# Patient Record
Sex: FEMALE | Race: WHITE | NOT HISPANIC OR LATINO | URBAN - METROPOLITAN AREA
[De-identification: names, ages, dates, MRNs, and addresses within clinical notes are randomized per-mention and may not be internally consistent; named-entity substitution may affect disease eponyms.]

---

## 2017-07-26 ENCOUNTER — OUTPATIENT (OUTPATIENT)
Dept: OUTPATIENT SERVICES | Facility: HOSPITAL | Age: 57
LOS: 1 days | Discharge: HOME | End: 2017-07-26

## 2017-07-26 DIAGNOSIS — R55 SYNCOPE AND COLLAPSE: ICD-10-CM

## 2017-07-26 DIAGNOSIS — R42 DIZZINESS AND GIDDINESS: ICD-10-CM

## 2017-07-26 DIAGNOSIS — Z12.31 ENCOUNTER FOR SCREENING MAMMOGRAM FOR MALIGNANT NEOPLASM OF BREAST: ICD-10-CM

## 2017-07-29 ENCOUNTER — EMERGENCY (EMERGENCY)
Facility: HOSPITAL | Age: 57
LOS: 0 days | Discharge: HOME | End: 2017-07-29

## 2017-07-29 DIAGNOSIS — Z79.899 OTHER LONG TERM (CURRENT) DRUG THERAPY: ICD-10-CM

## 2017-07-29 DIAGNOSIS — M62.830 MUSCLE SPASM OF BACK: ICD-10-CM

## 2017-07-29 DIAGNOSIS — M54.6 PAIN IN THORACIC SPINE: ICD-10-CM

## 2017-07-29 DIAGNOSIS — R55 SYNCOPE AND COLLAPSE: ICD-10-CM

## 2017-07-29 DIAGNOSIS — I10 ESSENTIAL (PRIMARY) HYPERTENSION: ICD-10-CM

## 2017-07-29 DIAGNOSIS — Z79.82 LONG TERM (CURRENT) USE OF ASPIRIN: ICD-10-CM

## 2017-07-29 DIAGNOSIS — Z98.890 OTHER SPECIFIED POSTPROCEDURAL STATES: ICD-10-CM

## 2017-07-29 DIAGNOSIS — Z88.2 ALLERGY STATUS TO SULFONAMIDES: ICD-10-CM

## 2017-07-29 DIAGNOSIS — R42 DIZZINESS AND GIDDINESS: ICD-10-CM

## 2017-07-29 DIAGNOSIS — E78.00 PURE HYPERCHOLESTEROLEMIA, UNSPECIFIED: ICD-10-CM

## 2017-07-31 DIAGNOSIS — N95.9 UNSPECIFIED MENOPAUSAL AND PERIMENOPAUSAL DISORDER: ICD-10-CM

## 2017-07-31 DIAGNOSIS — Z13.820 ENCOUNTER FOR SCREENING FOR OSTEOPOROSIS: ICD-10-CM

## 2017-08-04 ENCOUNTER — OUTPATIENT (OUTPATIENT)
Dept: OUTPATIENT SERVICES | Facility: HOSPITAL | Age: 57
LOS: 1 days | Discharge: HOME | End: 2017-08-04

## 2017-08-04 DIAGNOSIS — R55 SYNCOPE AND COLLAPSE: ICD-10-CM

## 2017-08-04 DIAGNOSIS — R42 DIZZINESS AND GIDDINESS: ICD-10-CM

## 2017-08-04 DIAGNOSIS — R92.8 OTHER ABNORMAL AND INCONCLUSIVE FINDINGS ON DIAGNOSTIC IMAGING OF BREAST: ICD-10-CM

## 2017-08-08 DIAGNOSIS — Z01.419 ENCOUNTER FOR GYNECOLOGICAL EXAMINATION (GENERAL) (ROUTINE) WITHOUT ABNORMAL FINDINGS: ICD-10-CM

## 2017-09-21 ENCOUNTER — EMERGENCY (EMERGENCY)
Facility: HOSPITAL | Age: 57
LOS: 0 days | Discharge: HOME | End: 2017-09-21

## 2017-09-21 DIAGNOSIS — R11.0 NAUSEA: ICD-10-CM

## 2017-09-21 DIAGNOSIS — E78.00 PURE HYPERCHOLESTEROLEMIA, UNSPECIFIED: ICD-10-CM

## 2017-09-21 DIAGNOSIS — R10.32 LEFT LOWER QUADRANT PAIN: ICD-10-CM

## 2017-09-21 DIAGNOSIS — R42 DIZZINESS AND GIDDINESS: ICD-10-CM

## 2017-09-21 DIAGNOSIS — Z79.899 OTHER LONG TERM (CURRENT) DRUG THERAPY: ICD-10-CM

## 2017-09-21 DIAGNOSIS — R10.30 LOWER ABDOMINAL PAIN, UNSPECIFIED: ICD-10-CM

## 2017-09-21 DIAGNOSIS — R55 SYNCOPE AND COLLAPSE: ICD-10-CM

## 2017-09-21 DIAGNOSIS — Z88.2 ALLERGY STATUS TO SULFONAMIDES: ICD-10-CM

## 2017-09-21 DIAGNOSIS — Z79.82 LONG TERM (CURRENT) USE OF ASPIRIN: ICD-10-CM

## 2017-09-21 DIAGNOSIS — Z98.890 OTHER SPECIFIED POSTPROCEDURAL STATES: ICD-10-CM

## 2017-09-22 ENCOUNTER — EMERGENCY (EMERGENCY)
Facility: HOSPITAL | Age: 57
LOS: 0 days | Discharge: HOME | End: 2017-09-22
Admitting: INTERNAL MEDICINE

## 2017-09-22 DIAGNOSIS — Z79.899 OTHER LONG TERM (CURRENT) DRUG THERAPY: ICD-10-CM

## 2017-09-22 DIAGNOSIS — R35.0 FREQUENCY OF MICTURITION: ICD-10-CM

## 2017-09-22 DIAGNOSIS — Z79.82 LONG TERM (CURRENT) USE OF ASPIRIN: ICD-10-CM

## 2017-09-22 DIAGNOSIS — R55 SYNCOPE AND COLLAPSE: ICD-10-CM

## 2017-09-22 DIAGNOSIS — K65.9 PERITONITIS, UNSPECIFIED: ICD-10-CM

## 2017-09-22 DIAGNOSIS — R11.0 NAUSEA: ICD-10-CM

## 2017-09-22 DIAGNOSIS — Z88.2 ALLERGY STATUS TO SULFONAMIDES: ICD-10-CM

## 2017-09-22 DIAGNOSIS — R10.32 LEFT LOWER QUADRANT PAIN: ICD-10-CM

## 2017-09-22 DIAGNOSIS — R42 DIZZINESS AND GIDDINESS: ICD-10-CM

## 2017-09-22 DIAGNOSIS — E78.00 PURE HYPERCHOLESTEROLEMIA, UNSPECIFIED: ICD-10-CM

## 2017-10-24 ENCOUNTER — APPOINTMENT (OUTPATIENT)
Dept: SURGERY | Facility: CLINIC | Age: 57
End: 2017-10-24
Payer: COMMERCIAL

## 2017-10-24 VITALS
SYSTOLIC BLOOD PRESSURE: 124 MMHG | WEIGHT: 179 LBS | DIASTOLIC BLOOD PRESSURE: 72 MMHG | BODY MASS INDEX: 30.56 KG/M2 | HEIGHT: 64 IN

## 2017-10-24 DIAGNOSIS — Z80.1 FAMILY HISTORY OF MALIGNANT NEOPLASM OF TRACHEA, BRONCHUS AND LUNG: ICD-10-CM

## 2017-10-24 DIAGNOSIS — Z82.49 FAMILY HISTORY OF ISCHEMIC HEART DISEASE AND OTHER DISEASES OF THE CIRCULATORY SYSTEM: ICD-10-CM

## 2017-10-24 DIAGNOSIS — Z80.0 FAMILY HISTORY OF MALIGNANT NEOPLASM OF DIGESTIVE ORGANS: ICD-10-CM

## 2017-10-24 DIAGNOSIS — Z84.1 FAMILY HISTORY OF DISORDERS OF KIDNEY AND URETER: ICD-10-CM

## 2017-10-24 DIAGNOSIS — Z87.891 PERSONAL HISTORY OF NICOTINE DEPENDENCE: ICD-10-CM

## 2017-10-24 DIAGNOSIS — K86.1 OTHER CHRONIC PANCREATITIS: ICD-10-CM

## 2017-10-24 DIAGNOSIS — K58.9 IRRITABLE BOWEL SYNDROME W/OUT DIARRHEA: ICD-10-CM

## 2017-10-24 DIAGNOSIS — K57.90 DIVERTICULOSIS OF INTESTINE, PART UNSPECIFIED, W/OUT PERFORATION OR ABSCESS W/OUT BLEEDING: ICD-10-CM

## 2017-10-24 DIAGNOSIS — R42 DIZZINESS AND GIDDINESS: ICD-10-CM

## 2017-10-24 DIAGNOSIS — K59.00 CONSTIPATION, UNSPECIFIED: ICD-10-CM

## 2017-10-24 DIAGNOSIS — M54.5 LOW BACK PAIN: ICD-10-CM

## 2017-10-24 DIAGNOSIS — Z87.442 PERSONAL HISTORY OF URINARY CALCULI: ICD-10-CM

## 2017-10-24 PROCEDURE — 99203 OFFICE O/P NEW LOW 30 MIN: CPT

## 2017-10-31 PROBLEM — Z87.891 FORMER SMOKER: Status: ACTIVE | Noted: 2017-10-24

## 2017-10-31 PROBLEM — K86.1 CHRONIC PANCREATITIS: Status: ACTIVE | Noted: 2017-10-24

## 2017-10-31 PROBLEM — Z82.49 FAMILY HISTORY OF CARDIAC DISORDER: Status: ACTIVE | Noted: 2017-10-24

## 2017-10-31 PROBLEM — K58.9 IRRITABLE BOWEL SYNDROME: Status: ACTIVE | Noted: 2017-10-24

## 2017-10-31 PROBLEM — Z87.442 HISTORY OF KIDNEY STONES: Status: ACTIVE | Noted: 2017-10-24

## 2017-10-31 PROBLEM — Z84.1 FAMILY HISTORY OF RENAL FAILURE: Status: ACTIVE | Noted: 2017-10-24

## 2017-10-31 PROBLEM — M54.5 LOWER BACK PAIN: Status: ACTIVE | Noted: 2017-10-24

## 2017-10-31 PROBLEM — R42 LIGHTHEADEDNESS: Status: ACTIVE | Noted: 2017-10-24

## 2017-10-31 PROBLEM — Z80.0 FAMILY HISTORY OF PANCREATIC CANCER: Status: ACTIVE | Noted: 2017-10-24

## 2017-10-31 PROBLEM — Z80.1 FAMILY HISTORY OF LUNG CANCER: Status: ACTIVE | Noted: 2017-10-24

## 2017-10-31 PROBLEM — Z80.0 FAMILY HISTORY OF MALIGNANT NEOPLASM OF STOMACH: Status: ACTIVE | Noted: 2017-10-24

## 2017-10-31 PROBLEM — K59.00 CONSTIPATION: Status: ACTIVE | Noted: 2017-10-24

## 2017-11-21 ENCOUNTER — APPOINTMENT (OUTPATIENT)
Dept: SURGERY | Facility: CLINIC | Age: 57
End: 2017-11-21

## 2018-02-22 ENCOUNTER — EMERGENCY (EMERGENCY)
Facility: HOSPITAL | Age: 58
LOS: 0 days | Discharge: HOME | End: 2018-02-22
Attending: EMERGENCY MEDICINE

## 2018-02-22 VITALS
TEMPERATURE: 97 F | SYSTOLIC BLOOD PRESSURE: 109 MMHG | HEART RATE: 73 BPM | RESPIRATION RATE: 18 BRPM | OXYGEN SATURATION: 99 % | DIASTOLIC BLOOD PRESSURE: 68 MMHG

## 2018-02-22 VITALS
WEIGHT: 179.9 LBS | HEART RATE: 65 BPM | OXYGEN SATURATION: 100 % | SYSTOLIC BLOOD PRESSURE: 145 MMHG | RESPIRATION RATE: 18 BRPM | DIASTOLIC BLOOD PRESSURE: 83 MMHG | TEMPERATURE: 97 F | HEIGHT: 64 IN

## 2018-02-22 DIAGNOSIS — G89.18 OTHER ACUTE POSTPROCEDURAL PAIN: ICD-10-CM

## 2018-02-22 DIAGNOSIS — Z88.2 ALLERGY STATUS TO SULFONAMIDES: ICD-10-CM

## 2018-02-22 DIAGNOSIS — M79.675 PAIN IN LEFT TOE(S): ICD-10-CM

## 2018-02-22 DIAGNOSIS — M79.676 PAIN IN UNSPECIFIED TOE(S): ICD-10-CM

## 2018-02-22 DIAGNOSIS — Z98.890 OTHER SPECIFIED POSTPROCEDURAL STATES: Chronic | ICD-10-CM

## 2018-02-22 DIAGNOSIS — Z98.890 OTHER SPECIFIED POSTPROCEDURAL STATES: ICD-10-CM

## 2018-02-22 LAB
HCT VFR BLD CALC: 39.2 % — SIGNIFICANT CHANGE UP (ref 37–47)
HGB BLD-MCNC: 12.8 G/DL — LOW (ref 14–18)
MCHC RBC-ENTMCNC: 29.3 PG — SIGNIFICANT CHANGE UP (ref 27–31)
MCHC RBC-ENTMCNC: 32.7 G/DL — SIGNIFICANT CHANGE UP (ref 32–37)
MCV RBC AUTO: 89.7 FL — SIGNIFICANT CHANGE UP (ref 81–91)
NRBC # BLD: 0 /100 WBCS — SIGNIFICANT CHANGE UP (ref 0–0)
PLATELET # BLD AUTO: 240 K/UL — SIGNIFICANT CHANGE UP (ref 130–400)
RBC # BLD: 4.37 M/UL — SIGNIFICANT CHANGE UP (ref 4.2–5.4)
RBC # FLD: 12.6 % — SIGNIFICANT CHANGE UP (ref 11.5–14.5)
WBC # BLD: 6.34 K/UL — SIGNIFICANT CHANGE UP (ref 4.8–10.8)
WBC # FLD AUTO: 6.34 K/UL — SIGNIFICANT CHANGE UP (ref 4.8–10.8)

## 2018-02-22 RX ORDER — AMPICILLIN SODIUM AND SULBACTAM SODIUM 250; 125 MG/ML; MG/ML
3 INJECTION, POWDER, FOR SUSPENSION INTRAMUSCULAR; INTRAVENOUS EVERY 6 HOURS
Qty: 0 | Refills: 0 | Status: DISCONTINUED | OUTPATIENT
Start: 2018-02-22 | End: 2018-02-22

## 2018-02-22 RX ORDER — OXYCODONE AND ACETAMINOPHEN 5; 325 MG/1; MG/1
2 TABLET ORAL ONCE
Qty: 0 | Refills: 0 | Status: DISCONTINUED | OUTPATIENT
Start: 2018-02-22 | End: 2018-02-22

## 2018-02-22 RX ADMIN — OXYCODONE AND ACETAMINOPHEN 2 TABLET(S): 5; 325 TABLET ORAL at 06:49

## 2018-02-22 RX ADMIN — AMPICILLIN SODIUM AND SULBACTAM SODIUM 200 GRAM(S): 250; 125 INJECTION, POWDER, FOR SUSPENSION INTRAMUSCULAR; INTRAVENOUS at 06:49

## 2018-02-22 NOTE — ED PROVIDER NOTE - ATTENDING CONTRIBUTION TO CARE
agree with above. 57 yF not immunocompromised p/w pain to surgical area where she had to e surgery 2 weeks ago -   erythema to toe and mildly over dorsum of foot. no systemic symptoms FROM toes, soft compartments no paresthesias no calf pain .  NVI. plan iv abx -  cbc  follow up oupt surgeon return to ED instructions outpt abx

## 2018-02-22 NOTE — ED PROVIDER NOTE - NS ED ROS FT
Pt denied fvr/chills, HA, visual changes, dizziness, light headedness, presyncope, LOC, CP, FISHER, PND, SOB, cough, hemoptysis, abd pain, n/v/d, changes in bowel or bladder habits, LE edema, numbness, weakness, changes in gait.  The pt also denied recent travel outside of the country, recent illnesses, sick contacts, recent airplane trips or periods of immobility/bedbound.

## 2018-02-22 NOTE — ED PROVIDER NOTE - PHYSICAL EXAMINATION
AOx4, Non toxic appearing, NAD. Skin  warm and dry, no acute rash. PERRLA/EOM, conjunctiva and sclera clear. MM moist, no nasal discharge.  Pharynx and TM's unremarkable. Neck supple nt, no meningeal signs. Heart RRR s1s2 nl, no rub/murmur. Lungs- BS equal, CTAB. Abdomen soft ntnd no r/g. Extremities- moves all, +equal distal pulses, sensation wnl, normal ROM. No LE edema, calves nttp b/l.  Left 4th toe post op with surrounding erythema no fluctuance, nv intact distally, soft compartments.

## 2018-02-22 NOTE — ED ADULT TRIAGE NOTE - CHIEF COMPLAINT QUOTE
pt c/o of L foot pain for past 2 days. post foot sx 2 weeks. pt states she has redness and swelling  denies drainage from incision site.

## 2018-02-22 NOTE — ED PROVIDER NOTE - OBJECTIVE STATEMENT
58 yo F hx of toe surgery 2 weeks ago p/w increasing toe pain since yesterday not responsive to tramadol ehre for evaluation. pain was improving for the first 10 days then began getting suddenly worse. denies trauma.

## 2018-03-28 ENCOUNTER — OUTPATIENT (OUTPATIENT)
Dept: OUTPATIENT SERVICES | Facility: HOSPITAL | Age: 58
LOS: 1 days | Discharge: HOME | End: 2018-03-28

## 2018-03-28 DIAGNOSIS — Z98.890 OTHER SPECIFIED POSTPROCEDURAL STATES: Chronic | ICD-10-CM

## 2018-04-13 ENCOUNTER — OUTPATIENT (OUTPATIENT)
Dept: OUTPATIENT SERVICES | Facility: HOSPITAL | Age: 58
LOS: 1 days | Discharge: HOME | End: 2018-04-13

## 2018-04-13 DIAGNOSIS — N39.0 URINARY TRACT INFECTION, SITE NOT SPECIFIED: ICD-10-CM

## 2018-04-13 DIAGNOSIS — E78.00 PURE HYPERCHOLESTEROLEMIA, UNSPECIFIED: ICD-10-CM

## 2018-04-13 DIAGNOSIS — E11.9 TYPE 2 DIABETES MELLITUS WITHOUT COMPLICATIONS: ICD-10-CM

## 2018-04-13 DIAGNOSIS — D64.9 ANEMIA, UNSPECIFIED: ICD-10-CM

## 2018-04-13 DIAGNOSIS — Z98.890 OTHER SPECIFIED POSTPROCEDURAL STATES: Chronic | ICD-10-CM

## 2018-04-13 DIAGNOSIS — E03.9 HYPOTHYROIDISM, UNSPECIFIED: ICD-10-CM

## 2018-04-13 DIAGNOSIS — E55.9 VITAMIN D DEFICIENCY, UNSPECIFIED: ICD-10-CM

## 2018-04-13 DIAGNOSIS — C73 MALIGNANT NEOPLASM OF THYROID GLAND: ICD-10-CM

## 2018-04-13 DIAGNOSIS — D51.9 VITAMIN B12 DEFICIENCY ANEMIA, UNSPECIFIED: ICD-10-CM

## 2018-04-13 DIAGNOSIS — N18.9 CHRONIC KIDNEY DISEASE, UNSPECIFIED: ICD-10-CM

## 2018-04-20 ENCOUNTER — OUTPATIENT (OUTPATIENT)
Dept: OUTPATIENT SERVICES | Facility: HOSPITAL | Age: 58
LOS: 1 days | Discharge: HOME | End: 2018-04-20

## 2018-04-20 DIAGNOSIS — Z98.890 OTHER SPECIFIED POSTPROCEDURAL STATES: Chronic | ICD-10-CM

## 2018-04-23 DIAGNOSIS — K02.52 DENTAL CARIES ON PIT AND FISSURE SURFACE PENETRATING INTO DENTIN: ICD-10-CM

## 2018-05-04 ENCOUNTER — OUTPATIENT (OUTPATIENT)
Dept: OUTPATIENT SERVICES | Facility: HOSPITAL | Age: 58
LOS: 1 days | Discharge: HOME | End: 2018-05-04

## 2018-05-04 DIAGNOSIS — Z98.890 OTHER SPECIFIED POSTPROCEDURAL STATES: Chronic | ICD-10-CM

## 2018-05-04 DIAGNOSIS — Z98.818 OTHER DENTAL PROCEDURE STATUS: ICD-10-CM

## 2018-05-11 ENCOUNTER — OUTPATIENT (OUTPATIENT)
Dept: OUTPATIENT SERVICES | Facility: HOSPITAL | Age: 58
LOS: 1 days | Discharge: HOME | End: 2018-05-11

## 2018-05-11 DIAGNOSIS — M79.609 PAIN IN UNSPECIFIED LIMB: ICD-10-CM

## 2018-05-11 DIAGNOSIS — Z98.890 OTHER SPECIFIED POSTPROCEDURAL STATES: Chronic | ICD-10-CM

## 2018-06-12 ENCOUNTER — EMERGENCY (EMERGENCY)
Facility: HOSPITAL | Age: 58
LOS: 0 days | Discharge: HOME | End: 2018-06-12
Attending: EMERGENCY MEDICINE | Admitting: EMERGENCY MEDICINE

## 2018-06-12 VITALS
TEMPERATURE: 99 F | OXYGEN SATURATION: 98 % | RESPIRATION RATE: 18 BRPM | DIASTOLIC BLOOD PRESSURE: 73 MMHG | SYSTOLIC BLOOD PRESSURE: 147 MMHG | HEART RATE: 68 BPM

## 2018-06-12 VITALS
TEMPERATURE: 97 F | SYSTOLIC BLOOD PRESSURE: 130 MMHG | DIASTOLIC BLOOD PRESSURE: 85 MMHG | OXYGEN SATURATION: 97 % | HEART RATE: 62 BPM | RESPIRATION RATE: 18 BRPM
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
## 2018-06-12 DIAGNOSIS — S39.012A STRAIN OF MUSCLE, FASCIA AND TENDON OF LOWER BACK, INITIAL ENCOUNTER: ICD-10-CM
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
## 2018-06-12 DIAGNOSIS — M54.2 CERVICALGIA: ICD-10-CM

## 2018-06-12 DIAGNOSIS — E78.00 PURE HYPERCHOLESTEROLEMIA, UNSPECIFIED: ICD-10-CM

## 2018-06-12 DIAGNOSIS — Z79.82 LONG TERM (CURRENT) USE OF ASPIRIN: ICD-10-CM

## 2018-06-12 DIAGNOSIS — Z79.2 LONG TERM (CURRENT) USE OF ANTIBIOTICS: ICD-10-CM

## 2018-06-12 DIAGNOSIS — Z98.890 OTHER SPECIFIED POSTPROCEDURAL STATES: Chronic | ICD-10-CM
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
## 2018-06-12 RX ORDER — METHOCARBAMOL 500 MG/1
1000 TABLET, FILM COATED ORAL ONCE
Qty: 0 | Refills: 0 | Status: COMPLETED | OUTPATIENT
Start: 2018-06-12 | End: 2018-06-12

## 2018-06-12 RX ORDER — DIAZEPAM 5 MG
5 TABLET ORAL ONCE
Qty: 0 | Refills: 0 | Status: DISCONTINUED | OUTPATIENT
Start: 2018-06-12 | End: 2018-06-12

## 2018-06-12 RX ORDER — KETOROLAC TROMETHAMINE 30 MG/ML
30 SYRINGE (ML) INJECTION ONCE
Qty: 0 | Refills: 0 | Status: DISCONTINUED | OUTPATIENT
Start: 2018-06-12 | End: 2018-06-12

## 2018-06-12 RX ADMIN — METHOCARBAMOL 1000 MILLIGRAM(S): 500 TABLET, FILM COATED ORAL at 11:05

## 2018-06-12 RX ADMIN — Medication 5 MILLIGRAM(S): at 12:34

## 2018-06-12 RX ADMIN — Medication 30 MILLIGRAM(S): at 11:04

## 2018-06-12 NOTE — ED PROVIDER NOTE - PHYSICAL EXAMINATION
AOx4, Non toxic appearing, uncomfortable appearing, speaking in full sentences. Skin  warm and dry, no acute rash. Head normocephalic, atraumatic. PERRLA/EOMI, conjunctiva and sclera clear. MM moist, no nasal discharge.  Pharynx and TM's unremarkable.  No mastoid or temporal ttp. Neck supple nt, no meningeal signs, ttp over L strap muscles. Upper back ttp over L side superior to scapula. Heart RRR s1s2 nl, no rub/murmur. Lungs- No retractions, BS equal. Extremities- normal ROM.

## 2018-06-12 NOTE — ED PROVIDER NOTE - OBJECTIVE STATEMENT
58 y/o F p/w L sided upper back pain radiating into neck x 1 week, worse w/ movement. pt has been taking ibuprofen with no relief. Pt states she had same pain in past from a muscle strain and it improved w/ toradol, trigger point inject and physical therapy. Denies fever, chills, HA, rash.

## 2018-06-12 NOTE — ED PROVIDER NOTE - ATTENDING CONTRIBUTION TO CARE
I personally evaluated the patient. I reviewed the Resident’s or Physician Assistant’s note (as assigned above), and agree with the findings and plan except as documented in my note.  pt with left parascapular pain and neck/trap spasm, worse with rotation left shoulder and neck flexion/rotation to right, no rash, cor reg lungs cta abd snt neuro intact, improved with medication/therapy, will d/c to f/u with PT. Patient counseled regarding conditions which should prompt return.

## 2018-06-12 NOTE — ED PROVIDER NOTE - NS ED ROS FT
Constitutional: See HPI.  Eyes: No visual changes, eye pain or discharge.  ENMT: No hearing changes, pain, discharge or infections. +neck pain, stiffness.  Cardiac: No chest pain, SOB or edema. No chest pain with exertion.  Respiratory: No cough or respiratory distress.   GI: No nausea, vomiting, diarrhea or abdominal pain.  : No dysuria, frequency or burning.  MS: No myalgia, muscle weakness, joint pain. +back pain.  Neuro: No headache or weakness. No LOC.  Skin: No skin rash.

## 2018-07-27 ENCOUNTER — OUTPATIENT (OUTPATIENT)
Dept: OUTPATIENT SERVICES | Facility: HOSPITAL | Age: 58
LOS: 1 days | Discharge: HOME | End: 2018-07-27

## 2018-07-27 DIAGNOSIS — Z12.31 ENCOUNTER FOR SCREENING MAMMOGRAM FOR MALIGNANT NEOPLASM OF BREAST: ICD-10-CM

## 2018-07-27 DIAGNOSIS — Z98.890 OTHER SPECIFIED POSTPROCEDURAL STATES: Chronic | ICD-10-CM

## 2018-07-27 PROBLEM — E78.00 PURE HYPERCHOLESTEROLEMIA, UNSPECIFIED: Chronic | Status: ACTIVE | Noted: 2018-06-12

## 2018-07-27 PROBLEM — R00.0 TACHYCARDIA, UNSPECIFIED: Chronic | Status: ACTIVE | Noted: 2018-02-22

## 2018-07-27 PROBLEM — K85.90 ACUTE PANCREATITIS WITHOUT NECROSIS OR INFECTION, UNSPECIFIED: Chronic | Status: ACTIVE | Noted: 2018-02-22

## 2018-08-09 ENCOUNTER — APPOINTMENT (OUTPATIENT)
Dept: OBGYN | Facility: CLINIC | Age: 58
End: 2018-08-09

## 2018-08-10 ENCOUNTER — OUTPATIENT (OUTPATIENT)
Dept: OUTPATIENT SERVICES | Facility: HOSPITAL | Age: 58
LOS: 1 days | Discharge: HOME | End: 2018-08-10

## 2018-08-10 DIAGNOSIS — D64.9 ANEMIA, UNSPECIFIED: ICD-10-CM

## 2018-08-10 DIAGNOSIS — E03.9 HYPOTHYROIDISM, UNSPECIFIED: ICD-10-CM

## 2018-08-10 DIAGNOSIS — E11.9 TYPE 2 DIABETES MELLITUS WITHOUT COMPLICATIONS: ICD-10-CM

## 2018-08-10 DIAGNOSIS — E55.9 VITAMIN D DEFICIENCY, UNSPECIFIED: ICD-10-CM

## 2018-08-10 DIAGNOSIS — D51.9 VITAMIN B12 DEFICIENCY ANEMIA, UNSPECIFIED: ICD-10-CM

## 2018-08-10 DIAGNOSIS — N18.9 CHRONIC KIDNEY DISEASE, UNSPECIFIED: ICD-10-CM

## 2018-08-10 DIAGNOSIS — E78.00 PURE HYPERCHOLESTEROLEMIA, UNSPECIFIED: ICD-10-CM

## 2018-08-10 DIAGNOSIS — Z98.890 OTHER SPECIFIED POSTPROCEDURAL STATES: Chronic | ICD-10-CM

## 2018-08-16 ENCOUNTER — RESULT REVIEW (OUTPATIENT)
Age: 58
End: 2018-08-16

## 2018-08-16 ENCOUNTER — OUTPATIENT (OUTPATIENT)
Dept: OUTPATIENT SERVICES | Facility: HOSPITAL | Age: 58
LOS: 1 days | Discharge: HOME | End: 2018-08-16

## 2018-08-16 DIAGNOSIS — Z98.890 OTHER SPECIFIED POSTPROCEDURAL STATES: Chronic | ICD-10-CM

## 2018-08-20 DIAGNOSIS — Z01.419 ENCOUNTER FOR GYNECOLOGICAL EXAMINATION (GENERAL) (ROUTINE) WITHOUT ABNORMAL FINDINGS: ICD-10-CM

## 2019-02-08 ENCOUNTER — OUTPATIENT (OUTPATIENT)
Dept: OUTPATIENT SERVICES | Facility: HOSPITAL | Age: 59
LOS: 1 days | Discharge: HOME | End: 2019-02-08

## 2019-02-08 DIAGNOSIS — Z98.890 OTHER SPECIFIED POSTPROCEDURAL STATES: Chronic | ICD-10-CM

## 2019-02-22 ENCOUNTER — OUTPATIENT (OUTPATIENT)
Dept: OUTPATIENT SERVICES | Facility: HOSPITAL | Age: 59
LOS: 1 days | Discharge: HOME | End: 2019-02-22

## 2019-02-22 DIAGNOSIS — Z98.890 OTHER SPECIFIED POSTPROCEDURAL STATES: Chronic | ICD-10-CM

## 2019-04-01 PROBLEM — K57.90 DIVERTICULAR DISEASE: Status: ACTIVE | Noted: 2017-10-31

## 2019-04-24 ENCOUNTER — APPOINTMENT (OUTPATIENT)
Dept: PODIATRY | Facility: CLINIC | Age: 59
End: 2019-04-24
Payer: COMMERCIAL

## 2019-04-24 ENCOUNTER — OUTPATIENT (OUTPATIENT)
Dept: OUTPATIENT SERVICES | Facility: HOSPITAL | Age: 59
LOS: 1 days | Discharge: HOME | End: 2019-04-24

## 2019-04-24 ENCOUNTER — OTHER (OUTPATIENT)
Age: 59
End: 2019-04-24

## 2019-04-24 ENCOUNTER — OUTPATIENT (OUTPATIENT)
Dept: OUTPATIENT SERVICES | Facility: HOSPITAL | Age: 59
LOS: 1 days | Discharge: HOME | End: 2019-04-24
Payer: COMMERCIAL

## 2019-04-24 VITALS
BODY MASS INDEX: 31.58 KG/M2 | WEIGHT: 185 LBS | SYSTOLIC BLOOD PRESSURE: 124 MMHG | DIASTOLIC BLOOD PRESSURE: 76 MMHG | HEART RATE: 71 BPM | HEIGHT: 64 IN

## 2019-04-24 DIAGNOSIS — Z98.890 OTHER SPECIFIED POSTPROCEDURAL STATES: Chronic | ICD-10-CM

## 2019-04-24 DIAGNOSIS — M25.80 OTHER SPECIFIED JOINT DISORDERS, UNSPECIFIED JOINT: ICD-10-CM

## 2019-04-24 PROCEDURE — 29540 STRAPPING ANKLE &/FOOT: CPT

## 2019-04-24 PROCEDURE — 73630 X-RAY EXAM OF FOOT: CPT | Mod: 26,50

## 2019-04-24 PROCEDURE — 99203 OFFICE O/P NEW LOW 30 MIN: CPT | Mod: 25

## 2019-04-24 NOTE — HISTORY OF PRESENT ILLNESS
[FreeTextEntry1] : 58 y.o female with one month h/o of left foot pain. Denies h.o of trauma. Worse during ambulation and relieved with rest.

## 2019-04-24 NOTE — PHYSICAL EXAM
[General Appearance - Alert] : alert [FreeTextEntry1] : pain on palpation of the left tibial sesamoid. No pain on ROM of the left hallux [Full Pulse] : the pedal pulses are present [Skin Color & Pigmentation] : normal skin color and pigmentation [Skin Turgor] : normal skin turgor [] : no rash [Skin Lesions] : no skin lesions

## 2019-04-26 ENCOUNTER — APPOINTMENT (OUTPATIENT)
Dept: PODIATRY | Facility: CLINIC | Age: 59
End: 2019-04-26
Payer: COMMERCIAL

## 2019-04-26 ENCOUNTER — OUTPATIENT (OUTPATIENT)
Dept: OUTPATIENT SERVICES | Facility: HOSPITAL | Age: 59
LOS: 1 days | Discharge: HOME | End: 2019-04-26

## 2019-04-26 VITALS — WEIGHT: 185 LBS | HEIGHT: 64 IN | BODY MASS INDEX: 31.58 KG/M2

## 2019-04-26 DIAGNOSIS — Z98.890 OTHER SPECIFIED POSTPROCEDURAL STATES: Chronic | ICD-10-CM

## 2019-04-26 DIAGNOSIS — M25.80 OTHER SPECIFIED JOINT DISORDERS, UNSPECIFIED JOINT: ICD-10-CM

## 2019-04-26 PROCEDURE — 20600 DRAIN/INJ JOINT/BURSA W/O US: CPT | Mod: LT

## 2019-04-27 PROBLEM — M25.80 SESAMOIDITIS: Status: ACTIVE | Noted: 2019-04-24

## 2019-04-27 NOTE — ASSESSMENT
[FreeTextEntry1] : 58 y.o female with one month h/o of left foot pain. Denies h.o of trauma. Worse during ambulation and relieved with rest. Patient sent for xrays to r/o sesamoid fracture--->negative for fractures. \par \par - periarticular injection performed with 1% lidocaine and 3mg of Celestone for a total of 1.5cc about the sesamoid appartatous. Injection performed under sterile technique\par -return as needed \par

## 2019-04-27 NOTE — PHYSICAL EXAM
[General Appearance - Alert] : alert [Full Pulse] : the pedal pulses are present [Skin Color & Pigmentation] : normal skin color and pigmentation [Skin Turgor] : normal skin turgor [] : no rash [Skin Lesions] : no skin lesions [FreeTextEntry1] : pain on palpation of the left tibial sesamoid. No pain on ROM of the left hallux

## 2019-04-27 NOTE — HISTORY OF PRESENT ILLNESS
[FreeTextEntry1] : 58 y.o female with one month h/o of left foot pain. Denies h.o of trauma. Worse during ambulation and relieved with rest. Patient sent for xrays to r/o sesamoid fracture--->negative for fractures.

## 2019-05-07 ENCOUNTER — APPOINTMENT (OUTPATIENT)
Dept: CARDIOLOGY | Facility: CLINIC | Age: 59
End: 2019-05-07

## 2019-05-20 ENCOUNTER — EMERGENCY (EMERGENCY)
Facility: HOSPITAL | Age: 59
LOS: 0 days | Discharge: HOME | End: 2019-05-20
Attending: EMERGENCY MEDICINE | Admitting: EMERGENCY MEDICINE
Payer: COMMERCIAL

## 2019-05-20 ENCOUNTER — OUTPATIENT (OUTPATIENT)
Dept: OUTPATIENT SERVICES | Facility: HOSPITAL | Age: 59
LOS: 1 days | Discharge: HOME | End: 2019-05-20

## 2019-05-20 VITALS
DIASTOLIC BLOOD PRESSURE: 90 MMHG | HEART RATE: 86 BPM | TEMPERATURE: 98 F | HEIGHT: 64 IN | SYSTOLIC BLOOD PRESSURE: 137 MMHG | RESPIRATION RATE: 18 BRPM | OXYGEN SATURATION: 96 % | WEIGHT: 186.07 LBS

## 2019-05-20 VITALS
HEART RATE: 82 BPM | OXYGEN SATURATION: 97 % | DIASTOLIC BLOOD PRESSURE: 90 MMHG | SYSTOLIC BLOOD PRESSURE: 128 MMHG | RESPIRATION RATE: 18 BRPM | TEMPERATURE: 98 F

## 2019-05-20 DIAGNOSIS — E78.00 PURE HYPERCHOLESTEROLEMIA, UNSPECIFIED: ICD-10-CM

## 2019-05-20 DIAGNOSIS — Z79.82 LONG TERM (CURRENT) USE OF ASPIRIN: ICD-10-CM
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
## 2019-05-20 DIAGNOSIS — S00.11XA CONTUSION OF RIGHT EYELID AND PERIOCULAR AREA, INITIAL ENCOUNTER: ICD-10-CM

## 2019-05-20 DIAGNOSIS — M25.562 PAIN IN LEFT KNEE: ICD-10-CM

## 2019-05-20 DIAGNOSIS — Z79.899 OTHER LONG TERM (CURRENT) DRUG THERAPY: ICD-10-CM

## 2019-05-20 DIAGNOSIS — E55.9 VITAMIN D DEFICIENCY, UNSPECIFIED: ICD-10-CM

## 2019-05-20 DIAGNOSIS — E78.5 HYPERLIPIDEMIA, UNSPECIFIED: ICD-10-CM

## 2019-05-20 DIAGNOSIS — D52.9 FOLATE DEFICIENCY ANEMIA, UNSPECIFIED: ICD-10-CM

## 2019-05-20 DIAGNOSIS — S06.0X0A CONCUSSION WITHOUT LOSS OF CONSCIOUSNESS, INITIAL ENCOUNTER: ICD-10-CM

## 2019-05-20 DIAGNOSIS — R51 HEADACHE: ICD-10-CM

## 2019-05-20 DIAGNOSIS — Z88.2 ALLERGY STATUS TO SULFONAMIDES: ICD-10-CM
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
## 2019-05-20 DIAGNOSIS — R53.83 OTHER FATIGUE: ICD-10-CM

## 2019-05-20 DIAGNOSIS — E13.9 OTHER SPECIFIED DIABETES MELLITUS WITHOUT COMPLICATIONS: ICD-10-CM
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
## 2019-05-20 DIAGNOSIS — E03.9 HYPOTHYROIDISM, UNSPECIFIED: ICD-10-CM

## 2019-05-20 DIAGNOSIS — Z98.890 OTHER SPECIFIED POSTPROCEDURAL STATES: Chronic | ICD-10-CM

## 2019-05-20 DIAGNOSIS — S92.152A DISPLACED AVULSION FRACTURE (CHIP FRACTURE) OF LEFT TALUS, INITIAL ENCOUNTER FOR CLOSED FRACTURE: ICD-10-CM

## 2019-05-20 PROCEDURE — 73562 X-RAY EXAM OF KNEE 3: CPT | Mod: 26,LT

## 2019-05-20 PROCEDURE — 99284 EMERGENCY DEPT VISIT MOD MDM: CPT

## 2019-05-20 PROCEDURE — 73630 X-RAY EXAM OF FOOT: CPT | Mod: 26,LT

## 2019-05-20 RX ORDER — METOCLOPRAMIDE HCL 10 MG
10 TABLET ORAL ONCE
Refills: 0 | Status: COMPLETED | OUTPATIENT
Start: 2019-05-20 | End: 2019-05-20

## 2019-05-20 RX ADMIN — Medication 10 MILLIGRAM(S): at 20:40

## 2019-05-20 NOTE — ED PROVIDER NOTE - CARE PROVIDER_API CALL
Nadir Bragg (DPM)  [facility]  [address], [address]  [place] [address]  Phone: [phone]  Fax: [phone]  Follow Up Time: 1-3 Days
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
## 2019-05-20 NOTE — ED PROVIDER NOTE - CARE PLAN
Principal Discharge DX:	Concussion  Secondary Diagnosis:	Foot fracture, left, closed, initial encounter

## 2019-05-20 NOTE — ED PROVIDER NOTE - PHYSICAL EXAMINATION
NAD non toxic. NCAT EOMI conjunctiva nml. right lower eyelid contusion No nasal discharge. MMM. Neck supple, non tender, full ROM. RRR no MRG +S1S2. CTA b/l. Abd s NT ND +BS. Ext WWP x4, moving all extremities, no edema. 2+ equal pulses throughout. Cooperative, appropriate. CN2-12 grossly intact no sensory or motor deficits throughout, no drift, rapid alternating wnl, no ataxia, neg romberg. left lateral knee tenderness and left lateral foot tenderness along metatarsal margin.
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
## 2019-05-20 NOTE — ED PROVIDER NOTE - NS ED ROS FT
Constitutional: See HPI.  Eyes: + visual changes. no eye pain or discharge.   ENMT: No hearing changes, pain, discharge or infections. No neck pain or stiffness. No limited ROM  Cardiac: No SOB or edema. No chest pain with exertion.  Respiratory: No cough or respiratory distress. No hemoptysis.   GI: No nausea, vomiting, diarrhea or abdominal pain.  : No dysuria, frequency or burning. No Discharge  MS: No myalgia, muscle weakness, joint pain or back pain.  Neuro: +headache. No  weakness. No LOC.  Skin: +contusion. No skin rash.  Except as documented in the HPI, all other systems are negative.

## 2019-05-20 NOTE — ED PROVIDER NOTE - NSFOLLOWUPCLINICS_GEN_ALL_ED_FT
[facility]  Neurology  [address] [place] [address], [facility] [address]  [place] [address]  Phone: [phone]  Fax:   Follow Up Time: 1-3 Days

## 2019-05-24 ENCOUNTER — APPOINTMENT (OUTPATIENT)
Dept: CARDIOLOGY | Facility: CLINIC | Age: 59
End: 2019-05-24

## 2019-07-11 ENCOUNTER — OUTPATIENT (OUTPATIENT)
Dept: OUTPATIENT SERVICES | Facility: HOSPITAL | Age: 59
LOS: 1 days | Discharge: HOME | End: 2019-07-11

## 2019-07-11 DIAGNOSIS — K08.409 PARTIAL LOSS OF TEETH, UNSPECIFIED CAUSE, UNSPECIFIED CLASS: ICD-10-CM

## 2019-07-11 DIAGNOSIS — Z98.890 OTHER SPECIFIED POSTPROCEDURAL STATES: Chronic | ICD-10-CM

## 2019-07-25 ENCOUNTER — APPOINTMENT (OUTPATIENT)
Dept: SURGERY | Facility: CLINIC | Age: 59
End: 2019-07-25

## 2019-08-08 ENCOUNTER — OUTPATIENT (OUTPATIENT)
Dept: OUTPATIENT SERVICES | Facility: HOSPITAL | Age: 59
LOS: 1 days | Discharge: HOME | End: 2019-08-08
Payer: COMMERCIAL

## 2019-08-08 DIAGNOSIS — Z12.31 ENCOUNTER FOR SCREENING MAMMOGRAM FOR MALIGNANT NEOPLASM OF BREAST: ICD-10-CM

## 2019-08-08 DIAGNOSIS — Z98.890 OTHER SPECIFIED POSTPROCEDURAL STATES: Chronic | ICD-10-CM

## 2019-08-08 PROCEDURE — 77067 SCR MAMMO BI INCL CAD: CPT | Mod: 26

## 2019-08-08 PROCEDURE — 77063 BREAST TOMOSYNTHESIS BI: CPT | Mod: 26

## 2019-08-20 ENCOUNTER — LABORATORY RESULT (OUTPATIENT)
Age: 59
End: 2019-08-20

## 2019-08-20 ENCOUNTER — APPOINTMENT (OUTPATIENT)
Dept: OBGYN | Facility: CLINIC | Age: 59
End: 2019-08-20
Payer: COMMERCIAL

## 2019-08-20 ENCOUNTER — OUTPATIENT (OUTPATIENT)
Dept: OUTPATIENT SERVICES | Facility: HOSPITAL | Age: 59
LOS: 1 days | Discharge: HOME | End: 2019-08-20

## 2019-08-20 VITALS
WEIGHT: 186 LBS | RESPIRATION RATE: 18 BRPM | SYSTOLIC BLOOD PRESSURE: 149 MMHG | HEIGHT: 64 IN | HEART RATE: 66 BPM | BODY MASS INDEX: 31.76 KG/M2 | DIASTOLIC BLOOD PRESSURE: 89 MMHG

## 2019-08-20 DIAGNOSIS — Z98.890 OTHER SPECIFIED POSTPROCEDURAL STATES: Chronic | ICD-10-CM

## 2019-08-20 LAB
BILIRUB UR QL STRIP: NORMAL
CLARITY UR: CLEAR
COLLECTION METHOD: NORMAL
GLUCOSE UR-MCNC: NORMAL
HCG UR QL: 0.2 EU/DL
HGB UR QL STRIP.AUTO: NORMAL
KETONES UR-MCNC: NORMAL
LEUKOCYTE ESTERASE UR QL STRIP: NORMAL
NITRITE UR QL STRIP: NORMAL
PH UR STRIP: 6
PROT UR STRIP-MCNC: NORMAL
SP GR UR STRIP: 1.02

## 2019-08-20 PROCEDURE — 99396 PREV VISIT EST AGE 40-64: CPT

## 2019-08-20 PROCEDURE — 81003 URINALYSIS AUTO W/O SCOPE: CPT | Mod: QW

## 2019-08-20 NOTE — PHYSICAL EXAM
[Alert] : alert [Awake] : awake [Soft] : soft [Oriented x3] : oriented to person, place, and time [Normal] : uterus [Uterine Adnexae] : were not tender and not enlarged [No Bleeding] : there was no active vaginal bleeding [Acute Distress] : no acute distress [Mass] : no breast mass [Nipple Discharge] : no nipple discharge [Axillary LAD] : no axillary lymphadenopathy [Tender] : non tender

## 2019-08-22 DIAGNOSIS — Z01.419 ENCOUNTER FOR GYNECOLOGICAL EXAMINATION (GENERAL) (ROUTINE) WITHOUT ABNORMAL FINDINGS: ICD-10-CM

## 2019-08-26 ENCOUNTER — OUTPATIENT (OUTPATIENT)
Dept: OUTPATIENT SERVICES | Facility: HOSPITAL | Age: 59
LOS: 1 days | Discharge: HOME | End: 2019-08-26

## 2019-08-26 DIAGNOSIS — E11.9 TYPE 2 DIABETES MELLITUS WITHOUT COMPLICATIONS: ICD-10-CM

## 2019-08-26 DIAGNOSIS — Z98.890 OTHER SPECIFIED POSTPROCEDURAL STATES: Chronic | ICD-10-CM

## 2019-08-26 DIAGNOSIS — R42 DIZZINESS AND GIDDINESS: ICD-10-CM

## 2019-08-26 DIAGNOSIS — E78.2 MIXED HYPERLIPIDEMIA: ICD-10-CM

## 2019-08-26 DIAGNOSIS — E55.9 VITAMIN D DEFICIENCY, UNSPECIFIED: ICD-10-CM

## 2019-08-26 DIAGNOSIS — E03.9 HYPOTHYROIDISM, UNSPECIFIED: ICD-10-CM

## 2019-08-26 DIAGNOSIS — N18.9 CHRONIC KIDNEY DISEASE, UNSPECIFIED: ICD-10-CM

## 2019-08-26 DIAGNOSIS — E61.1 IRON DEFICIENCY: ICD-10-CM

## 2019-08-26 DIAGNOSIS — D51.9 VITAMIN B12 DEFICIENCY ANEMIA, UNSPECIFIED: ICD-10-CM

## 2019-08-26 DIAGNOSIS — D64.9 ANEMIA, UNSPECIFIED: ICD-10-CM

## 2019-08-27 ENCOUNTER — OUTPATIENT (OUTPATIENT)
Dept: OUTPATIENT SERVICES | Facility: HOSPITAL | Age: 59
LOS: 1 days | Discharge: HOME | End: 2019-08-27

## 2019-08-27 DIAGNOSIS — B82.9 INTESTINAL PARASITISM, UNSPECIFIED: ICD-10-CM

## 2019-08-27 DIAGNOSIS — Z98.890 OTHER SPECIFIED POSTPROCEDURAL STATES: Chronic | ICD-10-CM

## 2019-08-30 ENCOUNTER — EMERGENCY (EMERGENCY)
Facility: HOSPITAL | Age: 59
LOS: 0 days | Discharge: HOME | End: 2019-08-30
Attending: EMERGENCY MEDICINE | Admitting: EMERGENCY MEDICINE
Payer: COMMERCIAL

## 2019-08-30 VITALS
RESPIRATION RATE: 17 BRPM | HEART RATE: 70 BPM | DIASTOLIC BLOOD PRESSURE: 67 MMHG | TEMPERATURE: 98 F | WEIGHT: 190.04 LBS | OXYGEN SATURATION: 98 % | SYSTOLIC BLOOD PRESSURE: 122 MMHG | HEIGHT: 64 IN

## 2019-08-30 DIAGNOSIS — S92.102A UNSPECIFIED FRACTURE OF LEFT TALUS, INITIAL ENCOUNTER FOR CLOSED FRACTURE: ICD-10-CM

## 2019-08-30 DIAGNOSIS — S60.512A ABRASION OF LEFT HAND, INITIAL ENCOUNTER: ICD-10-CM
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
## 2019-08-30 DIAGNOSIS — Z98.890 OTHER SPECIFIED POSTPROCEDURAL STATES: Chronic | ICD-10-CM

## 2019-08-30 DIAGNOSIS — M25.579 PAIN IN UNSPECIFIED ANKLE AND JOINTS OF UNSPECIFIED FOOT: ICD-10-CM

## 2019-08-30 PROCEDURE — 73130 X-RAY EXAM OF HAND: CPT | Mod: 26,LT

## 2019-08-30 PROCEDURE — 99284 EMERGENCY DEPT VISIT MOD MDM: CPT | Mod: 25

## 2019-08-30 PROCEDURE — 73610 X-RAY EXAM OF ANKLE: CPT | Mod: 26,LT

## 2019-08-30 PROCEDURE — 29515 APPLICATION SHORT LEG SPLINT: CPT

## 2019-08-30 NOTE — ED PROVIDER NOTE - PATIENT PORTAL LINK FT
You can access the FollowMyHealth Patient Portal offered by [facility] by registering at the following website: [email or web address]. By joining [facility]’s FollowMyHealth portal, you will also be able to view your health information using other applications (apps) compatible with our system.

## 2019-08-30 NOTE — ED PROVIDER NOTE - PHYSICAL EXAMINATION
VITALS:  I have reviewed the initial vital signs.  GENERAL: Well-developed, well-nourished, in no acute distress. Nontoxic.  HEENT: Sclera clear. Mucous membranes moist.  CARDIO: RRR, nl S1 and S2. No murmurs, rubs, or gallops. 2+ radial and pedal pulses bilaterally.  PULM: Normal effort. No tachypnea or retractions. CTA b/l without wheezes, rales, or rhonchi.  MSK: swelling and ecchymosis to medial left malleolus. FROM to ankles b/l, normal steady gait. no midfoot, tib/fib, or toe ttp. FROM to b/l wrists and fingers. No snuffbox ttp.   SKIN: Warm, dry. Nickel sized superficial abrasion to dorsum of left hand. no active bleeding/drainage. Capillary refill <2 seconds.  NEURO: A&Ox3. Speech clear. 5/5 strength to upper and lower extremities b/l. Sensation intact and equal throughout.

## 2019-08-30 NOTE — ED PROVIDER NOTE - CARE PROVIDER_API CALL
Russell Cain (MD)  Orthopaedic Surgery  [address] [place] [address]  Phone: [phone]  Fax: [phone]  Follow Up Time: 1-3 Days

## 2019-08-30 NOTE — ED PROVIDER NOTE - NS ED ROS FT
HEME: (-) easy bruising, (-) easy bleeding  MSK: see HPI  SKIN: (-) rashes, (-) wounds, (-) pallor, (+) ecchymosis  NEURO: see HPI, (-) numbness, (-) weakness, (-) paresthesias    *all other systems negative except as documented above and in the HPI*

## 2019-08-30 NOTE — ED PROVIDER NOTE - OBJECTIVE STATEMENT
59 year old female w hx of HTN, HLD, on 81 mg ASA daily presents to the ED with constant, moderate, left ankle pain x 4 days. Pain began when she tripped on a pot hole with a grocery shopping cart, falling onto her side with the cart landing on her left ankle. Denies head injury or LOC. Has been ambulating on the ankle w/o difficulty, but states that the bruising and swelling is still present. Denies numbness, paresthesias, weakness. Also endorses pain to her left hand from the fall. No previous injury/surgery to either extremity. Tetanus utd.

## 2019-08-30 NOTE — ED PROVIDER NOTE - ATTENDING CONTRIBUTION TO CARE
Pt with L ankle pain after a fall in pothole and a shopping cart fell onto ankle.  Increased swellinga nd pain after walking on it.    Exam: L medial > lateral malleolus tenderness, 2+ DP pulses, normal sensation, cap refill <2s  Imp: r/o fx  Plan: xr, splint, crutches, ortho

## 2019-09-19 ENCOUNTER — INPATIENT (INPATIENT)
Facility: HOSPITAL | Age: 59
LOS: 3 days | Discharge: HOME | End: 2019-09-23
Attending: INTERNAL MEDICINE | Admitting: INTERNAL MEDICINE
Payer: MEDICAID

## 2019-09-19 VITALS
WEIGHT: 184.97 LBS | SYSTOLIC BLOOD PRESSURE: 147 MMHG | TEMPERATURE: 98 F | RESPIRATION RATE: 16 BRPM | OXYGEN SATURATION: 99 % | DIASTOLIC BLOOD PRESSURE: 73 MMHG | HEIGHT: 66 IN | HEART RATE: 72 BPM

## 2019-09-19 DIAGNOSIS — Z98.890 OTHER SPECIFIED POSTPROCEDURAL STATES: Chronic | ICD-10-CM

## 2019-09-19 LAB
ALBUMIN SERPL ELPH-MCNC: 4.9 G/DL — SIGNIFICANT CHANGE UP (ref 3.5–5.2)
ALP SERPL-CCNC: 82 U/L — SIGNIFICANT CHANGE UP (ref 30–115)
ALT FLD-CCNC: 20 U/L — SIGNIFICANT CHANGE UP (ref 0–41)
ANION GAP SERPL CALC-SCNC: 12 MMOL/L — SIGNIFICANT CHANGE UP (ref 7–14)
AST SERPL-CCNC: 16 U/L — SIGNIFICANT CHANGE UP (ref 0–41)
BILIRUB SERPL-MCNC: 0.5 MG/DL — SIGNIFICANT CHANGE UP (ref 0.2–1.2)
BUN SERPL-MCNC: 15 MG/DL — SIGNIFICANT CHANGE UP (ref 10–20)
CALCIUM SERPL-MCNC: 9.9 MG/DL — SIGNIFICANT CHANGE UP (ref 8.5–10.1)
CHLORIDE SERPL-SCNC: 101 MMOL/L — SIGNIFICANT CHANGE UP (ref 98–110)
CO2 SERPL-SCNC: 28 MMOL/L — SIGNIFICANT CHANGE UP (ref 17–32)
CREAT SERPL-MCNC: 0.8 MG/DL — SIGNIFICANT CHANGE UP (ref 0.7–1.5)
GLUCOSE SERPL-MCNC: 87 MG/DL — SIGNIFICANT CHANGE UP (ref 70–99)
HCT VFR BLD CALC: 40.5 % — SIGNIFICANT CHANGE UP (ref 37–47)
HGB BLD-MCNC: 13.3 G/DL — SIGNIFICANT CHANGE UP (ref 12–16)
INR BLD: 0.98 RATIO — SIGNIFICANT CHANGE UP (ref 0.65–1.3)
MCHC RBC-ENTMCNC: 29 PG — SIGNIFICANT CHANGE UP (ref 27–31)
MCHC RBC-ENTMCNC: 32.8 G/DL — SIGNIFICANT CHANGE UP (ref 32–37)
MCV RBC AUTO: 88.2 FL — SIGNIFICANT CHANGE UP (ref 81–99)
NRBC # BLD: 0 /100 WBCS — SIGNIFICANT CHANGE UP (ref 0–0)
PLATELET # BLD AUTO: 259 K/UL — SIGNIFICANT CHANGE UP (ref 130–400)
POTASSIUM SERPL-MCNC: 4.1 MMOL/L — SIGNIFICANT CHANGE UP (ref 3.5–5)
POTASSIUM SERPL-SCNC: 4.1 MMOL/L — SIGNIFICANT CHANGE UP (ref 3.5–5)
PROT SERPL-MCNC: 7.7 G/DL — SIGNIFICANT CHANGE UP (ref 6–8)
PROTHROM AB SERPL-ACNC: 11.3 SEC — SIGNIFICANT CHANGE UP (ref 9.95–12.87)
RBC # BLD: 4.59 M/UL — SIGNIFICANT CHANGE UP (ref 4.2–5.4)
RBC # FLD: 13 % — SIGNIFICANT CHANGE UP (ref 11.5–14.5)
SODIUM SERPL-SCNC: 141 MMOL/L — SIGNIFICANT CHANGE UP (ref 135–146)
TROPONIN T SERPL-MCNC: <0.01 NG/ML — SIGNIFICANT CHANGE UP
WBC # BLD: 5.19 K/UL — SIGNIFICANT CHANGE UP (ref 4.8–10.8)
WBC # FLD AUTO: 5.19 K/UL — SIGNIFICANT CHANGE UP (ref 4.8–10.8)

## 2019-09-19 PROCEDURE — 99291 CRITICAL CARE FIRST HOUR: CPT

## 2019-09-19 PROCEDURE — 71045 X-RAY EXAM CHEST 1 VIEW: CPT | Mod: 26

## 2019-09-19 PROCEDURE — 99285 EMERGENCY DEPT VISIT HI MDM: CPT

## 2019-09-19 PROCEDURE — 70496 CT ANGIOGRAPHY HEAD: CPT | Mod: 26

## 2019-09-19 PROCEDURE — 70498 CT ANGIOGRAPHY NECK: CPT | Mod: 26

## 2019-09-19 PROCEDURE — 70450 CT HEAD/BRAIN W/O DYE: CPT | Mod: 26,59

## 2019-09-19 RX ORDER — METOPROLOL TARTRATE 50 MG
25 TABLET ORAL
Refills: 0 | Status: DISCONTINUED | OUTPATIENT
Start: 2019-09-19 | End: 2019-09-23

## 2019-09-19 RX ADMIN — Medication 100 MILLIGRAM(S): at 19:04

## 2019-09-19 RX ADMIN — Medication 25 MILLIGRAM(S): at 19:05

## 2019-09-19 NOTE — ED PROVIDER NOTE - CARE PLAN
Principal Discharge DX:	Weakness Principal Discharge DX:	Weakness  Secondary Diagnosis:	Speech abnormality

## 2019-09-19 NOTE — H&P ADULT - NSHPREVIEWOFSYSTEMS_GEN_ALL_CORE
occasional HA, -dizziness  -fevers/chills, n/v/d  -cough  -chest pain/palpitations/sob  L ankle pain, reports she had an injury

## 2019-09-19 NOTE — ED PROVIDER NOTE - OBJECTIVE STATEMENT
pt is a 59 yof w/ HLD, dizziness here for sudden dysarthria since 11AM with generalized weakness. pt was being worked up for lyme, started doxycycline last night. pt denies any fever, cough, sob, abd pain, diarrhea, nausea

## 2019-09-19 NOTE — H&P ADULT - NSHPLABSRESULTS_GEN_ALL_CORE
13.3   5.19  )-----------( 259      ( 19 Sep 2019 14:02 )             40.5       09-19    141  |  101  |  15  ----------------------------<  87  4.1   |  28  |  0.8    Ca    9.9      19 Sep 2019 14:02    TPro  7.7  /  Alb  4.9  /  TBili  0.5  /  DBili  x   /  AST  16  /  ALT  20  /  AlkPhos  82  09-19        PT/INR - ( 19 Sep 2019 14:02 )   PT: 11.30 sec;   INR: 0.98 ratio         Lactate Trend  CARDIAC MARKERS ( 19 Sep 2019 14:02 )  x     / <0.01 ng/mL / x     / x     / x            CAPILLARY BLOOD GLUCOSE      POCT Blood Glucose.: 76 mg/dL (19 Sep 2019 13:11)    EXAM:  CT ANGIO BRAIN (W)AW IC                PROCEDURE DATE:  09/19/2019        Impression:    Unremarkable CT angiogram of the neck and head.                EXAM:  CT BRAIN STROKE PROTOCOL            PROCEDURE DATE:  09/19/2019            INTERPRETATION:  Clinical History / Reason for exam: Speech difficulty   since 11 am. Generalized weakness. Stroke code.         IMPRESSION:      No evidence of recent infarct or acute intracranial hemorrhage.      Spoke with CEDRICK CARRILLO on 9/19/2019 1:48 PM with readback.            EXAM:  XR CHEST PORTABLE URGENT 1V            PROCEDURE DATE:  09/19/2019            INTERPRETATION:  CLINICAL HISTORY: stroke code.        IMPRESSION:      No radiographic evidence of acute cardiopulmonary disease.            EXAM:  XR ANKLE COMP MIN 3 VIEWS LT            PROCEDURE DATE:  08/30/2019          Impression:    Negative study.

## 2019-09-19 NOTE — H&P ADULT - HISTORY OF PRESENT ILLNESS
58 y/o female PMHx sig for HTN, Pt reports she has been having some vague symptoms, including dizziness and weakness, today it progressed. she was very weak, having difficulty finding her words, denies HA or fevers or chills. States she has a baseline light sensitivity that has not worsened.  She was recently diagnosed w/ Lyme disease and started on abx.

## 2019-09-19 NOTE — H&P ADULT - ASSESSMENT
Pt w/ sluggishness and difficulty finding her words, reports symptoms improving since arrival, admitted for r/o CVA, symptoms may be secondary to early disseminated Lyme disease.  Will send B. burgdorferi Ab, continue doxycyline 100mg PO BID and obtain ID and neuro evals.  Ankle xray was negative 8/30/19, probably strain, nsaids for pain.  Admit to CCU tele

## 2019-09-19 NOTE — ED PROVIDER NOTE - CLINICAL SUMMARY MEDICAL DECISION MAKING FREE TEXT BOX
Stroke code called on evaluation.  Not tpa candidate.  Results d/w patient, Neuro, Plan for admisison.

## 2019-09-19 NOTE — ED PROVIDER NOTE - NS ED ROS FT
Constitutional:  See HPI  Eyes:  No visual changes  ENMT: No neck pain or stiffness  Cardiac:  No chest pain  Respiratory:  No cough or respiratory distress.   GI:  No nausea, vomiting, diarrhea or abdominal pain.  :  No dysuria, frequency or burning.  MS:  No back pain.  Neuro:  No headache. +weakness, +dysarthria  Skin:  No skin rash  Except as documented in the HPI,  all other systems are negative

## 2019-09-19 NOTE — H&P ADULT - NSHPPHYSICALEXAM_GEN_ALL_CORE
Pt is responsive and alert, somewhat sluggish, no distress  eyes: PERRLA, EOMI  o/p: moist mucosa  neck: -jvd/narayan  lungs: cta b/l -r/r/w  heart: S1S2 RRR  abd: soft, nt, nd  ext: L ankle mild swelling mild tenderness  neuro: moving all extremities, following commands, somewhat sluggish

## 2019-09-19 NOTE — ED PROVIDER NOTE - PHYSICAL EXAMINATION
CONSTITUTIONAL: NAD  SKIN: Warm dry  HEAD: NCAT  EYES: NL inspection  ENT: MMM  NECK: Supple; non tender.  CARD: RRR  RESP: CTAB  ABD: S/NT no R/G  EXT: no pedal edema  NEURO: no focal deficits. CN 2 - 12 intact. dysarthric on exam. no visual deficits. no cerebellar signs.  PSYCH: Cooperative, appropriate.

## 2019-09-19 NOTE — ED ADULT NURSE NOTE - NSIMPLEMENTINTERV_GEN_ALL_ED
Implemented All Fall Risk Interventions:  [place] to call system. Call bell, personal items and telephone within reach. Instruct patient to call for assistance. Room bathroom lighting operational. Non-slip footwear when patient is off stretcher. Physically safe environment: no spills, clutter or unnecessary equipment. Stretcher in lowest position, wheels locked, appropriate side rails in place. Provide visual cue, wrist band, yellow gown, etc. Monitor gait and stability. Monitor for mental status changes and reorient to person, place, and time. Review medications for side effects contributing to fall risk. Reinforce activity limits and safety measures with patient and family.

## 2019-09-19 NOTE — H&P ADULT - NSICDXPASTMEDICALHX_GEN_ALL_CORE_FT
PAST MEDICAL HISTORY:  High blood cholesterol     Pancreatitis     POTS (postural orthostatic tachycardia syndrome)

## 2019-09-19 NOTE — ED PROVIDER NOTE - ATTENDING CONTRIBUTION TO CARE
58 yo F h/o POTS, recently diagnosed with Lyme, started on Doxy 2 days ago presents with c/o feeling very weak, starting at about 11 am while at work.  Pt having difficult finding her words, feels like it is too much effort to speak.  States she has been dizzy for months and was seen by Dr Zaidi who found Lyme titer positive, no numbness, no fevers, no cough, no CP, no SOB.  On exam pt in NAD AAO x 3, face is symmetric, speech is clear but very soft and slow, equal strength, sensation intact, + driftm abd soft nt nd

## 2019-09-20 DIAGNOSIS — R41.0 DISORIENTATION, UNSPECIFIED: ICD-10-CM

## 2019-09-20 DIAGNOSIS — R53.1 WEAKNESS: ICD-10-CM

## 2019-09-20 DIAGNOSIS — R47.9 UNSPECIFIED SPEECH DISTURBANCES: ICD-10-CM

## 2019-09-20 LAB
ALBUMIN SERPL ELPH-MCNC: 4.4 G/DL — SIGNIFICANT CHANGE UP (ref 3.5–5.2)
ALP SERPL-CCNC: 80 U/L — SIGNIFICANT CHANGE UP (ref 30–115)
ALT FLD-CCNC: 19 U/L — SIGNIFICANT CHANGE UP (ref 0–41)
ANION GAP SERPL CALC-SCNC: 9 MMOL/L — SIGNIFICANT CHANGE UP (ref 7–14)
AST SERPL-CCNC: 18 U/L — SIGNIFICANT CHANGE UP (ref 0–41)
B BURGDOR C6 AB SER-ACNC: NEGATIVE — SIGNIFICANT CHANGE UP
B BURGDOR IGG+IGM SER-ACNC: 0.09 INDEX — SIGNIFICANT CHANGE UP (ref 0.01–0.89)
BILIRUB SERPL-MCNC: 0.5 MG/DL — SIGNIFICANT CHANGE UP (ref 0.2–1.2)
BUN SERPL-MCNC: 19 MG/DL — SIGNIFICANT CHANGE UP (ref 10–20)
CALCIUM SERPL-MCNC: 9.7 MG/DL — SIGNIFICANT CHANGE UP (ref 8.5–10.1)
CHLORIDE SERPL-SCNC: 103 MMOL/L — SIGNIFICANT CHANGE UP (ref 98–110)
CO2 SERPL-SCNC: 30 MMOL/L — SIGNIFICANT CHANGE UP (ref 17–32)
CREAT SERPL-MCNC: 0.9 MG/DL — SIGNIFICANT CHANGE UP (ref 0.7–1.5)
GLUCOSE SERPL-MCNC: 93 MG/DL — SIGNIFICANT CHANGE UP (ref 70–99)
HCT VFR BLD CALC: 42.9 % — SIGNIFICANT CHANGE UP (ref 37–47)
HCV AB S/CO SERPL IA: 0.2 S/CO — SIGNIFICANT CHANGE UP (ref 0–0.99)
HCV AB SERPL-IMP: SIGNIFICANT CHANGE UP
HGB BLD-MCNC: 13.8 G/DL — SIGNIFICANT CHANGE UP (ref 12–16)
MCHC RBC-ENTMCNC: 28.8 PG — SIGNIFICANT CHANGE UP (ref 27–31)
MCHC RBC-ENTMCNC: 32.2 G/DL — SIGNIFICANT CHANGE UP (ref 32–37)
MCV RBC AUTO: 89.6 FL — SIGNIFICANT CHANGE UP (ref 81–99)
NRBC # BLD: 0 /100 WBCS — SIGNIFICANT CHANGE UP (ref 0–0)
PLATELET # BLD AUTO: 246 K/UL — SIGNIFICANT CHANGE UP (ref 130–400)
POTASSIUM SERPL-MCNC: 4.7 MMOL/L — SIGNIFICANT CHANGE UP (ref 3.5–5)
POTASSIUM SERPL-SCNC: 4.7 MMOL/L — SIGNIFICANT CHANGE UP (ref 3.5–5)
PROT SERPL-MCNC: 7.3 G/DL — SIGNIFICANT CHANGE UP (ref 6–8)
RBC # BLD: 4.79 M/UL — SIGNIFICANT CHANGE UP (ref 4.2–5.4)
RBC # FLD: 13.3 % — SIGNIFICANT CHANGE UP (ref 11.5–14.5)
SODIUM SERPL-SCNC: 142 MMOL/L — SIGNIFICANT CHANGE UP (ref 135–146)
WBC # BLD: 4.57 K/UL — LOW (ref 4.8–10.8)
WBC # FLD AUTO: 4.57 K/UL — LOW (ref 4.8–10.8)

## 2019-09-20 PROCEDURE — 70544 MR ANGIOGRAPHY HEAD W/O DYE: CPT | Mod: 26,59

## 2019-09-20 PROCEDURE — 99222 1ST HOSP IP/OBS MODERATE 55: CPT

## 2019-09-20 PROCEDURE — 70551 MRI BRAIN STEM W/O DYE: CPT | Mod: 26

## 2019-09-20 PROCEDURE — 70548 MR ANGIOGRAPHY NECK W/DYE: CPT | Mod: 26

## 2019-09-20 PROCEDURE — 99233 SBSQ HOSP IP/OBS HIGH 50: CPT

## 2019-09-20 RX ORDER — ACETAMINOPHEN 500 MG
325 TABLET ORAL EVERY 4 HOURS
Refills: 0 | Status: DISCONTINUED | OUTPATIENT
Start: 2019-09-20 | End: 2019-09-23

## 2019-09-20 RX ORDER — ASPIRIN/CALCIUM CARB/MAGNESIUM 324 MG
162 TABLET ORAL DAILY
Refills: 0 | Status: DISCONTINUED | OUTPATIENT
Start: 2019-09-20 | End: 2019-09-23

## 2019-09-20 RX ORDER — ENOXAPARIN SODIUM 100 MG/ML
40 INJECTION SUBCUTANEOUS DAILY
Refills: 0 | Status: DISCONTINUED | OUTPATIENT
Start: 2019-09-20 | End: 2019-09-23

## 2019-09-20 RX ORDER — ATORVASTATIN CALCIUM 80 MG/1
80 TABLET, FILM COATED ORAL AT BEDTIME
Refills: 0 | Status: DISCONTINUED | OUTPATIENT
Start: 2019-09-20 | End: 2019-09-23

## 2019-09-20 RX ADMIN — Medication 25 MILLIGRAM(S): at 05:30

## 2019-09-20 RX ADMIN — ENOXAPARIN SODIUM 40 MILLIGRAM(S): 100 INJECTION SUBCUTANEOUS at 23:53

## 2019-09-20 RX ADMIN — ATORVASTATIN CALCIUM 80 MILLIGRAM(S): 80 TABLET, FILM COATED ORAL at 21:45

## 2019-09-20 RX ADMIN — Medication 325 MILLIGRAM(S): at 21:45

## 2019-09-20 RX ADMIN — Medication 25 MILLIGRAM(S): at 21:45

## 2019-09-20 RX ADMIN — Medication 325 MILLIGRAM(S): at 10:29

## 2019-09-20 RX ADMIN — Medication 325 MILLIGRAM(S): at 03:59

## 2019-09-20 RX ADMIN — Medication 100 MILLIGRAM(S): at 22:41

## 2019-09-20 RX ADMIN — Medication 162 MILLIGRAM(S): at 11:13

## 2019-09-20 RX ADMIN — Medication 100 MILLIGRAM(S): at 05:30

## 2019-09-20 RX ADMIN — Medication 325 MILLIGRAM(S): at 23:00

## 2019-09-20 RX ADMIN — Medication 325 MILLIGRAM(S): at 10:34

## 2019-09-20 RX ADMIN — Medication 325 MILLIGRAM(S): at 04:07
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
## 2019-09-20 NOTE — CONSULT NOTE ADULT - REASON FOR ADMISSION
weakness, difficulty speaking r/o stroke

## 2019-09-20 NOTE — CONSULT NOTE ADULT - ASSESSMENT
Generalized weakness  hx apparent Lyme disease  R/O CVA  ?encephalitis unlikely        Neuro evaluation  agree with LP for routine + EEE, [place] etc  cont abx  full cultures   review OP data from ENT

## 2019-09-20 NOTE — CONSULT NOTE ADULT - PROBLEM SELECTOR RECOMMENDATION 9
lyme IgG positive WB     Needs Neurology eval and LP - CSF exam - r/o CNS lyme - Borrelia B CSF antibody + cells/ protein / cytology    If positive - Rx for CNS lyme - IV Rocephin 2 g Q24 - 2 weeks  + follow up with ID Dr howard out pt     If neg - Unlikely CNS lyme and would NOT treat for Lyme dx as this is NOT a recent infection     Recall if needed

## 2019-09-20 NOTE — CONSULT NOTE ADULT - SUBJECTIVE AND OBJECTIVE BOX
GHADA ROPER        Patient is a 59y old  Female who presents with a chief complaint of weakness, difficulty speaking and confusion (20 Sep 2019 12:25)      HPI:  58 y/o female PMHx sig for HTN, Pt reports she has been having some vague symptoms, including dizziness and weakness, today it progressed. she was very weak, having difficulty finding her words, denies HA or fevers or chills. States she has a baseline light sensitivity that has not worsened.  She was recently diagnosed w/ Lyme disease and started on abx. (19 Sep 2019 17:21)      Allergies    sulfa drugs (Unknown)    Intolerances        Daily Height in cm: 165.1 (19 Sep 2019 18:30)    Daily     I&O's Summary    19 Sep 2019 07:01  -  20 Sep 2019 07:00  --------------------------------------------------------  IN: 0 mL / OUT: 600 mL / NET: -600 mL    20 Sep 2019 07:01  -  20 Sep 2019 13:18  --------------------------------------------------------  IN: 0 mL / OUT: 200 mL / NET: -200 mL        FAMILY HISTORY:  No pertinent family history in first degree relatives      SOCIAL:    Marital Status:  ( x  )    (   ) Single    (   )    (  )   Occupation:   Lives with: (  ) alone  (  ) children   (x  ) spouse   (  ) parents  (  ) other  Recent Travel:     Substance Use (street drugs): (x  ) never used  (  ) other:  Tobacco Usage:  (   ) never smoked   ( x  ) former smoker   (   ) current smoker  (     ) pack year  Alcohol Usage:        HEALTH ISSUES - PROBLEM Dx:  Confusion: Confusion  Generalized weakness: Generalized weakness  Speech disturbance, unspecified type: Speech disturbance, unspecified type          Vital Signs Last 24 Hrs  T(C): 35.8 (20 Sep 2019 07:01), Max: 37.1 (19 Sep 2019 16:29)  T(F): 96.5 (20 Sep 2019 07:01), Max: 98.7 (19 Sep 2019 16:29)  HR: 44 (20 Sep 2019 07:08) (44 - 74)  BP: 115/59 (20 Sep 2019 07:08) (115/59 - 174/77)  BP(mean): 84 (20 Sep 2019 07:08) (84 - 119)  RR: 18 (20 Sep 2019 07:08) (18 - 20)  SpO2: 98% (20 Sep 2019 06:05) (96% - 100%)      PT/INR - ( 19 Sep 2019 14:02 )   PT: 11.30 sec;   INR: 0.98 ratio                                   13.8   4.57  )-----------( 246      ( 20 Sep 2019 06:03 )             42.9       09-20    142  |  103  |  19  ----------------------------<  93  4.7   |  30  |  0.9    Ca    9.7      20 Sep 2019 06:03    TPro  7.3  /  Alb  4.4  /  TBili  0.5  /  DBili  x   /  AST  18  /  ALT  19  /  AlkPhos  80  09-20      CARDIAC MARKERS ( 19 Sep 2019 14:02 )  x     / <0.01 ng/mL / x     / x     / x            LIVER FUNCTIONS - ( 20 Sep 2019 06:03 )  Alb: 4.4 g/dL / Pro: 7.3 g/dL / ALK PHOS: 80 U/L / ALT: 19 U/L / AST: 18 U/L / GGT: x                 CAPILLARY BLOOD GLUCOSE      POCT Blood Glucose.: 76 mg/dL (19 Sep 2019 13:11)                  Radiology: Radiology personally reviewed.    MEDICATIONS  (STANDING):  aspirin  chewable 162 milliGRAM(s) Oral daily  atorvastatin 80 milliGRAM(s) Oral at bedtime  doxycycline hyclate Capsule 100 milliGRAM(s) Oral every 12 hours  metoprolol tartrate 25 milliGRAM(s) Oral two times a day    MEDICATIONS  (PRN):  acetaminophen   Tablet .. 325 milliGRAM(s) Oral every 4 hours PRN Temp greater or equal to 38C (100.4F), Mild Pain (1 - 3)      Prescriptions:  amoxicillin-clavulanate 875 mg-125 mg oral tablet: 1 tab(s) orally 2 times a day  (12 Jun 2018 10:35)  Percocet 5/325 oral tablet: 1 tab(s) orally every 6 hours, As Needed -for moderate pain MDD:4  (12 Jun 2018 10:35)        REVIEW OF SYSTEMS:    Review of Systems:  · CONSTITUTIONAL: no fever and no chills.  · EYES: no discharge, no irritation, no pain, no redness, and no visual changes.  · ENMT: Ears: no ear pain and no hearing problems.Nose: no nasal congestion and no nasal drainage.Mouth/Throat: no dysphagia, no hoarseness and no throat pain.Neck: no lumps, no pain, no stiffness and no swollen glands.  · CARDIOVASCULAR: no chest pain  · RESPIRATORY: - - -   · Respiratory [+]: none  · GASTROINTESTINAL: no abdominal pain, no bloating, no constipation, no diarrhea, no nausea and no vomiting.  · GENITOURINARY: no dysuria, no frequency, and no hematuria.  · MUSCULOSKELETAL: no back pain, no gout, no musculoskeletal pain, no neck pain, and no weakness.  · SKIN: no abrasions, no jaundice, no lesions, no pruritis, and no rashes.  · NEURO: no loss of consciousness, no gait abnormality, no headache, no sensory deficits, and no weakness.  · PSYCHIATRIC: no known mental health issues.    PHYSICAL EXAM:   · CONSTITUTIONAL: Well appearing, well nourished, NAD  · ENMT: Airway patent, Nasal mucosa clear. Mouth with normal mucosa. Throat has no vesicles, no oropharyngeal exudates and uvula is midline.  · EYES: Clear bilaterally, pupils equal, round and reactive to light.  · CARDIAC: Normal rate, regular rhythm.  Heart sounds S1, S2.  No murmurs, rubs or gallops.  · RESPIRATORY: b/l wheezing  · GASTROINTESTINAL: Abdomen soft, non-tender, no guarding.  · MUSCULOSKELETAL: Spine appears normal, range of motion is not limited, no muscle or joint tenderness  · NEUROLOGICAL: Alert and oriented, no focal deficits, generalized weakness  · SKIN: Skin normal color for race, warm, dry and intact. No evidence of rash.  · PSYCHIATRIC: Alert and oriented to person, place, time/situation. normal mood and affect. no apparent risk to self or others.  · HEME LYMPH: No adenopathy or splenomegaly. No cervical or inguinal lymphadenopathy.

## 2019-09-20 NOTE — CONSULT NOTE ADULT - SUBJECTIVE AND OBJECTIVE BOX
GHADA ROPER  59y, Female  Allergy: sulfa drugs (Unknown)      CHIEF COMPLAINT: weakness, difficulty speaking r/o stroke (19 Sep 2019 17:21)      HPI:  60 y/o female PMHx sig for HTN, Pt reports she has been having some vague symptoms, including dizziness and weakness, today it progressed. she was very weak, having difficulty finding her words, denies HA or fevers or chills. States she has a baseline light sensitivity that has not worsened.  She was recently diagnosed w/ Lyme disease and started on abx. (19 Sep 2019 17:21)    FAMILY HISTORY:  No pertinent family history in first degree relatives    PAST MEDICAL & SURGICAL HISTORY:  High blood cholesterol  Pancreatitis  POTS (postural orthostatic tachycardia syndrome)  History of foot surgery    FSH - NOT relevant   Substance Use (  ) never used  (  ) IVDU (  ) Other:  Tobacco Usage:  (   ) never smoked   (   ) former smoker   (   ) current smoker   Alcohol Usage: (   ) social  (   ) daily use (   ) denies  Sexual History:       ROS  10 system review- c/o weakness/ inability to focus     VITALS:  T(F): 96.3, Max: 98.7 (09-19-19 @ 16:29)  HR: 51  BP: 130/65  RR: 18Vital Signs Last 24 Hrs  T(C): 35.7 (19 Sep 2019 23:18), Max: 37.1 (19 Sep 2019 16:29)  T(F): 96.3 (19 Sep 2019 23:18), Max: 98.7 (19 Sep 2019 16:29)  HR: 51 (19 Sep 2019 23:18) (51 - 74)  BP: 130/65 (19 Sep 2019 23:18) (129/71 - 174/77)  BP(mean): 92 (19 Sep 2019 23:18) (92 - 119)  RR: 18 (19 Sep 2019 18:23) (16 - 18)  SpO2: 96% (19 Sep 2019 23:18) (96% - 100%)    PHYSICAL EXAM:  Gen: NAD, resting in bed  HEENT: Normocephalic, atraumatic  Neck: supple, no lymphadenopathy  CV: s1 s 2+   Lungs: clear   Abdomen: Soft, BS present  Ext: Warm, well perfused  Neuro: non focal, awake  Skin: no rash, no erythema    TESTS & MEASUREMENTS:                        13.3   5.19  )-----------( 259      ( 19 Sep 2019 14:02 )             40.5     09-19    141  |  101  |  15  ----------------------------<  87  4.1   |  28  |  0.8    Ca    9.9      19 Sep 2019 14:02    TPro  7.7  /  Alb  4.9  /  TBili  0.5  /  DBili  x   /  AST  16  /  ALT  20  /  AlkPhos  82  09-19    eGFR if Non African American: 82 mL/min/1.73M2 (09-19-19 @ 14:02)  eGFR if African American: 95 mL/min/1.73M2 (09-19-19 @ 14:02)    LIVER FUNCTIONS - ( 19 Sep 2019 14:02 )  Alb: 4.9 g/dL / Pro: 7.7 g/dL / ALK PHOS: 82 U/L / ALT: 20 U/L / AST: 16 U/L / GGT: x                     INFECTIOUS DISEASES TESTING      RADIOLOGY & ADDITIONAL TESTS:  I have personally reviewed the last Chest xray  CXR  Xray Chest 1 View- PORTABLE-Urgent:   EXAM:  XR CHEST PORTABLE URGENT 1V            PROCEDURE DATE:  09/19/2019            INTERPRETATION:  CLINICAL HISTORY: stroke code.    COMPARISON: 6/12/2018.    TECHNIQUE: Portable frontal chest radiograph. Adequate positioning.    FINDINGS:    Support devices: None.    Cardiac/mediastinum/hilum: Unremarkable.    Lung parenchyma/Pleura: No focal parenchymal opacities, pleural   effusions, or pneumothorax.    Skeleton/soft tissues: Unremarkable.      IMPRESSION:      No radiographic evidence ofacute cardiopulmonary disease.                  ZELALEM POWERS M.D., ATTENDING RADIOLOGIST  This document has been electronically signed. Sep 19 2019  2:22PM             (09-19-19 @ 13:58)      CT      CARDIOLOGY TESTING  12 Lead ECG:   Ventricular Rate 68 BPM    Atrial Rate 68 BPM    P-R Interval 164 ms    QRS Duration 88 ms    Q-T Interval 432 ms    QTC Calculation(Bezet) 459 ms    P Axis 49 degrees    R Axis 16 degrees    T Axis 4 degrees    Diagnosis Line Sinus rhythm with occasional Premature ventricular complexes  Nonspecific ST and T wave abnormality  Abnormal ECG    Confirmed by MAGDA ANGEL MD ([id number]) on 9/19/2019 4:34:18 PM (09-19-19 @ 14:15)      MEDICATIONS  doxycycline hyclate Capsule 100  metoprolol tartrate 25      ANTIBIOTICS:  doxycycline hyclate Capsule 100 milliGRAM(s) Oral every 12 hours

## 2019-09-20 NOTE — PROGRESS NOTE ADULT - PROBLEM SELECTOR PLAN 1
pt has a history of HTN and hyperlipidemia  continue metoprolol and Lipitor (and Doxy started by PMD)  unclear etiology of symptoms, physical exam, CT and CTA head normal  check MRI / MRA, EEG, 2DECHO, LP as per neuro recommendations pt has a history of HTN and hyperlipidemia  continue metoprolol and Lipitor (and Doxy started by PMD) and aspirin  unclear etiology of symptoms, physical exam, CT and CTA head normal  check MRI / MRA, EEG, 2DECHO, LP as per neuro recommendations
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
## 2019-09-21 LAB
ANION GAP SERPL CALC-SCNC: 10 MMOL/L — SIGNIFICANT CHANGE UP (ref 7–14)
BASOPHILS # BLD AUTO: 0.02 K/UL — SIGNIFICANT CHANGE UP (ref 0–0.2)
BASOPHILS NFR BLD AUTO: 0.4 % — SIGNIFICANT CHANGE UP (ref 0–1)
BUN SERPL-MCNC: 27 MG/DL — HIGH (ref 10–20)
CALCIUM SERPL-MCNC: 9 MG/DL — SIGNIFICANT CHANGE UP (ref 8.5–10.1)
CHLORIDE SERPL-SCNC: 102 MMOL/L — SIGNIFICANT CHANGE UP (ref 98–110)
CHOLEST SERPL-MCNC: 143 MG/DL — SIGNIFICANT CHANGE UP (ref 100–200)
CO2 SERPL-SCNC: 28 MMOL/L — SIGNIFICANT CHANGE UP (ref 17–32)
CREAT SERPL-MCNC: 0.8 MG/DL — SIGNIFICANT CHANGE UP (ref 0.7–1.5)
EOSINOPHIL # BLD AUTO: 0.07 K/UL — SIGNIFICANT CHANGE UP (ref 0–0.7)
EOSINOPHIL NFR BLD AUTO: 1.4 % — SIGNIFICANT CHANGE UP (ref 0–8)
ESTIMATED AVERAGE GLUCOSE: 111 MG/DL — SIGNIFICANT CHANGE UP (ref 68–114)
GLUCOSE SERPL-MCNC: 110 MG/DL — HIGH (ref 70–99)
HBA1C BLD-MCNC: 5.5 % — SIGNIFICANT CHANGE UP (ref 4–5.6)
HCT VFR BLD CALC: 38.8 % — SIGNIFICANT CHANGE UP (ref 37–47)
HDLC SERPL-MCNC: 75 MG/DL — SIGNIFICANT CHANGE UP
HGB BLD-MCNC: 12.7 G/DL — SIGNIFICANT CHANGE UP (ref 12–16)
IMM GRANULOCYTES NFR BLD AUTO: 0 % — LOW (ref 0.1–0.3)
LIPID PNL WITH DIRECT LDL SERPL: 63 MG/DL — SIGNIFICANT CHANGE UP (ref 4–129)
LYMPHOCYTES # BLD AUTO: 1.92 K/UL — SIGNIFICANT CHANGE UP (ref 1.2–3.4)
LYMPHOCYTES # BLD AUTO: 39.3 % — SIGNIFICANT CHANGE UP (ref 20.5–51.1)
MCHC RBC-ENTMCNC: 29.3 PG — SIGNIFICANT CHANGE UP (ref 27–31)
MCHC RBC-ENTMCNC: 32.7 G/DL — SIGNIFICANT CHANGE UP (ref 32–37)
MCV RBC AUTO: 89.6 FL — SIGNIFICANT CHANGE UP (ref 81–99)
MONOCYTES # BLD AUTO: 0.44 K/UL — SIGNIFICANT CHANGE UP (ref 0.1–0.6)
MONOCYTES NFR BLD AUTO: 9 % — SIGNIFICANT CHANGE UP (ref 1.7–9.3)
NEUTROPHILS # BLD AUTO: 2.44 K/UL — SIGNIFICANT CHANGE UP (ref 1.4–6.5)
NEUTROPHILS NFR BLD AUTO: 49.9 % — SIGNIFICANT CHANGE UP (ref 42.2–75.2)
NRBC # BLD: 0 /100 WBCS — SIGNIFICANT CHANGE UP (ref 0–0)
PLATELET # BLD AUTO: 260 K/UL — SIGNIFICANT CHANGE UP (ref 130–400)
POTASSIUM SERPL-MCNC: 3.9 MMOL/L — SIGNIFICANT CHANGE UP (ref 3.5–5)
POTASSIUM SERPL-SCNC: 3.9 MMOL/L — SIGNIFICANT CHANGE UP (ref 3.5–5)
RBC # BLD: 4.33 M/UL — SIGNIFICANT CHANGE UP (ref 4.2–5.4)
RBC # FLD: 13.3 % — SIGNIFICANT CHANGE UP (ref 11.5–14.5)
SODIUM SERPL-SCNC: 140 MMOL/L — SIGNIFICANT CHANGE UP (ref 135–146)
TOTAL CHOLESTEROL/HDL RATIO MEASUREMENT: 1.9 RATIO — LOW (ref 4–5.5)
TRIGL SERPL-MCNC: 122 MG/DL — SIGNIFICANT CHANGE UP (ref 10–149)
WBC # BLD: 4.89 K/UL — SIGNIFICANT CHANGE UP (ref 4.8–10.8)
WBC # FLD AUTO: 4.89 K/UL — SIGNIFICANT CHANGE UP (ref 4.8–10.8)

## 2019-09-21 PROCEDURE — 99232 SBSQ HOSP IP/OBS MODERATE 35: CPT

## 2019-09-21 PROCEDURE — 95819 EEG AWAKE AND ASLEEP: CPT | Mod: 26

## 2019-09-21 PROCEDURE — 99233 SBSQ HOSP IP/OBS HIGH 50: CPT

## 2019-09-21 RX ADMIN — ATORVASTATIN CALCIUM 80 MILLIGRAM(S): 80 TABLET, FILM COATED ORAL at 21:01

## 2019-09-21 RX ADMIN — Medication 325 MILLIGRAM(S): at 06:30

## 2019-09-21 RX ADMIN — Medication 25 MILLIGRAM(S): at 17:01

## 2019-09-21 RX ADMIN — Medication 162 MILLIGRAM(S): at 11:00

## 2019-09-21 RX ADMIN — Medication 100 MILLIGRAM(S): at 05:50

## 2019-09-21 RX ADMIN — Medication 325 MILLIGRAM(S): at 05:50

## 2019-09-21 RX ADMIN — ENOXAPARIN SODIUM 40 MILLIGRAM(S): 100 INJECTION SUBCUTANEOUS at 11:51

## 2019-09-21 RX ADMIN — Medication 100 MILLIGRAM(S): at 17:01
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
## 2019-09-21 NOTE — PROGRESS NOTE ADULT - ASSESSMENT
59 F w/ recurrent episodes of transient aphasia    Plan:  Continue ASA62 MG QD  Continue LIPITOR  80 MG QD  f/u official MRI BRAIN AND MRA H/N  f/u 2D ECHO  REEG  LP if ID wishes, ask ID for CSF recommendations  Call w/ questions 59 F w/ recurrent episodes of transient aphasia and vague symptoms with extensive neurologic workup in the past all negative currently with no evidence of acute intracranial pathology and nonfocal.  r/o infectious/inflammatory cause    Plan:  Continue ASA62 MG QD  Continue LIPITOR  80 MG QD  f/u official MRI BRAIN AND MRA H/N  f/u 2D ECHO  LP as per ID  check CSF for ACE level, Lyme DNA PCR, encephalitis panel, NMDA receptor Abs  check serum for thyroglubulin ab, TPO ab, TSH, NMDA receptor ab, TJ ab, voltage gated K-parker ab

## 2019-09-21 NOTE — PROGRESS NOTE ADULT - ASSESSMENT
pt admitted in ICU for intermittent aphasia     1) Generalized weakness/headaches   -r/o CNS lyme  -latent lyme --- on doxycycline for now   -Lumbar puncture once MRI resulted (pt aware)  -Neurology following   -continue with ASA, Lipitor    2) HTN  -BB  -check BP    dvt and gi ppx      # Progress Note Handoff  PENDING as follows  consults: Neurology noted   Test: LP today   Family discussion: discussed with patient and understands her medical care   Disposition: home once cleared by Neurology     Attending Physician Dr. Jaqui Jerome # [id number]

## 2019-09-21 NOTE — PROGRESS NOTE ADULT - SUBJECTIVE AND OBJECTIVE BOX
Neurology Progress Note    Interval History:      HPI:  60 y/o female PMHx sig for HTN, Pt reports she has been having some vague symptoms, including dizziness and weakness, today it progressed. she was very weak, having difficulty finding her words, denies HA or fevers or chills. States she has a baseline light sensitivity that has not worsened.  She was recently diagnosed w/ Lyme disease and started on abx. (19 Sep 2019 17:21)      PAST MEDICAL & SURGICAL HISTORY:  High blood cholesterol  Pancreatitis  POTS (postural orthostatic tachycardia syndrome)  History of foot surgery          Medications:  acetaminophen   Tablet .. 325 milliGRAM(s) Oral every 4 hours PRN  aspirin  chewable 162 milliGRAM(s) Oral daily  atorvastatin 80 milliGRAM(s) Oral at bedtime  doxycycline hyclate Capsule 100 milliGRAM(s) Oral every 12 hours  enoxaparin Injectable 40 milliGRAM(s) SubCutaneous daily  metoprolol tartrate 25 milliGRAM(s) Oral two times a day      Vital Signs Last 24 Hrs  T(C): 36.3 (21 Sep 2019 07:01), Max: 36.8 (20 Sep 2019 23:00)  T(F): 97.3 (21 Sep 2019 07:01), Max: 98.2 (20 Sep 2019 23:00)  HR: 47 (21 Sep 2019 07:07) (47 - 68)  BP: 98/54 (21 Sep 2019 07:07) (91/54 - 115/57)  BP(mean): 70 (21 Sep 2019 07:07) (70 - 81)  RR: 20 (21 Sep 2019 07:07) (18 - 20)  SpO2: 98% (21 Sep 2019 07:07) (98% - 98%)    Neurological Exam:   Mental status: Awake, alert and oriented x3.  Recent and remote memory intact.  Naming, repetition and comprehension intact.  Attention/concentration intact.  No dysarthria, no aphasia.  Fund of knowledge appropriate.    Cranial nerves: Pupils equally round and reactive to light, visual fields full, no nystagmus, extraocular muscles intact, V1 through V3 intact bilaterally and symmetric, face symmetric, hearing intact to finger rub, palate elevation symmetric, tongue was midline.  Motor:  MRC grading 5/5 b/l UE/LE.   strength 5/5.  Normal tone and bulk.  No abnormal movements.    Sensation: Intact to light touch, proprioception, and pinprick.   Coordination: No dysmetria on finger-to-nose and heel-to-shin.  No dysdiadokinesia.  Reflexes: 2+ in bilateral UE/LE, downgoing toes bilaterally. (-) Lucas.  Gait: Narrow and steady. No ataxia.  Romberg negative    Labs:  CBC Full  -  ( 21 Sep 2019 05:39 )  WBC Count : 4.89 K/uL  RBC Count : 4.33 M/uL  Hemoglobin : 12.7 g/dL  Hematocrit : 38.8 %  Platelet Count - Automated : 260 K/uL  Mean Cell Volume : 89.6 fL  Mean Cell Hemoglobin : 29.3 pg  Mean Cell Hemoglobin Concentration : 32.7 g/dL  Auto Neutrophil # : 2.44 K/uL  Auto Lymphocyte # : 1.92 K/uL  Auto Monocyte # : 0.44 K/uL  Auto Eosinophil # : 0.07 K/uL  Auto Basophil # : 0.02 K/uL  Auto Neutrophil % : 49.9 %  Auto Lymphocyte % : 39.3 %  Auto Monocyte % : 9.0 %  Auto Eosinophil % : 1.4 %  Auto Basophil % : 0.4 %    09-21    140  |  102  |  27<H>  ----------------------------<  110<H>  3.9   |  28  |  0.8    Ca    9.0      21 Sep 2019 05:39    TPro  7.3  /  Alb  4.4  /  TBili  0.5  /  DBili  x   /  AST  18  /  ALT  19  /  AlkPhos  80  09-20    LIVER FUNCTIONS - ( 20 Sep 2019 06:03 )  Alb: 4.4 g/dL / Pro: 7.3 g/dL / ALK PHOS: 80 U/L / ALT: 19 U/L / AST: 18 U/L / GGT: x           PT/INR - ( 19 Sep 2019 14:02 )   PT: 11.30 sec;   INR: 0.98 ratio      MRI Brain (prelim): no restricted diffusion, nonspecific WMD R frontal, subcortical  MRA results pending.    < from: EEG (09.20.19 @ 14:31) >  Impression  Normal    Clinical Correlation & Recommendations   Anormal EEG does not exclude the possibility of seizure disorder.   Clinical correlation is recommended.      Reviewed by:  Karen Cooper    Signed by:  Karen COOPER MD  This document has been electronically signed. Sep 21 2019  7:27AM        < end of copied text > Neurology Progress Note    Interval History:  Pt still having vague symptoms of discomfort.  No focal deficits. Speaking full sentences but endorses "not feeling right."    PAST MEDICAL & SURGICAL HISTORY:  High blood cholesterol  Pancreatitis  POTS (postural orthostatic tachycardia syndrome)  History of foot surgery    Medications:  acetaminophen   Tablet .. 325 milliGRAM(s) Oral every 4 hours PRN  aspirin  chewable 162 milliGRAM(s) Oral daily  atorvastatin 80 milliGRAM(s) Oral at bedtime  doxycycline hyclate Capsule 100 milliGRAM(s) Oral every 12 hours  enoxaparin Injectable 40 milliGRAM(s) SubCutaneous daily  metoprolol tartrate 25 milliGRAM(s) Oral two times a day    Vital Signs Last 24 Hrs  T(C): 36.3 (21 Sep 2019 07:01), Max: 36.8 (20 Sep 2019 23:00)  T(F): 97.3 (21 Sep 2019 07:01), Max: 98.2 (20 Sep 2019 23:00)  HR: 47 (21 Sep 2019 07:07) (47 - 68)  BP: 98/54 (21 Sep 2019 07:07) (91/54 - 115/57)  BP(mean): 70 (21 Sep 2019 07:07) (70 - 81)  RR: 20 (21 Sep 2019 07:07) (18 - 20)  SpO2: 98% (21 Sep 2019 07:07) (98% - 98%)    Neurological Exam:   Mental status: Awake, alert and oriented x3.  Recent and remote memory intact.  Naming, repetition and comprehension intact.  Attention/concentration intact.  No dysarthria, no aphasia.  Fund of knowledge appropriate.    Cranial nerves: 2- 12 intact  Motor:  MRC grading 5/5 b/l UE/LE.   strength 5/5.  Normal tone and bulk.  No abnormal movements.    Sensation: Intact to light touch, proprioception, and pinprick.   Coordination: No dysmetria on finger-to-nose and heel-to-shin.  No dysdiadokinesia.  Reflexes: 2+ in bilateral UE/LE, downgoing toes bilaterally. (-) Lucas.    Labs:  CBC Full  -  ( 21 Sep 2019 05:39 )  WBC Count : 4.89 K/uL  RBC Count : 4.33 M/uL  Hemoglobin : 12.7 g/dL  Hematocrit : 38.8 %  Platelet Count - Automated : 260 K/uL  Mean Cell Volume : 89.6 fL  Mean Cell Hemoglobin : 29.3 pg  Mean Cell Hemoglobin Concentration : 32.7 g/dL  Auto Neutrophil # : 2.44 K/uL  Auto Lymphocyte # : 1.92 K/uL  Auto Monocyte # : 0.44 K/uL  Auto Eosinophil # : 0.07 K/uL  Auto Basophil # : 0.02 K/uL  Auto Neutrophil % : 49.9 %  Auto Lymphocyte % : 39.3 %  Auto Monocyte % : 9.0 %  Auto Eosinophil % : 1.4 %  Auto Basophil % : 0.4 %    09-21    140  |  102  |  27<H>  ----------------------------<  110<H>  3.9   |  28  |  0.8    Ca    9.0      21 Sep 2019 05:39    TPro  7.3  /  Alb  4.4  /  TBili  0.5  /  DBili  x   /  AST  18  /  ALT  19  /  AlkPhos  80  09-20    LIVER FUNCTIONS - ( 20 Sep 2019 06:03 )  Alb: 4.4 g/dL / Pro: 7.3 g/dL / ALK PHOS: 80 U/L / ALT: 19 U/L / AST: 18 U/L / GGT: x           PT/INR - ( 19 Sep 2019 14:02 )   PT: 11.30 sec;   INR: 0.98 ratio      MRI Brain (prelim): no restricted diffusion, nonspecific WMD R frontal, subcortical  MRA results pending.    < from: EEG (09.20.19 @ 14:31) >  Impression  Normal    Clinical Correlation & Recommendations   Anormal EEG does not exclude the possibility of seizure disorder.   Clinical correlation is recommended.      Reviewed by:  Karen Cooper    Signed by:  Karen COOPER MD  This document has been electronically signed. Sep 21 2019  7:27AM        < end of copied text >

## 2019-09-22 LAB
ALBUMIN SERPL ELPH-MCNC: 4.2 G/DL — SIGNIFICANT CHANGE UP (ref 3.5–5.2)
ALP SERPL-CCNC: 77 U/L — SIGNIFICANT CHANGE UP (ref 30–115)
ALT FLD-CCNC: 27 U/L — SIGNIFICANT CHANGE UP (ref 0–41)
ANION GAP SERPL CALC-SCNC: 9 MMOL/L — SIGNIFICANT CHANGE UP (ref 7–14)
AST SERPL-CCNC: 22 U/L — SIGNIFICANT CHANGE UP (ref 0–41)
BASOPHILS # BLD AUTO: 0.02 K/UL — SIGNIFICANT CHANGE UP (ref 0–0.2)
BASOPHILS NFR BLD AUTO: 0.4 % — SIGNIFICANT CHANGE UP (ref 0–1)
BILIRUB SERPL-MCNC: 0.3 MG/DL — SIGNIFICANT CHANGE UP (ref 0.2–1.2)
BUN SERPL-MCNC: 21 MG/DL — HIGH (ref 10–20)
CALCIUM SERPL-MCNC: 9.6 MG/DL — SIGNIFICANT CHANGE UP (ref 8.5–10.1)
CHLORIDE SERPL-SCNC: 107 MMOL/L — SIGNIFICANT CHANGE UP (ref 98–110)
CO2 SERPL-SCNC: 27 MMOL/L — SIGNIFICANT CHANGE UP (ref 17–32)
CREAT SERPL-MCNC: 0.8 MG/DL — SIGNIFICANT CHANGE UP (ref 0.7–1.5)
EOSINOPHIL # BLD AUTO: 0.04 K/UL — SIGNIFICANT CHANGE UP (ref 0–0.7)
EOSINOPHIL NFR BLD AUTO: 0.8 % — SIGNIFICANT CHANGE UP (ref 0–8)
GLUCOSE SERPL-MCNC: 107 MG/DL — HIGH (ref 70–99)
HCT VFR BLD CALC: 40.1 % — SIGNIFICANT CHANGE UP (ref 37–47)
HGB BLD-MCNC: 13.3 G/DL — SIGNIFICANT CHANGE UP (ref 12–16)
IMM GRANULOCYTES NFR BLD AUTO: 0.2 % — SIGNIFICANT CHANGE UP (ref 0.1–0.3)
LYMPHOCYTES # BLD AUTO: 1.99 K/UL — SIGNIFICANT CHANGE UP (ref 1.2–3.4)
LYMPHOCYTES # BLD AUTO: 39.6 % — SIGNIFICANT CHANGE UP (ref 20.5–51.1)
MAGNESIUM SERPL-MCNC: 1.8 MG/DL — SIGNIFICANT CHANGE UP (ref 1.8–2.4)
MCHC RBC-ENTMCNC: 29.4 PG — SIGNIFICANT CHANGE UP (ref 27–31)
MCHC RBC-ENTMCNC: 33.2 G/DL — SIGNIFICANT CHANGE UP (ref 32–37)
MCV RBC AUTO: 88.5 FL — SIGNIFICANT CHANGE UP (ref 81–99)
MONOCYTES # BLD AUTO: 0.41 K/UL — SIGNIFICANT CHANGE UP (ref 0.1–0.6)
MONOCYTES NFR BLD AUTO: 8.2 % — SIGNIFICANT CHANGE UP (ref 1.7–9.3)
NEUTROPHILS # BLD AUTO: 2.56 K/UL — SIGNIFICANT CHANGE UP (ref 1.4–6.5)
NEUTROPHILS NFR BLD AUTO: 50.8 % — SIGNIFICANT CHANGE UP (ref 42.2–75.2)
NRBC # BLD: 0 /100 WBCS — SIGNIFICANT CHANGE UP (ref 0–0)
PLATELET # BLD AUTO: 248 K/UL — SIGNIFICANT CHANGE UP (ref 130–400)
POTASSIUM SERPL-MCNC: 4.9 MMOL/L — SIGNIFICANT CHANGE UP (ref 3.5–5)
POTASSIUM SERPL-SCNC: 4.9 MMOL/L — SIGNIFICANT CHANGE UP (ref 3.5–5)
PROT SERPL-MCNC: 6.9 G/DL — SIGNIFICANT CHANGE UP (ref 6–8)
RBC # BLD: 4.53 M/UL — SIGNIFICANT CHANGE UP (ref 4.2–5.4)
RBC # FLD: 13.4 % — SIGNIFICANT CHANGE UP (ref 11.5–14.5)
SODIUM SERPL-SCNC: 143 MMOL/L — SIGNIFICANT CHANGE UP (ref 135–146)
TSH SERPL-MCNC: 17.2 UIU/ML — HIGH (ref 0.27–4.2)
WBC # BLD: 5.03 K/UL — SIGNIFICANT CHANGE UP (ref 4.8–10.8)
WBC # FLD AUTO: 5.03 K/UL — SIGNIFICANT CHANGE UP (ref 4.8–10.8)

## 2019-09-22 PROCEDURE — 99233 SBSQ HOSP IP/OBS HIGH 50: CPT

## 2019-09-22 RX ORDER — ONDANSETRON 8 MG/1
4 TABLET, FILM COATED ORAL ONCE
Refills: 0 | Status: COMPLETED | OUTPATIENT
Start: 2019-09-22 | End: 2019-09-22

## 2019-09-22 RX ADMIN — Medication 325 MILLIGRAM(S): at 04:00

## 2019-09-22 RX ADMIN — ATORVASTATIN CALCIUM 80 MILLIGRAM(S): 80 TABLET, FILM COATED ORAL at 21:50

## 2019-09-22 RX ADMIN — Medication 25 MILLIGRAM(S): at 17:15

## 2019-09-22 RX ADMIN — Medication 325 MILLIGRAM(S): at 11:10

## 2019-09-22 RX ADMIN — ONDANSETRON 4 MILLIGRAM(S): 8 TABLET, FILM COATED ORAL at 08:05

## 2019-09-22 RX ADMIN — Medication 100 MILLIGRAM(S): at 06:27

## 2019-09-22 RX ADMIN — Medication 100 MILLIGRAM(S): at 17:15

## 2019-09-22 RX ADMIN — Medication 162 MILLIGRAM(S): at 11:09

## 2019-09-22 RX ADMIN — Medication 325 MILLIGRAM(S): at 11:09

## 2019-09-22 RX ADMIN — Medication 325 MILLIGRAM(S): at 04:30

## 2019-09-22 NOTE — CHART NOTE - NSCHARTNOTEFT_GEN_A_CORE
Spoke with Neurology Dr. Urbina.   Patient is ok to be downgrade to medical floor.   Per neurology, patient does not need LP.
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
## 2019-09-22 NOTE — PROGRESS NOTE ADULT - ASSESSMENT
pt admitted in ICU for intermittent aphasia     1) Generalized weakness/headaches   -r/o CNS lyme  -latent lyme --- on doxycycline for now   -Lumbar puncture not done yet --- Pt wants to discuss with Neurology for absolute need of it.--- labs sent out and to be followed up (pt aware)  -Neurology follow up today   -continue with ASA, Lipitor    2) HTN  -monitor BP and hold BB if SBP in 110s.     dvt and gi ppx      # Progress Note Handoff  PENDING as follows  consults: Neurology follow up today   Test: LP as per CCU and Neurology today (held yesterday due to DVT ppx given)  Family discussion: discussed with patient and understands her medical care   Disposition: home once cleared by Neurology     Attending Physician Dr. Jaqui Jerome # [id number]

## 2019-09-23 ENCOUNTER — TRANSCRIPTION ENCOUNTER (OUTPATIENT)
Age: 59
End: 2019-09-23

## 2019-09-23 VITALS
DIASTOLIC BLOOD PRESSURE: 57 MMHG | SYSTOLIC BLOOD PRESSURE: 100 MMHG | TEMPERATURE: 98 F | HEART RATE: 60 BPM | RESPIRATION RATE: 18 BRPM

## 2019-09-23 LAB
THYROGLOB AB FLD IA-ACNC: <20 IU/ML — SIGNIFICANT CHANGE UP (ref 0–40)
THYROGLOB AB SERPL-ACNC: <20 IU/ML — SIGNIFICANT CHANGE UP (ref 0–40)
THYROPEROXIDASE AB SERPL-ACNC: <10 IU/ML — SIGNIFICANT CHANGE UP (ref 0–34.9)

## 2019-09-23 PROCEDURE — 99239 HOSP IP/OBS DSCHRG MGMT >30: CPT

## 2019-09-23 PROCEDURE — 99232 SBSQ HOSP IP/OBS MODERATE 35: CPT

## 2019-09-23 RX ORDER — LEVOTHYROXINE SODIUM 125 MCG
50 TABLET ORAL ONCE
Refills: 0 | Status: COMPLETED | OUTPATIENT
Start: 2019-09-23 | End: 2019-09-23

## 2019-09-23 RX ORDER — METOPROLOL TARTRATE 50 MG
1 TABLET ORAL
Qty: 0 | Refills: 0 | DISCHARGE
Start: 2019-09-23

## 2019-09-23 RX ORDER — SIMVASTATIN 20 MG/1
20 TABLET, FILM COATED ORAL AT BEDTIME
Refills: 0 | Status: DISCONTINUED | OUTPATIENT
Start: 2019-09-23 | End: 2019-09-23

## 2019-09-23 RX ORDER — LEVOTHYROXINE SODIUM 125 MCG
1 TABLET ORAL
Qty: 30 | Refills: 0
Start: 2019-09-23 | End: 2019-10-22

## 2019-09-23 RX ORDER — ASPIRIN/CALCIUM CARB/MAGNESIUM 324 MG
81 TABLET ORAL DAILY
Refills: 0 | Status: DISCONTINUED | OUTPATIENT
Start: 2019-09-23 | End: 2019-09-23

## 2019-09-23 RX ADMIN — Medication 162 MILLIGRAM(S): at 11:23

## 2019-09-23 RX ADMIN — Medication 25 MILLIGRAM(S): at 06:02

## 2019-09-23 RX ADMIN — Medication 100 MILLIGRAM(S): at 06:03

## 2019-09-23 RX ADMIN — Medication 50 MICROGRAM(S): at 11:22

## 2019-09-23 NOTE — PROGRESS NOTE ADULT - SUBJECTIVE AND OBJECTIVE BOX
Neurology Follow up note    Name  GHADA ROPER    HPI:  58 y/o female PMHx sig for HTN, Pt reports she has been having some vague symptoms, including dizziness and weakness, today it progressed. she was very weak, having difficulty finding her words, denies HA or fevers or chills. States she has a baseline light sensitivity that has not worsened.  She was recently diagnosed w/ Lyme disease and started on abx. (19 Sep 2019 17:21)      Interval History:    patient is stable  still reporting generalized weakness  stroke w/u neg  TSH 17      Vital Signs Last 24 Hrs  T(C): 36.4 (23 Sep 2019 06:16), Max: 36.4 (22 Sep 2019 21:10)  T(F): 97.5 (23 Sep 2019 06:16), Max: 97.6 (22 Sep 2019 21:10)  HR: 60 (23 Sep 2019 06:16) (51 - 60)  BP: 100/57 (23 Sep 2019 06:16) (100/57 - 131/63)  BP(mean): --  RR: 18 (23 Sep 2019 06:16) (18 - 18)  SpO2: --    Neurological Exam:   Mental status: Awake, alert and oriented x3.  Recent and remote memory intact.  Naming, repetition and comprehension intact.  Attention/concentration intact.  No dysarthria, no aphasia.  Fund of knowledge appropriate.    Cranial nerves: Pupils equally round and reactive to light, visual fields full, no nystagmus, extraocular muscles intact, V1 through V3 intact bilaterally and symmetric, face symmetric, hearing intact to finger rub, palate elevation symmetric, tongue was midline.  Motor:  MRC grading 5/5 b/l UE/LE.   strength 5/5.  Normal tone and bulk.  No abnormal movements.    Sensation: Intact to light touch, proprioception, and pinprick.   Coordination: No dysmetria on finger-to-nose and heel-to-shin.  No dysdiadokinesia.  Reflexes: 2+ in bilateral UE/LE, downgoing toes bilaterally. (-) Lucas.      Medications  acetaminophen   Tablet .. 325 milliGRAM(s) Oral every 4 hours PRN  aspirin  chewable 162 milliGRAM(s) Oral daily  aspirin  chewable 81 milliGRAM(s) Oral daily  atorvastatin 80 milliGRAM(s) Oral at bedtime  doxycycline hyclate Capsule 100 milliGRAM(s) Oral every 12 hours  enoxaparin Injectable 40 milliGRAM(s) SubCutaneous daily  metoprolol tartrate 25 milliGRAM(s) Oral two times a day  simvastatin 20 milliGRAM(s) Oral at bedtime      Lab  09-22    143  |  107  |  21<H>  ----------------------------<  107<H>  4.9   |  27  |  0.8    Ca    9.6      22 Sep 2019 05:51  Mg     1.8     09-22    TPro  6.9  /  Alb  4.2  /  TBili  0.3  /  DBili  x   /  AST  22  /  ALT  27  /  AlkPhos  77  09-22                          13.3   5.03  )-----------( 248      ( 22 Sep 2019 05:51 )             40.1     LIVER FUNCTIONS - ( 22 Sep 2019 05:51 )  Alb: 4.2 g/dL / Pro: 6.9 g/dL / ALK PHOS: 77 U/L / ALT: 27 U/L / AST: 22 U/L / GGT: x                   Radiology      Assessment:  58 YO FEMALE WITH GENERALIZED WEAKNESS AND DIZZINESS LIKELY 2/2 HYPOTHYROIDISM  LYME SCREEN NEG  CVA W/U NEG    Plan:  NO FURTHER INPT W/U AT THIS TIME  PLEASE CALL WITH ANY QUESTIONS

## 2019-09-23 NOTE — DISCHARGE NOTE NURSING/CASE MANAGEMENT/SOCIAL WORK - PATIENT PORTAL LINK FT
You can access the FollowMyHealth Patient Portal offered by [facility] by registering at the following website: [email or web address]. By joining [facility]’s FollowMyHealth portal, you will also be able to view your health information using other applications (apps) compatible with our system.

## 2019-09-23 NOTE — DISCHARGE NOTE PROVIDER - NSDCCPCAREPLAN_GEN_ALL_CORE_FT
PRINCIPAL DISCHARGE DIAGNOSIS  Diagnosis: Weakness  Assessment and Plan of Treatment:       SECONDARY DISCHARGE DIAGNOSES  Diagnosis: Hypothyroid  Assessment and Plan of Treatment:     Diagnosis: Hypertension  Assessment and Plan of Treatment:     Diagnosis: Hyperlipidemia  Assessment and Plan of Treatment:

## 2019-09-23 NOTE — DISCHARGE NOTE PROVIDER - PROVIDER TOKENS
PROVIDER:[TOKEN:[[id number]]],PROVIDER:[TOKEN:[[id number]]],PROVIDER:[TOKEN:[[id number]]]

## 2019-09-23 NOTE — DISCHARGE NOTE PROVIDER - CARE PROVIDER_API CALL
Johnathan Washington ()  Internal Medicine  [address] [place] [address]  Phone: [phone]  Fax: [phone]  Follow Up Time:     Kita Gayle)  Internal Medicine  [address] [place] [address]  Phone: [phone]  Fax: [phone]  Follow Up Time:     Everardo Zaidi)  Neurology  [address] [place] [address]  Phone: [phone]  Fax: [phone]  Follow Up Time:
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
## 2019-09-23 NOTE — PROGRESS NOTE ADULT - ASSESSMENT
1) Generalized weakness/headaches /confusion  - symptoms improving   - lyme Igg/Igm negative as above   - no need for LP   - MR brain  and EEG negative, cleared by neuro for DC and outpt f/u with Dr Cornejo    - TSH elevated at 17, symptoms possibly secondary to hypothyroidism   - I discussed case with PMD Dr Washington and Endocrinologist Dr Joaquin   - I will DC home today on synthroid 50mcg/daily and send repeat TSH and free t3 and t4   - prior to dc, PMD and endocrine will follow as outpt   - 45 min spent on dc    2) HTN  - lopressor

## 2019-09-23 NOTE — PROGRESS NOTE ADULT - SUBJECTIVE AND OBJECTIVE BOX
Patient reports she feels a little better, no headache, but some generalized weakness       T(F): 97.5 (09-23-19 @ 06:16), Max: 97.6 (09-22-19 @ 21:10)  HR: 60 (09-23-19 @ 06:16)  BP: 100/57 (09-23-19 @ 06:16)  RR: 18 (09-23-19 @ 06:16)      PHYSICAL EXAM:  GENERAL: NAD  HEAD:  Atraumatic, Normocephalic  EYES: EOMI, PERRLA, conjunctiva and sclera clear  ENMT: No tonsillar erythema, exudates, or enlargement; Moist mucous membranes, Good dentition, No lesions  NECK: Supple, No JVD, Normal thyroid  NERVOUS SYSTEM:  Alert & Oriented X3, Good concentration; Motor Strength 5/5 B/L upper and lower extremities; DTRs 2+ intact and symmetric  CHEST/LUNG: Clear to percussion bilaterally; No rales, rhonchi, wheezing, or rubs  HEART: Regular rate and rhythm; No murmurs, rubs, or gallops  ABDOMEN: Soft, Nontender, Nondistended; Bowel sounds present  EXTREMITIES:  2+ Peripheral Pulses, No clubbing, cyanosis, or edema  LYMPH: No lymphadenopathy noted  SKIN: No rashes or lesions    LABS  09-22    143  |  107  |  21<H>  ----------------------------<  107<H>  4.9   |  27  |  0.8    Ca    9.6      22 Sep 2019 05:51  Mg     1.8     09-22    TPro  6.9  /  Alb  4.2  /  TBili  0.3  /  DBili  x   /  AST  22  /  ALT  27  /  AlkPhos  77  09-22                          13.3   5.03  )-----------( 248      ( 22 Sep 2019 05:51 )             40.1     Thyroid Stimulating Hormone, Serum in AM (09.22.19 @ 05:51)    Thyroid Stimulating Hormone, Serum: 17.20 uIU/mL    Borrelia burgdorferi IgG/IgM Antibodies (09.19.19 @ 19:00)    LYME IgG/IgM Antibodies Result: 0.09 Index    Lyme C6 Interpretation: Negative: METHOD: Liaison Chemiluminescent Immunoassay      Reference Range: (values expressed as Lyme Index )                                < 0.90        Negative                                0.90 - 1.09   Equivocal                                >= 1.10     Positive  CDC/ASTPHLD Guidelines recommend that all samples judged equivocal or  positive be re-tested by immunoblot for confirmation of results.      RADIOLOGY  < from: MR Head No Cont (09.20.19 @ 18:03) >  IMPRESSION:     1.  No evidence of recent infarct or acute intracranial hemorrhage.    < end of copied text >    MEDICATIONS  (STANDING):  aspirin  chewable 162 milliGRAM(s) Oral daily  atorvastatin 80 milliGRAM(s) Oral at bedtime  doxycycline hyclate Capsule 100 milliGRAM(s) Oral every 12 hours  enoxaparin Injectable 40 milliGRAM(s) SubCutaneous daily  levothyroxine 50 MICROGram(s) Oral once  metoprolol tartrate 25 milliGRAM(s) Oral two times a day    MEDICATIONS  (PRN):  acetaminophen   Tablet .. 325 milliGRAM(s) Oral every 4 hours PRN Temp greater or equal to 38C (100.4F), Mild Pain (1 - 3)

## 2019-09-24 LAB
T3FREE SERPL-MCNC: 2.79 PG/ML — SIGNIFICANT CHANGE UP (ref 1.8–4.6)
T4 FREE SERPL-MCNC: 1 NG/DL — SIGNIFICANT CHANGE UP (ref 0.9–1.8)
TSH SERPL-MCNC: 16.3 UIU/ML — HIGH (ref 0.27–4.2)

## 2019-09-25 LAB
N-METHYL-DA RECEPTOR AB IGG BY CBA-IFA, SERUM W/RFLX TITER: SIGNIFICANT CHANGE UP
VGCC-P/Q BIND AB SER-SCNC: 0 PMOL/L — SIGNIFICANT CHANGE UP (ref 0–24.5)
VOLTAGE-GATED K CHANNEL AB RESULT: 5 PMOL/L — SIGNIFICANT CHANGE UP (ref 0–31)

## 2019-09-26 DIAGNOSIS — R41.82 ALTERED MENTAL STATUS, UNSPECIFIED: ICD-10-CM

## 2019-09-26 DIAGNOSIS — R13.19 OTHER DYSPHAGIA: ICD-10-CM

## 2019-09-26 DIAGNOSIS — H53.8 OTHER VISUAL DISTURBANCES: ICD-10-CM

## 2019-09-26 DIAGNOSIS — Z79.2 LONG TERM (CURRENT) USE OF ANTIBIOTICS: ICD-10-CM

## 2019-09-26 DIAGNOSIS — R53.1 WEAKNESS: ICD-10-CM

## 2019-09-26 DIAGNOSIS — S96.912A STRAIN OF UNSPECIFIED MUSCLE AND TENDON AT ANKLE AND FOOT LEVEL, LEFT FOOT, INITIAL ENCOUNTER: ICD-10-CM

## 2019-09-26 DIAGNOSIS — I10 ESSENTIAL (PRIMARY) HYPERTENSION: ICD-10-CM

## 2019-09-26 DIAGNOSIS — E78.5 HYPERLIPIDEMIA, UNSPECIFIED: ICD-10-CM
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
## 2019-09-26 DIAGNOSIS — R47.1 DYSARTHRIA AND ANARTHRIA: ICD-10-CM

## 2019-09-26 DIAGNOSIS — E03.9 HYPOTHYROIDISM, UNSPECIFIED: ICD-10-CM

## 2019-09-26 DIAGNOSIS — Z86.19 PERSONAL HISTORY OF OTHER INFECTIOUS AND PARASITIC DISEASES: ICD-10-CM
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
## 2019-09-26 DIAGNOSIS — R51 HEADACHE: ICD-10-CM

## 2019-09-26 LAB — GAD65 AB SER-MCNC: 0 NMOL/L — SIGNIFICANT CHANGE UP

## 2019-10-09 ENCOUNTER — LABORATORY RESULT (OUTPATIENT)
Age: 59
End: 2019-10-09

## 2019-10-09 ENCOUNTER — OUTPATIENT (OUTPATIENT)
Dept: OUTPATIENT SERVICES | Facility: HOSPITAL | Age: 59
LOS: 1 days | Discharge: HOME | End: 2019-10-09
Payer: MEDICAID

## 2019-10-09 ENCOUNTER — OUTPATIENT (OUTPATIENT)
Dept: OUTPATIENT SERVICES | Facility: HOSPITAL | Age: 59
LOS: 1 days | Discharge: HOME | End: 2019-10-09

## 2019-10-09 DIAGNOSIS — Z98.890 OTHER SPECIFIED POSTPROCEDURAL STATES: Chronic | ICD-10-CM

## 2019-10-09 DIAGNOSIS — H02.402 UNSPECIFIED PTOSIS OF LEFT EYELID: ICD-10-CM

## 2019-10-09 DIAGNOSIS — R53.82 CHRONIC FATIGUE, UNSPECIFIED: ICD-10-CM

## 2019-10-09 PROCEDURE — 92004 COMPRE OPH EXAM NEW PT 1/>: CPT

## 2019-10-15 DIAGNOSIS — H02.422 MYOGENIC PTOSIS OF LEFT EYELID: ICD-10-CM

## 2019-10-15 DIAGNOSIS — H50.332 INTERMITTENT MONOCULAR EXOTROPIA, LEFT EYE: ICD-10-CM

## 2019-11-04 ENCOUNTER — OUTPATIENT (OUTPATIENT)
Dept: OUTPATIENT SERVICES | Facility: HOSPITAL | Age: 59
LOS: 1 days | Discharge: HOME | End: 2019-11-04
Payer: MEDICAID

## 2019-11-04 DIAGNOSIS — Z98.890 OTHER SPECIFIED POSTPROCEDURAL STATES: Chronic | ICD-10-CM

## 2019-11-04 PROCEDURE — 99212 OFFICE O/P EST SF 10 MIN: CPT

## 2019-11-22 ENCOUNTER — APPOINTMENT (OUTPATIENT)
Dept: PULMONOLOGY | Facility: CLINIC | Age: 59
End: 2019-11-22
Payer: MEDICAID

## 2019-11-22 ENCOUNTER — OUTPATIENT (OUTPATIENT)
Dept: OUTPATIENT SERVICES | Facility: HOSPITAL | Age: 59
LOS: 1 days | Discharge: HOME | End: 2019-11-22

## 2019-11-22 VITALS
HEIGHT: 64 IN | SYSTOLIC BLOOD PRESSURE: 116 MMHG | WEIGHT: 184 LBS | HEART RATE: 58 BPM | BODY MASS INDEX: 31.41 KG/M2 | DIASTOLIC BLOOD PRESSURE: 79 MMHG | OXYGEN SATURATION: 96 %

## 2019-11-22 DIAGNOSIS — Z98.890 OTHER SPECIFIED POSTPROCEDURAL STATES: Chronic | ICD-10-CM

## 2019-11-22 PROCEDURE — 99203 OFFICE O/P NEW LOW 30 MIN: CPT | Mod: GC

## 2019-11-22 NOTE — REVIEW OF SYSTEMS
[Fatigue] : fatigue [As Noted in HPI] : as noted in HPI [Thyroid Problem] : thyroid problem [Negative] : Sleep Disorder

## 2019-11-22 NOTE — END OF VISIT
[FreeTextEntry3] : probs per above-to review records,ct chest///////needs pfts [Time Spent: ___ minutes] : I have spent [unfilled] minutes of face to face time with the patient

## 2019-11-22 NOTE — PHYSICAL EXAM
[General Appearance - Well Developed] : well developed [Normal Appearance] : normal appearance [Well Groomed] : well groomed [General Appearance - Well Nourished] : well nourished [No Deformities] : no deformities [General Appearance - In No Acute Distress] : no acute distress [Normal Oropharynx] : normal oropharynx [Neck Appearance] : the appearance of the neck was normal [Neck Cervical Mass (___cm)] : no neck mass was observed [Jugular Venous Distention Increased] : there was no jugular-venous distention [Thyroid Diffuse Enlargement] : the thyroid was not enlarged [Thyroid Nodule] : there were no palpable thyroid nodules [Heart Rate And Rhythm] : heart rate and rhythm were normal [Heart Sounds] : normal S1 and S2 [Murmurs] : no murmurs present [Respiration, Rhythm And Depth] : normal respiratory rhythm and effort [Exaggerated Use Of Accessory Muscles For Inspiration] : no accessory muscle use [Auscultation Breath Sounds / Voice Sounds] : lungs were clear to auscultation bilaterally [Abnormal Walk] : normal gait [Gait - Sufficient For Exercise Testing] : the gait was sufficient for exercise testing [Nail Clubbing] : no clubbing of the fingernails [Cyanosis, Localized] : no localized cyanosis [Petechial Hemorrhages (___cm)] : no petechial hemorrhages [] : no ischemic changes [Deep Tendon Reflexes (DTR)] : deep tendon reflexes were 2+ and symmetric [Sensation] : the sensory exam was normal to light touch and pinprick [No Focal Deficits] : no focal deficits
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
## 2019-11-29 ENCOUNTER — OUTPATIENT (OUTPATIENT)
Dept: OUTPATIENT SERVICES | Facility: HOSPITAL | Age: 59
LOS: 1 days | Discharge: HOME | End: 2019-11-29

## 2019-11-29 DIAGNOSIS — G70.00 MYASTHENIA GRAVIS WITHOUT (ACUTE) EXACERBATION: ICD-10-CM

## 2019-11-29 DIAGNOSIS — Z98.890 OTHER SPECIFIED POSTPROCEDURAL STATES: Chronic | ICD-10-CM

## 2019-12-11 ENCOUNTER — OUTPATIENT (OUTPATIENT)
Dept: OUTPATIENT SERVICES | Facility: HOSPITAL | Age: 59
LOS: 1 days | Discharge: HOME | End: 2019-12-11
Payer: MEDICAID

## 2019-12-11 DIAGNOSIS — Z98.890 OTHER SPECIFIED POSTPROCEDURAL STATES: Chronic | ICD-10-CM

## 2019-12-11 PROCEDURE — 92012 INTRM OPH EXAM EST PATIENT: CPT

## 2019-12-13 ENCOUNTER — APPOINTMENT (OUTPATIENT)
Dept: PULMONOLOGY | Facility: CLINIC | Age: 59
End: 2019-12-13
Payer: MEDICAID

## 2019-12-13 ENCOUNTER — OUTPATIENT (OUTPATIENT)
Dept: OUTPATIENT SERVICES | Facility: HOSPITAL | Age: 59
LOS: 1 days | Discharge: HOME | End: 2019-12-13

## 2019-12-13 VITALS
WEIGHT: 190 LBS | HEIGHT: 64 IN | BODY MASS INDEX: 32.44 KG/M2 | OXYGEN SATURATION: 98 % | HEART RATE: 73 BPM | DIASTOLIC BLOOD PRESSURE: 84 MMHG | SYSTOLIC BLOOD PRESSURE: 127 MMHG

## 2019-12-13 DIAGNOSIS — H02.422 MYOGENIC PTOSIS OF LEFT EYELID: ICD-10-CM

## 2019-12-13 DIAGNOSIS — H53.022 REFRACTIVE AMBLYOPIA, LEFT EYE: ICD-10-CM

## 2019-12-13 DIAGNOSIS — R06.02 SHORTNESS OF BREATH: ICD-10-CM

## 2019-12-13 DIAGNOSIS — Z98.890 OTHER SPECIFIED POSTPROCEDURAL STATES: Chronic | ICD-10-CM

## 2019-12-13 DIAGNOSIS — H53.19 OTHER SUBJECTIVE VISUAL DISTURBANCES: ICD-10-CM

## 2019-12-13 PROCEDURE — 99213 OFFICE O/P EST LOW 20 MIN: CPT | Mod: GC

## 2019-12-13 NOTE — REVIEW OF SYSTEMS
[Fatigue] : fatigue [As Noted in HPI] : as noted in HPI [Fever] : no fever [Chills] : no chills [Poor Appetite] : normal appetite

## 2019-12-13 NOTE — HISTORY OF PRESENT ILLNESS
[FreeTextEntry1] : 58 yo F here for follow up on SOB and pulm eval for myasthenia gravis. She was recently dx at [facility]. Pt symptoms started mainly in Sept was admiited to [facility] for acute episode of confusion weakness and inbability to speak. Symptoms started as progressive SOB, than eye twitching and dysphagia. Neuro imaging was negative at time. Pt had Lyme testing, and MG Ab Ach Ab testing all negative. TSH noted to be 17. Pt subsequently admitted to [facility] 2 weeks ago for SOB and dyphagia, placed on soft mechanical diet. Pt was seen in our clinic and had op PFT done, Ct scan chest deferred as she states as done in [facility]. She is following with neuro Dr. Cárdenas, Endocrine Dr. Gallegos, and PCP Dr. Washington and Rheum she cannot recall who. She has been on Mistinone 60q6 since [facility] discharge, minimal improvement, also takes Levothyroxine 50 mcg, repeated TFT yesterday result pending. \par \par Today she denies wheezing or chest pain, but does endorse worsening SOB on exertion that is very limiting. She also continue to have fatigue and dysphagia. \par \par \par

## 2019-12-13 NOTE — ASSESSMENT
[FreeTextEntry1] : - PFT reviewed. Mild obstructive pattern with response to bronchodilator. Will start on Symbicort 2 puff BID and Albuterol rescue inhaler. Letter of release signed to obtain records and CT scan of neck (r/o thymoma) and Ct chest done at [facility]. Will also send for sleep study. Rest of management per Rheum/Neuro

## 2019-12-20 ENCOUNTER — OUTPATIENT (OUTPATIENT)
Dept: OUTPATIENT SERVICES | Facility: HOSPITAL | Age: 59
LOS: 1 days | Discharge: HOME | End: 2019-12-20

## 2019-12-20 ENCOUNTER — APPOINTMENT (OUTPATIENT)
Dept: GASTROENTEROLOGY | Facility: CLINIC | Age: 59
End: 2019-12-20
Payer: MEDICAID

## 2019-12-20 DIAGNOSIS — Z98.890 OTHER SPECIFIED POSTPROCEDURAL STATES: Chronic | ICD-10-CM

## 2019-12-20 PROCEDURE — 99204 OFFICE O/P NEW MOD 45 MIN: CPT

## 2019-12-20 NOTE — REVIEW OF SYSTEMS
[Eyesight Problems] : eyesight problems [Shortness Of Breath] : shortness of breath [As Noted in HPI] : as noted in HPI [Negative] : Endocrine

## 2019-12-20 NOTE — HISTORY OF PRESENT ILLNESS
[de-identified] : 58 yo female patient with PMH of MG treated with mestinone, DLD, HTN, hypothyroidism, valvular heart disease, GERD presented for dysphagia.\par Patient was diagnosed with myastenia gravis in October at [facility] after one year history of muscle weakness, opthalmoparesis, SOB and difficulty swallowing despite the fact that antibodies and EMG were both negative. She was started on mestinone with improvement of her symptoms.\par She is complaining of difficulty initiating swallowing, described as forgetfulness of how to initiate swallowing, mainly for liquids but for solids as well, fluctuating in intensity associated with choking sensation after swallowing. She has done EGD in [facility] in October and dilatation of the esophagus was done? She felt milld improvement after the procedure but her symptoms has been worsening over the past month.\par No weight loss, no hematemesis, no abdominal pain. [FreeTextEntry1] : \par Today she denies wheezing or chest pain, but does endorse worsening SOB on exertion that is very limiting. She also continue to have fatigue and dysphagia. \par \par \par

## 2019-12-20 NOTE — PHYSICAL EXAM
[General Appearance - In No Acute Distress] : in no acute distress [General Appearance - Alert] : alert [Sclera] : the sclera and conjunctiva were normal [Thyroid Diffuse Enlargement] : the thyroid was not enlarged [Auscultation Breath Sounds / Voice Sounds] : lungs were clear to auscultation bilaterally [Heart Sounds] : normal S1 and S2 [Bowel Sounds] : normal bowel sounds [Abdomen Soft] : soft [Abdomen Tenderness] : non-tender [] : no hepato-splenomegaly [Abdomen Mass (___ Cm)] : no abdominal mass palpated [No CVA Tenderness] : no ~M costovertebral angle tenderness [Abnormal Walk] : normal gait [Skin Color & Pigmentation] : normal skin color and pigmentation [No Focal Deficits] : no focal deficits [Oriented To Time, Place, And Person] : oriented to person, place, and time

## 2019-12-20 NOTE — REASON FOR VISIT
[Initial Eval - Existing Diagnosis] : an initial evaluation of an existing diagnosis [Dysphagia] : dysphagia

## 2019-12-20 NOTE — ASSESSMENT
[FreeTextEntry1] : 60 yo female patient with Myasthenia gravis, hypothyroidism, HTN, DLD, valvular heart disease, GERD presented for dysphagia.\par \par # Dysphagia\par - fluctuating, mainly for liquids but present for solids, associated with choking sensation\par - Likely oropharyngeal due to MG\par - EGD done in October in [facility] with ? dilatation ? reason\par - will refer to Speech and swallow for assessment of swallowing and possible modified barium study\par - c/w PPI\par - f/u with GI after speech and swallow testing

## 2019-12-23 ENCOUNTER — INPATIENT (INPATIENT)
Facility: HOSPITAL | Age: 59
LOS: 3 days | Discharge: HOME | End: 2019-12-27
Attending: HOSPITALIST | Admitting: HOSPITALIST
Payer: MEDICAID

## 2019-12-23 VITALS
SYSTOLIC BLOOD PRESSURE: 135 MMHG | OXYGEN SATURATION: 95 % | HEIGHT: 64 IN | TEMPERATURE: 101 F | WEIGHT: 179.9 LBS | HEART RATE: 105 BPM | DIASTOLIC BLOOD PRESSURE: 68 MMHG | RESPIRATION RATE: 20 BRPM

## 2019-12-23 DIAGNOSIS — R13.10 DYSPHAGIA, UNSPECIFIED: ICD-10-CM

## 2019-12-23 DIAGNOSIS — Z98.890 OTHER SPECIFIED POSTPROCEDURAL STATES: Chronic | ICD-10-CM

## 2019-12-23 DIAGNOSIS — G70.00 MYASTHENIA GRAVIS WITHOUT (ACUTE) EXACERBATION: ICD-10-CM

## 2019-12-23 LAB
ALBUMIN SERPL ELPH-MCNC: 4.6 G/DL — SIGNIFICANT CHANGE UP (ref 3.5–5.2)
ALP SERPL-CCNC: 109 U/L — SIGNIFICANT CHANGE UP (ref 30–115)
ALT FLD-CCNC: 37 U/L — SIGNIFICANT CHANGE UP (ref 0–41)
ANION GAP SERPL CALC-SCNC: 15 MMOL/L — HIGH (ref 7–14)
APPEARANCE UR: CLEAR — SIGNIFICANT CHANGE UP
APTT BLD: 25.2 SEC — LOW (ref 27–39.2)
AST SERPL-CCNC: 27 U/L — SIGNIFICANT CHANGE UP (ref 0–41)
BACTERIA # UR AUTO: ABNORMAL
BASOPHILS # BLD AUTO: 0.02 K/UL — SIGNIFICANT CHANGE UP (ref 0–0.2)
BASOPHILS NFR BLD AUTO: 0.2 % — SIGNIFICANT CHANGE UP (ref 0–1)
BILIRUB SERPL-MCNC: 0.9 MG/DL — SIGNIFICANT CHANGE UP (ref 0.2–1.2)
BILIRUB UR-MCNC: NEGATIVE — SIGNIFICANT CHANGE UP
BUN SERPL-MCNC: 14 MG/DL — SIGNIFICANT CHANGE UP (ref 10–20)
CALCIUM SERPL-MCNC: 9.3 MG/DL — SIGNIFICANT CHANGE UP (ref 8.5–10.1)
CHLORIDE SERPL-SCNC: 91 MMOL/L — LOW (ref 98–110)
CO2 SERPL-SCNC: 27 MMOL/L — SIGNIFICANT CHANGE UP (ref 17–32)
COD CRY URNS QL: NEGATIVE — SIGNIFICANT CHANGE UP
COLOR SPEC: YELLOW — SIGNIFICANT CHANGE UP
CREAT SERPL-MCNC: 0.7 MG/DL — SIGNIFICANT CHANGE UP (ref 0.7–1.5)
DIFF PNL FLD: ABNORMAL
EOSINOPHIL # BLD AUTO: 0 K/UL — SIGNIFICANT CHANGE UP (ref 0–0.7)
EOSINOPHIL NFR BLD AUTO: 0 % — SIGNIFICANT CHANGE UP (ref 0–8)
EPI CELLS # UR: ABNORMAL /HPF
FLU A RESULT: NEGATIVE — SIGNIFICANT CHANGE UP
FLU A RESULT: NEGATIVE — SIGNIFICANT CHANGE UP
FLUAV AG NPH QL: NEGATIVE — SIGNIFICANT CHANGE UP
FLUBV AG NPH QL: NEGATIVE — SIGNIFICANT CHANGE UP
GLUCOSE SERPL-MCNC: 98 MG/DL — SIGNIFICANT CHANGE UP (ref 70–99)
GLUCOSE UR QL: NEGATIVE MG/DL — SIGNIFICANT CHANGE UP
GRAN CASTS # UR COMP ASSIST: NEGATIVE — SIGNIFICANT CHANGE UP
HCT VFR BLD CALC: 42.1 % — SIGNIFICANT CHANGE UP (ref 37–47)
HGB BLD-MCNC: 13.8 G/DL — SIGNIFICANT CHANGE UP (ref 12–16)
HYALINE CASTS # UR AUTO: NEGATIVE — SIGNIFICANT CHANGE UP
IMM GRANULOCYTES NFR BLD AUTO: 0.3 % — SIGNIFICANT CHANGE UP (ref 0.1–0.3)
INR BLD: 1.06 RATIO — SIGNIFICANT CHANGE UP (ref 0.65–1.3)
KETONES UR-MCNC: ABNORMAL
LEUKOCYTE ESTERASE UR-ACNC: ABNORMAL
LYMPHOCYTES # BLD AUTO: 0.84 K/UL — LOW (ref 1.2–3.4)
LYMPHOCYTES # BLD AUTO: 9.4 % — LOW (ref 20.5–51.1)
MCHC RBC-ENTMCNC: 28.9 PG — SIGNIFICANT CHANGE UP (ref 27–31)
MCHC RBC-ENTMCNC: 32.8 G/DL — SIGNIFICANT CHANGE UP (ref 32–37)
MCV RBC AUTO: 88.3 FL — SIGNIFICANT CHANGE UP (ref 81–99)
MONOCYTES # BLD AUTO: 0.55 K/UL — SIGNIFICANT CHANGE UP (ref 0.1–0.6)
MONOCYTES NFR BLD AUTO: 6.2 % — SIGNIFICANT CHANGE UP (ref 1.7–9.3)
NEUTROPHILS # BLD AUTO: 7.48 K/UL — HIGH (ref 1.4–6.5)
NEUTROPHILS NFR BLD AUTO: 83.9 % — HIGH (ref 42.2–75.2)
NITRITE UR-MCNC: NEGATIVE — SIGNIFICANT CHANGE UP
NRBC # BLD: 0 /100 WBCS — SIGNIFICANT CHANGE UP (ref 0–0)
PH UR: 7 — SIGNIFICANT CHANGE UP (ref 5–8)
PLATELET # BLD AUTO: 258 K/UL — SIGNIFICANT CHANGE UP (ref 130–400)
POTASSIUM SERPL-MCNC: 3.8 MMOL/L — SIGNIFICANT CHANGE UP (ref 3.5–5)
POTASSIUM SERPL-SCNC: 3.8 MMOL/L — SIGNIFICANT CHANGE UP (ref 3.5–5)
PROT SERPL-MCNC: 7.4 G/DL — SIGNIFICANT CHANGE UP (ref 6–8)
PROT UR-MCNC: NEGATIVE MG/DL — SIGNIFICANT CHANGE UP
PROTHROM AB SERPL-ACNC: 12.2 SEC — SIGNIFICANT CHANGE UP (ref 9.95–12.87)
RBC # BLD: 4.77 M/UL — SIGNIFICANT CHANGE UP (ref 4.2–5.4)
RBC # FLD: 12.8 % — SIGNIFICANT CHANGE UP (ref 11.5–14.5)
RBC CASTS # UR COMP ASSIST: ABNORMAL /HPF
RSV RESULT: POSITIVE
RSV RNA RESP QL NAA+PROBE: POSITIVE
SODIUM SERPL-SCNC: 133 MMOL/L — LOW (ref 135–146)
SP GR SPEC: 1.01 — SIGNIFICANT CHANGE UP (ref 1.01–1.03)
TRI-PHOS CRY UR QL COMP ASSIST: NEGATIVE — SIGNIFICANT CHANGE UP
URATE CRY FLD QL MICRO: NEGATIVE — SIGNIFICANT CHANGE UP
UROBILINOGEN FLD QL: 0.2 MG/DL — SIGNIFICANT CHANGE UP (ref 0.2–0.2)
WBC # BLD: 8.92 K/UL — SIGNIFICANT CHANGE UP (ref 4.8–10.8)
WBC # FLD AUTO: 8.92 K/UL — SIGNIFICANT CHANGE UP (ref 4.8–10.8)
WBC UR QL: ABNORMAL /HPF

## 2019-12-23 PROCEDURE — 99285 EMERGENCY DEPT VISIT HI MDM: CPT

## 2019-12-23 PROCEDURE — 99222 1ST HOSP IP/OBS MODERATE 55: CPT

## 2019-12-23 PROCEDURE — 71045 X-RAY EXAM CHEST 1 VIEW: CPT | Mod: 26

## 2019-12-23 RX ORDER — FAMOTIDINE 10 MG/ML
20 INJECTION INTRAVENOUS ONCE
Refills: 0 | Status: COMPLETED | OUTPATIENT
Start: 2019-12-23 | End: 2019-12-23

## 2019-12-23 RX ORDER — ONDANSETRON 8 MG/1
4 TABLET, FILM COATED ORAL ONCE
Refills: 0 | Status: COMPLETED | OUTPATIENT
Start: 2019-12-23 | End: 2019-12-23

## 2019-12-23 RX ORDER — SODIUM CHLORIDE 9 MG/ML
1000 INJECTION INTRAMUSCULAR; INTRAVENOUS; SUBCUTANEOUS ONCE
Refills: 0 | Status: COMPLETED | OUTPATIENT
Start: 2019-12-23 | End: 2019-12-23

## 2019-12-23 RX ORDER — ACETAMINOPHEN 500 MG
975 TABLET ORAL ONCE
Refills: 0 | Status: COMPLETED | OUTPATIENT
Start: 2019-12-23 | End: 2019-12-23

## 2019-12-23 RX ADMIN — FAMOTIDINE 100 MILLIGRAM(S): 10 INJECTION INTRAVENOUS at 18:02

## 2019-12-23 RX ADMIN — Medication 975 MILLIGRAM(S): at 19:35

## 2019-12-23 RX ADMIN — SODIUM CHLORIDE 1 MILLILITER(S): 9 INJECTION INTRAMUSCULAR; INTRAVENOUS; SUBCUTANEOUS at 18:03

## 2019-12-23 RX ADMIN — ONDANSETRON 104 MILLIGRAM(S): 8 TABLET, FILM COATED ORAL at 18:03

## 2019-12-23 RX ADMIN — ONDANSETRON 104 MILLIGRAM(S): 8 TABLET, FILM COATED ORAL at 19:35

## 2019-12-23 NOTE — H&P ADULT - PROBLEM SELECTOR PLAN 3
continue current meds but switch prednisone to solumedrol for now, may need neurology to see continue current meds but switch prednisone to solumedrol for now, may need neurology to see (patient wishes to hold off until re-evaluation in am) I spoke to pharmacy and we do not have IV form of Mestinon

## 2019-12-23 NOTE — ED PROVIDER NOTE - NS ED ROS FT
Review of Systems:  	•	CONSTITUTIONAL: + fever, no diaphoresis, + chills  	•	SKIN: no rash  	•	HEMATOLOGIC: no bleeding, no bruising  	•	EYES: no eye pain, no blurry vision  	•	ENT: no change in hearing, no sore throat, no ear pain or tinnitus  	•	RESPIRATORY: no shortness of breath, no cough  	•	CARDIAC: no chest pain, no palpitations  	•	GI: no abd pain, + nausea, + vomiting, + diarrhea, no constipation  	•	GENITO-URINARY: no discharge, no dysuria; no hematuria, no increased urinary frequency  	•	MUSCULOSKELETAL: no joint paint, no swelling, no redness, +myalgias  	•	NEUROLOGIC: no weakness, no headache, no paresthesias, no LOC  	•	PSYCH: no anxiety, non suicidal, non homicidal, no hallucination, no depression

## 2019-12-23 NOTE — H&P ADULT - NSHPLABSRESULTS_GEN_ALL_CORE
13.8   8.92  )-----------( 258      ( 23 Dec 2019 17:50 )             42.1         133<L>  |  91<L>  |  14  ----------------------------<  98  3.8   |  27  |  0.7    Ca    9.3      23 Dec 2019 17:50    TPro  7.4  /  Alb  4.6  /  TBili  0.9  /  DBili  x   /  AST  27  /  ALT  37  /  AlkPhos  109            Urinalysis Basic - ( 23 Dec 2019 20:20 )    Color: Yellow / Appearance: Clear / S.015 / pH: x  Gluc: x / Ketone: Trace  / Bili: Negative / Urobili: 0.2 mg/dL   Blood: x / Protein: Negative mg/dL / Nitrite: Negative   Leuk Esterase: Small / RBC: 6-10 /HPF / WBC 10-25 /HPF   Sq Epi: x / Non Sq Epi: Few /HPF / Bacteria: Few      PT/INR - ( 23 Dec 2019 17:50 )   PT: 12.20 sec;   INR: 1.06 ratio         PTT - ( 23 Dec 2019 17:50 )  PTT:25.2 sec  Lactate Trend        CAPILLARY BLOOD GLUCOSE

## 2019-12-23 NOTE — H&P ADULT - PROBLEM SELECTOR PLAN 1
IV fluids, trial of clear liquids diet IV fluids, trial of clear liquids diet (patient wishes to see if she improves prior to ordering a GI consult)

## 2019-12-23 NOTE — ED PROVIDER NOTE - PHYSICAL EXAMINATION
--EXAM--  VITAL SIGNS: I have reviewed vs documented at present.  CONSTITUTIONAL: Well-developed; well-nourished; in no acute distress.   SKIN: Warm and dry, no acute rash.   HEAD: Normocephalic; atraumatic.  EYES: conjunctiva and sclera clear. No nystagmus.  ENT: No nasal discharge; airway clear.  NECK: Supple; non tender.  CARD: S1, S2, tachy rate  RESP: No wheezes, rales or rhonchi.  ABD: Normal bowel sounds; soft; non-distended; non-tender.  EXT: Normal ROM.   NEURO: Alert, oriented, grossly unremarkable. Pt states she is at baseline  PSYCH: Cooperative, appropriate.

## 2019-12-23 NOTE — ED PROVIDER NOTE - CARE PLAN
Principal Discharge DX:	Nausea vomiting and diarrhea  Secondary Diagnosis:	RSV (respiratory syncytial virus infection)

## 2019-12-23 NOTE — ED ADULT NURSE NOTE - PMH
High blood cholesterol    Myasthenia gravis    Pancreatitis    POTS (postural orthostatic tachycardia syndrome)

## 2019-12-23 NOTE — H&P ADULT - HISTORY OF PRESENT ILLNESS
[age]yo [age] whose PMH includes myasthenia gravis presents to the ER due to nausea and vomiting 60yo female whose PMH includes myasthenia gravis presents to the ER due to nausea and vomiting. Recently seen by gastroenterologist in th eclinic 58yo female whose PMH includes myasthenia gravis presents to the ER due to nausea and vomiting. Recently seen by gastroenterologist in the clinic and she tells me a speech therapy consult was requested

## 2019-12-23 NOTE — ED ADULT TRIAGE NOTE - CHIEF COMPLAINT QUOTE
"I have myasthenia gravis and I am having time breathing, swallowing, and drinking. I also have been throwing up and running fevers at home."

## 2019-12-23 NOTE — H&P ADULT - NSICDXPASTMEDICALHX_GEN_ALL_CORE_FT
PAST MEDICAL HISTORY:  High blood cholesterol     Myasthenia gravis     Pancreatitis     POTS (postural orthostatic tachycardia syndrome)

## 2019-12-23 NOTE — ED ADULT NURSE NOTE - NSIMPLEMENTINTERV_GEN_ALL_ED
Implemented All Fall with Harm Risk Interventions:  [place] to call system. Call bell, personal items and telephone within reach. Instruct patient to call for assistance. Room bathroom lighting operational. Non-slip footwear when patient is off stretcher. Physically safe environment: no spills, clutter or unnecessary equipment. Stretcher in lowest position, wheels locked, appropriate side rails in place. Provide visual cue, wrist band, yellow gown, etc. Monitor gait and stability. Monitor for mental status changes and reorient to person, place, and time. Review medications for side effects contributing to fall risk. Reinforce activity limits and safety measures with patient and family. Provide visual clues: red socks.

## 2019-12-23 NOTE — ED PROVIDER NOTE - ATTENDING CONTRIBUTION TO CARE
Pt with c/o n/v/d.  +fever.  +dry cough.  L:CTAB, CV:tachy, Neg abd ttp.  Neg cva ttp.  a/p: labs, imaging, reassess.

## 2019-12-23 NOTE — ED PROVIDER NOTE - CLINICAL SUMMARY MEDICAL DECISION MAKING FREE TEXT BOX
pt hx of MG michaelbel to take her meds yesterday  2/2 vomitin g- unabel to oleate P requring multipel  rounds of  zofran  no  signs of crisis.   RSV +   pt admitted

## 2019-12-24 DIAGNOSIS — B97.4 RESPIRATORY SYNCYTIAL VIRUS AS THE CAUSE OF DISEASES CLASSIFIED ELSEWHERE: ICD-10-CM

## 2019-12-24 DIAGNOSIS — R11.2 NAUSEA WITH VOMITING, UNSPECIFIED: ICD-10-CM

## 2019-12-24 DIAGNOSIS — G70.00 MYASTHENIA GRAVIS WITHOUT (ACUTE) EXACERBATION: ICD-10-CM

## 2019-12-24 DIAGNOSIS — E55.9 VITAMIN D DEFICIENCY, UNSPECIFIED: ICD-10-CM

## 2019-12-24 LAB
ALBUMIN SERPL ELPH-MCNC: 3.8 G/DL — SIGNIFICANT CHANGE UP (ref 3.5–5.2)
ALP SERPL-CCNC: 117 U/L — HIGH (ref 30–115)
ALT FLD-CCNC: 46 U/L — HIGH (ref 0–41)
ANION GAP SERPL CALC-SCNC: 12 MMOL/L — SIGNIFICANT CHANGE UP (ref 7–14)
AST SERPL-CCNC: 47 U/L — HIGH (ref 0–41)
BILIRUB SERPL-MCNC: 0.5 MG/DL — SIGNIFICANT CHANGE UP (ref 0.2–1.2)
BUN SERPL-MCNC: 11 MG/DL — SIGNIFICANT CHANGE UP (ref 10–20)
CALCIUM SERPL-MCNC: 8.7 MG/DL — SIGNIFICANT CHANGE UP (ref 8.5–10.1)
CHLORIDE SERPL-SCNC: 104 MMOL/L — SIGNIFICANT CHANGE UP (ref 98–110)
CO2 SERPL-SCNC: 26 MMOL/L — SIGNIFICANT CHANGE UP (ref 17–32)
CREAT SERPL-MCNC: 0.7 MG/DL — SIGNIFICANT CHANGE UP (ref 0.7–1.5)
GLUCOSE SERPL-MCNC: 154 MG/DL — HIGH (ref 70–99)
HCT VFR BLD CALC: 38.5 % — SIGNIFICANT CHANGE UP (ref 37–47)
HGB BLD-MCNC: 12.4 G/DL — SIGNIFICANT CHANGE UP (ref 12–16)
MCHC RBC-ENTMCNC: 28.8 PG — SIGNIFICANT CHANGE UP (ref 27–31)
MCHC RBC-ENTMCNC: 32.2 G/DL — SIGNIFICANT CHANGE UP (ref 32–37)
MCV RBC AUTO: 89.5 FL — SIGNIFICANT CHANGE UP (ref 81–99)
NRBC # BLD: 0 /100 WBCS — SIGNIFICANT CHANGE UP (ref 0–0)
PLATELET # BLD AUTO: 223 K/UL — SIGNIFICANT CHANGE UP (ref 130–400)
POTASSIUM SERPL-MCNC: 3.7 MMOL/L — SIGNIFICANT CHANGE UP (ref 3.5–5)
POTASSIUM SERPL-SCNC: 3.7 MMOL/L — SIGNIFICANT CHANGE UP (ref 3.5–5)
PROT SERPL-MCNC: 6.8 G/DL — SIGNIFICANT CHANGE UP (ref 6–8)
RBC # BLD: 4.3 M/UL — SIGNIFICANT CHANGE UP (ref 4.2–5.4)
RBC # FLD: 12.6 % — SIGNIFICANT CHANGE UP (ref 11.5–14.5)
SODIUM SERPL-SCNC: 142 MMOL/L — SIGNIFICANT CHANGE UP (ref 135–146)
WBC # BLD: 7.11 K/UL — SIGNIFICANT CHANGE UP (ref 4.8–10.8)
WBC # FLD AUTO: 7.11 K/UL — SIGNIFICANT CHANGE UP (ref 4.8–10.8)

## 2019-12-24 RX ORDER — ATORVASTATIN CALCIUM 80 MG/1
20 TABLET, FILM COATED ORAL AT BEDTIME
Refills: 0 | Status: DISCONTINUED | OUTPATIENT
Start: 2019-12-24 | End: 2019-12-27

## 2019-12-24 RX ORDER — ASPIRIN/CALCIUM CARB/MAGNESIUM 324 MG
81 TABLET ORAL DAILY
Refills: 0 | Status: DISCONTINUED | OUTPATIENT
Start: 2019-12-24 | End: 2019-12-27

## 2019-12-24 RX ORDER — ACETAMINOPHEN 500 MG
650 TABLET ORAL EVERY 6 HOURS
Refills: 0 | Status: DISCONTINUED | OUTPATIENT
Start: 2019-12-24 | End: 2019-12-27

## 2019-12-24 RX ORDER — METOPROLOL TARTRATE 50 MG
25 TABLET ORAL DAILY
Refills: 0 | Status: DISCONTINUED | OUTPATIENT
Start: 2019-12-24 | End: 2019-12-27

## 2019-12-24 RX ORDER — SODIUM CHLORIDE 9 MG/ML
1000 INJECTION INTRAMUSCULAR; INTRAVENOUS; SUBCUTANEOUS
Refills: 0 | Status: DISCONTINUED | OUTPATIENT
Start: 2019-12-24 | End: 2019-12-25

## 2019-12-24 RX ORDER — GUAIFENESIN/DEXTROMETHORPHAN 600MG-30MG
10 TABLET, EXTENDED RELEASE 12 HR ORAL
Refills: 0 | Status: DISCONTINUED | OUTPATIENT
Start: 2019-12-24 | End: 2019-12-27

## 2019-12-24 RX ORDER — HEPARIN SODIUM 5000 [USP'U]/ML
5000 INJECTION INTRAVENOUS; SUBCUTANEOUS
Refills: 0 | Status: DISCONTINUED | OUTPATIENT
Start: 2019-12-24 | End: 2019-12-27

## 2019-12-24 RX ORDER — ALBUTEROL 90 UG/1
2 AEROSOL, METERED ORAL EVERY 6 HOURS
Refills: 0 | Status: DISCONTINUED | OUTPATIENT
Start: 2019-12-24 | End: 2019-12-26

## 2019-12-24 RX ORDER — PYRIDOSTIGMINE BROMIDE 60 MG/5ML
60 SOLUTION ORAL
Refills: 0 | Status: DISCONTINUED | OUTPATIENT
Start: 2019-12-24 | End: 2019-12-27

## 2019-12-24 RX ORDER — IBUPROFEN 200 MG
400 TABLET ORAL ONCE
Refills: 0 | Status: COMPLETED | OUTPATIENT
Start: 2019-12-24 | End: 2019-12-24

## 2019-12-24 RX ORDER — GUAIFENESIN/DEXTROMETHORPHAN 600MG-30MG
10 TABLET, EXTENDED RELEASE 12 HR ORAL ONCE
Refills: 0 | Status: COMPLETED | OUTPATIENT
Start: 2019-12-24 | End: 2019-12-24

## 2019-12-24 RX ORDER — ONDANSETRON 8 MG/1
4 TABLET, FILM COATED ORAL EVERY 4 HOURS
Refills: 0 | Status: DISCONTINUED | OUTPATIENT
Start: 2019-12-24 | End: 2019-12-27

## 2019-12-24 RX ORDER — BUDESONIDE AND FORMOTEROL FUMARATE DIHYDRATE 160; 4.5 UG/1; UG/1
2 AEROSOL RESPIRATORY (INHALATION)
Refills: 0 | Status: DISCONTINUED | OUTPATIENT
Start: 2019-12-24 | End: 2019-12-27

## 2019-12-24 RX ORDER — PANTOPRAZOLE SODIUM 20 MG/1
40 TABLET, DELAYED RELEASE ORAL
Refills: 0 | Status: DISCONTINUED | OUTPATIENT
Start: 2019-12-24 | End: 2019-12-27

## 2019-12-24 RX ORDER — SIMVASTATIN 20 MG/1
0 TABLET, FILM COATED ORAL
Qty: 0 | Refills: 0 | DISCHARGE

## 2019-12-24 RX ORDER — LEVOTHYROXINE SODIUM 125 MCG
50 TABLET ORAL
Refills: 0 | Status: DISCONTINUED | OUTPATIENT
Start: 2019-12-24 | End: 2019-12-27

## 2019-12-24 RX ORDER — ERGOCALCIFEROL 1.25 MG/1
50000 CAPSULE ORAL
Refills: 0 | Status: DISCONTINUED | OUTPATIENT
Start: 2019-12-27 | End: 2019-12-27

## 2019-12-24 RX ADMIN — PYRIDOSTIGMINE BROMIDE 60 MILLIGRAM(S): 60 SOLUTION ORAL at 01:11

## 2019-12-24 RX ADMIN — Medication 400 MILLIGRAM(S): at 11:09

## 2019-12-24 RX ADMIN — Medication 25 MILLIGRAM(S): at 05:45

## 2019-12-24 RX ADMIN — Medication 10 MILLILITER(S): at 16:19

## 2019-12-24 RX ADMIN — Medication 81 MILLIGRAM(S): at 11:13

## 2019-12-24 RX ADMIN — PANTOPRAZOLE SODIUM 40 MILLIGRAM(S): 20 TABLET, DELAYED RELEASE ORAL at 05:46

## 2019-12-24 RX ADMIN — BUDESONIDE AND FORMOTEROL FUMARATE DIHYDRATE 2 PUFF(S): 160; 4.5 AEROSOL RESPIRATORY (INHALATION) at 08:30

## 2019-12-24 RX ADMIN — Medication 650 MILLIGRAM(S): at 09:30

## 2019-12-24 RX ADMIN — Medication 10 MILLILITER(S): at 11:13

## 2019-12-24 RX ADMIN — PYRIDOSTIGMINE BROMIDE 60 MILLIGRAM(S): 60 SOLUTION ORAL at 11:13

## 2019-12-24 RX ADMIN — Medication 10 MILLILITER(S): at 21:22

## 2019-12-24 RX ADMIN — SODIUM CHLORIDE 50 MILLILITER(S): 9 INJECTION INTRAMUSCULAR; INTRAVENOUS; SUBCUTANEOUS at 21:22

## 2019-12-24 RX ADMIN — Medication 650 MILLIGRAM(S): at 08:30

## 2019-12-24 RX ADMIN — Medication 50 MICROGRAM(S): at 05:45

## 2019-12-24 RX ADMIN — SODIUM CHLORIDE 50 MILLILITER(S): 9 INJECTION INTRAMUSCULAR; INTRAVENOUS; SUBCUTANEOUS at 01:11

## 2019-12-24 RX ADMIN — PYRIDOSTIGMINE BROMIDE 60 MILLIGRAM(S): 60 SOLUTION ORAL at 17:23

## 2019-12-24 RX ADMIN — Medication 20 MILLIGRAM(S): at 05:44

## 2019-12-24 RX ADMIN — Medication 400 MILLIGRAM(S): at 12:05

## 2019-12-24 RX ADMIN — BUDESONIDE AND FORMOTEROL FUMARATE DIHYDRATE 2 PUFF(S): 160; 4.5 AEROSOL RESPIRATORY (INHALATION) at 21:23

## 2019-12-24 RX ADMIN — PYRIDOSTIGMINE BROMIDE 60 MILLIGRAM(S): 60 SOLUTION ORAL at 05:44

## 2019-12-24 RX ADMIN — PYRIDOSTIGMINE BROMIDE 60 MILLIGRAM(S): 60 SOLUTION ORAL at 21:23

## 2019-12-24 RX ADMIN — HEPARIN SODIUM 5000 UNIT(S): 5000 INJECTION INTRAVENOUS; SUBCUTANEOUS at 17:21

## 2019-12-24 RX ADMIN — HEPARIN SODIUM 5000 UNIT(S): 5000 INJECTION INTRAVENOUS; SUBCUTANEOUS at 05:44

## 2019-12-24 RX ADMIN — ATORVASTATIN CALCIUM 20 MILLIGRAM(S): 80 TABLET, FILM COATED ORAL at 21:23

## 2019-12-24 NOTE — CHART NOTE - NSCHARTNOTEFT_GEN_A_CORE
Will continue IV fluids and order supplemental oxygen per patient request (pulse ox is good without oxygen)

## 2019-12-25 LAB
ANION GAP SERPL CALC-SCNC: 7 MMOL/L — SIGNIFICANT CHANGE UP (ref 7–14)
BUN SERPL-MCNC: 14 MG/DL — SIGNIFICANT CHANGE UP (ref 10–20)
CALCIUM SERPL-MCNC: 8.4 MG/DL — LOW (ref 8.5–10.1)
CHLORIDE SERPL-SCNC: 108 MMOL/L — SIGNIFICANT CHANGE UP (ref 98–110)
CO2 SERPL-SCNC: 28 MMOL/L — SIGNIFICANT CHANGE UP (ref 17–32)
CREAT SERPL-MCNC: 0.6 MG/DL — LOW (ref 0.7–1.5)
CULTURE RESULTS: SIGNIFICANT CHANGE UP
GLUCOSE SERPL-MCNC: 111 MG/DL — HIGH (ref 70–99)
HCT VFR BLD CALC: 34.5 % — LOW (ref 37–47)
HGB BLD-MCNC: 11.2 G/DL — LOW (ref 12–16)
MCHC RBC-ENTMCNC: 28.9 PG — SIGNIFICANT CHANGE UP (ref 27–31)
MCHC RBC-ENTMCNC: 32.5 G/DL — SIGNIFICANT CHANGE UP (ref 32–37)
MCV RBC AUTO: 89.1 FL — SIGNIFICANT CHANGE UP (ref 81–99)
NRBC # BLD: 0 /100 WBCS — SIGNIFICANT CHANGE UP (ref 0–0)
PLATELET # BLD AUTO: 219 K/UL — SIGNIFICANT CHANGE UP (ref 130–400)
POTASSIUM SERPL-MCNC: 3.9 MMOL/L — SIGNIFICANT CHANGE UP (ref 3.5–5)
POTASSIUM SERPL-SCNC: 3.9 MMOL/L — SIGNIFICANT CHANGE UP (ref 3.5–5)
RBC # BLD: 3.87 M/UL — LOW (ref 4.2–5.4)
RBC # FLD: 12.7 % — SIGNIFICANT CHANGE UP (ref 11.5–14.5)
SODIUM SERPL-SCNC: 143 MMOL/L — SIGNIFICANT CHANGE UP (ref 135–146)
SPECIMEN SOURCE: SIGNIFICANT CHANGE UP
WBC # BLD: 7.77 K/UL — SIGNIFICANT CHANGE UP (ref 4.8–10.8)
WBC # FLD AUTO: 7.77 K/UL — SIGNIFICANT CHANGE UP (ref 4.8–10.8)

## 2019-12-25 PROCEDURE — 99223 1ST HOSP IP/OBS HIGH 75: CPT

## 2019-12-25 PROCEDURE — 99233 SBSQ HOSP IP/OBS HIGH 50: CPT

## 2019-12-25 RX ORDER — HYDROXYZINE HCL 10 MG
25 TABLET ORAL ONCE
Refills: 0 | Status: COMPLETED | OUTPATIENT
Start: 2019-12-25 | End: 2019-12-25

## 2019-12-25 RX ORDER — NYSTATIN 500MM UNIT
5 POWDER (EA) MISCELLANEOUS EVERY 6 HOURS
Refills: 0 | Status: DISCONTINUED | OUTPATIENT
Start: 2019-12-25 | End: 2019-12-27

## 2019-12-25 RX ORDER — PSEUDOEPHEDRINE HCL 30 MG
30 TABLET ORAL ONCE
Refills: 0 | Status: COMPLETED | OUTPATIENT
Start: 2019-12-25 | End: 2019-12-25

## 2019-12-25 RX ORDER — SODIUM CHLORIDE 9 MG/ML
1000 INJECTION INTRAMUSCULAR; INTRAVENOUS; SUBCUTANEOUS
Refills: 0 | Status: DISCONTINUED | OUTPATIENT
Start: 2019-12-25 | End: 2019-12-27

## 2019-12-25 RX ADMIN — Medication 5 MILLILITER(S): at 11:42

## 2019-12-25 RX ADMIN — ONDANSETRON 4 MILLIGRAM(S): 8 TABLET, FILM COATED ORAL at 01:43

## 2019-12-25 RX ADMIN — Medication 30 MILLIGRAM(S): at 11:42

## 2019-12-25 RX ADMIN — SODIUM CHLORIDE 75 MILLILITER(S): 9 INJECTION INTRAMUSCULAR; INTRAVENOUS; SUBCUTANEOUS at 14:06

## 2019-12-25 RX ADMIN — Medication 81 MILLIGRAM(S): at 11:41

## 2019-12-25 RX ADMIN — Medication 20 MILLIGRAM(S): at 06:52

## 2019-12-25 RX ADMIN — Medication 5 MILLILITER(S): at 17:49

## 2019-12-25 RX ADMIN — PANTOPRAZOLE SODIUM 40 MILLIGRAM(S): 20 TABLET, DELAYED RELEASE ORAL at 06:52

## 2019-12-25 RX ADMIN — PYRIDOSTIGMINE BROMIDE 60 MILLIGRAM(S): 60 SOLUTION ORAL at 06:52

## 2019-12-25 RX ADMIN — Medication 650 MILLIGRAM(S): at 21:52

## 2019-12-25 RX ADMIN — HEPARIN SODIUM 5000 UNIT(S): 5000 INJECTION INTRAVENOUS; SUBCUTANEOUS at 06:52

## 2019-12-25 RX ADMIN — BUDESONIDE AND FORMOTEROL FUMARATE DIHYDRATE 2 PUFF(S): 160; 4.5 AEROSOL RESPIRATORY (INHALATION) at 20:47

## 2019-12-25 RX ADMIN — PYRIDOSTIGMINE BROMIDE 60 MILLIGRAM(S): 60 SOLUTION ORAL at 11:41

## 2019-12-25 RX ADMIN — HEPARIN SODIUM 5000 UNIT(S): 5000 INJECTION INTRAVENOUS; SUBCUTANEOUS at 17:49

## 2019-12-25 RX ADMIN — Medication 650 MILLIGRAM(S): at 01:43

## 2019-12-25 RX ADMIN — Medication 650 MILLIGRAM(S): at 21:21

## 2019-12-25 RX ADMIN — Medication 25 MILLIGRAM(S): at 06:52

## 2019-12-25 RX ADMIN — Medication 30 MILLIGRAM(S): at 21:18

## 2019-12-25 RX ADMIN — Medication 650 MILLIGRAM(S): at 02:13

## 2019-12-25 RX ADMIN — Medication 10 MILLILITER(S): at 04:16

## 2019-12-25 RX ADMIN — Medication 10 MILLILITER(S): at 14:37

## 2019-12-25 RX ADMIN — Medication 10 MILLILITER(S): at 22:08

## 2019-12-25 RX ADMIN — ATORVASTATIN CALCIUM 20 MILLIGRAM(S): 80 TABLET, FILM COATED ORAL at 21:11

## 2019-12-25 RX ADMIN — Medication 50 MICROGRAM(S): at 06:52

## 2019-12-25 RX ADMIN — PYRIDOSTIGMINE BROMIDE 60 MILLIGRAM(S): 60 SOLUTION ORAL at 17:49

## 2019-12-25 RX ADMIN — Medication 5 MILLILITER(S): at 14:06

## 2019-12-25 RX ADMIN — Medication 25 MILLIGRAM(S): at 02:32

## 2019-12-25 RX ADMIN — BUDESONIDE AND FORMOTEROL FUMARATE DIHYDRATE 2 PUFF(S): 160; 4.5 AEROSOL RESPIRATORY (INHALATION) at 08:22

## 2019-12-25 NOTE — CONSULT NOTE ADULT - ASSESSMENT
60 yo F w/ h/o MG, POTS, pancreatitis 60 yo F w/ h/o MG, POTS, pancreatitis currently admitted for recurrent sinus congestion and dysphagia/cough found with RSV rhinnitis.  Continues to have proximal weakness likely due to underdosing of prednisone but has current infection which may worsen with any increase in DMTs.  Recommend continue prednisone 20 mg or solumedrol equivalent.  Paresthesias likely due to electrolyte imbalance and underlying R CTS currently asymptomatic.     Plan:  - continue prednisone 20 mg or solumedrol equivalent for now  - check PFTs Q8 hours- if NIF/VC decreasing, recommend ICU/CCU monitoring and increase corticosteroids  - continue Mestinon 60 mg QID  - check MBS  - continue IV hydration   - correct electrolytes

## 2019-12-25 NOTE — PROGRESS NOTE ADULT - ASSESSMENT
·  Problem: Nausea vomiting and diarrhea.   -resolved  -pt tolerating full liquid diet but does feel like she is having issued digesting  -speech and swallow eval     Problem/Plan - 2:  ·  Problem: RSV (respiratory syncytial virus infection).    -monitor, closely watch respiratory parameters   -NIF being monitored by respiratory      Problem/Plan - 3:  ·  Problem: Myasthenia gravis.    -neuro eval appreciated  -on pyridostigmine   -neuro following  -S&S eval    ·  Problem: Vitamin D deficiency.  Plan: on supplement.     Progress Note Handoff  Pending Consults: none  Pending Tests: none  Pending Results: none  Family Discussion: discussed IVF, diet and neuro follow up with pt and her brother - in agreement  Disposition: Home__x___/SNF______/Other_____/Unknown at this time_____    Please call me with any questions at extension [phone]

## 2019-12-25 NOTE — CHART NOTE - NSCHARTNOTEFT_GEN_A_CORE
Patient was awoken from sleep with chest pain and pins and needles to hands. Stat EKG is WNL. I spoke to patient. Await AM lab  work but will ask neurology to see

## 2019-12-25 NOTE — CONSULT NOTE ADULT - SUBJECTIVE AND OBJECTIVE BOX
Neurology Consult    Patient is a 59y old  Female who presents with a chief complaint of     HPI:  60yo female whose PMH includes myasthenia gravis presents to the ER due to nausea and vomiting. Recently seen by gastroenterologist in the clinic and she tells me a speech therapy consult was requested (23 Dec 2019 21:43)    PAST MEDICAL & SURGICAL HISTORY:  Myasthenia gravis  High blood cholesterol  Pancreatitis  POTS (postural orthostatic tachycardia syndrome)  History of foot surgery    FAMILY HISTORY:  No pertinent family history in first degree relatives    Social History: (-) x 3    Allergies    sulfa drugs (Unknown)    Intolerances    MEDICATIONS  (STANDING):  aspirin  chewable 81 milliGRAM(s) Oral daily  atorvastatin 20 milliGRAM(s) Oral at bedtime  budesonide 160 MICROgram(s)/formoterol 4.5 MICROgram(s) Inhaler 2 Puff(s) Inhalation two times a day  heparin  Injectable 5000 Unit(s) SubCutaneous two times a day  levothyroxine 50 MICROGram(s) Oral <User Schedule>  methylPREDNISolone sodium succinate Injectable 20 milliGRAM(s) IV Push daily  metoprolol succinate ER 25 milliGRAM(s) Oral daily  pantoprazole    Tablet 40 milliGRAM(s) Oral before breakfast  pyridostigmine 60 milliGRAM(s) Oral four times a day  sodium chloride 0.9%. 1000 milliLiter(s) (50 mL/Hr) IV Continuous <Continuous>  sodium chloride 0.9%. 1000 milliLiter(s) (50 mL/Hr) IV Continuous <Continuous>    MEDICATIONS  (PRN):  acetaminophen   Tablet .. 650 milliGRAM(s) Oral every 6 hours PRN Temp greater or equal to 38C (100.4F), Mild Pain (1 - 3)  ALBUTerol    90 MICROgram(s) HFA Inhaler 2 Puff(s) Inhalation every 6 hours PRN Shortness of Breath and/or Wheezing  guaifenesin/dextromethorphan  Syrup 10 milliLiter(s) Oral four times a day PRN Cough  ondansetron Injectable 4 milliGRAM(s) IV Push every 4 hours PRN Nausea and/or Vomiting    Review of systems:    Constitutional: as per HPI  Eyes: No eye pain or discharge  ENMT:  No difficulty hearing; No sinus or throat pain  Neck: No pain or stiffness  Respiratory: No cough, wheezing, chills or hemoptysis  Cardiovascular: No chest pain, palpitations, shortness of breath, dyspnea on exertion  Gastrointestinal: No abdominal pain, nausea, vomiting or hematemesis; No diarrhea or constipation.   Genitourinary: No dysuria, frequency, hematuria or incontinence  Neurological: As per HPI  Skin: No rashes or lesions   Endocrine: No heat or cold intolerance; No hair loss  Musculoskeletal: No joint pain or swelling  Psychiatric: No depression, anxiety, mood swings  Heme/Lymph: No easy bruising or bleeding gums    Vital Signs Last 24 Hrs  T(C): 35.6 (25 Dec 2019 06:14), Max: 36.9 (24 Dec 2019 21:10)  T(F): 96 (25 Dec 2019 06:14), Max: 98.4 (24 Dec 2019 21:10)  HR: 69 (25 Dec 2019 06:14) (66 - 76)  BP: 126/62 (25 Dec 2019 06:14) (99/55 - 126/62)  BP(mean): --  RR: 16 (25 Dec 2019 06:14) (16 - 16)  SpO2: 95% (25 Dec 2019 06:14) (95% - 97%)    Examination:  General:  Appearance is consistent with chronologic age.  No abnormal facies.  Gross skin survey within normal limits.    Cognitive/Language:  The patient is oriented to person, place, time and date.  Recent and remote memory intact.  Fund of knowledge is intact and normal.  Language with normal repetition, comprehension and naming.  Nondysarthric.    Eyes: intact VA, VFF.  EOMI w/o nystagmus, skew or reported double vision.  PERRL.  No ptosis/weakness of eyelid closure.    Face:  Facial sensation normal V1 - 3, no facial asymmetry.    Ears/Nose/Throat:  Hearing grossly intact b/l.  Palate elevates midline.  Tongue and uvula midline.   Motor examination:   Normal tone, bulk and range of motion.  No tenderness, twitching, tremors or involuntary movements.  Formal Muscle Strength Testing: (MRC grade R/L) 5/5 UE; 5/5 LE.  No observable drift.  Reflexes:   2+ b/l pectoralis, biceps, triceps, brachioradialis, patella and Achilles.  Plantar response downgoing b/l.  Jaw jerk, Coby, clonus absent.  Sensory examination:   Intact to light touch and pinprick, pain, temperature and proprioception and vibration in all extremities.  Cerebellum:   FTN/HKS intact with normal REINA in all limbs.  No dysmetria or dysdiadokinesia.  Gait narrow based and normal.    Respiratory:  no audible wheezing or inspiratory stridor.  no use of accessory muscles.   Cardiac: pulse palpable, no audible bruits  Abdomen: supple, no guarding, no TTP    Labs:   CBC Full  -  ( 24 Dec 2019 11:31 )  WBC Count : 7.11 K/uL  RBC Count : 4.30 M/uL  Hemoglobin : 12.4 g/dL  Hematocrit : 38.5 %  Platelet Count - Automated : 223 K/uL  Mean Cell Volume : 89.5 fL  Mean Cell Hemoglobin : 28.8 pg  Mean Cell Hemoglobin Concentration : 32.2 g/dL        142  |  104  |  11  ----------------------------<  154<H>  3.7   |  26  |  0.7    Ca    8.7      24 Dec 2019 11:31    TPro  6.8  /  Alb  3.8  /  TBili  0.5  /  DBili  x   /  AST  47<H>  /  ALT  46<H>  /  AlkPhos  117<H>      LIVER FUNCTIONS - ( 24 Dec 2019 11:31 )  Alb: 3.8 g/dL / Pro: 6.8 g/dL / ALK PHOS: 117 U/L / ALT: 46 U/L / AST: 47 U/L / GGT: x           PT/INR - ( 23 Dec 2019 17:50 )   PT: 12.20 sec;   INR: 1.06 ratio       PTT - ( 23 Dec 2019 17:50 )  PTT:25.2 sec  Urinalysis Basic - ( 23 Dec 2019 20:20 )    Color: Yellow / Appearance: Clear / S.015 / pH: x  Gluc: x / Ketone: Trace  / Bili: Negative / Urobili: 0.2 mg/dL   Blood: x / Protein: Negative mg/dL / Nitrite: Negative   Leuk Esterase: Small / RBC: 6-10 /HPF / WBC 10-25 /HPF   Sq Epi: x / Non Sq Epi: Few /HPF / Bacteria: Few      19 @ 09:19 Neurology Consult    Patient is a 59y old  Female who presents with a chief complaint of     HPI:  60yo female whose PMH includes myasthenia gravis presents to the ER due to nausea and vomiting. Recently seen by gastroenterologist in the clinic and she tells me a speech therapy consult was requested to assess swallowing.  Recently diagnosed with MG at [facility] in October followed by Dr. Cárdenas who placed her on prednisone taper recently currently on 20 mg at home.  Started Mestinon in October without any recent changes to dosage.  Denies any N/V with mestinon in the past.  Currently having sinus congestion with recurrent coughing getting treated for RSV rhinnitis.  Has some SOB/dyspnea but no orthopnea or dysphagia.  Denies any abdominal cramping or diarrhea.  Has generalized weakness which has not responded to prednisone though breathing issues have improved.      c/o transient b/l distal UE/LE paresthesias last night resolved by this morning.  Denies any paresthesias in the face or trunk. denies any new focal weakness.  No neck or LBP.  currently without sensory symptoms.      PAST MEDICAL & SURGICAL HISTORY:  Myasthenia gravis  High blood cholesterol  Pancreatitis  POTS (postural orthostatic tachycardia syndrome)  History of foot surgery    FAMILY HISTORY:  No pertinent family history in first degree relatives    Social History: (-) x 3    Allergies    sulfa drugs (Unknown)    Intolerances    MEDICATIONS  (STANDING):  aspirin  chewable 81 milliGRAM(s) Oral daily  atorvastatin 20 milliGRAM(s) Oral at bedtime  budesonide 160 MICROgram(s)/formoterol 4.5 MICROgram(s) Inhaler 2 Puff(s) Inhalation two times a day  heparin  Injectable 5000 Unit(s) SubCutaneous two times a day  levothyroxine 50 MICROGram(s) Oral <User Schedule>  methylPREDNISolone sodium succinate Injectable 20 milliGRAM(s) IV Push daily  metoprolol succinate ER 25 milliGRAM(s) Oral daily  pantoprazole    Tablet 40 milliGRAM(s) Oral before breakfast  pyridostigmine 60 milliGRAM(s) Oral four times a day  sodium chloride 0.9%. 1000 milliLiter(s) (50 mL/Hr) IV Continuous <Continuous>  sodium chloride 0.9%. 1000 milliLiter(s) (50 mL/Hr) IV Continuous <Continuous>    MEDICATIONS  (PRN):  acetaminophen   Tablet .. 650 milliGRAM(s) Oral every 6 hours PRN Temp greater or equal to 38C (100.4F), Mild Pain (1 - 3)  ALBUTerol    90 MICROgram(s) HFA Inhaler 2 Puff(s) Inhalation every 6 hours PRN Shortness of Breath and/or Wheezing  guaifenesin/dextromethorphan  Syrup 10 milliLiter(s) Oral four times a day PRN Cough  ondansetron Injectable 4 milliGRAM(s) IV Push every 4 hours PRN Nausea and/or Vomiting    Review of systems:    Constitutional: as per HPI  Eyes: No eye pain or discharge  ENMT:  No difficulty hearing; No sinus or throat pain  Neck: No pain or stiffness  Respiratory: No cough, wheezing, chills or hemoptysis  Cardiovascular: No chest pain, palpitations, shortness of breath, dyspnea on exertion  Gastrointestinal: No abdominal pain, nausea, vomiting or hematemesis; No diarrhea or constipation.   Genitourinary: No dysuria, frequency, hematuria or incontinence  Neurological: As per HPI  Skin: No rashes or lesions   Endocrine: No heat or cold intolerance; No hair loss  Musculoskeletal: No joint pain or swelling  Psychiatric: No depression, anxiety, mood swings  Heme/Lymph: No easy bruising or bleeding gums    Vital Signs Last 24 Hrs  T(C): 35.6 (25 Dec 2019 06:14), Max: 36.9 (24 Dec 2019 21:10)  T(F): 96 (25 Dec 2019 06:14), Max: 98.4 (24 Dec 2019 21:10)  HR: 69 (25 Dec 2019 06:14) (66 - 76)  BP: 126/62 (25 Dec 2019 06:14) (99/55 - 126/62)  BP(mean): --  RR: 16 (25 Dec 2019 06:14) (16 - 16)  SpO2: 95% (25 Dec 2019 06:14) (95% - 97%)    Examination:  General:  Appearance is consistent with chronologic age.  No abnormal facies.  Gross skin survey within normal limits.    Cognitive/Language:  The patient is oriented to person, place, time and date.  Recent and remote memory intact.  Fund of knowledge is intact and normal.  Language with normal repetition, comprehension and naming.  Nondysarthric.    Eyes: intact VA, VFF.  EOMI w/o nystagmus, skew or reported double vision.  PERRL.  No ptosis/weakness of eyelid closure.    Face:  Facial sensation normal V1 - 3, no facial asymmetry.    Ears/Nose/Throat:  Hearing grossly intact b/l.  Palate elevates midline.  Tongue and uvula midline.   Motor examination:   Normal tone, bulk and range of motion.  No tenderness, twitching, tremors or involuntary movements. NF/NE 4/5  Formal Muscle Strength Testing: (MRC grade R/L) 4/4 proximal UE, distal 5/5; 5/5 LE.  No observable drift.  Reflexes:   2+ b/l pectoralis, biceps, triceps, brachioradialis, patella and Achilles.  Plantar response downgoing b/l.  Jaw jerk, Coby, clonus absent.  Sensory examination:   Intact to light touch and pinprick, pain, temperature and proprioception and vibration in all extremities. (+) Tinels R wrist  Cerebellum:   FTN/HKS intact with normal REINA in all limbs.  No dysmetria or dysdiadokinesia.  Gait narrow based and normal.    Respiratory:  no audible wheezing or inspiratory stridor.  no use of accessory muscles.   Cardiac: pulse palpable, no audible bruits  Abdomen: supple, no guarding, no TTP    Labs:   CBC Full  -  ( 24 Dec 2019 11:31 )  WBC Count : 7.11 K/uL  RBC Count : 4.30 M/uL  Hemoglobin : 12.4 g/dL  Hematocrit : 38.5 %  Platelet Count - Automated : 223 K/uL  Mean Cell Volume : 89.5 fL  Mean Cell Hemoglobin : 28.8 pg  Mean Cell Hemoglobin Concentration : 32.2 g/dL        142  |  104  |  11  ----------------------------<  154<H>  3.7   |  26  |  0.7    Ca    8.7      24 Dec 2019 11:31    TPro  6.8  /  Alb  3.8  /  TBili  0.5  /  DBili  x   /  AST  47<H>  /  ALT  46<H>  /  AlkPhos  117<H>  12    LIVER FUNCTIONS - ( 24 Dec 2019 11:31 )  Alb: 3.8 g/dL / Pro: 6.8 g/dL / ALK PHOS: 117 U/L / ALT: 46 U/L / AST: 47 U/L / GGT: x           PT/INR - ( 23 Dec 2019 17:50 )   PT: 12.20 sec;   INR: 1.06 ratio       PTT - ( 23 Dec 2019 17:50 )  PTT:25.2 sec  Urinalysis Basic - ( 23 Dec 2019 20:20 )    Color: Yellow / Appearance: Clear / S.015 / pH: x  Gluc: x / Ketone: Trace  / Bili: Negative / Urobili: 0.2 mg/dL   Blood: x / Protein: Negative mg/dL / Nitrite: Negative   Leuk Esterase: Small / RBC: 6-10 /HPF / WBC 10-25 /HPF   Sq Epi: x / Non Sq Epi: Few /HPF / Bacteria: Few    19 @ 09:19    FLU A B RSV Detection by PCR (19 @ 20:00)    Flu A Result: Negative: Negative results do not preclude influenza infection and  should not be used as the sole basis for treatment or  other patient management decisions.  A positive result may occur in the absence of viable virus.  By: [employer] Flu viral assay by Reverse Transcriptase  Polymerase Chain Reaction (RT-PCR).    Flu B Result: Negative    RSV Result: Positive

## 2019-12-25 NOTE — PROGRESS NOTE ADULT - SUBJECTIVE AND OBJECTIVE BOX
ROLOROSENDAJACKSA  59y  Female      Patient is a 59y old  Female who presents with a chief complaint of N/V (25 Dec 2019 09:18)      INTERVAL HPI/OVERNIGHT EVENTS:  Patient seen and examined earlier this morning.  Lying comfortably in bed. In NAD.   Complains of feeling weak.  Her brother is bedside.       REVIEW OF SYSTEMS:  CONSTITUTIONAL: fatigue  EYES: No eye pain, visual disturbances, or discharge  ENMT:  No difficulty hearing, tinnitus, vertigo; No sinus or throat pain  NECK: No pain or stiffness  RESPIRATORY: No cough, wheezing, chills or hemoptysis; No shortness of breath  CARDIOVASCULAR: No chest pain, palpitations, dizziness, or leg swelling  GASTROINTESTINAL: No abdominal or epigastric pain. No nausea, vomiting, or hematemesis; No diarrhea or constipation. No melena or hematochezia.  GENITOURINARY: No dysuria, frequency, hematuria, or incontinence  NEUROLOGICAL: No headaches, memory loss, loss of strength, numbness, or tremors  SKIN: No itching, burning, rashes, or lesions   LYMPH NODES: No enlarged glands  ENDOCRINE: No heat or cold intolerance; No hair loss  MUSCULOSKELETAL: No joint pain or swelling; No muscle, back, or extremity pain  PSYCHIATRIC: No depression, anxiety, mood swings, or difficulty sleeping  HEME/LYMPH: No easy bruising, or bleeding gums  ALLERY AND IMMUNOLOGIC: No hives or eczema    T(C): 35.6 (19 @ 06:14), Max: 36.9 (19 @ 21:10)  HR: 69 (19 @ 06:14) (66 - 76)  BP: 126/62 (19 @ 06:14) (99/55 - 126/62)  RR: 16 (19 @ 06:14) (16 - 16)  SpO2: 99% (19 @ 11:51) (95% - 99%)    PHYSICAL EXAM:  GENERAL: NAD, well-groomed, well-developed  HEAD:  Atraumatic, Normocephalic  EYES: EOMI, PERRLA, conjunctiva and sclera clear  ENMT: No tonsillar erythema, exudates, or enlargement; Moist mucous membranes, Good dentition, No lesions  NECK: Supple, No JVD, Normal thyroid  NERVOUS SYSTEM:  Alert & Oriented X3, Good concentration; Motor Strength 5/5 B/L upper and lower extremities; DTRs 2+ intact and symmetric  CHEST/LUNG: Clear to percussion bilaterally; No rales, rhonchi, wheezing, or rubs  HEART: Regular rate and rhythm; No murmurs, rubs, or gallops  ABDOMEN: Soft, Nontender, Nondistended; Bowel sounds present  EXTREMITIES:  2+ Peripheral Pulses, No clubbing, cyanosis, or edema  LYMPH: No lymphadenopathy noted  SKIN: No rashes or lesions    Consultant(s) Notes Reviewed:  [x ] YES  [ ] NO  Care Discussed with Consultants/Other Providers [ x] YES  [ ] NO    LAB:                        11.2   7.77  )-----------( 219      ( 25 Dec 2019 08:49 )             34.5         143  |  108  |  14  ----------------------------<  111<H>  3.9   |  28  |  0.6<L>    Ca    8.4<L>      25 Dec 2019 08:49  Mg     2.0         TPro  6.8  /  Alb  3.8  /  TBili  0.5  /  DBili  x   /  AST  47<H>  /  ALT  46<H>  /  AlkPhos  117<H>  12    LIVER FUNCTIONS - ( 24 Dec 2019 11:31 )  Alb: 3.8 g/dL / Pro: 6.8 g/dL / ALK PHOS: 117 U/L / ALT: 46 U/L / AST: 47 U/L / GGT: x                 Drug Dosing Weight  Height (cm): 162.6 (24 Dec 2019 01:03)  Weight (kg): 85.5 (24 Dec 2019 01:03)  BMI (kg/m2): 32.3 (24 Dec 2019 01:03)  BSA (m2): 1.91 (24 Dec 2019 01:03)        Urinalysis Basic - ( 23 Dec 2019 20:20 )    Color: Yellow / Appearance: Clear / S.015 / pH: x  Gluc: x / Ketone: Trace  / Bili: Negative / Urobili: 0.2 mg/dL   Blood: x / Protein: Negative mg/dL / Nitrite: Negative   Leuk Esterase: Small / RBC: 6-10 /HPF / WBC 10-25 /HPF   Sq Epi: x / Non Sq Epi: Few /HPF / Bacteria: Few        RADIOLOGY & ADDITIONAL TESTS:  Imaging Personally Reviewed:  [x] YES  [ ] NO    HEALTH ISSUES - PROBLEM Dx:  Vitamin D deficiency: Vitamin D deficiency  Myasthenia gravis: Myasthenia gravis  RSV (respiratory syncytial virus infection): RSV (respiratory syncytial virus infection)  Nausea vomiting and diarrhea: Nausea vomiting and diarrhea          MEDS:  acetaminophen   Tablet .. 650 milliGRAM(s) Oral every 6 hours PRN  ALBUTerol    90 MICROgram(s) HFA Inhaler 2 Puff(s) Inhalation every 6 hours PRN  aspirin  chewable 81 milliGRAM(s) Oral daily  atorvastatin 20 milliGRAM(s) Oral at bedtime  budesonide 160 MICROgram(s)/formoterol 4.5 MICROgram(s) Inhaler 2 Puff(s) Inhalation two times a day  guaifenesin/dextromethorphan  Syrup 10 milliLiter(s) Oral four times a day PRN  heparin  Injectable 5000 Unit(s) SubCutaneous two times a day  levothyroxine 50 MICROGram(s) Oral <User Schedule>  methylPREDNISolone sodium succinate Injectable 20 milliGRAM(s) IV Push daily  metoprolol succinate ER 25 milliGRAM(s) Oral daily  nystatin    Suspension 5 milliLiter(s) Swish and Swallow every 6 hours  ondansetron Injectable 4 milliGRAM(s) IV Push every 4 hours PRN  pantoprazole    Tablet 40 milliGRAM(s) Oral before breakfast  pyridostigmine 60 milliGRAM(s) Oral four times a day  sodium chloride 0.9%. 1000 milliLiter(s) IV Continuous <Continuous>

## 2019-12-26 PROCEDURE — 99233 SBSQ HOSP IP/OBS HIGH 50: CPT

## 2019-12-26 PROCEDURE — 99232 SBSQ HOSP IP/OBS MODERATE 35: CPT

## 2019-12-26 RX ORDER — ALBUTEROL 90 UG/1
2.5 AEROSOL, METERED ORAL ONCE
Refills: 0 | Status: COMPLETED | OUTPATIENT
Start: 2019-12-26 | End: 2019-12-26

## 2019-12-26 RX ORDER — PSEUDOEPHEDRINE HCL 30 MG
30 TABLET ORAL EVERY 4 HOURS
Refills: 0 | Status: DISCONTINUED | OUTPATIENT
Start: 2019-12-26 | End: 2019-12-27

## 2019-12-26 RX ORDER — ALBUTEROL 90 UG/1
2.5 AEROSOL, METERED ORAL EVERY 6 HOURS
Refills: 0 | Status: DISCONTINUED | OUTPATIENT
Start: 2019-12-26 | End: 2019-12-27

## 2019-12-26 RX ADMIN — ATORVASTATIN CALCIUM 20 MILLIGRAM(S): 80 TABLET, FILM COATED ORAL at 21:48

## 2019-12-26 RX ADMIN — Medication 50 MICROGRAM(S): at 05:35

## 2019-12-26 RX ADMIN — Medication 650 MILLIGRAM(S): at 06:05

## 2019-12-26 RX ADMIN — SODIUM CHLORIDE 75 MILLILITER(S): 9 INJECTION INTRAMUSCULAR; INTRAVENOUS; SUBCUTANEOUS at 14:20

## 2019-12-26 RX ADMIN — Medication 30 MILLIGRAM(S): at 14:21

## 2019-12-26 RX ADMIN — PYRIDOSTIGMINE BROMIDE 60 MILLIGRAM(S): 60 SOLUTION ORAL at 00:34

## 2019-12-26 RX ADMIN — PYRIDOSTIGMINE BROMIDE 60 MILLIGRAM(S): 60 SOLUTION ORAL at 05:35

## 2019-12-26 RX ADMIN — Medication 650 MILLIGRAM(S): at 21:48

## 2019-12-26 RX ADMIN — Medication 30 MILLIGRAM(S): at 18:23

## 2019-12-26 RX ADMIN — Medication 5 MILLILITER(S): at 18:10

## 2019-12-26 RX ADMIN — Medication 25 MILLIGRAM(S): at 05:35

## 2019-12-26 RX ADMIN — Medication 5 MILLILITER(S): at 14:22

## 2019-12-26 RX ADMIN — Medication 81 MILLIGRAM(S): at 14:22

## 2019-12-26 RX ADMIN — ALBUTEROL 2.5 MILLIGRAM(S): 90 AEROSOL, METERED ORAL at 05:20

## 2019-12-26 RX ADMIN — Medication 10 MILLILITER(S): at 15:32

## 2019-12-26 RX ADMIN — Medication 650 MILLIGRAM(S): at 22:59

## 2019-12-26 RX ADMIN — Medication 20 MILLIGRAM(S): at 05:35

## 2019-12-26 RX ADMIN — PYRIDOSTIGMINE BROMIDE 60 MILLIGRAM(S): 60 SOLUTION ORAL at 18:10

## 2019-12-26 RX ADMIN — Medication 650 MILLIGRAM(S): at 05:35

## 2019-12-26 RX ADMIN — Medication 5 MILLILITER(S): at 00:34

## 2019-12-26 RX ADMIN — ONDANSETRON 4 MILLIGRAM(S): 8 TABLET, FILM COATED ORAL at 05:36

## 2019-12-26 RX ADMIN — Medication 10 MILLILITER(S): at 18:23

## 2019-12-26 RX ADMIN — BUDESONIDE AND FORMOTEROL FUMARATE DIHYDRATE 2 PUFF(S): 160; 4.5 AEROSOL RESPIRATORY (INHALATION) at 20:36

## 2019-12-26 RX ADMIN — Medication 5 MILLILITER(S): at 05:35

## 2019-12-26 RX ADMIN — HEPARIN SODIUM 5000 UNIT(S): 5000 INJECTION INTRAVENOUS; SUBCUTANEOUS at 18:11

## 2019-12-26 RX ADMIN — HEPARIN SODIUM 5000 UNIT(S): 5000 INJECTION INTRAVENOUS; SUBCUTANEOUS at 05:34

## 2019-12-26 RX ADMIN — PANTOPRAZOLE SODIUM 40 MILLIGRAM(S): 20 TABLET, DELAYED RELEASE ORAL at 06:49

## 2019-12-26 RX ADMIN — PYRIDOSTIGMINE BROMIDE 60 MILLIGRAM(S): 60 SOLUTION ORAL at 14:22

## 2019-12-26 RX ADMIN — BUDESONIDE AND FORMOTEROL FUMARATE DIHYDRATE 2 PUFF(S): 160; 4.5 AEROSOL RESPIRATORY (INHALATION) at 09:12

## 2019-12-26 NOTE — PROGRESS NOTE ADULT - ASSESSMENT
·  Problem: Nausea vomiting and diarrhea.   -resolved  -pt tolerating full liquid diet and wants to try regular food with pudding ( she says it helps her digest)   -speech and swallow eval appreciated - pt wants to follow up as outpt with GI     Problem/Plan - 2:  ·  Problem: RSV (respiratory syncytial virus infection) with acute respiratory failure on oxygen  -monitor, closely watch respiratory parameters   -NIF being monitored by respiratory   -pulm eval  -nebs q4; symbicort     Problem/Plan - 3:  ·  Problem: Myasthenia gravis.    -neuro eval appreciated  -on pyridostigmine and steroids  -neuro following  -S&S eval appreciated     ·  Problem: Vitamin D deficiency.  Plan: on supplement.     Hypothryroid  -on synthroid     Progress Note Handoff  Pending Consults: none  Pending Tests: none  Pending Results: none  Family Discussion: discussed diet, GI and anticipate d/c in am with pt - in agreement  Disposition: Home__x___/SNF______/Other_____/Unknown at this time_____    Please call me with any questions at extension [phone]

## 2019-12-26 NOTE — SWALLOW BEDSIDE ASSESSMENT ADULT - SLP PERTINENT HISTORY OF CURRENT PROBLEM
Myasthenia Gravis, unmanagement sleep disorder (per patient previous assessment indicated not having any REM, no follow up recommended?) esophageal dilation in October with referral to follow up due to insurance needed to change to GI clinic and that MD recommended SLP prior to repeat dilation, admit with possible RSV and nausea and vomiting.; reports MBS at [facility] that she "failed" even after dilation but recommended PO diet of soft.

## 2019-12-26 NOTE — PROGRESS NOTE ADULT - SUBJECTIVE AND OBJECTIVE BOX
Neurology Follow up note    Name  GHADA ROPER    HPI:  58yo female whose PMH includes myasthenia gravis presents to the ER due to nausea and vomiting. Recently seen by gastroenterologist in the clinic and she tells me a speech therapy consult was requested (23 Dec 2019 21:43)      Interval History -  Prednisone 20 /mg continued overnight. Better this morning. Will start on thin liquid diet per nutrition     Vital Signs Last 24 Hrs  T(C): 36.3 (26 Dec 2019 14:00), Max: 37.1 (25 Dec 2019 21:44)  T(F): 97.4 (26 Dec 2019 14:00), Max: 98.7 (25 Dec 2019 21:44)  HR: 79 (26 Dec 2019 14:00) (68 - 79)  BP: 131/69 (26 Dec 2019 14:00) (131/69 - 148/81)  BP(mean): --  RR: 16 (26 Dec 2019 14:00) (16 - 18)  SpO2: 94% (26 Dec 2019 14:26) (94% - 96%)  ICU Vital Signs Last 24 Hrs  T(C): 36.3 (26 Dec 2019 14:00), Max: 37.1 (25 Dec 2019 21:44)  T(F): 97.4 (26 Dec 2019 14:00), Max: 98.7 (25 Dec 2019 21:44)  HR: 79 (26 Dec 2019 14:00) (68 - 79)  BP: 131/69 (26 Dec 2019 14:00) (131/69 - 148/81)  BP(mean): --  ABP: --  ABP(mean): --  RR: 16 (26 Dec 2019 14:00) (16 - 18)  SpO2: 94% (26 Dec 2019 14:26) (94% - 96%)      Neurological Exam:   Mental status: Awake, alert and oriented x3.  Recent and remote memory intact.  Naming, repetition and comprehension intact.  Attention/concentration intact.  No dysarthria, no aphasia.  Fund of knowledge appropriate.    Cranial nerves: Pupils equally round and reactive to light, visual fields full, no nystagmus, extraocular muscles intact, V1 through V3 intact bilaterally and symmetric, face symmetric, hearing intact to finger rub, palate elevation symmetric, tongue was midline, sternocleidomastoid/shoulder shrug strength bilaterally 5/5 in the right and 4/5 in the left    Motor:  Normal bulk and tone,Rapid alternating movements intact and symmetric. Formal Muscle Strength Testing: (MRC grade R/L) 4/4 proximal UE, some giving way weakness.  Distal 5/5; 5/5 LE.  No observable drift.   Sensation: Intact to light touch, proprioception, and pinprick.  No neglect.   Coordination: No dysmetria on finger-to-nose and heel-to-shin.  No clumsiness.  Reflexes: 2+ in upper and lower extremities, downgoing toes bilaterally  Gait: Narrow and steady. No ataxia.  Romberg negative    Medications  acetaminophen   Tablet .. 650 milliGRAM(s) Oral every 6 hours PRN  ALBUTerol    0.083% 2.5 milliGRAM(s) Nebulizer every 6 hours PRN  aspirin  chewable 81 milliGRAM(s) Oral daily  atorvastatin 20 milliGRAM(s) Oral at bedtime  budesonide 160 MICROgram(s)/formoterol 4.5 MICROgram(s) Inhaler 2 Puff(s) Inhalation two times a day  guaifenesin/dextromethorphan  Syrup 10 milliLiter(s) Oral four times a day PRN  heparin  Injectable 5000 Unit(s) SubCutaneous two times a day  levothyroxine 50 MICROGram(s) Oral <User Schedule>  methylPREDNISolone sodium succinate Injectable 20 milliGRAM(s) IV Push daily  metoprolol succinate ER 25 milliGRAM(s) Oral daily  nystatin    Suspension 5 milliLiter(s) Swish and Swallow every 6 hours  ondansetron Injectable 4 milliGRAM(s) IV Push every 4 hours PRN  pantoprazole    Tablet 40 milliGRAM(s) Oral before breakfast  pseudoephedrine 30 milliGRAM(s) Oral every 4 hours PRN  pyridostigmine 60 milliGRAM(s) Oral four times a day  sodium chloride 0.9%. 1000 milliLiter(s) IV Continuous <Continuous>      Lab                        11.2   7.77  )-----------( 219      ( 25 Dec 2019 08:49 )             34.5     12-25    143  |  108  |  14  ----------------------------<  111<H>  3.9   |  28  |  0.6<L>    Ca    8.4<L>      25 Dec 2019 08:49  Mg     2.0     12-25

## 2019-12-26 NOTE — SWALLOW BEDSIDE ASSESSMENT ADULT - NS SPL SWALLOW CLINIC TRIAL FT
patient with significant GI related symptoms including vomiting and persistent belching. patient with significant concerns of oromotor dysfunction- patient with unmanaged sleep disorder would could be increasing symptoms and stress on musculature and management of PO

## 2019-12-26 NOTE — PROGRESS NOTE ADULT - SUBJECTIVE AND OBJECTIVE BOX
JACK ROPERSA  59y  Female      Patient is a 59y old  Female who presents with a chief complaint of N/V (25 Dec 2019 09:18)      INTERVAL HPI/OVERNIGHT EVENTS:  Patient seen and examined this afternoon.  Pt had difficulty with S&S eval today.  I offered a GI eval to the patient but she declined because she says she sees a GI attending in the clinic.   Pt is still hypoxic requiring oxygen      REVIEW OF SYSTEMS:  CONSTITUTIONAL: fatigue  EYES: No eye pain, visual disturbances, or discharge  ENMT:  No difficulty hearing, tinnitus, vertigo; No sinus or throat pain  NECK: No pain or stiffness  RESPIRATORY: No cough, wheezing, chills or hemoptysis; No shortness of breath  CARDIOVASCULAR: No chest pain, palpitations, dizziness, or leg swelling  GASTROINTESTINAL: No abdominal or epigastric pain. No nausea, vomiting, or hematemesis; No diarrhea or constipation. No melena or hematochezia.  GENITOURINARY: No dysuria, frequency, hematuria, or incontinence  NEUROLOGICAL: No headaches, memory loss, loss of strength, numbness, or tremors  SKIN: No itching, burning, rashes, or lesions   LYMPH NODES: No enlarged glands  ENDOCRINE: No heat or cold intolerance; No hair loss  MUSCULOSKELETAL: No joint pain or swelling; No muscle, back, or extremity pain  PSYCHIATRIC: No depression, anxiety, mood swings, or difficulty sleeping  HEME/LYMPH: No easy bruising, or bleeding gums  ALLERY AND IMMUNOLOGIC: No hives or eczema      Vital Signs Last 24 Hrs  T(C): 36.3 (26 Dec 2019 14:00), Max: 37.1 (25 Dec 2019 21:44)  T(F): 97.4 (26 Dec 2019 14:00), Max: 98.7 (25 Dec 2019 21:44)  HR: 79 (26 Dec 2019 14:00) (68 - 79)  BP: 131/69 (26 Dec 2019 14:00) (131/69 - 148/81)  BP(mean): --  RR: 16 (26 Dec 2019 14:00) (16 - 18)  SpO2: 94% (26 Dec 2019 14:26) (94% - 96%)      PHYSICAL EXAM:  GENERAL: NAD, well-groomed, well-developed  HEAD:  Atraumatic, Normocephalic  EYES: EOMI, PERRLA, conjunctiva and sclera clear  ENMT: No tonsillar erythema, exudates, or enlargement; Moist mucous membranes, Good dentition, No lesions  NECK: Supple, No JVD, Normal thyroid  NERVOUS SYSTEM:  Alert & Oriented X3, Good concentration; Motor Strength 5/5 B/L upper and lower extremities; DTRs 2+ intact and symmetric  CHEST/LUNG: Clear to percussion bilaterally; No rales, rhonchi, wheezing, or rubs  HEART: Regular rate and rhythm; No murmurs, rubs, or gallops  ABDOMEN: Soft, Nontender, Nondistended; Bowel sounds present  EXTREMITIES:  2+ Peripheral Pulses, No clubbing, cyanosis, or edema  LYMPH: No lymphadenopathy noted  SKIN: No rashes or lesions    Consultant(s) Notes Reviewed:  [x ] YES  [ ] NO  Care Discussed with Consultants/Other Providers [ x] YES  [ ] NO    LAB:                                  11.2   7.77  )-----------( 219      ( 25 Dec 2019 08:49 )             34.5     12    143  |  108  |  14  ----------------------------<  111<H>  3.9   |  28  |  0.6<L>    Ca    8.4<L>      25 Dec 2019 08:49  Mg     2.0         TPro  6.8  /  Alb  3.8  /  TBili  0.5  /  DBili  x   /  AST  47<H>  /  ALT  46<H>  /  AlkPhos  117<H>  12-24    LIVER FUNCTIONS - ( 24 Dec 2019 11:31 )  Alb: 3.8 g/dL / Pro: 6.8 g/dL / ALK PHOS: 117 U/L / ALT: 46 U/L / AST: 47 U/L / GGT: x               Drug Dosing Weight  Height (cm): 162.6 (24 Dec 2019 01:03)  Weight (kg): 85.5 (24 Dec 2019 01:03)  BMI (kg/m2): 32.3 (24 Dec 2019 01:03)  BSA (m2): 1.91 (24 Dec 2019 01:03)        Urinalysis Basic - ( 23 Dec 2019 20:20 )    Color: Yellow / Appearance: Clear / S.015 / pH: x  Gluc: x / Ketone: Trace  / Bili: Negative / Urobili: 0.2 mg/dL   Blood: x / Protein: Negative mg/dL / Nitrite: Negative   Leuk Esterase: Small / RBC: 6-10 /HPF / WBC 10-25 /HPF   Sq Epi: x / Non Sq Epi: Few /HPF / Bacteria: Few        RADIOLOGY & ADDITIONAL TESTS:  Imaging Personally Reviewed:  [x] YES  [ ] NO    HEALTH ISSUES - PROBLEM Dx:  Vitamin D deficiency: Vitamin D deficiency  Myasthenia gravis: Myasthenia gravis  RSV (respiratory syncytial virus infection): RSV (respiratory syncytial virus infection)  Nausea vomiting and diarrhea: Nausea vomiting and diarrhea      MEDICATIONS  (STANDING):  aspirin  chewable 81 milliGRAM(s) Oral daily  atorvastatin 20 milliGRAM(s) Oral at bedtime  budesonide 160 MICROgram(s)/formoterol 4.5 MICROgram(s) Inhaler 2 Puff(s) Inhalation two times a day  heparin  Injectable 5000 Unit(s) SubCutaneous two times a day  levothyroxine 50 MICROGram(s) Oral <User Schedule>  methylPREDNISolone sodium succinate Injectable 20 milliGRAM(s) IV Push daily  metoprolol succinate ER 25 milliGRAM(s) Oral daily  nystatin    Suspension 5 milliLiter(s) Swish and Swallow every 6 hours  pantoprazole    Tablet 40 milliGRAM(s) Oral before breakfast  pyridostigmine 60 milliGRAM(s) Oral four times a day  sodium chloride 0.9%. 1000 milliLiter(s) (75 mL/Hr) IV Continuous <Continuous>    MEDICATIONS  (PRN):  acetaminophen   Tablet .. 650 milliGRAM(s) Oral every 6 hours PRN Temp greater or equal to 38C (100.4F), Mild Pain (1 - 3)  ALBUTerol    0.083% 2.5 milliGRAM(s) Nebulizer every 6 hours PRN Shortness of Breath and/or Wheezing  guaifenesin/dextromethorphan  Syrup 10 milliLiter(s) Oral four times a day PRN Cough  ondansetron Injectable 4 milliGRAM(s) IV Push every 4 hours PRN Nausea and/or Vomiting  pseudoephedrine 30 milliGRAM(s) Oral every 4 hours PRN nasal congestion

## 2019-12-26 NOTE — PROGRESS NOTE ADULT - ASSESSMENT
58 yo F w/ h/o MG, POTS, pancreatitis currently admitted for recurrent sinus congestion and dysphagia/cough found with RSV rhinnitis. Continues to have proximal weakness in the setting of  prednisone underdosing and current infection wRecommend continue prednisone 20 mg or solumedrol equivalent.    Plan:    - continue prednisone 20 mg or solumedrol equivalent for now. Recommend to stay at current dose after discharge   - check PFTs Q8 hours- if NIF/VC decreasing, recommend ICU/CCU monitoring and increase corticosteroids  - continue Mestinon 60 mg QID

## 2019-12-26 NOTE — SWALLOW BEDSIDE ASSESSMENT ADULT - SWALLOW EVAL: DIAGNOSIS
symptomps more consistent with GI related etiology, evidence of oromusculature dysfunction, however unable to proceed further with patient vomiting trials

## 2019-12-26 NOTE — SWALLOW BEDSIDE ASSESSMENT ADULT - SLP GENERAL OBSERVATIONS
in bed cooperative, appeared fatigued walking around room, reported history but not able to provide details.

## 2019-12-26 NOTE — SWALLOW BEDSIDE ASSESSMENT ADULT - ASR SWALLOW LINGUAL MOBILITY
ankyloglossia noted. with floor of mouth elevation; scalloped lingual edges/impaired left lateral movement/impaired protrusion/impaired anterior elevation/impaired right lateral movement

## 2019-12-27 ENCOUNTER — TRANSCRIPTION ENCOUNTER (OUTPATIENT)
Age: 59
End: 2019-12-27

## 2019-12-27 VITALS — OXYGEN SATURATION: 95 % | RESPIRATION RATE: 16 BRPM

## 2019-12-27 PROCEDURE — 99239 HOSP IP/OBS DSCHRG MGMT >30: CPT

## 2019-12-27 RX ORDER — ASPIRIN/CALCIUM CARB/MAGNESIUM 324 MG
0 TABLET ORAL
Qty: 0 | Refills: 0 | DISCHARGE

## 2019-12-27 RX ORDER — PSEUDOEPHEDRINE HCL 30 MG
1 TABLET ORAL
Qty: 60 | Refills: 0
Start: 2019-12-27 | End: 2020-01-05

## 2019-12-27 RX ORDER — ACETAMINOPHEN 500 MG
2 TABLET ORAL
Qty: 0 | Refills: 0 | DISCHARGE
Start: 2019-12-27

## 2019-12-27 RX ADMIN — SODIUM CHLORIDE 75 MILLILITER(S): 9 INJECTION INTRAMUSCULAR; INTRAVENOUS; SUBCUTANEOUS at 05:35

## 2019-12-27 RX ADMIN — Medication 81 MILLIGRAM(S): at 11:12

## 2019-12-27 RX ADMIN — Medication 650 MILLIGRAM(S): at 05:35

## 2019-12-27 RX ADMIN — BUDESONIDE AND FORMOTEROL FUMARATE DIHYDRATE 2 PUFF(S): 160; 4.5 AEROSOL RESPIRATORY (INHALATION) at 08:32

## 2019-12-27 RX ADMIN — Medication 25 MILLIGRAM(S): at 05:35

## 2019-12-27 RX ADMIN — Medication 5 MILLILITER(S): at 11:12

## 2019-12-27 RX ADMIN — Medication 50 MICROGRAM(S): at 05:35

## 2019-12-27 RX ADMIN — PYRIDOSTIGMINE BROMIDE 60 MILLIGRAM(S): 60 SOLUTION ORAL at 11:12

## 2019-12-27 RX ADMIN — Medication 30 MILLIGRAM(S): at 11:12

## 2019-12-27 RX ADMIN — PYRIDOSTIGMINE BROMIDE 60 MILLIGRAM(S): 60 SOLUTION ORAL at 05:35

## 2019-12-27 RX ADMIN — Medication 5 MILLILITER(S): at 05:38

## 2019-12-27 RX ADMIN — Medication 5 MILLILITER(S): at 00:51

## 2019-12-27 RX ADMIN — ERGOCALCIFEROL 50000 UNIT(S): 1.25 CAPSULE ORAL at 11:12

## 2019-12-27 RX ADMIN — Medication 20 MILLIGRAM(S): at 05:35

## 2019-12-27 RX ADMIN — PANTOPRAZOLE SODIUM 40 MILLIGRAM(S): 20 TABLET, DELAYED RELEASE ORAL at 05:35

## 2019-12-27 RX ADMIN — PYRIDOSTIGMINE BROMIDE 60 MILLIGRAM(S): 60 SOLUTION ORAL at 00:50

## 2019-12-27 NOTE — DISCHARGE NOTE PROVIDER - CARE PROVIDER_API CALL
Johnathan Washington ()  Internal Medicine  [address] [place] [address]  Phone: [phone]  Fax: [phone]  Follow Up Time:     Long Blakely)  Critical Care Medicine; Geriatric Medicine; Internal Medicine; Pulmonary Disease  [address], [address]  [place] [address]  Phone: [phone]  Fax: [phone]  Follow Up Time:     Satinder Urbina)  Neuromuscular Medicine  [address], [address]  [place], [place] [id number]  Phone: [phone]  Fax: [phone]  Follow Up Time:

## 2019-12-27 NOTE — CONSULT NOTE ADULT - SUBJECTIVE AND OBJECTIVE BOX
MRN-[id number]    HPI:  58yo female whose PMH includes myasthenia gravis presents to the ER due to nausea and vomiting. Recently seen by gastroenterologist in the clinic and she tells me a speech therapy consult was requested (23 Dec 2019 21:43)      CC/ HPI Patient is a 59y old  Female who presents with a chief complaint of N/V (25 Dec 2019 09:18)      NAUSEA VOMITING AND DIARRHEA RSV      PMHX:  Myasthenia gravis  High blood cholesterol  Pancreatitis  POTS (postural orthostatic tachycardia syndrome)  Nausea vomiting and diarrhea  Vitamin D deficiency  Myasthenia gravis  RSV (respiratory syncytial virus infection)  Nausea vomiting and diarrhea  History of foot surgery    RSV (respiratory syncytial virus infection)          FAMILY HISTORY:  Family history was reviewed for HTN, cardiovascular disease, pulmonary diseases as well as renal conditions and was found to be not pertinent to this admission        sulfa drugs (Unknown)        Marital Status:  ( x  )    (   ) Single    (   )    (  )   Occupation:   Lives with: (  ) alone  (  ) children   ( x ) spouse   (  ) parents  (  ) other  Recent Travel:     Substance Use (street drugs): (x  ) never used  (  ) other:  Tobacco Usage:  (   ) never smoked   (   ) former smoker   (   ) current smoker  (     ) pack year  Alcohol Usage:          Home Medications:  albuterol:  (24 Dec 2019 00:16)  Aspir 81:  (24 Dec 2019 00:16)  atorvastatin 20 mg oral tablet: 1 tab(s) orally once a day (24 Dec 2019 00:16)  doxepin 10 mg oral capsule:  (24 Dec 2019 00:16)  Mestinon 60 mg oral tablet: orally 4 times a day (24 Dec 2019 00:16)  Pepcid:  (24 Dec 2019 00:16)  predniSONE 20 mg oral tablet: 1 tab(s) orally once a day (24 Dec 2019 00:16)  Protonix 40 mg oral delayed release tablet: 1 tab(s) orally once a day (24 Dec 2019 00:16)  Symbicort: inhaled 2 times a day (24 Dec 2019 00:16)  Toprol-XL 25 mg oral tablet, extended release: 1 tab(s) orally once a day (24 Dec 2019 00:16)  Vitamin D2: every friday (24 Dec 2019 00:16)      MEDICATIONS  (STANDING):  aspirin  chewable 81 milliGRAM(s) Oral daily  atorvastatin 20 milliGRAM(s) Oral at bedtime  budesonide 160 MICROgram(s)/formoterol 4.5 MICROgram(s) Inhaler 2 Puff(s) Inhalation two times a day  ergocalciferol 95809 Unit(s) Oral every week  heparin  Injectable 5000 Unit(s) SubCutaneous two times a day  levothyroxine 50 MICROGram(s) Oral <User Schedule>  methylPREDNISolone sodium succinate Injectable 20 milliGRAM(s) IV Push daily  metoprolol succinate ER 25 milliGRAM(s) Oral daily  nystatin    Suspension 5 milliLiter(s) Swish and Swallow every 6 hours  pantoprazole    Tablet 40 milliGRAM(s) Oral before breakfast  pyridostigmine 60 milliGRAM(s) Oral four times a day  sodium chloride 0.9%. 1000 milliLiter(s) (75 mL/Hr) IV Continuous <Continuous>    MEDICATIONS  (PRN):  acetaminophen   Tablet .. 650 milliGRAM(s) Oral every 6 hours PRN Temp greater or equal to 38C (100.4F), Mild Pain (1 - 3)  ALBUTerol    0.083% 2.5 milliGRAM(s) Nebulizer every 6 hours PRN Shortness of Breath and/or Wheezing  guaifenesin/dextromethorphan  Syrup 10 milliLiter(s) Oral four times a day PRN Cough  ondansetron Injectable 4 milliGRAM(s) IV Push every 4 hours PRN Nausea and/or Vomiting  pseudoephedrine 30 milliGRAM(s) Oral every 4 hours PRN nasal congestion      sodium chloride 0.9% Bolus:   1000 milliLiter(s), IV Bolus, once, infuse over 1000 Hr, Stop After 1 Doses  Provider's Contact #: [phone]  sodium chloride 0.9%.: Solution, 1000 milliLiter(s) infuse at 50 mL/Hr, Stop After 10 Hours  Provider's Contact #: [phone]  sodium chloride 0.9%.: Solution, 1000 milliLiter(s) infuse at 50 mL/Hr, Stop After 12 Hours  Provider's Contact #: [phone]  sodium chloride 0.9%.: Solution, 1000 milliLiter(s) infuse at 75 mL/Hr  Provider's Contact #: [phone]      T(C): 36.8 (12-27-19 @ 04:30), Max: 37.8 (12-26-19 @ 21:01)  HR: 73 (12-27-19 @ 04:30) (73 - 79)  BP: 135/67 (12-27-19 @ 04:30) (120/70 - 135/67)  RR: 16 (12-27-19 @ 04:30) (16 - 17)  SpO2: 96% (12-26-19 @ 21:52) (91% - 96%)  ICU Vital Signs Last 24 Hrs  T(C): 36.8 (27 Dec 2019 04:30), Max: 37.8 (26 Dec 2019 21:01)  T(F): 98.2 (27 Dec 2019 04:30), Max: 100.1 (26 Dec 2019 21:01)  HR: 73 (27 Dec 2019 04:30) (73 - 79)  BP: 135/67 (27 Dec 2019 04:30) (120/70 - 135/67)  RR: 16 (27 Dec 2019 04:30) (16 - 17)  SpO2: 96% (26 Dec 2019 21:52) (91% - 96%)          Drug Dosing Weight  Height (cm): 162.6 (24 Dec 2019 01:03)  Weight (kg): 85.5 (24 Dec 2019 01:03)  BMI (kg/m2): 32.3 (24 Dec 2019 01:03)  BSA (m2): 1.91 (24 Dec 2019 01:03)    LABS:                          11.2   7.77  )-----------( 219      ( 25 Dec 2019 08:49 )             34.5       12-25    143  |  108  |  14  ----------------------------<  111<H>  3.9   |  28  |  0.6<L>    Ca    8.4<L>      25 Dec 2019 08:49  Mg     2.0     12-25          nystatin    Suspension 5 milliLiter(s) Swish and Swallow every 6 hours                RADIOLOGY:  I have personally reviewed all chest and other pertinent radiology films.      REVIEW OF SYSTEMS:     · CONSTITUTIONAL:   +fever   no chills.      · EYES:   no discharge,   no irritation,        · ENMT:   Ears: no ear pain and no hearing problems.  Nose: no nasal congestion and no nasal drainage.      · CARDIOVASCULAR:   no chest pain,   no swelling  no palpitations      · RESPIRATORY:  +SOB,  no wheezing ,  +respiratory difficulty  no sputum production    · GASTROINTESTINAL:   no abdominal pain,    no nausea   no vomiting.    · GENITOURINARY:  no dysuria,   no frequency,       · MUSCULOSKELETAL:   no back pain,   no neck pain,   +weakness.    · SKIN:   no pruritis,   no rashes.    · NEURO:   no loss of consciousness,   no headache,   no weakness.        ALLERGIC/IMMUNOLOGIC:   No active allergic or immunologic issues    all other systems are negative        PHYSICAL EXAM:     · CONSTITUTIONAL:   Ill appearing,   well nourished,   NAD    · ENMT:   Airway patent,   Nasal mucosa clear.  Mouth with normal mucosa.   No thrush    · EYES:   Clear bilaterally,   pupils equal,   round and reactive to light.    · CARDIAC:   Normal rate,   regular rhythm.    Heart sounds S1, S2.   no thrills or bruits on palpitation  normal  cardiac impulse  No murmurs, no rubs or gallops on auscultation  no edema        CAROTID:   normal systolic impulse  no bruits    · RESPIRATORY:   no w/r/r/,   normal chest expansion  not tachypneic,  no retractions or use of accessory muscles  palpation of chest is normal with no fremitus  percussion of chest demonstrates no hyperresonance or dullness    · GASTROINTESTINAL:  Abdomen soft,   non-tender,   no masses  no guarding,   + BS  liver spleen not palpable      · MUSCULOSKELETAL:   range of motion is not limited,  no clubbing, cyanosis  no petechiae    · NEUROLOGICAL:   Alert and oriented   no obvious focal deficits in cranial nerve areas  no motor or sensory deficits.      · SKIN:   Skin normal color for race,   warm,   dry and intact.       · PSYCHIATRIC:   Alert and oriented to person,   place, time/situation.   normal mood and affect.   no apparent risk to self or others.    · HEME LYMPH:   no splenomegaly.  No cervical  lymphadenopathy.  no inguinal lymphadenopathy [id number]    HPI:  58yo female whose PMH includes myasthenia gravis presents to the ER due to nausea and vomiting. Recently seen by gastroenterologist in the clinic and she tells me a speech therapy consult was requested (23 Dec 2019 21:43)      She was placed on ICS PRN albuterol recently.      NAUSEA VOMITING AND DIARRHEA RSV      PMHX:  Myasthenia gravis  High blood cholesterol  Pancreatitis  POTS (postural orthostatic tachycardia syndrome)  Nausea vomiting and diarrhea  Vitamin D deficiency  Myasthenia gravis  RSV (respiratory syncytial virus infection)  Nausea vomiting and diarrhea  History of foot surgery    RSV (respiratory syncytial virus infection)          FAMILY HISTORY:  Family history was reviewed for HTN, cardiovascular disease, pulmonary diseases as well as renal conditions and was found to be not pertinent to this admission        sulfa drugs (Unknown)        Marital Status:  ( x  )    (   ) Single    (   )    (  )   Occupation:   Lives with: (  ) alone  (  ) children   ( x ) spouse   (  ) parents  (  ) other  Recent Travel:     Substance Use (street drugs): (x  ) never used  (  ) other:  Tobacco Usage:  (   ) never smoked   (   ) former smoker   (   ) current smoker  (     ) pack year  Alcohol Usage:          Home Medications:  albuterol:  (24 Dec 2019 00:16)  Aspir 81:  (24 Dec 2019 00:16)  atorvastatin 20 mg oral tablet: 1 tab(s) orally once a day (24 Dec 2019 00:16)  doxepin 10 mg oral capsule:  (24 Dec 2019 00:16)  Mestinon 60 mg oral tablet: orally 4 times a day (24 Dec 2019 00:16)  Pepcid:  (24 Dec 2019 00:16)  predniSONE 20 mg oral tablet: 1 tab(s) orally once a day (24 Dec 2019 00:16)  Protonix 40 mg oral delayed release tablet: 1 tab(s) orally once a day (24 Dec 2019 00:16)  Symbicort: inhaled 2 times a day (24 Dec 2019 00:16)  Toprol-XL 25 mg oral tablet, extended release: 1 tab(s) orally once a day (24 Dec 2019 00:16)  Vitamin D2: every friday (24 Dec 2019 00:16)      MEDICATIONS  (STANDING):  aspirin  chewable 81 milliGRAM(s) Oral daily  atorvastatin 20 milliGRAM(s) Oral at bedtime  budesonide 160 MICROgram(s)/formoterol 4.5 MICROgram(s) Inhaler 2 Puff(s) Inhalation two times a day  ergocalciferol 63572 Unit(s) Oral every week  heparin  Injectable 5000 Unit(s) SubCutaneous two times a day  levothyroxine 50 MICROGram(s) Oral <User Schedule>  methylPREDNISolone sodium succinate Injectable 20 milliGRAM(s) IV Push daily  metoprolol succinate ER 25 milliGRAM(s) Oral daily  nystatin    Suspension 5 milliLiter(s) Swish and Swallow every 6 hours  pantoprazole    Tablet 40 milliGRAM(s) Oral before breakfast  pyridostigmine 60 milliGRAM(s) Oral four times a day  sodium chloride 0.9%. 1000 milliLiter(s) (75 mL/Hr) IV Continuous <Continuous>    MEDICATIONS  (PRN):  acetaminophen   Tablet .. 650 milliGRAM(s) Oral every 6 hours PRN Temp greater or equal to 38C (100.4F), Mild Pain (1 - 3)  ALBUTerol    0.083% 2.5 milliGRAM(s) Nebulizer every 6 hours PRN Shortness of Breath and/or Wheezing  guaifenesin/dextromethorphan  Syrup 10 milliLiter(s) Oral four times a day PRN Cough  ondansetron Injectable 4 milliGRAM(s) IV Push every 4 hours PRN Nausea and/or Vomiting  pseudoephedrine 30 milliGRAM(s) Oral every 4 hours PRN nasal congestion      sodium chloride 0.9% Bolus:   1000 milliLiter(s), IV Bolus, once, infuse over 1000 Hr, Stop After 1 Doses  Provider's Contact #: [phone]  sodium chloride 0.9%.: Solution, 1000 milliLiter(s) infuse at 50 mL/Hr, Stop After 10 Hours  Provider's Contact #: [phone]  sodium chloride 0.9%.: Solution, 1000 milliLiter(s) infuse at 50 mL/Hr, Stop After 12 Hours  Provider's Contact #: [phone]  sodium chloride 0.9%.: Solution, 1000 milliLiter(s) infuse at 75 mL/Hr  Provider's Contact #: [phone]      T(C): 36.8 (12-27-19 @ 04:30), Max: 37.8 (12-26-19 @ 21:01)  HR: 73 (12-27-19 @ 04:30) (73 - 79)  BP: 135/67 (12-27-19 @ 04:30) (120/70 - 135/67)  RR: 16 (12-27-19 @ 04:30) (16 - 17)  SpO2: 96% (12-26-19 @ 21:52) (91% - 96%)  ICU Vital Signs Last 24 Hrs  T(C): 36.8 (27 Dec 2019 04:30), Max: 37.8 (26 Dec 2019 21:01)  T(F): 98.2 (27 Dec 2019 04:30), Max: 100.1 (26 Dec 2019 21:01)  HR: 73 (27 Dec 2019 04:30) (73 - 79)  BP: 135/67 (27 Dec 2019 04:30) (120/70 - 135/67)  RR: 16 (27 Dec 2019 04:30) (16 - 17)  SpO2: 96% (26 Dec 2019 21:52) (91% - 96%)          Drug Dosing Weight  Height (cm): 162.6 (24 Dec 2019 01:03)  Weight (kg): 85.5 (24 Dec 2019 01:03)  BMI (kg/m2): 32.3 (24 Dec 2019 01:03)  BSA (m2): 1.91 (24 Dec 2019 01:03)    LABS:                          11.2   7.77  )-----------( 219      ( 25 Dec 2019 08:49 )             34.5       12-25    143  |  108  |  14  ----------------------------<  111<H>  3.9   |  28  |  0.6<L>    Ca    8.4<L>      25 Dec 2019 08:49  Mg     2.0     12-25          nystatin    Suspension 5 milliLiter(s) Swish and Swallow every 6 hours                RADIOLOGY:  I have personally reviewed all chest and other pertinent radiology films.      REVIEW OF SYSTEMS:     · CONSTITUTIONAL:   +fever   no chills.      · EYES:   no discharge,   no irritation,        · ENMT:   Ears: no ear pain and no hearing problems.  Nose: no nasal congestion and no nasal drainage.      · CARDIOVASCULAR:   no chest pain,   no swelling  no palpitations      · RESPIRATORY:  +SOB,  + wheezing ,  +respiratory difficulty  no sputum production    · GASTROINTESTINAL:   no abdominal pain,    no nausea   no vomiting.    · GENITOURINARY:  no dysuria,   no frequency,       · MUSCULOSKELETAL:   no back pain,   no neck pain,   +weakness.    · SKIN:   no pruritis,   no rashes.    · NEURO:   no loss of consciousness,   no headache,   no weakness.        ALLERGIC/IMMUNOLOGIC:   No active allergic or immunologic issues    all other systems are negative        PHYSICAL EXAM:     · CONSTITUTIONAL:   Ill appearing,   well nourished,   NAD    · ENMT:   Airway patent,   Nasal mucosa clear.  Mouth with normal mucosa.   No thrush    · EYES:   Clear bilaterally,   pupils equal,   round and reactive to light.    · CARDIAC:   Normal rate,   regular rhythm.    Heart sounds S1, S2.   no thrills or bruits on palpitation  normal  cardiac impulse  No murmurs, no rubs or gallops on auscultation  no edema        CAROTID:   normal systolic impulse  no bruits    · RESPIRATORY:   scattered wheeze  normal chest expansion  not tachypneic,  no retractions or use of accessory muscles  percussion of chest demonstrates no hyperresonance or dullness    · GASTROINTESTINAL:  Abdomen soft,   non-tender,   no guarding,   + BS  liver spleen not palpable      · MUSCULOSKELETAL:   range of motion is not limited,  no clubbing, cyanosis  no petechiae    · NEUROLOGICAL:   Alert and oriented   no obvious focal deficits in cranial nerve areas  no motor or sensory deficits.      · SKIN:   Skin normal color for race,   warm,   dry and intact.     no apparent risk to self or others.    · HEME LYMPH:   no splenomegaly.  No cervical  lymphadenopathy.

## 2019-12-27 NOTE — DISCHARGE NOTE PROVIDER - CARE PROVIDERS DIRECT ADDRESSES
,DirectAddress_Unknown,[email or web address]

## 2019-12-27 NOTE — DISCHARGE NOTE PROVIDER - NSDCMRMEDTOKEN_GEN_ALL_CORE_FT
acetaminophen 325 mg oral tablet: 2 tab(s) orally every 6 hours, As needed, Temp greater or equal to 38C (100.4F), Mild Pain (1 - 3)  albuterol:   aspirin 81 mg oral tablet, chewable: 1 tab(s) orally once a day  atorvastatin 20 mg oral tablet: 1 tab(s) orally once a day  doxepin 10 mg oral capsule:   Mestinon 60 mg oral tablet: orally 4 times a day  Pepcid:   predniSONE 20 mg oral tablet: 1 tab(s) orally once a day  Protonix 40 mg oral delayed release tablet: 1 tab(s) orally once a day  pseudoephedrine 30 mg oral tablet: 1 tab(s) orally every 4 hours, As needed, nasal congestion  Symbicort: inhaled 2 times a day  Synthroid 50 mcg (0.05 mg) oral tablet: 1 tab(s) orally once a day   Toprol-XL 25 mg oral tablet, extended release: 1 tab(s) orally once a day  Vitamin D2: every friday

## 2019-12-27 NOTE — DISCHARGE NOTE PROVIDER - NSDCFUSCHEDAPPT_GEN_ALL_CORE_FT
GHADA ROPER ; 01/03/2020 ; [facility] PreAdmits  GHADA ROPER ; 01/24/2020 ; [facility] [address] GHADA Torres ; 01/24/2020 ; [facility] Gastro  [address] GHADA ROPER ; 01/03/2020 ; [facility] PreAdmits  GHADA ROPER ; 01/24/2020 ; [facility] Pulm[facility] [address] GHADA Torres ; 01/24/2020 ; NPP Gastro  [address]

## 2019-12-27 NOTE — CONSULT NOTE ADULT - ASSESSMENT
IMPRESSION:  Impression:  Acute chronic COPD with mild exacerbation  No Impending respiratory failure  no pneumonia possible viral from RSV    SUGGEST:  VC are 1800 acceptable  Continue IV solumedrol   bronchodilator treatments ATC and PRN  OOB to chair  GI and DVT prophylaxis  pulmonary toilet  Monitor clinically, convert to oral prednisone as clinical improvement allows  Upon D/C the patient will need ICS/LABA, PRN albuterol,   Pt to see Dr. Blakely in the clinic in approx. 2 weeks

## 2019-12-27 NOTE — DISCHARGE NOTE PROVIDER - PROVIDER TOKENS
PROVIDER:[TOKEN:[[id number]]],PROVIDER:[TOKEN:[[id number]]],PROVIDER:[TOKEN:[[id number]]]

## 2019-12-27 NOTE — DISCHARGE NOTE PROVIDER - NSDCCPCAREPLAN_GEN_ALL_CORE_FT
PRINCIPAL DISCHARGE DIAGNOSIS  Diagnosis: Nausea vomiting and diarrhea  Assessment and Plan of Treatment: This has resolved.  Make sure to continue with stomach rest for the next few days with a bland diet.  Make sure to follow up with your primary doctor on discharge      SECONDARY DISCHARGE DIAGNOSES  Diagnosis: Chronic obstructive pulmonary disease, unspecified COPD type  Assessment and Plan of Treatment: No changes to your medications were made.  continue with your inhalers as prescribed.  Follow up with Dr. Blakely in 2 weeks.    Diagnosis: Myasthenia gravis  Assessment and Plan of Treatment: You were seen by neurology, no changes to your home medications were done.  Make sure to keep taking your medication as prescribed and follow up outpatient with your neurologist and primary doctor.    Diagnosis: RSV (respiratory syncytial virus infection)  Assessment and Plan of Treatment: Your symptoms have improved.  A prescription for pseudoephedrine was sent to the pharmacy. PRINCIPAL DISCHARGE DIAGNOSIS  Diagnosis: Nausea vomiting and diarrhea  Assessment and Plan of Treatment: This has resolved.  Make sure to continue with stomach rest for the next few days with a bland diet.  Make sure to follow up with your primary doctor on discharge      SECONDARY DISCHARGE DIAGNOSES  Diagnosis: Chronic obstructive pulmonary disease, unspecified COPD type  Assessment and Plan of Treatment: No changes to your medications were made.  continue with your inhalers as prescribed.  Follow up with Dr. Blakely in 2 weeks.  Please follow up for a sleep study to evaluate your KADI    Diagnosis: Myasthenia gravis  Assessment and Plan of Treatment: You were seen by neurology, no changes to your home medications were done.  Make sure to keep taking your medication as prescribed and follow up outpatient with your neurologist and primary doctor.    Diagnosis: RSV (respiratory syncytial virus infection)  Assessment and Plan of Treatment: Your symptoms have improved.  A prescription for pseudoephedrine was sent to the pharmacy.

## 2019-12-27 NOTE — DISCHARGE NOTE PROVIDER - HOSPITAL COURSE
DAY OF SURGERY INSTRUCTIONS        YOUR SURGEON: OLIVAREZ    PROCEDURE: HYSTEROSCOPY, D&C    DATE OF SURGERY: 2/24/17    ARRIVAL TIME: AS DIRECTED BY OFFICE    DAY OF SURGERY TAKE ONLY THESE MEDICATIONS: NONE            BEFORE YOU COME TO THE HOSPITAL  (Pre-op instructions)  • Do not eat, drink, smoke or chew gum after midnight the night before surgery.  This also includes no mints.  • Morning of surgery take only the medicines you have been instructed with a sip of water unless otherwise instructed  by your physician.  • Do not shave, wear makeup or dark nail polish.  • Remove all jewelry including rings.  • Leave anything you consider valuable at home.  • Leave your suitcase in the car until after your surgery.  • Bring the following with you if applicable:  o Picture ID and insurance, Medicare or Medicaid cards  o Co-pay/deductible required by insurance (cash, check, credit card)  o Medications (no narcotics) in original bottles (not a list) including all over-the-counter medications.  o Copy of advance directive, living will or power-of- documents if not brought to PAT  o CPAP or BIPAP mask and tubing  o Skin prep instruction sheet  o Relaxation aids (MP3 player, book, magazine)  • Confirm your arrival time with you surgeon the day before your surgery (surgery times are subject to change)  • On the day of surgery check in at registration located at the main entrance of the hospital.       Outpatient Surgery Guidelines, Adult  Outpatient procedures are those for which the person having the procedure is allowed to go home the same day as the procedure. Various procedures are done on an outpatient basis. You should follow some general guidelines if you will be having an outpatient procedure.  LET YOUR HEALTH CARE PROVIDER KNOW ABOUT:  · Any allergies you have.  · All medicines you are taking, including vitamins, herbs, eye drops, creams, and over-the-counter medicines.  · Previous problems you or members of  your family have had with the use of anesthetics.  · Any blood disorders you have.  · Previous surgeries you have had.  · Medical conditions you have.  RISKS AND COMPLICATIONS  Your health care provider will discuss possible risks and complications with you before surgery. Common risks and complications include:    · Problems due to the use of anesthetics.  · Blood loss and replacement (does not apply to minor surgical procedures).  · Temporary increase in pain due to surgery.  · Uncorrected pain or problems that the surgery was meant to correct.  · Infection.  · New damage.  BEFORE THE PROCEDURE  · Ask your health care provider about changing or stopping your regular medicines. You may need to stop taking certain medicines in the days or weeks before the procedure.  · Stop smoking at least 2 weeks before surgery. This lowers your risk for complications during and after surgery. Ask your health care provider for help with this if needed.  · Eat your usual meals and a light supper the day before surgery. Continue fluid intake. Do not drink alcohol.  · Do not eat or drink after midnight the night before your surgery.   · Arrange for someone to take you home and to stay with you for 24 hours after the procedure. Medicine given for your procedure may affect your ability to drive or to care for yourself.  · Call your health care provider's office if you develop an illness or problem that may prevent you from safely having your procedure.  AFTER THE PROCEDURE  After surgery, you will be taken to a recovery area, where your progress will be monitored. If there are no complications, you will be allowed to go home when you are awake, stable, and taking fluids well. You may have numbness around the surgical site. Healing will take some time. You will have tenderness at the surgical site and may have some swelling and bruising. You may also have some nausea.  HOME CARE INSTRUCTIONS  · Do not drive for 24 hours, or as directed  by your health care provider. Do not drive while taking prescription pain medicines.  · Do not drink alcohol for 24 hours.  · Do not make important decisions or sign legal documents for 24 hours.  · You may resume a normal diet and activities as directed.  · Do not lift anything heavier than 10 pounds (4.5 kg) or play contact sports until your health care provider says it is okay.  · Change your bandages (dressings) as directed.  · Only take over-the-counter or prescription medicines as directed by your health care provider.  · Follow up with your health care provider as directed.  SEEK MEDICAL CARE IF:  · You have increased bleeding (more than a small spot) from the surgical site.  · You have redness, swelling, or increasing pain in the wound.  · You see pus coming from the wound.  · You have a fever.  · You notice a bad smell coming from the wound or dressing.  · You feel lightheaded or faint.  · You develop a rash.  · You have trouble breathing.  · You develop allergies.  MAKE SURE YOU:  · Understand these instructions.  · Will watch your condition.  · Will get help right away if you are not doing well or get worse.     This information is not intended to replace advice given to you by your health care provider. Make sure you discuss any questions you have with your health care provider.     Document Released: 09/12/2002 Document Revised: 05/03/2016 Document Reviewed: 05/22/2014  [employer] Interactive Patient Education ©2016 [employer]       Fall Prevention in Hospitals, Adult  As a hospital patient, your condition and the treatments you receive can increase your risk for falls. Some additional risk factors for falls in a hospital include:  · Being in an unfamiliar environment.  · Being on bed rest.  · Your surgery.  · Taking certain medicines.  · Your tubing requirements, such as intravenous (IV) therapy or catheters.  It is important that you learn how to decrease fall risks while at the hospital. Below are  important tips that can help prevent falls.  SAFETY TIPS FOR PREVENTING FALLS  Talk about your risk of falling.  · Ask your health care provider why you are at risk for falling. Is it your medicine, illness, tubing placement, or something else?  · Make a plan with your health care provider to keep you safe from falls.  · Ask your health care provider or pharmacist about side effects of your medicines. Some medicines can make you dizzy or affect your coordination.  Ask for help.  · Ask for help before getting out of bed. You may need to press your call button.  · Ask for assistance in getting safely to the toilet.  · Ask for a walker or cane to be put at your bedside. Ask that most of the side rails on your bed be placed up before your health care provider leaves the room.  · Ask family or friends to sit with you.  · Ask for things that are out of your reach, such as your glasses, hearing aids, telephone, bedside table, or call button.  Follow these tips to avoid falling:  · Stay lying or seated, rather than standing, while waiting for help.  · Wear rubber-soled slippers or shoes whenever you walk in the hospital.  · Avoid quick, sudden movements.  ¨ Change positions slowly.  ¨ Sit on the side of your bed before standing.  ¨ Stand up slowly and wait before you start to walk.  · Let your health care provider know if there is a spill on the floor.  · Pay careful attention to the medical equipment, electrical cords, and tubes around you.  · When you need help, use your call button by your bed or in the bathroom. Wait for one of your health care providers to help you.  · If you feel dizzy or unsure of your footing, return to bed and wait for assistance.  · Avoid being distracted by the TV, telephone, or another person in your room.  · Do not lean or support yourself on rolling objects, such as IV poles or bedside tables.     This information is not intended to replace advice given to you by your health care provider.  to be completed by attending Make sure you discuss any questions you have with your health care provider.     Document Released: 12/15/2001 Document Revised: 01/08/2016 Document Reviewed: 08/25/2013  [employer] Interactive Patient Education ©2016 [employer]       Surgical Site Infections FAQs  What is a Surgical Site Infection (SSI)?  A surgical site infection is an infection that occurs after surgery in the part of the body where the surgery took place. Most patients who have surgery do not develop an infection. However, infections develop in about 1 to 3 out of every 100 patients who have surgery.  Some of the common symptoms of a surgical site infection are:  · Redness and pain around the area where you had surgery  · Drainage of cloudy fluid from your surgical wound  · Fever  Can SSIs be treated?  Yes. Most surgical site infections can be treated with antibiotics. The antibiotic given to you depends on the bacteria (germs) causing the infection. Sometimes patients with SSIs also need another surgery to treat the infection.  What are some of the things that hospitals are doing to prevent SSIs?  To prevent SSIs, doctors, nurses, and other healthcare providers:  · Clean their hands and arms up to their elbows with an antiseptic agent just before the surgery.  · Clean their hands with soap and water or an alcohol-based hand rub before and after caring for each patient.  · May remove some of your hair immediately before your surgery using electric clippers if the hair is in the same area where the procedure will occur. They should not shave you with a razor.  · Wear special hair covers, masks, gowns, and gloves during surgery to keep the surgery area clean.  · Give you antibiotics before your surgery starts. In most cases, you should get antibiotics within 60 minutes before the surgery starts and the antibiotics should be stopped within 24 hours after surgery.  · Clean the skin at the site of your surgery with a special soap that kills  germs.  What can I do to help prevent SSIs?  Before your surgery:  · Tell your doctor about other medical problems you may have. Health problems such as allergies, diabetes, and obesity could affect your surgery and your treatment.  · Quit smoking. Patients who smoke get more infections. Talk to your doctor about how you can quit before your surgery.  · Do not shave near where you will have surgery. Shaving with a razor can irritate your skin and make it easier to develop an infection.  At the time of your surgery:  · Speak up if someone tries to shave you with a razor before surgery. Ask why you need to be shaved and talk with your surgeon if you have any concerns.  · Ask if you will get antibiotics before surgery.  After your surgery:  · Make sure that your healthcare providers clean their hands before examining you, either with soap and water or an alcohol-based hand rub.  · If you do not see your providers clean their hands, please ask them to do so.  · Family and friends who visit you should not touch the surgical wound or dressings.  · Family and friends should clean their hands with soap and water or an alcohol-based hand rub before and after visiting you. If you do not see them clean their hands, ask them to clean their hands.  What do I need to do when I go home from the hospital?  · Before you go home, your doctor or nurse should explain everything you need to know about taking care of your wound. Make sure you understand how to care for your wound before you leave the hospital.  · Always clean your hands before and after caring for your wound.  · Before you go home, make sure you know who to contact if you have questions or problems after you get home.  · If you have any symptoms of an infection, such as redness and pain at the surgery site, drainage, or fever, call your doctor immediately.  If you have additional questions, please ask your doctor or nurse.  Developed and co-sponsored by The Society for  Healthcare Epidemiology of [place] (SHEA); Infectious Diseases Society of [place] (IDSA); American Hospital Association; Association for Professionals in Infection Control and Epidemiology (APIC); Centers for Disease Control and Prevention (CDC); and The Joint Commission.     This information is not intended to replace advice given to you by your health care provider. Make sure you discuss any questions you have with your health care provider.     Document Released: 12/23/2014 Document Revised: 01/08/2016 Document Reviewed: 03/02/2016  [employer] Interactive Patient Education ©2016 [employer].        60yo female whose PMH includes myasthenia gravis presents to the ER due to nausea and vomiting.     Pt found to have RSV.    Patient was treated with IVF and supportive care.    She improved.    She was also seen by neurology, pulm and speech for her Myasthenia gravis.     She is recommended to have close follow ups with these physicians upon d/c.        Patient seen and examined today    Pt is tolerating a diet and ambulating without difficulty.    She is saturating 95% on room air.         Vital Signs Last 24 Hrs    T(C): 36.8 (27 Dec 2019 04:30), Max: 37.8 (26 Dec 2019 21:01)    T(F): 98.2 (27 Dec 2019 04:30), Max: 100.1 (26 Dec 2019 21:01)    HR: 73 (27 Dec 2019 04:30) (73 - 79)    BP: 135/67 (27 Dec 2019 04:30) (120/70 - 135/67)    BP(mean): --    RR: 16 (27 Dec 2019 08:00) (16 - 17)    SpO2: 95% (27 Dec 2019 08:00) (91% - 96%)        PHYSICAL EXAM:    GENERAL: NAD, well-groomed, well-developed    HEAD:  Atraumatic, Normocephalic    EYES: EOMI, PERRLA, conjunctiva and sclera clear    NERVOUS SYSTEM:  Alert & Oriented X 4, Good concentration; Motor Strength 5/5 B/L upper and lower extremities; DTRs 2+ intact and symmetric    CHEST/LUNG: decreased breath sounds b/l;  No rales, rhonchi, wheezing, or rubs    HEART: Regular rate and rhythm; No murmurs, rubs, or gallops    ABDOMEN: Soft, Nontender, Nondistended; Bowel sounds present    EXTREMITIES:  2+ Peripheral Pulses, No clubbing, cyanosis, or edema    SKIN: No rashes or lesions        d/c home today    d/c planning took over 55 min    d/c papers done by me

## 2019-12-30 PROBLEM — G70.00 MYASTHENIA GRAVIS WITHOUT (ACUTE) EXACERBATION: Chronic | Status: ACTIVE | Noted: 2019-12-23

## 2020-01-01 ENCOUNTER — OUTPATIENT (OUTPATIENT)
Dept: OUTPATIENT SERVICES | Facility: HOSPITAL | Age: 60
LOS: 1 days | End: 2020-01-01
Payer: MEDICAID

## 2020-01-01 DIAGNOSIS — Z98.890 OTHER SPECIFIED POSTPROCEDURAL STATES: Chronic | ICD-10-CM

## 2020-01-01 PROCEDURE — G9001: CPT

## 2020-01-02 ENCOUNTER — APPOINTMENT (OUTPATIENT)
Dept: NEUROLOGY | Facility: CLINIC | Age: 60
End: 2020-01-02
Payer: MEDICAID

## 2020-01-02 VITALS
WEIGHT: 188 LBS | HEIGHT: 64 IN | DIASTOLIC BLOOD PRESSURE: 79 MMHG | HEART RATE: 67 BPM | BODY MASS INDEX: 32.1 KG/M2 | SYSTOLIC BLOOD PRESSURE: 120 MMHG

## 2020-01-02 DIAGNOSIS — R53.1 WEAKNESS: ICD-10-CM

## 2020-01-02 DIAGNOSIS — T38.0X6A UNDERDOSING OF GLUCOCORTICOIDS AND SYNTHETIC ANALOGUES, INITIAL ENCOUNTER: ICD-10-CM

## 2020-01-02 DIAGNOSIS — E87.8 OTHER DISORDERS OF ELECTROLYTE AND FLUID BALANCE, NOT ELSEWHERE CLASSIFIED: ICD-10-CM

## 2020-01-02 DIAGNOSIS — E78.5 HYPERLIPIDEMIA, UNSPECIFIED: ICD-10-CM

## 2020-01-02 DIAGNOSIS — J44.1 CHRONIC OBSTRUCTIVE PULMONARY DISEASE WITH (ACUTE) EXACERBATION: ICD-10-CM

## 2020-01-02 DIAGNOSIS — B97.4 RESPIRATORY SYNCYTIAL VIRUS AS THE CAUSE OF DISEASES CLASSIFIED ELSEWHERE: ICD-10-CM

## 2020-01-02 DIAGNOSIS — R06.02 SHORTNESS OF BREATH: ICD-10-CM

## 2020-01-02 DIAGNOSIS — J96.01 ACUTE RESPIRATORY FAILURE WITH HYPOXIA: ICD-10-CM

## 2020-01-02 DIAGNOSIS — G70.00 MYASTHENIA GRAVIS WITHOUT (ACUTE) EXACERBATION: ICD-10-CM

## 2020-01-02 DIAGNOSIS — J00 ACUTE NASOPHARYNGITIS [COMMON COLD]: ICD-10-CM

## 2020-01-02 DIAGNOSIS — E55.9 VITAMIN D DEFICIENCY, UNSPECIFIED: ICD-10-CM

## 2020-01-02 DIAGNOSIS — E03.9 HYPOTHYROIDISM, UNSPECIFIED: ICD-10-CM

## 2020-01-02 DIAGNOSIS — R20.2 PARESTHESIA OF SKIN: ICD-10-CM

## 2020-01-02 PROCEDURE — 99213 OFFICE O/P EST LOW 20 MIN: CPT

## 2020-01-02 NOTE — ASSESSMENT
[FreeTextEntry1] : 60 y/o woman w/ Myesthesia Gravis s/p hospitalization for RSV\par \par Plan:\par -Increase Prednisone to 30mg/day x 1 week then back to 20mg\par -Increase Mestinon to 60 mg QID (decrease back to TID if not tolerated)\par -Barium Swallow\par -PMD, Pulm and GI f/u\par -Pt will bring records from [facility] (where she was originally dx)\par -F/u in 1 months with plan to initiate steroid sparing agent\par -ER if breathing worsens

## 2020-01-02 NOTE — PHYSICAL EXAM
[FreeTextEntry1] : AAOX3\par PERRL, EOMI\par Face symmetrical\par Normal visual fields\par 5/5 strength b/l UE, LE distally, 4+ proximally\par Sensory intact to LT, temp, vib, pinprick but perhaps slightly decreased LUE, LLE?\par 2+ DTR's b/l tricep, bicep, BR, patellar, ankle jerk\par No ataxia\par (+) resting tremor\par Gait unsteady\par Downgoing plantars\par

## 2020-01-02 NOTE — REVIEW OF SYSTEMS
[Arm Weakness] : arm weakness [Leg Weakness] : leg weakness [Numbness] : numbness [Shortness Of Breath] : shortness of breath [Skin Lesions] : skin lesion [Feeling Tired] : feeling tired [Incontinence] : no incontinence [FreeTextEntry3] : no diplopia

## 2020-01-02 NOTE — HISTORY OF PRESENT ILLNESS
[FreeTextEntry1] : 60 y/o woman with PMH Myesthesia Gravis dx at [facility] by Dr. Cárdenas here for post hospital f/u. She was admitted to [facility] with nausea/vomiting/SOB. Swab (+) for RSV. She was treated and subsequently discharged. She has chronic dysphagia (feels tightness in chest with vomiting) for which she has required esophageal dilation in the past. She follows with GI and barium swallow is scheduled for tomorrow. She also follows w/ pulmonary and is on beta agonist and ICS. She is to have a sleep study which has been ordered.\par She is currently on Mestinon 60 mg TID (chart said QID but pt states it is TID) and Prednisone 20mg QD. \par She is planning on going for a second opinion at [facility] next week + EMG but believes she will stay with us for her neurological care.\par \par She is here for follow-up and states that she is still having sinus congestion, perioral sores. Her breathing is slightly better but she is still wheezing.\par She has a PMD appt today and GI and Pulm later in the month

## 2020-01-03 ENCOUNTER — OUTPATIENT (OUTPATIENT)
Dept: OUTPATIENT SERVICES | Facility: HOSPITAL | Age: 60
LOS: 1 days | Discharge: HOME | End: 2020-01-03

## 2020-01-03 DIAGNOSIS — Z98.890 OTHER SPECIFIED POSTPROCEDURAL STATES: Chronic | ICD-10-CM

## 2020-01-03 DIAGNOSIS — R13.10 DYSPHAGIA, UNSPECIFIED: ICD-10-CM

## 2020-01-09 DIAGNOSIS — Z71.89 OTHER SPECIFIED COUNSELING: ICD-10-CM

## 2020-01-09 LAB
TPMT ENZYME INTERPRETATION: NORMAL
TPMT ENZYME METHODOLOGY: NORMAL
TPMT ENZYME: 20.3

## 2020-01-10 ENCOUNTER — OUTPATIENT (OUTPATIENT)
Dept: OUTPATIENT SERVICES | Facility: HOSPITAL | Age: 60
LOS: 1 days | Discharge: HOME | End: 2020-01-10

## 2020-01-10 DIAGNOSIS — Z98.890 OTHER SPECIFIED POSTPROCEDURAL STATES: Chronic | ICD-10-CM

## 2020-01-13 DIAGNOSIS — G47.33 OBSTRUCTIVE SLEEP APNEA (ADULT) (PEDIATRIC): ICD-10-CM

## 2020-01-21 ENCOUNTER — RX RENEWAL (OUTPATIENT)
Age: 60
End: 2020-01-21

## 2020-01-24 ENCOUNTER — APPOINTMENT (OUTPATIENT)
Dept: PULMONOLOGY | Facility: CLINIC | Age: 60
End: 2020-01-24
Payer: MEDICAID

## 2020-01-24 ENCOUNTER — OUTPATIENT (OUTPATIENT)
Dept: OUTPATIENT SERVICES | Facility: HOSPITAL | Age: 60
LOS: 1 days | Discharge: HOME | End: 2020-01-24

## 2020-01-24 ENCOUNTER — APPOINTMENT (OUTPATIENT)
Dept: GASTROENTEROLOGY | Facility: CLINIC | Age: 60
End: 2020-01-24
Payer: MEDICAID

## 2020-01-24 ENCOUNTER — OUTPATIENT (OUTPATIENT)
Dept: OUTPATIENT SERVICES | Facility: HOSPITAL | Age: 60
LOS: 1 days | Discharge: HOME | End: 2020-01-24
Payer: MEDICAID

## 2020-01-24 VITALS
DIASTOLIC BLOOD PRESSURE: 79 MMHG | WEIGHT: 194 LBS | HEIGHT: 64 IN | OXYGEN SATURATION: 98 % | SYSTOLIC BLOOD PRESSURE: 142 MMHG | BODY MASS INDEX: 33.12 KG/M2 | HEART RATE: 65 BPM

## 2020-01-24 VITALS
WEIGHT: 193 LBS | SYSTOLIC BLOOD PRESSURE: 124 MMHG | HEIGHT: 64 IN | HEART RATE: 63 BPM | BODY MASS INDEX: 32.95 KG/M2 | DIASTOLIC BLOOD PRESSURE: 83 MMHG

## 2020-01-24 DIAGNOSIS — Z98.890 OTHER SPECIFIED POSTPROCEDURAL STATES: Chronic | ICD-10-CM

## 2020-01-24 DIAGNOSIS — H04.123 DRY EYE SYNDROME OF BILATERAL LACRIMAL GLANDS: ICD-10-CM

## 2020-01-24 PROCEDURE — 92012 INTRM OPH EXAM EST PATIENT: CPT

## 2020-01-24 PROCEDURE — 99213 OFFICE O/P EST LOW 20 MIN: CPT | Mod: GE

## 2020-01-24 NOTE — HISTORY OF PRESENT ILLNESS
[FreeTextEntry1] : 58 yo F with PMH of myasthenia gravis, diverticular disease, hypothyroidism, kidney stones, IBS, chronic pancreatitis here for follow up due to SOB after MG diagnosis. She was recently dx at [facility]. Pt symptoms started mainly in Sept was admitted to [facility] for acute episode of confusion weakness and inability to speak. Symptoms started as progressive SOB, than eye twitching and dysphagia. Neuro imaging was negative at time. Pt had Lyme testing, and MG Ab Ach Ab testing all negative. TSH noted to be 17.  Pt was seen in our clinic and had op PFT done, Ct scan chest deferred as she states as done in [facility]. She is following with neuro Dr. Cárdenas, Endocrine Dr. Gallegos, and PCP Dr. Washington. She has been on mestinon 60mg tid since [facility] discharge.  She reports improvement with weakness and fatigue since mestinon started.  Reports SOB has improved with ICS and albuterol started at last clinic appointment but can still be SOB at times.  \par \par

## 2020-01-24 NOTE — END OF VISIT
[] : Resident [Time Spent: ___ minutes] : I have spent [unfilled] minutes of face to face time with the patient [FreeTextEntry3] : low-order sleep equipment

## 2020-01-24 NOTE — REVIEW OF SYSTEMS
[Feeling Tired] : feeling tired [SOB on Exertion] : shortness of breath during exertion [As Noted in HPI] : as noted in HPI [Negative] : Integumentary

## 2020-01-24 NOTE — PHYSICAL EXAM
[General Appearance - Alert] : alert [Sclera] : the sclera and conjunctiva were normal [General Appearance - In No Acute Distress] : in no acute distress [Outer Ear] : the ears and nose were normal in appearance [Hearing Threshold Finger Rub Not Stewart] : hearing was normal [Extraocular Movements] : extraocular movements were intact [Neck Cervical Mass (___cm)] : no neck mass was observed [Neck Appearance] : the appearance of the neck was normal [Bowel Sounds] : normal bowel sounds [Exaggerated Use Of Accessory Muscles For Inspiration] : no accessory muscle use [] : no respiratory distress [No CVA Tenderness] : no ~M costovertebral angle tenderness [Abdomen Soft] : soft [Abdomen Tenderness] : non-tender [Skin Color & Pigmentation] : normal skin color and pigmentation [Abnormal Walk] : normal gait [Oriented To Time, Place, And Person] : oriented to person, place, and time

## 2020-01-24 NOTE — REVIEW OF SYSTEMS
[Fatigue] : fatigue [Recent Wt Gain (___ Lbs)] : recent [unfilled] ~Ulb weight gain [Negative] : Cardiovascular [Fever] : no fever [Chills] : no chills [FreeTextEntry8] : SOB on exertion, improved since starting inhalers at last pulm clinic  [FreeTextEntry3] : noticed weight gain, but not exercising due to fatigue and on steroids [Poor Appetite] : normal appetite

## 2020-01-24 NOTE — HISTORY OF PRESENT ILLNESS
[de-identified] : 60 yo female patient with PMH of MG treated with mestinone and prednisone 20 mg oral , DLD, HTN, hypothyroidism, valvular heart disease, GERD on protonix 40 po QD and pepcid presented for follow up for  dysphagia.\par Patient was diagnosed with myastenia gravis in October at [facility] after one year history of muscle weakness, opthalmoparesis, SOB and difficulty swallowing despite the fact that antibodies and EMG were both negative. She was started on mestinone with improvement of her symptoms.\par She is complaining of difficulty initiating swallowing, described as forgetfulness of how to initiate swallowing, mainly for liquids but for solids as well, fluctuating in intensity associated with choking sensation after swallowing. She has done EGD in [facility] in October and showed esophagitis and had dilatation of the esophagus to 42 Fr. \par Pt was seen by Speech and swallow and as per patient they taught her how to eat and to have antireflux diet and her symptoms improved. \par No weight loss, no hematemesis, no abdominal pain.

## 2020-01-24 NOTE — ASSESSMENT
[FreeTextEntry1] : \par #Myagravia Gravis\par -continuing with mestanon and prednisone 20mg once daily\par -neurology follow up\par -sleep study results\par -patient just received paper copy of CT neck and Chest results, will bring to next appointment\par -advised to attend ER if acute worsening of breathing\par \par #SOB\par -hx of smoking \par -mild obstructive pattern on PFTs\par -c/w symbicort and albuterol \par \par #sleep apnea\par -apnea/hypapnea index 8.1 (called sleep lab for results)\par -auto pap prescribed, prescription given by Dr. Blakely\par \par folllow up in 3 months\par

## 2020-02-03 ENCOUNTER — APPOINTMENT (OUTPATIENT)
Dept: NEUROLOGY | Facility: CLINIC | Age: 60
End: 2020-02-03
Payer: MEDICAID

## 2020-02-03 VITALS
TEMPERATURE: 98 F | OXYGEN SATURATION: 98 % | WEIGHT: 195 LBS | SYSTOLIC BLOOD PRESSURE: 144 MMHG | DIASTOLIC BLOOD PRESSURE: 87 MMHG | BODY MASS INDEX: 33.29 KG/M2 | HEIGHT: 64 IN | HEART RATE: 65 BPM

## 2020-02-03 PROCEDURE — 99213 OFFICE O/P EST LOW 20 MIN: CPT

## 2020-02-03 NOTE — HISTORY OF PRESENT ILLNESS
[FreeTextEntry1] : The patient is here for follow-up MG.\par She did not bring her [facility] records. (had neg CT chest, labs there at time of dx. acetylcholine binding, blocking, modulating Abs are neg but MG thought likely given positive response to pred and Mestinon)\par She did not get us her EMG results.\par She was seen by GIU for dysphagia-she will have esophagram\par She was seen by pulmonary-COPD, KADI, to start CPAP\par She is hypothyroid and is currently on Synthroid, monitored by endo\par \par She continues with FISHER (overall uncharged, as well as continued proximal muscle weakness)\par \par

## 2020-02-03 NOTE — PHYSICAL EXAM
[FreeTextEntry1] : AAOX3\par PERRL, EOMI\par Face symmetrical\par Normal visual fields\par 5/5 strength b/l UE, LE distally, 4/5 proximally b/l UE, LE\par Sensory intact to LT, temp, vib, pinprick but perhaps slightly decreased LUE, LLE?\par 2+ DTR's b/l tricep, bicep, BR, patellar, ankle jerk\par No ataxia

## 2020-02-03 NOTE — ASSESSMENT
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 Were other relevant labs checked?? (anti-musk, etc) We need records from [facility] and EMG results from [facility]. In the meantime we can begin steroid sparing agent Imuran 50mg QD after a CMC and CMP. Would continue prednisone at 20mg QD for now, Continue Mestonin 60mg QID. F/u on esophagram+EGD, f/u w pulm as planned. She would benefir from PT. F/u in neuro office in 30 day to titrate Imuran upward if it is tolerated and CBC remains stable.

## 2020-02-03 NOTE — REVIEW OF SYSTEMS
[Facial Weakness] : facial weakness [Arm Weakness] : arm weakness [Leg Weakness] : leg weakness [Shortness Of Breath] : shortness of breath [Fever] : no fever [Eyesight Problems] : no eyesight problems [Chills] : no chills [Chest Pain] : no chest pain [Skin Lesions] : no skin lesions

## 2020-02-04 LAB
ALBUMIN SERPL ELPH-MCNC: 4.4 G/DL
ALP BLD-CCNC: 72 U/L
ALT SERPL-CCNC: 19 U/L
ANION GAP SERPL CALC-SCNC: 17 MMOL/L
AST SERPL-CCNC: 15 U/L
BASOPHILS # BLD AUTO: 0.02 K/UL
BASOPHILS NFR BLD AUTO: 0.2 %
BILIRUB SERPL-MCNC: 0.3 MG/DL
BUN SERPL-MCNC: 20 MG/DL
CALCIUM SERPL-MCNC: 10 MG/DL
CHLORIDE SERPL-SCNC: 101 MMOL/L
CO2 SERPL-SCNC: 25 MMOL/L
CREAT SERPL-MCNC: 0.8 MG/DL
EOSINOPHIL # BLD AUTO: 0.01 K/UL
EOSINOPHIL NFR BLD AUTO: 0.1 %
GLUCOSE SERPL-MCNC: 118 MG/DL
HCT VFR BLD CALC: 41.8 %
HGB BLD-MCNC: 12.8 G/DL
IMM GRANULOCYTES NFR BLD AUTO: 0.4 %
LYMPHOCYTES # BLD AUTO: 1.05 K/UL
LYMPHOCYTES NFR BLD AUTO: 10.3 %
MAN DIFF?: NORMAL
MCHC RBC-ENTMCNC: 29.3 PG
MCHC RBC-ENTMCNC: 30.6 G/DL
MCV RBC AUTO: 95.7 FL
MONOCYTES # BLD AUTO: 0.21 K/UL
MONOCYTES NFR BLD AUTO: 2.1 %
NEUTROPHILS # BLD AUTO: 8.83 K/UL
NEUTROPHILS NFR BLD AUTO: 86.9 %
PLATELET # BLD AUTO: 267 K/UL
POTASSIUM SERPL-SCNC: 4.6 MMOL/L
PROT SERPL-MCNC: 7 G/DL
RBC # BLD: 4.37 M/UL
RBC # FLD: 14.6 %
SODIUM SERPL-SCNC: 143 MMOL/L
WBC # FLD AUTO: 10.16 K/UL

## 2020-02-28 ENCOUNTER — OUTPATIENT (OUTPATIENT)
Dept: OUTPATIENT SERVICES | Facility: HOSPITAL | Age: 60
LOS: 1 days | Discharge: HOME | End: 2020-02-28

## 2020-02-28 ENCOUNTER — APPOINTMENT (OUTPATIENT)
Dept: GASTROENTEROLOGY | Facility: CLINIC | Age: 60
End: 2020-02-28

## 2020-02-28 VITALS
WEIGHT: 194 LBS | HEIGHT: 64 IN | TEMPERATURE: 97.2 F | BODY MASS INDEX: 33.12 KG/M2 | HEART RATE: 62 BPM | SYSTOLIC BLOOD PRESSURE: 138 MMHG | DIASTOLIC BLOOD PRESSURE: 83 MMHG

## 2020-02-28 DIAGNOSIS — Z98.890 OTHER SPECIFIED POSTPROCEDURAL STATES: Chronic | ICD-10-CM

## 2020-03-04 ENCOUNTER — FORM ENCOUNTER (OUTPATIENT)
Age: 60
End: 2020-03-04

## 2020-03-05 ENCOUNTER — OUTPATIENT (OUTPATIENT)
Dept: OUTPATIENT SERVICES | Facility: HOSPITAL | Age: 60
LOS: 1 days | Discharge: HOME | End: 2020-03-05
Payer: MEDICAID

## 2020-03-05 DIAGNOSIS — R13.10 DYSPHAGIA, UNSPECIFIED: ICD-10-CM

## 2020-03-05 DIAGNOSIS — Z98.890 OTHER SPECIFIED POSTPROCEDURAL STATES: Chronic | ICD-10-CM

## 2020-03-05 PROCEDURE — 74220 X-RAY XM ESOPHAGUS 1CNTRST: CPT | Mod: 26

## 2020-03-10 ENCOUNTER — LABORATORY RESULT (OUTPATIENT)
Age: 60
End: 2020-03-10

## 2020-03-10 ENCOUNTER — APPOINTMENT (OUTPATIENT)
Dept: NEUROLOGY | Facility: CLINIC | Age: 60
End: 2020-03-10
Payer: MEDICAID

## 2020-03-10 VITALS
WEIGHT: 194 LBS | SYSTOLIC BLOOD PRESSURE: 134 MMHG | BODY MASS INDEX: 33.12 KG/M2 | HEIGHT: 64 IN | DIASTOLIC BLOOD PRESSURE: 79 MMHG | HEART RATE: 84 BPM

## 2020-03-10 PROCEDURE — 99213 OFFICE O/P EST LOW 20 MIN: CPT

## 2020-03-10 NOTE — HISTORY OF PRESENT ILLNESS
[FreeTextEntry1] : Christiane is here for follow-up. She is feeling slightly better since starting the Imuran. She is on Prednisone 20mg/day. She continues to follow at [facility]. They ordered additional labs (CBC, CMP and another MUSK), EMG and PFT.\par She reports improvement in swallowing, strength. Still with FISHER. She has appt with Dr Blakely coming up.\par \par I reviewed extensive records from [facility] and [facility].\par She presented to [facility] with the weakness, sob, dysphagia and ptosis.\par Single Fiber EMG did reveal increased jitter which supported dx of MG.\par Labs :\par Ac binding ab, mod ab blocking ab receptor ab negative\par MUSK neg\par Voltage gated Ca, K neg\par NMDA neg\par RPR neg\par ACE neg\par TSH nl\par CK nl\par CRP nl\par ESR 21\par B12 336\par TJ ab neg\par Lyme neg\par MANJINDER neg\par RF neg\par Thyroiditis panel neg\par Aquaporin ab neg\par CSF IgG nl, protein normal\par \par Barium swallow normal

## 2020-03-10 NOTE — PHYSICAL EXAM
[FreeTextEntry1] : AAOX3\par PERRL, EOMI\par Face symmetrical\par Normal visual fields\par 4+/5 strength b/l UE, LE distally, 4/5 proximally b/l UE, LE\par Sensory intact to LT, temp, vib, pinprick \par 2+ DTR's b/l tricep, bicep, BR, patellar, ankle jerk\par No ataxia.

## 2020-03-10 NOTE — REVIEW OF SYSTEMS
[Arm Weakness] : arm weakness [Hand Weakness] :  hand weakness [Shortness Of Breath] : shortness of breath [Fever] : no fever [Chills] : no chills [Chest Pain] : no chest pain [Skin Lesions] : no skin lesions

## 2020-03-10 NOTE — ASSESSMENT
[FreeTextEntry1] : 58 y/o woman with likely MG w/ negative labs as mentioned above. She continues to follow at [facility]. \par I discussed that she can not follow with two neurology practices for the same diagnosis due to safety concerns. She agrees to complete the testing at [facility] and follow-up here for further management.\par \par Plan:\par -Pulmonary f/u\par -F/u on new EMG results\par -Check Anti-LR4\par -MRI Cspine to ensure we are not missing a myelopathy\par -Increase Imuran to 100mg/day\par -Continue Pred at 20mg/day for now\par -f/u 6w\par

## 2020-03-11 ENCOUNTER — OUTPATIENT (OUTPATIENT)
Dept: OUTPATIENT SERVICES | Facility: HOSPITAL | Age: 60
LOS: 1 days | Discharge: HOME | End: 2020-03-11
Payer: MEDICAID

## 2020-03-11 DIAGNOSIS — Z98.890 OTHER SPECIFIED POSTPROCEDURAL STATES: Chronic | ICD-10-CM

## 2020-03-11 PROCEDURE — 92012 INTRM OPH EXAM EST PATIENT: CPT

## 2020-03-23 DIAGNOSIS — G70.01 MYASTHENIA GRAVIS WITH (ACUTE) EXACERBATION: ICD-10-CM

## 2020-03-23 DIAGNOSIS — H04.129 DRY EYE SYNDROME OF UNSPECIFIED LACRIMAL GLAND: ICD-10-CM

## 2020-03-23 DIAGNOSIS — H53.022 REFRACTIVE AMBLYOPIA, LEFT EYE: ICD-10-CM

## 2020-04-14 ENCOUNTER — APPOINTMENT (OUTPATIENT)
Dept: NEUROLOGY | Facility: CLINIC | Age: 60
End: 2020-04-14

## 2020-04-17 ENCOUNTER — OUTPATIENT (OUTPATIENT)
Dept: OUTPATIENT SERVICES | Facility: HOSPITAL | Age: 60
LOS: 1 days | Discharge: HOME | End: 2020-04-17

## 2020-04-17 ENCOUNTER — APPOINTMENT (OUTPATIENT)
Dept: PULMONOLOGY | Facility: CLINIC | Age: 60
End: 2020-04-17
Payer: MEDICAID

## 2020-04-17 VITALS
SYSTOLIC BLOOD PRESSURE: 149 MMHG | DIASTOLIC BLOOD PRESSURE: 85 MMHG | HEART RATE: 60 BPM | TEMPERATURE: 97.7 F | BODY MASS INDEX: 34.83 KG/M2 | HEIGHT: 64 IN | WEIGHT: 204 LBS

## 2020-04-17 DIAGNOSIS — Z98.890 OTHER SPECIFIED POSTPROCEDURAL STATES: Chronic | ICD-10-CM

## 2020-04-17 PROCEDURE — 99213 OFFICE O/P EST LOW 20 MIN: CPT | Mod: GC

## 2020-04-29 ENCOUNTER — APPOINTMENT (OUTPATIENT)
Dept: NEUROLOGY | Facility: CLINIC | Age: 60
End: 2020-04-29

## 2020-04-29 DIAGNOSIS — G70.00 MYASTHENIA GRAVIS WITHOUT (ACUTE) EXACERBATION: ICD-10-CM

## 2020-05-01 ENCOUNTER — APPOINTMENT (OUTPATIENT)
Dept: NEUROLOGY | Facility: CLINIC | Age: 60
End: 2020-05-01
Payer: MEDICAID

## 2020-05-01 PROCEDURE — 99214 OFFICE O/P EST MOD 30 MIN: CPT | Mod: 95

## 2020-05-01 NOTE — HISTORY OF PRESENT ILLNESS
[FreeTextEntry1] : Pt consents for telehealth visit.  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  She is on azathioprine 100 mg/day now for 1 month,  prednisone 20 mg/day.  .  Her vision has changed.  Looking through a filter, no cataracts she says.  No double vision but vision does improve when she covers one eye.  Some days uses close caption on TV.  Muscles - states she gets bad spasms in arches of her feet, hard to get out of spasm, lasts 30 seconds.  Denies numbness and tingling in her feet.  Strength of hands is a little less, can't open jars, or carry things.  Denies diabetes.  The hand and foot symptoms are more prevalent at night when she is tired.  Sleeping patterns are awful.  States half the time she doesn't know what day it is.  Gets up at 4 am., Feels flushed in face.  Normally 6-7 am got up in past.  USually able to fall asleep okay at night.  Sleeps with sleep apnea machine.  She does feel a difference in her energy level.  States she feels like she can't breath well every day for a month.  Went to pulmonary and says he thinks its more the myasthenia.  Patient states more difficulty chewing and swallowing.  During that breathing is more difficulty.  O2 sats are 92%.\par \par Denies fever or chills or loss of taste or smell.  States she is aware of IVIG.  Son had PANDAS disorder and  has autoimmune disorder so got IVIG.    Even while talking now she gets shortness of breath, since when she first got sick in September.   was hospitalized 3 times since september.

## 2020-05-06 LAB
ALBUMIN SERPL ELPH-MCNC: 4.1 G/DL
ALP BLD-CCNC: 94 U/L
ALT SERPL-CCNC: 23 U/L
ANION GAP SERPL CALC-SCNC: 15 MMOL/L
AST SERPL-CCNC: 19 U/L
BASOPHILS # BLD AUTO: 0.01 K/UL
BASOPHILS NFR BLD AUTO: 0.2 %
BILIRUB SERPL-MCNC: 0.5 MG/DL
BUN SERPL-MCNC: 28 MG/DL
CALCIUM SERPL-MCNC: 9.1 MG/DL
CHLORIDE SERPL-SCNC: 105 MMOL/L
CO2 SERPL-SCNC: 25 MMOL/L
CREAT SERPL-MCNC: 0.9 MG/DL
EOSINOPHIL # BLD AUTO: 0.06 K/UL
EOSINOPHIL NFR BLD AUTO: 0.9 %
GLUCOSE SERPL-MCNC: 87 MG/DL
HCT VFR BLD CALC: 41.1 %
HGB BLD-MCNC: 12.8 G/DL
IGA SER QL IEP: 162 MG/DL
IMM GRANULOCYTES NFR BLD AUTO: 0.3 %
LYMPHOCYTES # BLD AUTO: 2.18 K/UL
LYMPHOCYTES NFR BLD AUTO: 33.5 %
MAN DIFF?: NORMAL
MCHC RBC-ENTMCNC: 29.5 PG
MCHC RBC-ENTMCNC: 31.1 G/DL
MCV RBC AUTO: 94.7 FL
MONOCYTES # BLD AUTO: 0.67 K/UL
MONOCYTES NFR BLD AUTO: 10.3 %
NEUTROPHILS # BLD AUTO: 3.56 K/UL
NEUTROPHILS NFR BLD AUTO: 54.8 %
PLATELET # BLD AUTO: 267 K/UL
POTASSIUM SERPL-SCNC: 4.9 MMOL/L
PROT SERPL-MCNC: 6.2 G/DL
RBC # BLD: 4.34 M/UL
RBC # FLD: 14.2 %
SODIUM SERPL-SCNC: 145 MMOL/L
WBC # FLD AUTO: 6.5 K/UL

## 2020-05-18 ENCOUNTER — RX RENEWAL (OUTPATIENT)
Age: 60
End: 2020-05-18

## 2020-05-19 ENCOUNTER — APPOINTMENT (OUTPATIENT)
Dept: NEUROLOGY | Facility: CLINIC | Age: 60
End: 2020-05-19

## 2020-06-01 ENCOUNTER — OUTPATIENT (OUTPATIENT)
Dept: OUTPATIENT SERVICES | Facility: HOSPITAL | Age: 60
LOS: 1 days | End: 2020-06-01
Payer: MEDICAID

## 2020-06-01 DIAGNOSIS — Z98.890 OTHER SPECIFIED POSTPROCEDURAL STATES: Chronic | ICD-10-CM

## 2020-06-05 ENCOUNTER — APPOINTMENT (OUTPATIENT)
Dept: NEUROLOGY | Facility: CLINIC | Age: 60
End: 2020-06-05
Payer: MEDICAID

## 2020-06-05 PROCEDURE — 99214 OFFICE O/P EST MOD 30 MIN: CPT | Mod: 95

## 2020-06-05 NOTE — HISTORY OF PRESENT ILLNESS
[FreeTextEntry1] : Patient consents to audiovisual telehealth visit.  STart of visit 10:12 a.m.  End of visit 10:43 a.m.  Duration of visit 31 min. [place] and physician is at remote office location [place].  \par \par Pt states since May she has increase imuran to 150 mg/day.  States her breathing is difficult, anything where she exerts herself.  \par Can climb a flight of stairs, but has to rest at top of stairs.  Down isn't  a problem.  Walking can't walk far, about 1/2 to 3/4 of block before stopping to rest.  SHe uses a cane but not every day. Fell in shower twice since last visit, has shower chair and has grab bars.  Feels feet are hard to grab the gound, feels like she is sinking doesn't feel her feet have a .  Her feet don't feel the same.  She denies diabetes, numbness, tingling or burning in her feet.  Gets spasms in her feet.\par \par Started IVIG induction this week today is 5/5 days.  Then set up for monthly after that.\par Only got headache yesterday morning on awakening, went away with 3 advil.\par \par She reports feeling great on day two then by day 3 was right back to were she was. \par \par States no double vision, still some swallow difficulty. Says vision looks like filter on everything.  Using eye drops.  Some dificulty shampooing weak.  Feels the same as a month ago, no signif improvement.  Her lower back ahas been bothering her when stands at a counter.\par \par Had imaging of chest to look for thymus, CT negative in October 2019.\par \par SHe has albuterol inhaler as rescue inhaler for mild COPD.  Saw pulmonologist 2 months ago though she was doing okay.  Takes Symbicort daily, no changes made.  takes 2 puffs twice a day.\par \par Pyridostigmine is 60 mg four times a day.\par \par Able to sleep flat but feels better in recliner (since October).  Has been able to go to bed more often and laying flat.  She uses CPAP machine when in bed.  ALso has obstructive sleep apnea.\par \par \par \par

## 2020-06-05 NOTE — ASSESSMENT
[FreeTextEntry1] : Myasthenia gravis with generalized weakness and shortness of breath.  She feels about the same as a month ago.  She has not yet had a bump from IVIG (except for briefly feeling better after 2 nd dose).\par \par Plan:\par 1.  Increase pyridostigmine from 60 mg to 90 mg four times a day.\par 2.  Continue imuran 150 mg /day.\par 3.  Check CBC, LFT.\par 4.  Continue treatments for COPD and KADI.\par 5.  Report back to us how she is doing in 1 week.\par 6.  In office visit in 4-6 weeks.\par 7.  I discussed importance of activating EMS if there is a sigificant decline in breathing or generalized weakness and she verbalized understanding.\par 8.  Continue IVIG monthly 0.5 gram/kg.\par 9.  Pt to call for f/u with pulmonary specialists.\par \par 31 min face-to-face time with > 50% spent in counselling and coordination of care.

## 2020-06-05 NOTE — PHYSICAL EXAM
[FreeTextEntry1] : Able to talk w/o significant SOB however with a single breath only counts to 9 or 10. \par States feels dizzy when she stares up but no double vision\par Holds arms outstretched for 20 seconds.\par Facial muscles symmetric.\par \par Was able to stand w/o pushing up with her arms.\par Walks seems steady but she feels like she is waddling, attributes it to low back pain.\par \par \par

## 2020-06-11 ENCOUNTER — INPATIENT (INPATIENT)
Facility: HOSPITAL | Age: 60
LOS: 10 days | Discharge: HOME HEALTH | End: 2020-06-22
Attending: INTERNAL MEDICINE | Admitting: INTERNAL MEDICINE
Payer: MEDICAID

## 2020-06-11 VITALS
HEART RATE: 71 BPM | SYSTOLIC BLOOD PRESSURE: 131 MMHG | TEMPERATURE: 98 F | DIASTOLIC BLOOD PRESSURE: 63 MMHG | WEIGHT: 207.9 LBS | OXYGEN SATURATION: 97 % | HEIGHT: 64 IN

## 2020-06-11 DIAGNOSIS — Z98.890 OTHER SPECIFIED POSTPROCEDURAL STATES: Chronic | ICD-10-CM

## 2020-06-11 LAB
ALBUMIN SERPL ELPH-MCNC: 4.1 G/DL — SIGNIFICANT CHANGE UP (ref 3.5–5.2)
ALP SERPL-CCNC: 77 U/L — SIGNIFICANT CHANGE UP (ref 30–115)
ALT FLD-CCNC: 30 U/L — SIGNIFICANT CHANGE UP (ref 0–41)
ANION GAP SERPL CALC-SCNC: 7 MMOL/L — SIGNIFICANT CHANGE UP (ref 7–14)
APPEARANCE UR: CLEAR — SIGNIFICANT CHANGE UP
APTT BLD: 25.8 SEC — LOW (ref 27–39.2)
AST SERPL-CCNC: 28 U/L — SIGNIFICANT CHANGE UP (ref 0–41)
BACTERIA # UR AUTO: ABNORMAL
BASE EXCESS BLDA CALC-SCNC: 1 MMOL/L — SIGNIFICANT CHANGE UP (ref -2–2)
BASOPHILS # BLD AUTO: 0.02 K/UL — SIGNIFICANT CHANGE UP (ref 0–0.2)
BASOPHILS NFR BLD AUTO: 0.2 % — SIGNIFICANT CHANGE UP (ref 0–1)
BILIRUB SERPL-MCNC: 1.7 MG/DL — HIGH (ref 0.2–1.2)
BILIRUB UR-MCNC: NEGATIVE — SIGNIFICANT CHANGE UP
BUN SERPL-MCNC: 21 MG/DL — HIGH (ref 10–20)
CALCIUM SERPL-MCNC: 9.3 MG/DL — SIGNIFICANT CHANGE UP (ref 8.5–10.1)
CHLORIDE SERPL-SCNC: 102 MMOL/L — SIGNIFICANT CHANGE UP (ref 98–110)
CO2 SERPL-SCNC: 26 MMOL/L — SIGNIFICANT CHANGE UP (ref 17–32)
COLOR SPEC: YELLOW — SIGNIFICANT CHANGE UP
CREAT SERPL-MCNC: 0.8 MG/DL — SIGNIFICANT CHANGE UP (ref 0.7–1.5)
DIFF PNL FLD: ABNORMAL
EOSINOPHIL # BLD AUTO: 0 K/UL — SIGNIFICANT CHANGE UP (ref 0–0.7)
EOSINOPHIL NFR BLD AUTO: 0 % — SIGNIFICANT CHANGE UP (ref 0–8)
EPI CELLS # UR: ABNORMAL /HPF
GLUCOSE BLDC GLUCOMTR-MCNC: 162 MG/DL — HIGH (ref 70–99)
GLUCOSE SERPL-MCNC: 158 MG/DL — HIGH (ref 70–99)
GLUCOSE UR QL: NEGATIVE MG/DL — SIGNIFICANT CHANGE UP
HCO3 BLDA-SCNC: 25 MMOL/L — SIGNIFICANT CHANGE UP (ref 23–27)
HCT VFR BLD CALC: 33.2 % — LOW (ref 37–47)
HGB BLD-MCNC: 11.3 G/DL — LOW (ref 12–16)
HOROWITZ INDEX BLDA+IHG-RTO: 21 — SIGNIFICANT CHANGE UP
IMM GRANULOCYTES NFR BLD AUTO: 0.4 % — HIGH (ref 0.1–0.3)
INR BLD: 0.92 RATIO — SIGNIFICANT CHANGE UP (ref 0.65–1.3)
KETONES UR-MCNC: NEGATIVE — SIGNIFICANT CHANGE UP
LEUKOCYTE ESTERASE UR-ACNC: ABNORMAL
LYMPHOCYTES # BLD AUTO: 0.92 K/UL — LOW (ref 1.2–3.4)
LYMPHOCYTES # BLD AUTO: 9.7 % — LOW (ref 20.5–51.1)
MCHC RBC-ENTMCNC: 31.6 PG — HIGH (ref 27–31)
MCHC RBC-ENTMCNC: 34 G/DL — SIGNIFICANT CHANGE UP (ref 32–37)
MCV RBC AUTO: 92.7 FL — SIGNIFICANT CHANGE UP (ref 81–99)
MONOCYTES # BLD AUTO: 0.16 K/UL — SIGNIFICANT CHANGE UP (ref 0.1–0.6)
MONOCYTES NFR BLD AUTO: 1.7 % — SIGNIFICANT CHANGE UP (ref 1.7–9.3)
NEUTROPHILS # BLD AUTO: 8.37 K/UL — HIGH (ref 1.4–6.5)
NEUTROPHILS NFR BLD AUTO: 88 % — HIGH (ref 42.2–75.2)
NITRITE UR-MCNC: NEGATIVE — SIGNIFICANT CHANGE UP
NRBC # BLD: 0 /100 WBCS — SIGNIFICANT CHANGE UP (ref 0–0)
PCO2 BLDA: 37 MMHG — LOW (ref 38–42)
PH BLDA: 7.44 — HIGH (ref 7.38–7.42)
PH UR: 5.5 — SIGNIFICANT CHANGE UP (ref 5–8)
PLATELET # BLD AUTO: 316 K/UL — SIGNIFICANT CHANGE UP (ref 130–400)
PO2 BLDA: 177 MMHG — HIGH (ref 78–95)
POTASSIUM SERPL-MCNC: 4.4 MMOL/L — SIGNIFICANT CHANGE UP (ref 3.5–5)
POTASSIUM SERPL-SCNC: 4.4 MMOL/L — SIGNIFICANT CHANGE UP (ref 3.5–5)
PROT SERPL-MCNC: 8.9 G/DL — HIGH (ref 6–8)
PROT UR-MCNC: NEGATIVE MG/DL — SIGNIFICANT CHANGE UP
PROTHROM AB SERPL-ACNC: 10.6 SEC — SIGNIFICANT CHANGE UP (ref 9.95–12.87)
RBC # BLD: 3.58 M/UL — LOW (ref 4.2–5.4)
RBC # FLD: 16.7 % — HIGH (ref 11.5–14.5)
SAO2 % BLDA: 99 % — HIGH (ref 94–98)
SARS-COV-2 RNA SPEC QL NAA+PROBE: SIGNIFICANT CHANGE UP
SODIUM SERPL-SCNC: 135 MMOL/L — SIGNIFICANT CHANGE UP (ref 135–146)
SP GR SPEC: 1.02 — SIGNIFICANT CHANGE UP (ref 1.01–1.03)
UROBILINOGEN FLD QL: 0.2 MG/DL — SIGNIFICANT CHANGE UP (ref 0.2–0.2)
WBC # BLD: 9.51 K/UL — SIGNIFICANT CHANGE UP (ref 4.8–10.8)
WBC # FLD AUTO: 9.51 K/UL — SIGNIFICANT CHANGE UP (ref 4.8–10.8)
WBC UR QL: SIGNIFICANT CHANGE UP /HPF

## 2020-06-11 PROCEDURE — 99291 CRITICAL CARE FIRST HOUR: CPT | Mod: 25

## 2020-06-11 PROCEDURE — 36556 INSERT NON-TUNNEL CV CATH: CPT

## 2020-06-11 PROCEDURE — 99223 1ST HOSP IP/OBS HIGH 75: CPT

## 2020-06-11 PROCEDURE — 71045 X-RAY EXAM CHEST 1 VIEW: CPT | Mod: 26

## 2020-06-11 RX ORDER — PANTOPRAZOLE SODIUM 20 MG/1
40 TABLET, DELAYED RELEASE ORAL
Refills: 0 | Status: DISCONTINUED | OUTPATIENT
Start: 2020-06-11 | End: 2020-06-22

## 2020-06-11 RX ORDER — BUDESONIDE AND FORMOTEROL FUMARATE DIHYDRATE 160; 4.5 UG/1; UG/1
2 AEROSOL RESPIRATORY (INHALATION)
Refills: 0 | Status: DISCONTINUED | OUTPATIENT
Start: 2020-06-11 | End: 2020-06-22

## 2020-06-11 RX ORDER — ASPIRIN/CALCIUM CARB/MAGNESIUM 324 MG
81 TABLET ORAL DAILY
Refills: 0 | Status: DISCONTINUED | OUTPATIENT
Start: 2020-06-11 | End: 2020-06-22

## 2020-06-11 RX ORDER — PYRIDOSTIGMINE BROMIDE 60 MG/5ML
90 SOLUTION ORAL
Refills: 0 | Status: DISCONTINUED | OUTPATIENT
Start: 2020-06-11 | End: 2020-06-22

## 2020-06-11 RX ORDER — LEVOTHYROXINE SODIUM 125 MCG
50 TABLET ORAL DAILY
Refills: 0 | Status: DISCONTINUED | OUTPATIENT
Start: 2020-06-11 | End: 2020-06-22

## 2020-06-11 RX ORDER — FAMOTIDINE 10 MG/ML
40 INJECTION INTRAVENOUS AT BEDTIME
Refills: 0 | Status: DISCONTINUED | OUTPATIENT
Start: 2020-06-11 | End: 2020-06-22

## 2020-06-11 RX ORDER — ENOXAPARIN SODIUM 100 MG/ML
40 INJECTION SUBCUTANEOUS DAILY
Refills: 0 | Status: DISCONTINUED | OUTPATIENT
Start: 2020-06-11 | End: 2020-06-22

## 2020-06-11 RX ORDER — PYRIDOSTIGMINE BROMIDE 60 MG/5ML
0 SOLUTION ORAL
Qty: 0 | Refills: 0 | DISCHARGE

## 2020-06-11 RX ORDER — AZATHIOPRINE 100 MG/1
1 TABLET ORAL
Qty: 0 | Refills: 0 | DISCHARGE

## 2020-06-11 RX ORDER — MIDAZOLAM HYDROCHLORIDE 1 MG/ML
2 INJECTION, SOLUTION INTRAMUSCULAR; INTRAVENOUS ONCE
Refills: 0 | Status: DISCONTINUED | OUTPATIENT
Start: 2020-06-11 | End: 2020-06-11

## 2020-06-11 RX ORDER — AZATHIOPRINE 100 MG/1
50 TABLET ORAL
Refills: 0 | Status: DISCONTINUED | OUTPATIENT
Start: 2020-06-11 | End: 2020-06-22

## 2020-06-11 RX ORDER — CHLORHEXIDINE GLUCONATE 213 G/1000ML
1 SOLUTION TOPICAL
Refills: 0 | Status: DISCONTINUED | OUTPATIENT
Start: 2020-06-11 | End: 2020-06-22

## 2020-06-11 RX ORDER — ALBUMIN HUMAN 25 %
500 VIAL (ML) INTRAVENOUS ONCE
Refills: 0 | Status: DISCONTINUED | OUTPATIENT
Start: 2020-06-12 | End: 2020-06-12

## 2020-06-11 RX ORDER — ATORVASTATIN CALCIUM 80 MG/1
20 TABLET, FILM COATED ORAL AT BEDTIME
Refills: 0 | Status: DISCONTINUED | OUTPATIENT
Start: 2020-06-11 | End: 2020-06-22

## 2020-06-11 RX ADMIN — PYRIDOSTIGMINE BROMIDE 90 MILLIGRAM(S): 60 SOLUTION ORAL at 23:42

## 2020-06-11 RX ADMIN — FAMOTIDINE 40 MILLIGRAM(S): 10 INJECTION INTRAVENOUS at 22:49

## 2020-06-11 RX ADMIN — ATORVASTATIN CALCIUM 20 MILLIGRAM(S): 80 TABLET, FILM COATED ORAL at 21:49

## 2020-06-11 RX ADMIN — BUDESONIDE AND FORMOTEROL FUMARATE DIHYDRATE 2 PUFF(S): 160; 4.5 AEROSOL RESPIRATORY (INHALATION) at 21:49

## 2020-06-11 RX ADMIN — MIDAZOLAM HYDROCHLORIDE 2 MILLIGRAM(S): 1 INJECTION, SOLUTION INTRAMUSCULAR; INTRAVENOUS at 16:36

## 2020-06-11 RX ADMIN — AZATHIOPRINE 50 MILLIGRAM(S): 100 TABLET ORAL at 21:49

## 2020-06-11 NOTE — ED PROVIDER NOTE - CLINICAL SUMMARY MEDICAL DECISION MAKING FREE TEXT BOX
60 y/o F PMHx Myasthenia Gravis on Mestonin, Oral steroids (Prednisone daily) and s/p 5 day IVIG regimen last week (Neurologist Dr. Velazquez) who presents to the Ed with increasing dyspnea and generalized weakness. She denies any cp, sob, fever, chills. Patient denies any hemoptysis , orthopnea, leg swelling. no back pain, dysuria. no vomiting , abdominal pain or diarrhea.    On exam, VS reviewed.  Patient has a non-focal neuro exam and O2 sat 97=99% on RA. Good inspiratory effort and no respiratory distress. Exam otherwise unremarkable. Labs sent, Neurology consulted and case discussed in detail with Dr. Dolan. Respiratory evaluated patient and performed NIF (which was normal). Neurology recommends admission to ICU for Plasma exchange.  Blood bank director (Dr. Dias) spoken to about case.  She approves Plasma exchange and weight, labs, etc discussed.  [place] Blood [facility] spoken to and this is being set up for tomorrow morning. Consent obtained for UDAL placement and for plasmapheresis. Requested orders placed.  Patient approved for ICU by Dr. Lemos. Repeat exam, is unchanged and VS remained well.  Patient requested nasal cannula oxygen as it makes her feel better but she is oxygenating fine without it. Catheter placed as documented and without issue.     Patient spoken to in detail about results and plan of care.  All questions addressed.  Results of ED work up discussed and patient requires admission for further treatment and work up.

## 2020-06-11 NOTE — H&P ADULT - ASSESSMENT
A/P:     1. Myasthenia gravis crisis   - right uldall placed  - for plasma exchange in am   - neuro fu   - nebs prn   - hold symbicort   - send hga1c, send tsh   - monitor NIF   - DVT ppx     Pt high risk of sudden of cardiac death needs close MICU monitoring.

## 2020-06-11 NOTE — CHART NOTE - NSCHARTNOTEFT_GEN_A_CORE
Negative Inspiratory Force (NIF) -40  Vital Capacity 2.5 L Negative Inspiratory Force (NIF) -40 Cm H20  Vital Capacity 2.5 L

## 2020-06-11 NOTE — H&P ADULT - NSHPREVIEWOFSYSTEMS_GEN_ALL_CORE
REVIEW OF SYSTEMS  General:	feels weak  Skin/Breast: no rashes 	  Ophthalmologic: some blurry vision 	  ENMT:	no thrush  Respiratory and Thorax: no sob at rest 	  Cardiovascular:	no chest pain   Gastrointestinal:	no diarrhea   Genitourinary:	+ pink urine   Musculoskeletal:	+ wekaness  Neurological:	+ wekaness  Psychiatric:	no hallucinations

## 2020-06-11 NOTE — ED PROVIDER NOTE - CARE PLAN
Principal Discharge DX:	Myasthenia gravis in crisis Principal Discharge DX:	Myasthenia gravis in crisis  Secondary Diagnosis:	Weakness generalized

## 2020-06-11 NOTE — ED PROVIDER NOTE - OBJECTIVE STATEMENT
58 y/o female with hx of myasthenia gravis on mestonin, prednisone daily, finished a 5 day IVIG regimen last week presents to the Ed with increasing dyspnea and weakness. patient with hematuria noted since sunday. patient denies any cp, sob. patient without fever,chills. patient denies any hemoptysis , orthopnea, leg swelling. no back pain, dysuria. no vomiting , abdominal pain or diarrhea

## 2020-06-11 NOTE — H&P ADULT - NSHPLABSRESULTS_GEN_ALL_CORE
labs  06-11    135  |  102  |  21<H>  ----------------------------<  158<H>  4.4   |  26  |  0.8    Ca    9.3      11 Jun 2020 13:08    TPro  8.9<H>  /  Alb  4.1  /  TBili  1.7<H>  /  DBili  x   /  AST  28  /  ALT  30  /  AlkPhos  77  06-11                          11.3   9.51  )-----------( 316      ( 11 Jun 2020 13:08 )             33.2         PT/INR - ( 11 Jun 2020 13:08 )   PT: 10.60 sec;   INR: 0.92 ratio         PTT - ( 11 Jun 2020 13:08 )  PTT:25.8 sec

## 2020-06-11 NOTE — H&P ADULT - HISTORY OF PRESENT ILLNESS
59 year old owman with hx of myasthenia gravis on mestonin, prednisone daily, finished a 5 day IVIG regimen last week presents to the ED with increasing dyspnea and weakness. [facility] was [facility] getting [facility] draw when she could not walk up 2 flights of stairs. Patient with hematuria noted since Sunday. Denies any chest pain, no sob, no fever, chills.

## 2020-06-11 NOTE — ED PROVIDER NOTE - PROGRESS NOTE DETAILS
NIF -40 spoke with dr. ritchie who advises plasma exchange and ICU monitoring. dr. reich approves for ICU. dr. us accepts for medicine transfusion medical team aware of admission . will consent patient for plasma exchange and place ULDALL catheter.

## 2020-06-11 NOTE — ED PROVIDER NOTE - ATTENDING CONTRIBUTION TO CARE
I personally evaluated patient. I agree with the findings and plan with all documentation on chart except as documented  in my note.    60 y/o F PMHx Myasthenia Gravis on Mestonin, Oral steroids (Prednisone daily) and s/p 5 day IVIG regimen last week (Neurologist Dr. Velazquez) who presents to the Ed with increasing dyspnea and generalized weakness. She denies any cp, sob, fever, chills. Patient denies any hemoptysis , orthopnea, leg swelling. no back pain, dysuria. no vomiting , abdominal pain or diarrhea.    On exam, VS reviewed.  Patient has a non-focal neuro exam and O2 sat 97=99% on RA. Good inspiratory effort and no respiratory distress. Exam otherwise unremarkable. Labs sent, Neurology consulted and case discussed in detail with Dr. Dolan. Respiratory evaluated patient and performed NIF (which was normal). Neurology recommends admission to ICU for Plasma exchange.  Blood bank director (Dr. Dias) spoken to about case.  She approves Plasma exchange and weight, labs, etc discussed.  [place] [facility] spoken to and this is being set up for tomorrow morning. Consent obtained for UDAL placement and for plasmapheresis. Requested orders placed.  Patient approved for ICU by Dr. Lemos. Repeat exam, is unchanged and VS remained well.  Patient requested nasal cannula oxygen as it makes her feel better but she is oxygenating fine without it. Catheter placed as documented and without issue.     Patient spoken to in detail about results and plan of care.  All questions addressed.  Results of ED work up discussed and patient requires admission for further treatment and work up.

## 2020-06-11 NOTE — ED PROCEDURE NOTE - NS ED ATTENDING STATEMENT MOD
Culture Follow-up I have personally performed a face to face diagnostic evaluation on this patient. I have reviewed the ACP note and agree with the history, exam and plan of care, except as noted.

## 2020-06-11 NOTE — ED PROVIDER NOTE - NS ED ROS FT
Review of Systems    Constitutional: (-) fever/ chills (+)weakness  Eyes/ENT: (-) blurry vision, (-) epistaxis (-) sore throat (-) ear pain  Cardiovascular: (-) chest pain, (-) syncope (-) palpitations  Respiratory: (-) cough, (+) shortness of breath  Gastrointestinal: (-) vomiting, (-) diarrhea (-) abdominal pain  Musculoskeletal: (-) neck pain, (-) back pain, (-) joint pain (-) pedal edema   Integumentary: (-) rash, (-) swelling  Neurological: (-) headache, (-) altered mental status  Psychiatric: (-) hallucinations or depression   Allergic/Immunologic: (-) pruritus

## 2020-06-11 NOTE — H&P ADULT - NSHPPHYSICALEXAM_GEN_ALL_CORE
PHYSICAL EXAM:  GENERAL: NAD, well-groomed, well-developed  HEAD:  Atraumatic, Normocephalic  EYES: EOMI, PERRLA, conjunctiva and sclera clear  ENMT: No tonsillar erythema, exudates, or enlargement; Moist mucous membranes, Good dentition, No lesions  NECK: Supple, No JVD, Normal thyroid  NERVOUS SYSTEM:  Alert & Oriented X3, Good concentration  CHEST/LUNG: Clear to percussion bilaterally; No rales, rhonchi, wheezing, or rubs  HEART: Regular rate and rhythm; No murmurs, rubs, or gallops  ABDOMEN: Soft, Nontender, Nondistended; Bowel sounds present  EXTREMITIES:  2+ Peripheral Pulses, No clubbing, cyanosis, or edema  LYMPH: No lymphadenopathy noted  SKIN: No rashes or lesions  right uldall placed

## 2020-06-12 LAB
ALBUMIN SERPL ELPH-MCNC: 3.7 G/DL — SIGNIFICANT CHANGE UP (ref 3.5–5.2)
ALP SERPL-CCNC: 70 U/L — SIGNIFICANT CHANGE UP (ref 30–115)
ALT FLD-CCNC: 23 U/L — SIGNIFICANT CHANGE UP (ref 0–41)
ANION GAP SERPL CALC-SCNC: 8 MMOL/L — SIGNIFICANT CHANGE UP (ref 7–14)
AST SERPL-CCNC: 25 U/L — SIGNIFICANT CHANGE UP (ref 0–41)
BASOPHILS # BLD AUTO: 0.03 K/UL — SIGNIFICANT CHANGE UP (ref 0–0.2)
BASOPHILS NFR BLD AUTO: 0.4 % — SIGNIFICANT CHANGE UP (ref 0–1)
BILIRUB SERPL-MCNC: 1.3 MG/DL — HIGH (ref 0.2–1.2)
BUN SERPL-MCNC: 17 MG/DL — SIGNIFICANT CHANGE UP (ref 10–20)
CALCIUM SERPL-MCNC: 9 MG/DL — SIGNIFICANT CHANGE UP (ref 8.5–10.1)
CHLORIDE SERPL-SCNC: 104 MMOL/L — SIGNIFICANT CHANGE UP (ref 98–110)
CO2 SERPL-SCNC: 27 MMOL/L — SIGNIFICANT CHANGE UP (ref 17–32)
CREAT SERPL-MCNC: 0.7 MG/DL — SIGNIFICANT CHANGE UP (ref 0.7–1.5)
EOSINOPHIL # BLD AUTO: 0.04 K/UL — SIGNIFICANT CHANGE UP (ref 0–0.7)
EOSINOPHIL NFR BLD AUTO: 0.6 % — SIGNIFICANT CHANGE UP (ref 0–8)
GLUCOSE BLDC GLUCOMTR-MCNC: 141 MG/DL — HIGH (ref 70–99)
GLUCOSE BLDC GLUCOMTR-MCNC: 145 MG/DL — HIGH (ref 70–99)
GLUCOSE BLDC GLUCOMTR-MCNC: 147 MG/DL — HIGH (ref 70–99)
GLUCOSE BLDC GLUCOMTR-MCNC: 209 MG/DL — HIGH (ref 70–99)
GLUCOSE SERPL-MCNC: 97 MG/DL — SIGNIFICANT CHANGE UP (ref 70–99)
HCT VFR BLD CALC: 32.3 % — LOW (ref 37–47)
HGB BLD-MCNC: 10.9 G/DL — LOW (ref 12–16)
IMM GRANULOCYTES NFR BLD AUTO: 0.3 % — SIGNIFICANT CHANGE UP (ref 0.1–0.3)
LYMPHOCYTES # BLD AUTO: 2.2 K/UL — SIGNIFICANT CHANGE UP (ref 1.2–3.4)
LYMPHOCYTES # BLD AUTO: 31.7 % — SIGNIFICANT CHANGE UP (ref 20.5–51.1)
MAGNESIUM SERPL-MCNC: 1.9 MG/DL — SIGNIFICANT CHANGE UP (ref 1.8–2.4)
MCHC RBC-ENTMCNC: 31.5 PG — HIGH (ref 27–31)
MCHC RBC-ENTMCNC: 33.7 G/DL — SIGNIFICANT CHANGE UP (ref 32–37)
MCV RBC AUTO: 93.4 FL — SIGNIFICANT CHANGE UP (ref 81–99)
MONOCYTES # BLD AUTO: 0.62 K/UL — HIGH (ref 0.1–0.6)
MONOCYTES NFR BLD AUTO: 8.9 % — SIGNIFICANT CHANGE UP (ref 1.7–9.3)
NEUTROPHILS # BLD AUTO: 4.03 K/UL — SIGNIFICANT CHANGE UP (ref 1.4–6.5)
NEUTROPHILS NFR BLD AUTO: 58.1 % — SIGNIFICANT CHANGE UP (ref 42.2–75.2)
NRBC # BLD: 0 /100 WBCS — SIGNIFICANT CHANGE UP (ref 0–0)
PHOSPHATE SERPL-MCNC: 4.2 MG/DL — SIGNIFICANT CHANGE UP (ref 2.1–4.9)
PLATELET # BLD AUTO: 281 K/UL — SIGNIFICANT CHANGE UP (ref 130–400)
POTASSIUM SERPL-MCNC: 4.2 MMOL/L — SIGNIFICANT CHANGE UP (ref 3.5–5)
POTASSIUM SERPL-SCNC: 4.2 MMOL/L — SIGNIFICANT CHANGE UP (ref 3.5–5)
PROT SERPL-MCNC: 7.6 G/DL — SIGNIFICANT CHANGE UP (ref 6–8)
RBC # BLD: 3.46 M/UL — LOW (ref 4.2–5.4)
RBC # FLD: 17.1 % — HIGH (ref 11.5–14.5)
SODIUM SERPL-SCNC: 139 MMOL/L — SIGNIFICANT CHANGE UP (ref 135–146)
WBC # BLD: 6.94 K/UL — SIGNIFICANT CHANGE UP (ref 4.8–10.8)
WBC # FLD AUTO: 6.94 K/UL — SIGNIFICANT CHANGE UP (ref 4.8–10.8)

## 2020-06-12 PROCEDURE — 99233 SBSQ HOSP IP/OBS HIGH 50: CPT

## 2020-06-12 PROCEDURE — 99231 SBSQ HOSP IP/OBS SF/LOW 25: CPT | Mod: GC

## 2020-06-12 PROCEDURE — 71045 X-RAY EXAM CHEST 1 VIEW: CPT | Mod: 26

## 2020-06-12 PROCEDURE — 99223 1ST HOSP IP/OBS HIGH 75: CPT

## 2020-06-12 RX ORDER — ACETAMINOPHEN 500 MG
650 TABLET ORAL EVERY 6 HOURS
Refills: 0 | Status: DISCONTINUED | OUTPATIENT
Start: 2020-06-12 | End: 2020-06-22

## 2020-06-12 RX ORDER — CALCIUM GLUCONATE 100 MG/ML
1 VIAL (ML) INTRAVENOUS ONCE
Refills: 0 | Status: COMPLETED | OUTPATIENT
Start: 2020-06-12 | End: 2020-06-12

## 2020-06-12 RX ORDER — ALBUMIN HUMAN 25 %
4000 VIAL (ML) INTRAVENOUS ONCE
Refills: 0 | Status: COMPLETED | OUTPATIENT
Start: 2020-06-12 | End: 2020-06-12

## 2020-06-12 RX ORDER — ONDANSETRON 8 MG/1
4 TABLET, FILM COATED ORAL EVERY 8 HOURS
Refills: 0 | Status: DISCONTINUED | OUTPATIENT
Start: 2020-06-12 | End: 2020-06-22

## 2020-06-12 RX ADMIN — ONDANSETRON 4 MILLIGRAM(S): 8 TABLET, FILM COATED ORAL at 21:29

## 2020-06-12 RX ADMIN — ATORVASTATIN CALCIUM 20 MILLIGRAM(S): 80 TABLET, FILM COATED ORAL at 21:30

## 2020-06-12 RX ADMIN — CHLORHEXIDINE GLUCONATE 1 APPLICATION(S): 213 SOLUTION TOPICAL at 07:56

## 2020-06-12 RX ADMIN — Medication 81 MILLIGRAM(S): at 13:07

## 2020-06-12 RX ADMIN — FAMOTIDINE 40 MILLIGRAM(S): 10 INJECTION INTRAVENOUS at 21:30

## 2020-06-12 RX ADMIN — BUDESONIDE AND FORMOTEROL FUMARATE DIHYDRATE 2 PUFF(S): 160; 4.5 AEROSOL RESPIRATORY (INHALATION) at 07:55

## 2020-06-12 RX ADMIN — Medication 2000 MILLILITER(S): at 13:51

## 2020-06-12 RX ADMIN — PYRIDOSTIGMINE BROMIDE 90 MILLIGRAM(S): 60 SOLUTION ORAL at 23:25

## 2020-06-12 RX ADMIN — AZATHIOPRINE 50 MILLIGRAM(S): 100 TABLET ORAL at 21:30

## 2020-06-12 RX ADMIN — CHLORHEXIDINE GLUCONATE 1 APPLICATION(S): 213 SOLUTION TOPICAL at 17:33

## 2020-06-12 RX ADMIN — ENOXAPARIN SODIUM 40 MILLIGRAM(S): 100 INJECTION SUBCUTANEOUS at 13:07

## 2020-06-12 RX ADMIN — ONDANSETRON 4 MILLIGRAM(S): 8 TABLET, FILM COATED ORAL at 10:13

## 2020-06-12 RX ADMIN — PANTOPRAZOLE SODIUM 40 MILLIGRAM(S): 20 TABLET, DELAYED RELEASE ORAL at 06:07

## 2020-06-12 RX ADMIN — Medication 50 MICROGRAM(S): at 05:36

## 2020-06-12 RX ADMIN — BUDESONIDE AND FORMOTEROL FUMARATE DIHYDRATE 2 PUFF(S): 160; 4.5 AEROSOL RESPIRATORY (INHALATION) at 20:21

## 2020-06-12 RX ADMIN — Medication 40 MILLIGRAM(S): at 05:38

## 2020-06-12 RX ADMIN — Medication 650 MILLIGRAM(S): at 12:18

## 2020-06-12 RX ADMIN — PYRIDOSTIGMINE BROMIDE 90 MILLIGRAM(S): 60 SOLUTION ORAL at 13:39

## 2020-06-12 RX ADMIN — AZATHIOPRINE 50 MILLIGRAM(S): 100 TABLET ORAL at 13:41

## 2020-06-12 RX ADMIN — PYRIDOSTIGMINE BROMIDE 90 MILLIGRAM(S): 60 SOLUTION ORAL at 05:40

## 2020-06-12 RX ADMIN — PYRIDOSTIGMINE BROMIDE 90 MILLIGRAM(S): 60 SOLUTION ORAL at 17:31

## 2020-06-12 RX ADMIN — Medication 100 GRAM(S): at 13:41

## 2020-06-12 RX ADMIN — AZATHIOPRINE 50 MILLIGRAM(S): 100 TABLET ORAL at 05:37

## 2020-06-12 NOTE — CONSULT NOTE ADULT - ATTENDING COMMENTS
Attending Statement: I have personally performed a face to face diagnostic evaluation on this patient. The patient is suffering from Myasthenia gravis crisis . I have reviewed the above note and agree with the history, exam and suggestions for care, except as I have noted in the text.

## 2020-06-12 NOTE — PROGRESS NOTE ADULT - SUBJECTIVE AND OBJECTIVE BOX
Patient has been feeling sob, weak and difficulty swallowing, no significant improvement s/p IVIG last week      T(F): 96.4 (06-12-20 @ 07:01), Max: 98.2 (06-11-20 @ 12:52)  HR: 67 (06-12-20 @ 08:05)  BP: 132/63 (06-12-20 @ 07:12)  RR: 20 (06-12-20 @ 07:12)  SpO2: 100% (06-12-20 @ 08:05) (97% - 100%)    PHYSICAL EXAM:  GENERAL: NAD  HEAD:  Atraumatic, Normocephalic  NERVOUS SYSTEM:  Alert & Oriented X3, no focal deficits   CHEST/LUNG: Clear to percussion bilaterally; No rales, rhonchi, wheezing, or rubs  HEART: Regular rate and rhythm; No murmurs, rubs, or gallops  ABDOMEN: Soft, Nontender, Nondistended; Bowel sounds present  EXTREMITIES:  2+ Peripheral Pulses, No clubbing, cyanosis, or edema  LYMPH: No lymphadenopathy noted  SKIN: No rashes or lesions    LABS  06-12    139  |  104  |  17  ----------------------------<  97  4.2   |  27  |  0.7    Ca    9.0      12 Jun 2020 05:54  Phos  4.2     06-12  Mg     1.9     06-12    TPro  7.6  /  Alb  3.7  /  TBili  1.3<H>  /  DBili  x   /  AST  25  /  ALT  23  /  AlkPhos  70  06-12                          10.9   6.94  )-----------( 281      ( 12 Jun 2020 05:54 )             32.3     PT/INR - ( 11 Jun 2020 13:08 )   PT: 10.60 sec;   INR: 0.92 ratio         PTT - ( 11 Jun 2020 13:08 )  PTT:25.8 sec      RADIOLOGY  < from: Xray Chest 1 View-PORTABLE IMMEDIATE (06.11.20 @ 13:42) >  Impression:      No focal opacity    < end of copied text >    MEDICATIONS  (STANDING):  albumin human  5% IVPB 500 milliLiter(s) IV Intermittent once  aspirin  chewable 81 milliGRAM(s) Oral daily  atorvastatin 20 milliGRAM(s) Oral at bedtime  azaTHIOprine 50 milliGRAM(s) Oral <User Schedule>  budesonide  80 MICROgram(s)/formoterol 4.5 MICROgram(s) Inhaler 2 Puff(s) Inhalation two times a day  chlorhexidine 4% Liquid 1 Application(s) Topical two times a day  enoxaparin Injectable 40 milliGRAM(s) SubCutaneous daily  famotidine    Tablet 40 milliGRAM(s) Oral at bedtime  levothyroxine 50 MICROGram(s) Oral daily  methylPREDNISolone sodium succinate Injectable 40 milliGRAM(s) IV Push daily  pantoprazole    Tablet 40 milliGRAM(s) Oral before breakfast  pyridostigmine 90 milliGRAM(s) Oral four times a day    MEDICATIONS  (PRN):

## 2020-06-12 NOTE — PROGRESS NOTE ADULT - SUBJECTIVE AND OBJECTIVE BOX
60 yo female with recent dx of generalized MG, followed by Dr Velazquez, presents with progressive worsening of strength despite load with IVIG. PFTs normal in ED although pt feels SOB. she has mild copd and sleep apnea as well  generalized weakness on exam    Over Night Events: no events noted. NIF -33     ROS: SOB with some improvement on o2 and CPAP    All ROS are negative except HPI       PHYSICAL EXAM    ICU Vital Signs Last 24 Hrs  T(C): 35.8 (2020 07:01), Max: 36.8 (2020 12:52)  T(F): 96.4 (2020 07:01), Max: 98.2 (2020 12:52)  HR: 73 (2020 09:10) (48 - 74)  BP: 139/64 (2020 09:10) (123/59 - 165/71)  BP(mean): 91 (2020 09:10) (85 - 103)  RR: 16 (2020 09:10) (16 - 20)  SpO2: 99% (2020 09:10) (97% - 100%)        20 @ 07:01  -  20 @ 07:00  --------------------------------------------------------  IN:  Total IN: 0 mL    OUT:    Voided: 150 mL  Total OUT: 150 mL    Total NET: -150 mL      20 @ 07:01  -  20 @ 10:28  --------------------------------------------------------  IN:    Oral Fluid: 240 mL  Total IN: 240 mL    OUT:    Voided: 800 mL  Total OUT: 800 mL    Total NET: -560 mL          LABS:                            10.9   6.94  )-----------( 281      ( 2020 05:54 )             32.3                                               06-    139  |  104  |  17  ----------------------------<  97  4.2   |  27  |  0.7    Ca    9.0      2020 05:54  Phos  4.2     06-12  Mg     1.9     06-12    TPro  7.6  /  Alb  3.7  /  TBili  1.3<H>  /  DBili  x   /  AST  25  /  ALT  23  /  AlkPhos  70  06-12      PT/INR - ( 2020 13:08 )   PT: 10.60 sec;   INR: 0.92 ratio         PTT - ( 2020 13:08 )  PTT:25.8 sec                                       Urinalysis Basic - ( 2020 13:08 )    Color: Yellow / Appearance: Clear / S.020 / pH: x  Gluc: x / Ketone: Negative  / Bili: Negative / Urobili: 0.2 mg/dL   Blood: x / Protein: Negative mg/dL / Nitrite: Negative   Leuk Esterase: Trace / RBC: x / WBC 3-5 /HPF   Sq Epi: x / Non Sq Epi: Occasional /HPF / Bacteria: Moderate                                              LIVER FUNCTIONS - ( 2020 05:54 )  Alb: 3.7 g/dL / Pro: 7.6 g/dL / ALK PHOS: 70 U/L / ALT: 23 U/L / AST: 25 U/L / GGT: x                                                                                                                                 ABG - ( 2020 14:24 )  pH, Arterial: 7.44  pH, Blood: x     /  pCO2: 37    /  pO2: 177   / HCO3: 25    / Base Excess: 1.0   /  SaO2: 99        MEDICATIONS  (STANDING):  albumin human  5% IVPB 500 milliLiter(s) IV Intermittent once  aspirin  chewable 81 milliGRAM(s) Oral daily  atorvastatin 20 milliGRAM(s) Oral at bedtime  azaTHIOprine 50 milliGRAM(s) Oral <User Schedule>  budesonide  80 MICROgram(s)/formoterol 4.5 MICROgram(s) Inhaler 2 Puff(s) Inhalation two times a day  chlorhexidine 4% Liquid 1 Application(s) Topical two times a day  enoxaparin Injectable 40 milliGRAM(s) SubCutaneous daily  famotidine    Tablet 40 milliGRAM(s) Oral at bedtime  levothyroxine 50 MICROGram(s) Oral daily  methylPREDNISolone sodium succinate Injectable 40 milliGRAM(s) IV Push daily  ondansetron Injectable 4 milliGRAM(s) IV Push every 8 hours  pantoprazole    Tablet 40 milliGRAM(s) Oral before breakfast  pyridostigmine 90 milliGRAM(s) Oral four times a day

## 2020-06-12 NOTE — CONSULT NOTE ADULT - SUBJECTIVE AND OBJECTIVE BOX
Patient is a 59y old  Female who presents with a chief complaint of Weakness (2020 08:31)      HPI:  59 year old woman with hx of myasthenia gravis on mestonin, prednisone daily, finished a 5 day IVIG regimen last week presents to the ED with increasing dyspnea and weakness. [facility] when she could not walk up 2 flights of stairs. Patient with hematuria noted since . Denies any chest pain, no sob, no fever, chills. (2020 18:30)      PAST MEDICAL & SURGICAL HISTORY:  Myasthenia gravis  High blood cholesterol  Pancreatitis  POTS (postural orthostatic tachycardia syndrome)  History of foot surgery      SOCIAL HX:   Nonsmoker    FAMILY HISTORY:  No pertinent family history in first degree relatives  :  No known cardiovacular family hisotry     Review Of Systems:     CONSTITUTIONAL:   no fever   no chills.  no weight gain   no weight loss    EYES:   no discharge,   no pain  no redness,   no visual changes.    ENT:   Ears: no ear pain and no hearing problems.  Nose: no nasal congestion and no nasal drainage.  Mouth/Throat: no dysphagia,  no hoarseness and no throat pain.  Neck: no lumps, no pain, no stiffness and no swollen glands.     CARDIOVASCULAR:   no chest pain,   no swelling  no palpitaions  no syncope    RESPIRATORY:  +SOB  no wheezing  no sputum production    GASTROINTESTINAL:   no abdominal pain,   no constipation,   no diarrhea,   no vomiting.    GENITOURINARY:  no dysuria,   no frequency,   no urgency  no hematuria.    MUSCULOSKELETAL:   no back pain,   no musculoskeletal pain,  no weakness.    SKIN:   no jaundice,   no lesions,   no pruritis,   no rashes.    NEURO:   no loss of consciousness,   no gait abnormality,   no headache,   no sensory deficits,   no weakness.    PSYCHIATRIC:   no known mental health issues  no anxiety  no depression    ALLERGIC/IMMUNOLOGIC:   No active allergic or immunologic issues      Allergies    sulfa drugs (Unknown)    PHYSICAL EXAM    ICU Vital Signs Last 24 Hrs  T(C): 35.8 (2020 07:01), Max: 36.8 (2020 12:52)  T(F): 96.4 (2020 07:01), Max: 98.2 (2020 12:52)  HR: 67 (2020 08:05) (48 - 74)  BP: 132/63 (2020 07:12) (123/59 - 148/81)  BP(mean): 90 (2020 07:12) (85 - 103)  RR: 20 (2020 07:12) (18 - 20)  SpO2: 100% (2020 08:05) (97% - 100%)    CONSTITUTIONAL:  Well nourished.  NAD    ENT:   Airway patent,   Mouth with normal mucosa.   No thrush    EYES:   pupils equal,   round and reactive to light.    CARDIAC:   Normal rate,   Regular rhythm.    Heart sounds S1, S2.   No edema    Vascular:   normal systolic impulse  no bruits    RESPIRATORY:   No wheezing   Normal chest expansion  No use of accessory muscles    GASTROINTESTINAL:  Abdomen soft  Non-tender  No guarding  + BS    GENITOURINARY  normal genitalia for sex  no edema    MUSCULOSKELETAL:   Range of motion is not limited,  Nno clubbing, cyanosis    NEUROLOGICAL:   Alert and oriented   No motor or sensory deficits.  Pertinent DTRs normal    SKIN:   Skin normal color for race,   Warm and dry  No evidence of rash.    PSYCHIATRIC:   Normal mood and affect.   No apparent risk to self or others.    HEME LYMPH:   No cervical  lymphadenopathy.  No inguinal lymphadenopathy      20 @ 07:01  -  20 @ 07:00  --------------------------------------------------------  IN:  Total IN: 0 mL    OUT:    Voided: 150 mL  Total OUT: 150 mL    Total NET: -150 mL      20 @ 07:01  -  20 @ 08:55  --------------------------------------------------------  IN:  Total IN: 0 mL    OUT:    Voided: 800 mL  Total OUT: 800 mL    Total NET: -800 mL          LABS:                          10.9   6.94  )-----------( 281      ( 2020 05:54 )             32.3                                                   139  |  104  |  17  ----------------------------<  97  4.2   |  27  |  0.7    Ca    9.0      2020 05:54  Phos  4.2     06-12  Mg     1.9     06-12    TPro  7.6  /  Alb  3.7  /  TBili  1.3<H>  /  DBili  x   /  AST  25  /  ALT  23  /  AlkPhos  70  06-12      PT/INR - ( 2020 13:08 )   PT: 10.60 sec;   INR: 0.92 ratio         PTT - ( 2020 13:08 )  PTT:25.8 sec                                       Urinalysis Basic - ( 2020 13:08 )    Color: Yellow / Appearance: Clear / S.020 / pH: x  Gluc: x / Ketone: Negative  / Bili: Negative / Urobili: 0.2 mg/dL   Blood: x / Protein: Negative mg/dL / Nitrite: Negative   Leuk Esterase: Trace / RBC: x / WBC 3-5 /HPF   Sq Epi: x / Non Sq Epi: Occasional /HPF / Bacteria: Moderate                                                  LIVER FUNCTIONS - ( 2020 05:54 )  Alb: 3.7 g/dL / Pro: 7.6 g/dL / ALK PHOS: 70 U/L / ALT: 23 U/L / AST: 25 U/L / GGT: x                                                                                                                                   ABG - ( 2020 14:24 )  pH, Arterial: 7.44  pH, Blood: x     /  pCO2: 37    /  pO2: 177   / HCO3: 25    / Base Excess: 1.0   /  SaO2: 99                  X-Rays reviewed:                                                                                    ECHO    CXR interpreted by me:    MEDICATIONS  (STANDING):  albumin human  5% IVPB 500 milliLiter(s) IV Intermittent once  aspirin  chewable 81 milliGRAM(s) Oral daily  atorvastatin 20 milliGRAM(s) Oral at bedtime  azaTHIOprine 50 milliGRAM(s) Oral <User Schedule>  budesonide  80 MICROgram(s)/formoterol 4.5 MICROgram(s) Inhaler 2 Puff(s) Inhalation two times a day  chlorhexidine 4% Liquid 1 Application(s) Topical two times a day  enoxaparin Injectable 40 milliGRAM(s) SubCutaneous daily  famotidine    Tablet 40 milliGRAM(s) Oral at bedtime  levothyroxine 50 MICROGram(s) Oral daily  methylPREDNISolone sodium succinate Injectable 40 milliGRAM(s) IV Push daily  pantoprazole    Tablet 40 milliGRAM(s) Oral before breakfast  pyridostigmine 90 milliGRAM(s) Oral four times a day    MEDICATIONS  (PRN): Patient is a 59y old  Female who presents with a chief complaint of Weakness (2020 08:31)      HPI:  59 year old woman with hx of myasthenia gravis on mestonin, prednisone daily, finished a 5 day IVIG regimen last week presents to the ED with increasing dyspnea and weakness. [facility] when she could not walk up 2 flights of stairs. Patient with hematuria noted since . Denies any chest pain, no sob, no fever, chills. (2020 18:30)      PAST MEDICAL & SURGICAL HISTORY:  Myasthenia gravis  High blood cholesterol  Pancreatitis  POTS (postural orthostatic tachycardia syndrome)  History of foot surgery      SOCIAL HX:   Nonsmoker    FAMILY HISTORY:  No pertinent family history in first degree relatives  :  No known cardiovacular family hisotry     Review Of Systems:     CONSTITUTIONAL:   no fever   no chills.  no weight gain   no weight loss    EYES:   no discharge,   no pain  no redness,   no visual changes.    ENT:   Ears: no ear pain and no hearing problems.  Nose: no nasal congestion and no nasal drainage.  Mouth/Throat: no dysphagia,  no hoarseness and no throat pain.  Neck: no lumps, no pain, no stiffness and no swollen glands.     CARDIOVASCULAR:   no chest pain,   no swelling  no palpitaions  no syncope    RESPIRATORY:  +SOB  no wheezing  no sputum production    GASTROINTESTINAL:   no abdominal pain,   no constipation,   no diarrhea,   no vomiting.    GENITOURINARY:  no dysuria,   no frequency,   no urgency  no hematuria.    MUSCULOSKELETAL:   no back pain,   no musculoskeletal pain,  +weakness.    SKIN:   no jaundice,   no lesions,   no pruritis,   no rashes.    NEURO:   no loss of consciousness,   no gait abnormality,   no headache,   no sensory deficits,   +weakness.    PSYCHIATRIC:   no known mental health issues  no anxiety  no depression    ALLERGIC/IMMUNOLOGIC:   No active allergic or immunologic issues      Allergies    sulfa drugs (Unknown)    PHYSICAL EXAM    ICU Vital Signs Last 24 Hrs  T(C): 35.8 (2020 07:01), Max: 36.8 (2020 12:52)  T(F): 96.4 (2020 07:01), Max: 98.2 (2020 12:52)  HR: 67 (2020 08:05) (48 - 74)  BP: 132/63 (2020 07:12) (123/59 - 148/81)  BP(mean): 90 (2020 07:12) (85 - 103)  RR: 20 (2020 07:12) (18 - 20)  SpO2: 100% (2020 08:05) (97% - 100%)    CONSTITUTIONAL:  Well nourished.  NAD    ENT:   Airway patent,   Mouth with normal mucosa.   No thrush    EYES:   pupils equal,   round and reactive to light.    CARDIAC:   Normal rate,   Regular rhythm.    Heart sounds S1, S2.   No edema    Vascular:   normal systolic impulse  no bruits    RESPIRATORY:   No wheezing   Normal chest expansion  No use of accessory muscles    GASTROINTESTINAL:  Abdomen soft  Non-tender  No guarding  + BS    GENITOURINARY  normal genitalia for sex  no edema    MUSCULOSKELETAL:   Range of motion is not limited,  Nno clubbing, cyanosis  diffusely weak     NEUROLOGICAL:   Alert and oriented   No motor or sensory deficits.  Pertinent DTRs normal    SKIN:   Skin normal color for race,   Warm and dry  No evidence of rash.    PSYCHIATRIC:   Normal mood and affect.   No apparent risk to self or others.    HEME LYMPH:   No cervical  lymphadenopathy.  No inguinal lymphadenopathy      20 @ 07:01  -  20 @ 07:00  --------------------------------------------------------  IN:  Total IN: 0 mL    OUT:    Voided: 150 mL  Total OUT: 150 mL    Total NET: -150 mL      20 @ 07:01  -  20 @ 08:55  --------------------------------------------------------  IN:  Total IN: 0 mL    OUT:    Voided: 800 mL  Total OUT: 800 mL    Total NET: -800 mL          LABS:                          10.9   6.94  )-----------( 281      ( 2020 05:54 )             32.3                                                   139  |  104  |  17  ----------------------------<  97  4.2   |  27  |  0.7    Ca    9.0      2020 05:54  Phos  4.2     06-12  Mg     1.9     06-12    TPro  7.6  /  Alb  3.7  /  TBili  1.3<H>  /  DBili  x   /  AST  25  /  ALT  23  /  AlkPhos  70  06-12      PT/INR - ( 2020 13:08 )   PT: 10.60 sec;   INR: 0.92 ratio         PTT - ( 2020 13:08 )  PTT:25.8 sec                                       Urinalysis Basic - ( 2020 13:08 )    Color: Yellow / Appearance: Clear / S.020 / pH: x  Gluc: x / Ketone: Negative  / Bili: Negative / Urobili: 0.2 mg/dL   Blood: x / Protein: Negative mg/dL / Nitrite: Negative   Leuk Esterase: Trace / RBC: x / WBC 3-5 /HPF   Sq Epi: x / Non Sq Epi: Occasional /HPF / Bacteria: Moderate                                                  LIVER FUNCTIONS - ( 2020 05:54 )  Alb: 3.7 g/dL / Pro: 7.6 g/dL / ALK PHOS: 70 U/L / ALT: 23 U/L / AST: 25 U/L / GGT: x                                                                                                                                   ABG - ( 2020 14:24 )  pH, Arterial: 7.44  pH, Blood: x     /  pCO2: 37    /  pO2: 177   / HCO3: 25    / Base Excess: 1.0   /  SaO2: 99                  X-Rays reviewed:                                                                                    ECHO    CXR interpreted by me:    MEDICATIONS  (STANDING):  albumin human  5% IVPB 500 milliLiter(s) IV Intermittent once  aspirin  chewable 81 milliGRAM(s) Oral daily  atorvastatin 20 milliGRAM(s) Oral at bedtime  azaTHIOprine 50 milliGRAM(s) Oral <User Schedule>  budesonide  80 MICROgram(s)/formoterol 4.5 MICROgram(s) Inhaler 2 Puff(s) Inhalation two times a day  chlorhexidine 4% Liquid 1 Application(s) Topical two times a day  enoxaparin Injectable 40 milliGRAM(s) SubCutaneous daily  famotidine    Tablet 40 milliGRAM(s) Oral at bedtime  levothyroxine 50 MICROGram(s) Oral daily  methylPREDNISolone sodium succinate Injectable 40 milliGRAM(s) IV Push daily  pantoprazole    Tablet 40 milliGRAM(s) Oral before breakfast  pyridostigmine 90 milliGRAM(s) Oral four times a day    MEDICATIONS  (PRN):

## 2020-06-12 NOTE — PROGRESS NOTE ADULT - ASSESSMENT
Impression:  Acute Respiratory insufficiency secondary MG crisis   Generalized Weakness recent dx of MG   s/p [place] placement for plasmapheresis  Hematuria?  Hypothyroid / Hyperlipidemia     Plan:    CNS: MG crisis. Resume Solumedrol. Plan For plasmapheresis today. Monitor NIF and VC. cont pyridostigmine / Imuran. Electively intubate if worsening. neuro following    HEENT: Oral care    PULMONARY:  HOB @ 45 degrees. Wean supplemental o2 as tolerated. BiPAP QHS and PRN. Pt requesting home BiPAP as it helps.     CARDIOVASCULAR: I=O    GI: GI prophylaxis.  Feeding    RENAL:  Follow up lytes.  Correct as needed    INFECTIOUS DISEASE: Follow up urine cultures, + bacteria and lysed RBC, pt is asymptomatic, Hold off on abx.     HEMATOLOGICAL:  DVT prophylaxis.    ENDOCRINE:  Follow up FS.  Insulin protocol if needed.    MUSCULOSKELETAL: Bedrest due udall in fem    Dispo - From Home. MICU for now  FULL CODE

## 2020-06-12 NOTE — CONSULT NOTE ADULT - ASSESSMENT
IMPRESSION:  Weakness - likely MG crisis s/p [place] placement for plasmapheresis  Hematuria?    PLAN:    CNS: For plasmapheresis today. Monitor NIF and VC. Electively intubate if worsening    HEENT: Oral care    PULMONARY:  HOB @ 45 degrees    CARDIOVASCULAR: I=O    GI: GI prophylaxis.  Feeding    RENAL:  Follow up lytes.  Correct as needed    INFECTIOUS DISEASE: Follow up cultures    HEMATOLOGICAL:  DVT prophylaxis.    ENDOCRINE:  Follow up FS.  Insulin protocol if needed.    MUSCULOSKELETAL: Bedrest    MICU monitoring IMPRESSION:  Weakness - likely MG crisis s/p [place] placement for plasmapheresis  Hematuria?      SUGGEST:      CNS: For plasmapheresis today.  Monitor NIF and VC Q shift .   Electively intubate if worsening  neuro F/U  cont pyriodstigmine/Imuran    HEENT: Oral care    PULMONARY:  HOB @ 45 degrees    CARDIOVASCULAR: I=O    GI: GI prophylaxis.  Feeding    RENAL:  Follow up lytes.  Correct as needed    INFECTIOUS DISEASE: Follow up cultures    HEMATOLOGICAL:  DVT prophylaxis.    ENDOCRINE:  Follow up FS.  Insulin protocol if needed.    MUSCULOSKELETAL: Bedrest    MICU monitoring

## 2020-06-12 NOTE — CONSULT NOTE ADULT - SUBJECTIVE AND OBJECTIVE BOX
Neurology Consultation note    Name  GHADA ROPER    HPI:  59 year old owman with hx of myasthenia gravis on mestonin, prednisone daily, finished a 5 day IVIG regimen last week presents to the ED with increasing dyspnea and weakness. [facility] when she could not walk up 2 flights of stairs. Patient with hematuria noted since Sunday. Denies any chest pain, no sob, no fever, chills. (11 Jun 2020 18:30)      NEURO:  Patient is a 58 yo female with hx of MG dx'd last september at [facility] who presents for exacerbation. patient recently saw Dr Velazquez for the first time via telehealth. at that time she told him her weakness was worsening and so he increased her mestinon, imuran and loaded her w/ IVIG. Her last IVIG tx (day 5) was last friday. she has continued to worsen despite the treatment. she presents with dysphagia, shortness of breath (abg and pfts normal) as well as generalized weakness. she states that it is hard to chew, that she has diplopia in the evening and she cant walk more than 3 steps.  she did have ct chest at [facility] which was neg for thymoma      Vital Signs Last 24 Hrs  T(C): 35.8 (12 Jun 2020 07:01), Max: 36.8 (11 Jun 2020 12:52)  T(F): 96.4 (12 Jun 2020 07:01), Max: 98.2 (11 Jun 2020 12:52)  HR: 67 (12 Jun 2020 08:05) (48 - 74)  BP: 132/63 (12 Jun 2020 07:12) (123/59 - 148/81)  BP(mean): 90 (12 Jun 2020 07:12) (85 - 103)  RR: 20 (12 Jun 2020 07:12) (18 - 20)  SpO2: 100% (12 Jun 2020 08:05) (97% - 100%)    Neurological Exam:   Mental status: Awake, alert and oriented x3.  Recent and remote memory intact.  Naming, repetition and comprehension intact.  Attention/concentration intact.  No dysarthria, no aphasia.  Fund of knowledge appropriate.    Cranial nerves: Pupils equally round and reactive to light, visual fields full, no nystagmus, extraocular muscles intact, V1 through V3 intact bilaterally and symmetric, face symmetric, hearing intact to finger rub, palate elevation symmetric, tongue was midline. no ptosis  Motor:  neck flexion 4/5, neck extension full, strength 4/5 x 4 (may be some effort limitation). single breath count 12 but makes fluent conversation without break.  Sensation: Intact to light touch, proprioception, and pinprick.   Coordination: No dysmetria on finger-to-nose and heel-to-shin.  No dysdiadokinesia.  Reflexes: 2+ in bilateral UE/LE, downgoing toes bilaterally. (-) Lucas.      Medications  albumin human  5% IVPB 500 milliLiter(s) IV Intermittent once  aspirin  chewable 81 milliGRAM(s) Oral daily  atorvastatin 20 milliGRAM(s) Oral at bedtime  azaTHIOprine 50 milliGRAM(s) Oral <User Schedule>  budesonide  80 MICROgram(s)/formoterol 4.5 MICROgram(s) Inhaler 2 Puff(s) Inhalation two times a day  chlorhexidine 4% Liquid 1 Application(s) Topical two times a day  enoxaparin Injectable 40 milliGRAM(s) SubCutaneous daily  famotidine    Tablet 40 milliGRAM(s) Oral at bedtime  levothyroxine 50 MICROGram(s) Oral daily  methylPREDNISolone sodium succinate Injectable 40 milliGRAM(s) IV Push daily  ondansetron Injectable 4 milliGRAM(s) IV Push every 8 hours  pantoprazole    Tablet 40 milliGRAM(s) Oral before breakfast  pyridostigmine 90 milliGRAM(s) Oral four times a day      Lab  06-12    139  |  104  |  17  ----------------------------<  97  4.2   |  27  |  0.7    Ca    9.0      12 Jun 2020 05:54  Phos  4.2     06-12  Mg     1.9     06-12    TPro  7.6  /  Alb  3.7  /  TBili  1.3<H>  /  DBili  x   /  AST  25  /  ALT  23  /  AlkPhos  70  06-12                          10.9   6.94  )-----------( 281      ( 12 Jun 2020 05:54 )             32.3     LIVER FUNCTIONS - ( 12 Jun 2020 05:54 )  Alb: 3.7 g/dL / Pro: 7.6 g/dL / ALK PHOS: 70 U/L / ALT: 23 U/L / AST: 25 U/L / GGT: x             1.9        Radiology      Assessment:  58 yo female with recent dx of generalized MG, followed by Dr Velazquez, presents with progressive worsening of strength despite load with IVIG. PFTs normal in ED although pt feels SOB. she has mild copd and sleep apnea as well  generalized weakness on exam    Plan:  admit to icu for q 4 neuro checks  start plasma exchange today for total of 5 exchanges (line already placed and transfusion medicine aware)  cont imuran at home dosage of 50 mg tid (recently increased by Dr Velazquez)  increase prednisone to 60 mg qd  cont home dose of mestinon  speech/swallow eval  PFTS Q 4 HRS FOR TODAY.  will follow

## 2020-06-12 NOTE — PROGRESS NOTE ADULT - ASSESSMENT
59 year old owman with hx of myasthenia gravis on mestonin, prednisone daily, finished a 5 day IVIG regimen last week presents to the ED with increasing dyspnea and weakness. [facility] when she could not walk up 2 flights of stairs. Patient with hematuria noted since Sunday. Denies any chest pain, no sob, no fever, chills.         Myasthenia gravis crisis     - ICU  - plasma exchange x 5 days    - neuro fu   - nebs prn    - monitor NIF    - continue home meds  - DVT prophylaxis     Hypothyroid / Hyperlipidemia      - continue home meds

## 2020-06-13 LAB
ANION GAP SERPL CALC-SCNC: 8 MMOL/L — SIGNIFICANT CHANGE UP (ref 7–14)
BUN SERPL-MCNC: 21 MG/DL — HIGH (ref 10–20)
CALCIUM SERPL-MCNC: 9 MG/DL — SIGNIFICANT CHANGE UP (ref 8.5–10.1)
CHLORIDE SERPL-SCNC: 105 MMOL/L — SIGNIFICANT CHANGE UP (ref 98–110)
CO2 SERPL-SCNC: 26 MMOL/L — SIGNIFICANT CHANGE UP (ref 17–32)
CREAT SERPL-MCNC: 0.8 MG/DL — SIGNIFICANT CHANGE UP (ref 0.7–1.5)
CULTURE RESULTS: SIGNIFICANT CHANGE UP
GLUCOSE BLDC GLUCOMTR-MCNC: 126 MG/DL — HIGH (ref 70–99)
GLUCOSE BLDC GLUCOMTR-MCNC: 146 MG/DL — HIGH (ref 70–99)
GLUCOSE BLDC GLUCOMTR-MCNC: 169 MG/DL — HIGH (ref 70–99)
GLUCOSE BLDC GLUCOMTR-MCNC: 248 MG/DL — HIGH (ref 70–99)
GLUCOSE SERPL-MCNC: 100 MG/DL — HIGH (ref 70–99)
HCT VFR BLD CALC: 36.1 % — LOW (ref 37–47)
HGB BLD-MCNC: 12.2 G/DL — SIGNIFICANT CHANGE UP (ref 12–16)
MAGNESIUM SERPL-MCNC: 2 MG/DL — SIGNIFICANT CHANGE UP (ref 1.8–2.4)
MCHC RBC-ENTMCNC: 31.4 PG — HIGH (ref 27–31)
MCHC RBC-ENTMCNC: 33.8 G/DL — SIGNIFICANT CHANGE UP (ref 32–37)
MCV RBC AUTO: 92.8 FL — SIGNIFICANT CHANGE UP (ref 81–99)
NRBC # BLD: 0 /100 WBCS — SIGNIFICANT CHANGE UP (ref 0–0)
PHOSPHATE SERPL-MCNC: 4.3 MG/DL — SIGNIFICANT CHANGE UP (ref 2.1–4.9)
PLATELET # BLD AUTO: 281 K/UL — SIGNIFICANT CHANGE UP (ref 130–400)
POTASSIUM SERPL-MCNC: 4.5 MMOL/L — SIGNIFICANT CHANGE UP (ref 3.5–5)
POTASSIUM SERPL-SCNC: 4.5 MMOL/L — SIGNIFICANT CHANGE UP (ref 3.5–5)
RBC # BLD: 3.89 M/UL — LOW (ref 4.2–5.4)
RBC # FLD: 17.4 % — HIGH (ref 11.5–14.5)
SODIUM SERPL-SCNC: 139 MMOL/L — SIGNIFICANT CHANGE UP (ref 135–146)
SPECIMEN SOURCE: SIGNIFICANT CHANGE UP
WBC # BLD: 11.53 K/UL — HIGH (ref 4.8–10.8)
WBC # FLD AUTO: 11.53 K/UL — HIGH (ref 4.8–10.8)

## 2020-06-13 PROCEDURE — 99233 SBSQ HOSP IP/OBS HIGH 50: CPT

## 2020-06-13 PROCEDURE — 71045 X-RAY EXAM CHEST 1 VIEW: CPT | Mod: 26

## 2020-06-13 RX ORDER — ONDANSETRON 8 MG/1
4 TABLET, FILM COATED ORAL ONCE
Refills: 0 | Status: COMPLETED | OUTPATIENT
Start: 2020-06-13 | End: 2020-06-13

## 2020-06-13 RX ADMIN — ONDANSETRON 4 MILLIGRAM(S): 8 TABLET, FILM COATED ORAL at 05:39

## 2020-06-13 RX ADMIN — CHLORHEXIDINE GLUCONATE 1 APPLICATION(S): 213 SOLUTION TOPICAL at 08:08

## 2020-06-13 RX ADMIN — ENOXAPARIN SODIUM 40 MILLIGRAM(S): 100 INJECTION SUBCUTANEOUS at 12:32

## 2020-06-13 RX ADMIN — BUDESONIDE AND FORMOTEROL FUMARATE DIHYDRATE 2 PUFF(S): 160; 4.5 AEROSOL RESPIRATORY (INHALATION) at 20:07

## 2020-06-13 RX ADMIN — AZATHIOPRINE 50 MILLIGRAM(S): 100 TABLET ORAL at 21:59

## 2020-06-13 RX ADMIN — Medication 81 MILLIGRAM(S): at 12:31

## 2020-06-13 RX ADMIN — ATORVASTATIN CALCIUM 20 MILLIGRAM(S): 80 TABLET, FILM COATED ORAL at 21:59

## 2020-06-13 RX ADMIN — ONDANSETRON 4 MILLIGRAM(S): 8 TABLET, FILM COATED ORAL at 13:28

## 2020-06-13 RX ADMIN — BUDESONIDE AND FORMOTEROL FUMARATE DIHYDRATE 2 PUFF(S): 160; 4.5 AEROSOL RESPIRATORY (INHALATION) at 08:08

## 2020-06-13 RX ADMIN — FAMOTIDINE 40 MILLIGRAM(S): 10 INJECTION INTRAVENOUS at 21:59

## 2020-06-13 RX ADMIN — AZATHIOPRINE 50 MILLIGRAM(S): 100 TABLET ORAL at 05:39

## 2020-06-13 RX ADMIN — PYRIDOSTIGMINE BROMIDE 90 MILLIGRAM(S): 60 SOLUTION ORAL at 05:40

## 2020-06-13 RX ADMIN — PYRIDOSTIGMINE BROMIDE 90 MILLIGRAM(S): 60 SOLUTION ORAL at 23:42

## 2020-06-13 RX ADMIN — Medication 650 MILLIGRAM(S): at 16:43

## 2020-06-13 RX ADMIN — AZATHIOPRINE 50 MILLIGRAM(S): 100 TABLET ORAL at 13:27

## 2020-06-13 RX ADMIN — Medication 50 MICROGRAM(S): at 05:35

## 2020-06-13 RX ADMIN — ONDANSETRON 4 MILLIGRAM(S): 8 TABLET, FILM COATED ORAL at 19:08

## 2020-06-13 RX ADMIN — PYRIDOSTIGMINE BROMIDE 90 MILLIGRAM(S): 60 SOLUTION ORAL at 18:19

## 2020-06-13 RX ADMIN — PANTOPRAZOLE SODIUM 40 MILLIGRAM(S): 20 TABLET, DELAYED RELEASE ORAL at 06:02

## 2020-06-13 RX ADMIN — CHLORHEXIDINE GLUCONATE 1 APPLICATION(S): 213 SOLUTION TOPICAL at 18:17

## 2020-06-13 RX ADMIN — Medication 40 MILLIGRAM(S): at 05:40

## 2020-06-13 RX ADMIN — PYRIDOSTIGMINE BROMIDE 90 MILLIGRAM(S): 60 SOLUTION ORAL at 12:31

## 2020-06-13 NOTE — PROGRESS NOTE ADULT - SUBJECTIVE AND OBJECTIVE BOX
Neurology Follow up note    Name  CHRISTIANE ROPER    HPI:  59 year old owman with hx of myasthenia gravis on mestonin, prednisone daily, finished a 5 day IVIG regimen last week presents to the ED with increasing dyspnea and weakness. [facility] when she could not walk up 2 flights of stairs. Patient with hematuria noted since Sunday. Denies any chest pain, no sob, no fever, chills. (11 Jun 2020 18:30)      Interval History - Patient seen this morning and is s/p one session of plasma exchange.  No events or complaints after PLEX.  she is still having dysphagia and become SOB quickly.  She feels better on nasal canula for oxygen.          Vital Signs Last 24 Hrs  T(C): 35.6 (13 Jun 2020 07:01), Max: 36.4 (12 Jun 2020 19:00)  T(F): 96 (13 Jun 2020 07:01), Max: 97.6 (12 Jun 2020 19:00)  HR: 51 (13 Jun 2020 07:14) (51 - 83)  BP: 109/52 (13 Jun 2020 07:14) (103/55 - 134/74)  BP(mean): 75 (13 Jun 2020 07:14) (74 - 101)  RR: 31 (13 Jun 2020 07:14) (13 - 31)  SpO2: 100% (13 Jun 2020 07:14) (97% - 100%)  ICU Vital Signs Last 24 Hrs  T(C): 35.6 (13 Jun 2020 07:01), Max: 36.4 (12 Jun 2020 19:00)  T(F): 96 (13 Jun 2020 07:01), Max: 97.6 (12 Jun 2020 19:00)  HR: 51 (13 Jun 2020 07:14) (51 - 83)  BP: 109/52 (13 Jun 2020 07:14) (103/55 - 134/74)  BP(mean): 75 (13 Jun 2020 07:14) (74 - 101)  ABP: --  ABP(mean): --  RR: 31 (13 Jun 2020 07:14) (13 - 31)  SpO2: 100% (13 Jun 2020 07:14) (97% - 100%)          Neurological Exam:   Mental status: Awake, alert and oriented x3.  Recent and remote memory intact.  Naming, repetition and comprehension intact.  Attention/concentration intact.  No dysarthria, no aphasia.  Fund of knowledge appropriate.    Fatigable ptosis in R>L eye.  Able to count to 10 in one breath  4+/5 in neck flexion and extension  4+/5 in proximal UE  No Diplopia       Medications  acetaminophen   Tablet .. 650 milliGRAM(s) Oral every 6 hours PRN  aspirin  chewable 81 milliGRAM(s) Oral daily  atorvastatin 20 milliGRAM(s) Oral at bedtime  azaTHIOprine 50 milliGRAM(s) Oral <User Schedule>  budesonide  80 MICROgram(s)/formoterol 4.5 MICROgram(s) Inhaler 2 Puff(s) Inhalation two times a day  chlorhexidine 4% Liquid 1 Application(s) Topical two times a day  enoxaparin Injectable 40 milliGRAM(s) SubCutaneous daily  famotidine    Tablet 40 milliGRAM(s) Oral at bedtime  levothyroxine 50 MICROGram(s) Oral daily  methylPREDNISolone sodium succinate Injectable 40 milliGRAM(s) IV Push daily  ondansetron Injectable 4 milliGRAM(s) IV Push every 8 hours  pantoprazole    Tablet 40 milliGRAM(s) Oral before breakfast  pyridostigmine 90 milliGRAM(s) Oral four times a day      Lab                        12.2   11.53 )-----------( 281      ( 13 Jun 2020 05:48 )             36.1     06-13    139  |  105  |  21<H>  ----------------------------<  100<H>  4.5   |  26  |  0.8    Ca    9.0      13 Jun 2020 05:48  Phos  4.3     06-13  Mg     2.0     06-13    TPro  7.6  /  Alb  3.7  /  TBili  1.3<H>  /  DBili  x   /  AST  25  /  ALT  23  /  AlkPhos  70  06-12    CAPILLARY BLOOD GLUCOSE      POCT Blood Glucose.: 126 mg/dL (13 Jun 2020 07:39)  POCT Blood Glucose.: 141 mg/dL (12 Jun 2020 21:45)  POCT Blood Glucose.: 147 mg/dL (12 Jun 2020 16:38)  POCT Blood Glucose.: 209 mg/dL (12 Jun 2020 11:29)    LIVER FUNCTIONS - ( 12 Jun 2020 05:54 )  Alb: 3.7 g/dL / Pro: 7.6 g/dL / ALK PHOS: 70 U/L / ALT: 23 U/L / AST: 25 U/L / GGT: x           PT/INR - ( 11 Jun 2020 13:08 )   PT: 10.60 sec;   INR: 0.92 ratio         PTT - ( 11 Jun 2020 13:08 )  PTT:25.8 sec    Radiology    Assessment- Christiane is here with continued myasthenia gravis exacerbation s/p 1 session of PLEX.    Plan  1. Next session of PLEX tomorrow  2. OOB to chair as tolerated  3. NIF/FVC Q6 hours  4. Continue mestinon 90mg Q6 hours  5. Continue Imuran   6. Continue methylprednisolone 40mg QD  7. Call with any changes

## 2020-06-13 NOTE — PROGRESS NOTE ADULT - SUBJECTIVE AND OBJECTIVE BOX
HPI  Patient is a 59y old Female who presents with a chief complaint of Weakness (13 Jun 2020 09:14)    Currently admitted to medicine with the primary diagnosis of Myasthenia gravis in crisis     Today is hospital day 2d.     INTERVAL HPI / OVERNIGHT EVENTS:  Patient was examined and seen at bedside.   Feels nauseated. She spits out most of the time  States she had EGD in [place]; results reviewed- showing esophagitis and GERD  tolerating diet  states her weakness is slightly improving    PAST MEDICAL & SURGICAL HISTORY  Myasthenia gravis  High blood cholesterol  Pancreatitis  POTS (postural orthostatic tachycardia syndrome)  History of foot surgery    ALLERGIES  sulfa drugs (Unknown)    MEDICATIONS  STANDING MEDICATIONS  aspirin  chewable 81 milliGRAM(s) Oral daily  atorvastatin 20 milliGRAM(s) Oral at bedtime  azaTHIOprine 50 milliGRAM(s) Oral <User Schedule>  budesonide  80 MICROgram(s)/formoterol 4.5 MICROgram(s) Inhaler 2 Puff(s) Inhalation two times a day  chlorhexidine 4% Liquid 1 Application(s) Topical two times a day  enoxaparin Injectable 40 milliGRAM(s) SubCutaneous daily  famotidine    Tablet 40 milliGRAM(s) Oral at bedtime  levothyroxine 50 MICROGram(s) Oral daily  methylPREDNISolone sodium succinate Injectable 40 milliGRAM(s) IV Push daily  ondansetron Injectable 4 milliGRAM(s) IV Push every 8 hours  pantoprazole    Tablet 40 milliGRAM(s) Oral before breakfast  pyridostigmine 90 milliGRAM(s) Oral four times a day    PRN MEDICATIONS  acetaminophen   Tablet .. 650 milliGRAM(s) Oral every 6 hours PRN    VITALS:  T(F): 97.4  HR: 82  BP: 122/69  RR: 30  SpO2: 98%    PHYSICAL EXAM  GEN: NAD, Resting in bed  PULM: Clear to auscultation bilaterally, No wheezes  CVS: Regular rate and rhythm, S1-S2, no murmurs  ABD: Soft, non-tender, non-distended, no guarding  EXT: No edema  NEURO: A&Ox3, b/l lower extremity weakness    LABS                        12.2   11.53 )-----------( 281      ( 13 Jun 2020 05:48 )             36.1     06-13    139  |  105  |  21<H>  ----------------------------<  100<H>  4.5   |  26  |  0.8    Ca    9.0      13 Jun 2020 05:48  Phos  4.3     06-13  Mg     2.0     06-13    TPro  7.6  /  Alb  3.7  /  TBili  1.3<H>  /  DBili  x   /  AST  25  /  ALT  23  /  AlkPhos  70  06-12        ABG - ( 11 Jun 2020 14:24 )  pH, Arterial: 7.44  pH, Blood: x     /  pCO2: 37    /  pO2: 177   / HCO3: 25    / Base Excess: 1.0   /  SaO2: 99                        RADIOLOGY HPI  Patient is a 59y old Female who presents with a chief complaint of Weakness (13 Jun 2020 09:14)    Currently admitted to medicine with the primary diagnosis of Myasthenia gravis in crisis     Today is hospital day 2d.     INTERVAL HPI / OVERNIGHT EVENTS:  Patient was examined and seen at bedside.   Feels nauseated. She spits out most of the time  States she had EGD in [place]; results reviewed- showing esophagitis and GERD  tolerating diet  states her weakness is slightly improving  requesting that whether she will be able to get oxygen at home. States she had an episode of severe SOB; where she was pale and oxygen levels dropped; patient not sure    PAST MEDICAL & SURGICAL HISTORY  Myasthenia gravis  High blood cholesterol  Pancreatitis  POTS (postural orthostatic tachycardia syndrome)  History of foot surgery    ALLERGIES  sulfa drugs (Unknown)    MEDICATIONS  STANDING MEDICATIONS  aspirin  chewable 81 milliGRAM(s) Oral daily  atorvastatin 20 milliGRAM(s) Oral at bedtime  azaTHIOprine 50 milliGRAM(s) Oral <User Schedule>  budesonide  80 MICROgram(s)/formoterol 4.5 MICROgram(s) Inhaler 2 Puff(s) Inhalation two times a day  chlorhexidine 4% Liquid 1 Application(s) Topical two times a day  enoxaparin Injectable 40 milliGRAM(s) SubCutaneous daily  famotidine    Tablet 40 milliGRAM(s) Oral at bedtime  levothyroxine 50 MICROGram(s) Oral daily  methylPREDNISolone sodium succinate Injectable 40 milliGRAM(s) IV Push daily  ondansetron Injectable 4 milliGRAM(s) IV Push every 8 hours  pantoprazole    Tablet 40 milliGRAM(s) Oral before breakfast  pyridostigmine 90 milliGRAM(s) Oral four times a day    PRN MEDICATIONS  acetaminophen   Tablet .. 650 milliGRAM(s) Oral every 6 hours PRN    VITALS:  T(F): 97.4  HR: 82  BP: 122/69  RR: 30  SpO2: 98%    PHYSICAL EXAM  GEN: NAD, Resting in bed  PULM: Clear to auscultation bilaterally, No wheezes  CVS: Regular rate and rhythm, S1-S2, no murmurs  ABD: Soft, non-tender, non-distended, no guarding  EXT: No edema  NEURO: A&Ox3, b/l lower extremity weakness    LABS                        12.2   11.53 )-----------( 281      ( 13 Jun 2020 05:48 )             36.1     06-13    139  |  105  |  21<H>  ----------------------------<  100<H>  4.5   |  26  |  0.8    Ca    9.0      13 Jun 2020 05:48  Phos  4.3     06-13  Mg     2.0     06-13    TPro  7.6  /  Alb  3.7  /  TBili  1.3<H>  /  DBili  x   /  AST  25  /  ALT  23  /  AlkPhos  70  06-12        ABG - ( 11 Jun 2020 14:24 )  pH, Arterial: 7.44  pH, Blood: x     /  pCO2: 37    /  pO2: 177   / HCO3: 25    / Base Excess: 1.0   /  SaO2: 99                        RADIOLOGY

## 2020-06-14 LAB
ANION GAP SERPL CALC-SCNC: 9 MMOL/L — SIGNIFICANT CHANGE UP (ref 7–14)
BASOPHILS # BLD AUTO: 0.03 K/UL — SIGNIFICANT CHANGE UP (ref 0–0.2)
BASOPHILS NFR BLD AUTO: 0.3 % — SIGNIFICANT CHANGE UP (ref 0–1)
BUN SERPL-MCNC: 18 MG/DL — SIGNIFICANT CHANGE UP (ref 10–20)
CALCIUM SERPL-MCNC: 9.2 MG/DL — SIGNIFICANT CHANGE UP (ref 8.5–10.1)
CHLORIDE SERPL-SCNC: 103 MMOL/L — SIGNIFICANT CHANGE UP (ref 98–110)
CO2 SERPL-SCNC: 27 MMOL/L — SIGNIFICANT CHANGE UP (ref 17–32)
CREAT SERPL-MCNC: 0.7 MG/DL — SIGNIFICANT CHANGE UP (ref 0.7–1.5)
EOSINOPHIL # BLD AUTO: 0.02 K/UL — SIGNIFICANT CHANGE UP (ref 0–0.7)
EOSINOPHIL NFR BLD AUTO: 0.2 % — SIGNIFICANT CHANGE UP (ref 0–8)
GLUCOSE BLDC GLUCOMTR-MCNC: 130 MG/DL — HIGH (ref 70–99)
GLUCOSE BLDC GLUCOMTR-MCNC: 184 MG/DL — HIGH (ref 70–99)
GLUCOSE BLDC GLUCOMTR-MCNC: 201 MG/DL — HIGH (ref 70–99)
GLUCOSE SERPL-MCNC: 104 MG/DL — HIGH (ref 70–99)
HCT VFR BLD CALC: 34.8 % — LOW (ref 37–47)
HGB BLD-MCNC: 12 G/DL — SIGNIFICANT CHANGE UP (ref 12–16)
IMM GRANULOCYTES NFR BLD AUTO: 0.3 % — SIGNIFICANT CHANGE UP (ref 0.1–0.3)
LYMPHOCYTES # BLD AUTO: 2.35 K/UL — SIGNIFICANT CHANGE UP (ref 1.2–3.4)
LYMPHOCYTES # BLD AUTO: 21.7 % — SIGNIFICANT CHANGE UP (ref 20.5–51.1)
MAGNESIUM SERPL-MCNC: 1.8 MG/DL — SIGNIFICANT CHANGE UP (ref 1.8–2.4)
MCHC RBC-ENTMCNC: 31.7 PG — HIGH (ref 27–31)
MCHC RBC-ENTMCNC: 34.5 G/DL — SIGNIFICANT CHANGE UP (ref 32–37)
MCV RBC AUTO: 92.1 FL — SIGNIFICANT CHANGE UP (ref 81–99)
MONOCYTES # BLD AUTO: 0.82 K/UL — HIGH (ref 0.1–0.6)
MONOCYTES NFR BLD AUTO: 7.6 % — SIGNIFICANT CHANGE UP (ref 1.7–9.3)
NEUTROPHILS # BLD AUTO: 7.6 K/UL — HIGH (ref 1.4–6.5)
NEUTROPHILS NFR BLD AUTO: 69.9 % — SIGNIFICANT CHANGE UP (ref 42.2–75.2)
NRBC # BLD: 0 /100 WBCS — SIGNIFICANT CHANGE UP (ref 0–0)
PLATELET # BLD AUTO: 252 K/UL — SIGNIFICANT CHANGE UP (ref 130–400)
POTASSIUM SERPL-MCNC: 4.1 MMOL/L — SIGNIFICANT CHANGE UP (ref 3.5–5)
POTASSIUM SERPL-SCNC: 4.1 MMOL/L — SIGNIFICANT CHANGE UP (ref 3.5–5)
RBC # BLD: 3.78 M/UL — LOW (ref 4.2–5.4)
RBC # FLD: 16.9 % — HIGH (ref 11.5–14.5)
SODIUM SERPL-SCNC: 139 MMOL/L — SIGNIFICANT CHANGE UP (ref 135–146)
WBC # BLD: 10.85 K/UL — HIGH (ref 4.8–10.8)
WBC # FLD AUTO: 10.85 K/UL — HIGH (ref 4.8–10.8)

## 2020-06-14 PROCEDURE — 99233 SBSQ HOSP IP/OBS HIGH 50: CPT

## 2020-06-14 PROCEDURE — 71045 X-RAY EXAM CHEST 1 VIEW: CPT | Mod: 26

## 2020-06-14 PROCEDURE — 99232 SBSQ HOSP IP/OBS MODERATE 35: CPT

## 2020-06-14 RX ORDER — HEPARIN SODIUM 5000 [USP'U]/ML
1000 INJECTION INTRAVENOUS; SUBCUTANEOUS ONCE
Refills: 0 | Status: COMPLETED | OUTPATIENT
Start: 2020-06-14 | End: 2020-06-14

## 2020-06-14 RX ORDER — CALCIUM GLUCONATE 100 MG/ML
1 VIAL (ML) INTRAVENOUS ONCE
Refills: 0 | Status: COMPLETED | OUTPATIENT
Start: 2020-06-14 | End: 2020-06-14

## 2020-06-14 RX ORDER — HEPARIN SODIUM 5000 [USP'U]/ML
1000 INJECTION INTRAVENOUS; SUBCUTANEOUS ONCE
Refills: 0 | Status: DISCONTINUED | OUTPATIENT
Start: 2020-06-14 | End: 2020-06-14

## 2020-06-14 RX ORDER — ALBUMIN HUMAN 25 %
4000 VIAL (ML) INTRAVENOUS ONCE
Refills: 0 | Status: COMPLETED | OUTPATIENT
Start: 2020-06-14 | End: 2020-06-14

## 2020-06-14 RX ADMIN — ONDANSETRON 4 MILLIGRAM(S): 8 TABLET, FILM COATED ORAL at 13:10

## 2020-06-14 RX ADMIN — ENOXAPARIN SODIUM 40 MILLIGRAM(S): 100 INJECTION SUBCUTANEOUS at 12:48

## 2020-06-14 RX ADMIN — Medication 2000 MILLILITER(S): at 13:16

## 2020-06-14 RX ADMIN — PYRIDOSTIGMINE BROMIDE 90 MILLIGRAM(S): 60 SOLUTION ORAL at 23:36

## 2020-06-14 RX ADMIN — FAMOTIDINE 40 MILLIGRAM(S): 10 INJECTION INTRAVENOUS at 21:43

## 2020-06-14 RX ADMIN — Medication 100 GRAM(S): at 12:42

## 2020-06-14 RX ADMIN — Medication 650 MILLIGRAM(S): at 12:47

## 2020-06-14 RX ADMIN — Medication 50 MICROGRAM(S): at 05:27

## 2020-06-14 RX ADMIN — ONDANSETRON 4 MILLIGRAM(S): 8 TABLET, FILM COATED ORAL at 05:27

## 2020-06-14 RX ADMIN — ATORVASTATIN CALCIUM 20 MILLIGRAM(S): 80 TABLET, FILM COATED ORAL at 21:43

## 2020-06-14 RX ADMIN — PYRIDOSTIGMINE BROMIDE 90 MILLIGRAM(S): 60 SOLUTION ORAL at 05:28

## 2020-06-14 RX ADMIN — HEPARIN SODIUM 1000 UNIT(S): 5000 INJECTION INTRAVENOUS; SUBCUTANEOUS at 12:43

## 2020-06-14 RX ADMIN — AZATHIOPRINE 50 MILLIGRAM(S): 100 TABLET ORAL at 05:27

## 2020-06-14 RX ADMIN — BUDESONIDE AND FORMOTEROL FUMARATE DIHYDRATE 2 PUFF(S): 160; 4.5 AEROSOL RESPIRATORY (INHALATION) at 08:10

## 2020-06-14 RX ADMIN — Medication 81 MILLIGRAM(S): at 12:47

## 2020-06-14 RX ADMIN — BUDESONIDE AND FORMOTEROL FUMARATE DIHYDRATE 2 PUFF(S): 160; 4.5 AEROSOL RESPIRATORY (INHALATION) at 20:10

## 2020-06-14 RX ADMIN — PYRIDOSTIGMINE BROMIDE 90 MILLIGRAM(S): 60 SOLUTION ORAL at 17:50

## 2020-06-14 RX ADMIN — ONDANSETRON 4 MILLIGRAM(S): 8 TABLET, FILM COATED ORAL at 21:43

## 2020-06-14 RX ADMIN — PANTOPRAZOLE SODIUM 40 MILLIGRAM(S): 20 TABLET, DELAYED RELEASE ORAL at 06:00

## 2020-06-14 RX ADMIN — Medication 40 MILLIGRAM(S): at 05:27

## 2020-06-14 RX ADMIN — AZATHIOPRINE 50 MILLIGRAM(S): 100 TABLET ORAL at 21:43

## 2020-06-14 RX ADMIN — PYRIDOSTIGMINE BROMIDE 90 MILLIGRAM(S): 60 SOLUTION ORAL at 12:48

## 2020-06-14 RX ADMIN — AZATHIOPRINE 50 MILLIGRAM(S): 100 TABLET ORAL at 13:10

## 2020-06-14 NOTE — PROGRESS NOTE ADULT - ASSESSMENT
59 year old owman with hx of myasthenia gravis on mestonin, prednisone daily, finished a 5 day IVIG regimen last week presents to the ED with increasing dyspnea and weakness. [facility] when she could not walk up 2 flights of stairs. Denies any chest pain, no sob, no fever, chills.         Myasthenia gravis crisis       - plasma exchange x 5 days    - nebs prn    - monitor NIF    - continue home meds  - DVT prophylaxis     Hypothyroid / Hyperlipidemia      - continue home meds

## 2020-06-14 NOTE — PROGRESS NOTE ADULT - SUBJECTIVE AND OBJECTIVE BOX
Neurology Follow up note    Name  GHADA ROPER    HPI:  59 year old owman with hx of myasthenia gravis on mestonin, prednisone daily, finished a 5 day IVIG regimen last week presents to the ED with increasing dyspnea and weakness. [facility] when she could not walk up 2 flights of stairs. Patient with hematuria noted since Sunday. Denies any chest pain, no sob, no fever, chills. (11 Jun 2020 18:30)      Interval History - Patient seen and examined and states she had a bad night of sleep and vomited a few times.  She is still SOB and feels tingling in mouth and feet when the oxygen is off but when oxygen back on tingling goes away within 1-2 minutes            Vital Signs Last 24 Hrs  T(C): 36.4 (14 Jun 2020 07:01), Max: 36.8 (13 Jun 2020 15:00)  T(F): 97.5 (14 Jun 2020 07:01), Max: 98.2 (13 Jun 2020 15:00)  HR: 52 (14 Jun 2020 07:09) (52 - 98)  BP: 132/61 (14 Jun 2020 07:09) (113/64 - 143/67)  BP(mean): 88 (14 Jun 2020 07:09) (82 - 97)  RR: 11 (14 Jun 2020 07:09) (11 - 38)  SpO2: 96% (14 Jun 2020 07:09) (96% - 100%)  ICU Vital Signs Last 24 Hrs  T(C): 36.4 (14 Jun 2020 07:01), Max: 36.8 (13 Jun 2020 15:00)  T(F): 97.5 (14 Jun 2020 07:01), Max: 98.2 (13 Jun 2020 15:00)  HR: 52 (14 Jun 2020 07:09) (52 - 98)  BP: 132/61 (14 Jun 2020 07:09) (113/64 - 143/67)  BP(mean): 88 (14 Jun 2020 07:09) (82 - 97)  ABP: --  ABP(mean): --  RR: 11 (14 Jun 2020 07:09) (11 - 38)  SpO2: 96% (14 Jun 2020 07:09) (96% - 100%)          Neurological Exam:   alert oriented x3 language and attention normal  EOMI, No facial but incomplete smiling, NO diplopia   No drift 4+/5 proximal UE weakness  Neck extension 5/5 Neck flexion 4+/5  Sensory symmetric  FTN NL  Gait deferred      Medications  acetaminophen   Tablet .. 650 milliGRAM(s) Oral every 6 hours PRN  albumin human  5% IVPB 4000 milliLiter(s) IV Intermittent once  aspirin  chewable 81 milliGRAM(s) Oral daily  atorvastatin 20 milliGRAM(s) Oral at bedtime  azaTHIOprine 50 milliGRAM(s) Oral <User Schedule>  budesonide  80 MICROgram(s)/formoterol 4.5 MICROgram(s) Inhaler 2 Puff(s) Inhalation two times a day  calcium gluconate IVPB 1 Gram(s) IV Intermittent once  chlorhexidine 4% Liquid 1 Application(s) Topical two times a day  enoxaparin Injectable 40 milliGRAM(s) SubCutaneous daily  famotidine    Tablet 40 milliGRAM(s) Oral at bedtime  levothyroxine 50 MICROGram(s) Oral daily  methylPREDNISolone sodium succinate Injectable 40 milliGRAM(s) IV Push daily  ondansetron Injectable 4 milliGRAM(s) IV Push every 8 hours  pantoprazole    Tablet 40 milliGRAM(s) Oral before breakfast  pyridostigmine 90 milliGRAM(s) Oral four times a day      Lab                        12.0   10.85 )-----------( 252      ( 14 Jun 2020 05:45 )             34.8     06-14    139  |  103  |  18  ----------------------------<  104<H>  4.1   |  27  |  0.7    Ca    9.2      14 Jun 2020 05:45  Phos  4.3     06-13  Mg     1.8     06-14      CAPILLARY BLOOD GLUCOSE      POCT Blood Glucose.: 146 mg/dL (13 Jun 2020 21:39)  POCT Blood Glucose.: 169 mg/dL (13 Jun 2020 16:19)  POCT Blood Glucose.: 248 mg/dL (13 Jun 2020 11:31)          Radiology    Assessment-Ms. Roper is a woman being seen here for Myasthenia gravis exacerbation currently on PLEX session #2 today.  If patient tolerates well would plan to have next two treatments back to back on Tuesday and Wed.    Plan  1. PLEX today and Session #3 and #4 on Tues/Wed respectively.  2. Continue FVC/NIF Q6 hours  3. Increase oxygen via nasal canula if having symptoms of paresthesias around mouth and distal extremities  4. OOB to chair as tolerated

## 2020-06-14 NOTE — PROGRESS NOTE ADULT - SUBJECTIVE AND OBJECTIVE BOX
Patient is feeling nauseous after small breakfast  this am      T(F): 98.8 (06-14-20 @ 11:00), Max: 98.8 (06-14-20 @ 11:00)  HR: 64 (06-14-20 @ 09:10)  BP: 117/56 (06-14-20 @ 09:10)  RR: 16 (06-14-20 @ 11:00)  SpO2: 99% (06-14-20 @ 09:10) (96% - 100%)    PHYSICAL EXAM:  GENERAL: NAD  HEAD:  Atraumatic, Normocephalic  NERVOUS SYSTEM:  Alert & Oriented X3, non focal   CHEST/LUNG: Clear to percussion bilaterally; No rales, rhonchi, wheezing, or rubs  HEART: Regular rate and rhythm; No murmurs, rubs, or gallops  ABDOMEN: Soft, Nontender, Nondistended; Bowel sounds present  EXTREMITIES:  2+ Peripheral Pulses, No clubbing, cyanosis, or edema  LYMPH: No lymphadenopathy noted  SKIN: No rashes or lesions    LABS  06-14    139  |  103  |  18  ----------------------------<  104<H>  4.1   |  27  |  0.7    Ca    9.2      14 Jun 2020 05:45  Phos  4.3     06-13  Mg     1.8     06-14                            12.0   10.85 )-----------( 252      ( 14 Jun 2020 05:45 )             34.8       Culture Results:   >=3 organisms. Probable collection contamination. (06-11-20)    RADIOLOGY  < from: Xray Chest 1 View- PORTABLE-Routine (06.14.20 @ 04:54) >  Impression: Near-complete resolution of left base opacity with minimal residual costophrenic angle reaction    < end of copied text >    MEDICATIONS  (STANDING):  albumin human  5% IVPB 4000 milliLiter(s) IV Intermittent once  aspirin  chewable 81 milliGRAM(s) Oral daily  atorvastatin 20 milliGRAM(s) Oral at bedtime  azaTHIOprine 50 milliGRAM(s) Oral <User Schedule>  budesonide  80 MICROgram(s)/formoterol 4.5 MICROgram(s) Inhaler 2 Puff(s) Inhalation two times a day  calcium gluconate IVPB 1 Gram(s) IV Intermittent once  chlorhexidine 4% Liquid 1 Application(s) Topical two times a day  enoxaparin Injectable 40 milliGRAM(s) SubCutaneous daily  famotidine    Tablet 40 milliGRAM(s) Oral at bedtime  heparin   Injectable 1000 Unit(s) IV Push once  heparin   Injectable 1000 Unit(s) IV Push once  levothyroxine 50 MICROGram(s) Oral daily  methylPREDNISolone sodium succinate Injectable 40 milliGRAM(s) IV Push daily  ondansetron Injectable 4 milliGRAM(s) IV Push every 8 hours  pantoprazole    Tablet 40 milliGRAM(s) Oral before breakfast  pyridostigmine 90 milliGRAM(s) Oral four times a day    MEDICATIONS  (PRN):  acetaminophen   Tablet .. 650 milliGRAM(s) Oral every 6 hours PRN Mild Pain (1 - 3), Moderate Pain (4 - 6)

## 2020-06-15 LAB
BASE EXCESS BLDA CALC-SCNC: 1.9 MMOL/L — SIGNIFICANT CHANGE UP (ref -2–2)
BASOPHILS # BLD AUTO: 0.01 K/UL — SIGNIFICANT CHANGE UP (ref 0–0.2)
BASOPHILS NFR BLD AUTO: 0.1 % — SIGNIFICANT CHANGE UP (ref 0–1)
EOSINOPHIL # BLD AUTO: 0 K/UL — SIGNIFICANT CHANGE UP (ref 0–0.7)
EOSINOPHIL NFR BLD AUTO: 0 % — SIGNIFICANT CHANGE UP (ref 0–8)
GLUCOSE BLDC GLUCOMTR-MCNC: 119 MG/DL — HIGH (ref 70–99)
GLUCOSE BLDC GLUCOMTR-MCNC: 124 MG/DL — HIGH (ref 70–99)
GLUCOSE BLDC GLUCOMTR-MCNC: 133 MG/DL — HIGH (ref 70–99)
GLUCOSE BLDC GLUCOMTR-MCNC: 152 MG/DL — HIGH (ref 70–99)
GLUCOSE BLDC GLUCOMTR-MCNC: 192 MG/DL — HIGH (ref 70–99)
HCO3 BLDA-SCNC: 26 MMOL/L — SIGNIFICANT CHANGE UP (ref 23–27)
HCT VFR BLD CALC: 37 % — SIGNIFICANT CHANGE UP (ref 37–47)
HGB BLD-MCNC: 12.5 G/DL — SIGNIFICANT CHANGE UP (ref 12–16)
IMM GRANULOCYTES NFR BLD AUTO: 0.4 % — HIGH (ref 0.1–0.3)
LYMPHOCYTES # BLD AUTO: 0.76 K/UL — LOW (ref 1.2–3.4)
LYMPHOCYTES # BLD AUTO: 8.3 % — LOW (ref 20.5–51.1)
MCHC RBC-ENTMCNC: 31.6 PG — HIGH (ref 27–31)
MCHC RBC-ENTMCNC: 33.8 G/DL — SIGNIFICANT CHANGE UP (ref 32–37)
MCV RBC AUTO: 93.4 FL — SIGNIFICANT CHANGE UP (ref 81–99)
MONOCYTES # BLD AUTO: 0.4 K/UL — SIGNIFICANT CHANGE UP (ref 0.1–0.6)
MONOCYTES NFR BLD AUTO: 4.4 % — SIGNIFICANT CHANGE UP (ref 1.7–9.3)
NEUTROPHILS # BLD AUTO: 7.92 K/UL — HIGH (ref 1.4–6.5)
NEUTROPHILS NFR BLD AUTO: 86.8 % — HIGH (ref 42.2–75.2)
NRBC # BLD: 0 /100 WBCS — SIGNIFICANT CHANGE UP (ref 0–0)
PCO2 BLDA: 40 MMHG — SIGNIFICANT CHANGE UP
PH BLD: 7.42 — SIGNIFICANT CHANGE UP (ref 7.38–7.42)
PLATELET # BLD AUTO: 283 K/UL — SIGNIFICANT CHANGE UP (ref 130–400)
PO2 BLDA: 80 MMHG — SIGNIFICANT CHANGE UP (ref 78–95)
RBC # BLD: 3.96 M/UL — LOW (ref 4.2–5.4)
RBC # FLD: 16.6 % — HIGH (ref 11.5–14.5)
SAO2 % BLDA: 97 % — SIGNIFICANT CHANGE UP (ref 94–98)
WBC # BLD: 9.13 K/UL — SIGNIFICANT CHANGE UP (ref 4.8–10.8)
WBC # FLD AUTO: 9.13 K/UL — SIGNIFICANT CHANGE UP (ref 4.8–10.8)

## 2020-06-15 PROCEDURE — 99232 SBSQ HOSP IP/OBS MODERATE 35: CPT

## 2020-06-15 PROCEDURE — 99223 1ST HOSP IP/OBS HIGH 75: CPT

## 2020-06-15 PROCEDURE — 99233 SBSQ HOSP IP/OBS HIGH 50: CPT

## 2020-06-15 RX ORDER — CALCIUM GLUCONATE 100 MG/ML
1 VIAL (ML) INTRAVENOUS ONCE
Refills: 0 | Status: COMPLETED | OUTPATIENT
Start: 2020-06-16 | End: 2020-06-16

## 2020-06-15 RX ORDER — ALBUMIN HUMAN 25 %
4000 VIAL (ML) INTRAVENOUS ONCE
Refills: 0 | Status: COMPLETED | OUTPATIENT
Start: 2020-06-16 | End: 2020-06-16

## 2020-06-15 RX ORDER — HEPARIN SODIUM 5000 [USP'U]/ML
1000 INJECTION INTRAVENOUS; SUBCUTANEOUS ONCE
Refills: 0 | Status: COMPLETED | OUTPATIENT
Start: 2020-06-16 | End: 2020-06-16

## 2020-06-15 RX ORDER — POLYETHYLENE GLYCOL 3350 17 G/17G
17 POWDER, FOR SOLUTION ORAL DAILY
Refills: 0 | Status: DISCONTINUED | OUTPATIENT
Start: 2020-06-15 | End: 2020-06-22

## 2020-06-15 RX ADMIN — ENOXAPARIN SODIUM 40 MILLIGRAM(S): 100 INJECTION SUBCUTANEOUS at 11:27

## 2020-06-15 RX ADMIN — Medication 40 MILLIGRAM(S): at 05:28

## 2020-06-15 RX ADMIN — PANTOPRAZOLE SODIUM 40 MILLIGRAM(S): 20 TABLET, DELAYED RELEASE ORAL at 06:04

## 2020-06-15 RX ADMIN — ONDANSETRON 4 MILLIGRAM(S): 8 TABLET, FILM COATED ORAL at 14:42

## 2020-06-15 RX ADMIN — BUDESONIDE AND FORMOTEROL FUMARATE DIHYDRATE 2 PUFF(S): 160; 4.5 AEROSOL RESPIRATORY (INHALATION) at 08:03

## 2020-06-15 RX ADMIN — PYRIDOSTIGMINE BROMIDE 90 MILLIGRAM(S): 60 SOLUTION ORAL at 11:28

## 2020-06-15 RX ADMIN — CHLORHEXIDINE GLUCONATE 1 APPLICATION(S): 213 SOLUTION TOPICAL at 21:30

## 2020-06-15 RX ADMIN — AZATHIOPRINE 50 MILLIGRAM(S): 100 TABLET ORAL at 14:43

## 2020-06-15 RX ADMIN — CHLORHEXIDINE GLUCONATE 1 APPLICATION(S): 213 SOLUTION TOPICAL at 11:30

## 2020-06-15 RX ADMIN — AZATHIOPRINE 50 MILLIGRAM(S): 100 TABLET ORAL at 21:33

## 2020-06-15 RX ADMIN — BUDESONIDE AND FORMOTEROL FUMARATE DIHYDRATE 2 PUFF(S): 160; 4.5 AEROSOL RESPIRATORY (INHALATION) at 20:33

## 2020-06-15 RX ADMIN — ONDANSETRON 4 MILLIGRAM(S): 8 TABLET, FILM COATED ORAL at 21:30

## 2020-06-15 RX ADMIN — Medication 50 MICROGRAM(S): at 05:28

## 2020-06-15 RX ADMIN — Medication 81 MILLIGRAM(S): at 11:27

## 2020-06-15 RX ADMIN — ATORVASTATIN CALCIUM 20 MILLIGRAM(S): 80 TABLET, FILM COATED ORAL at 21:33

## 2020-06-15 RX ADMIN — FAMOTIDINE 40 MILLIGRAM(S): 10 INJECTION INTRAVENOUS at 21:32

## 2020-06-15 RX ADMIN — AZATHIOPRINE 50 MILLIGRAM(S): 100 TABLET ORAL at 05:28

## 2020-06-15 RX ADMIN — PYRIDOSTIGMINE BROMIDE 90 MILLIGRAM(S): 60 SOLUTION ORAL at 17:54

## 2020-06-15 RX ADMIN — PYRIDOSTIGMINE BROMIDE 90 MILLIGRAM(S): 60 SOLUTION ORAL at 06:04

## 2020-06-15 RX ADMIN — PYRIDOSTIGMINE BROMIDE 90 MILLIGRAM(S): 60 SOLUTION ORAL at 21:36

## 2020-06-15 RX ADMIN — ONDANSETRON 4 MILLIGRAM(S): 8 TABLET, FILM COATED ORAL at 05:29

## 2020-06-15 RX ADMIN — POLYETHYLENE GLYCOL 3350 17 GRAM(S): 17 POWDER, FOR SOLUTION ORAL at 14:42

## 2020-06-15 NOTE — PROGRESS NOTE ADULT - SUBJECTIVE AND OBJECTIVE BOX
59 year old female with myasthenia gravis accepted for 5 sessions of plasmapheresis with 5% albumin, with first procedure initiated on 6/12/20.  Thanks so much for allowing us to participate in the care of your patient.  Please do not hesitate to call us with questions or concerns.    Pamela Gardner MD, MAGGIE  [phone]   of Blood Bank and Transfusion Medicine  Associate Residency , Clinical Pathology, [facility]   of Pathology at the [employer] at [facility]

## 2020-06-15 NOTE — PROGRESS NOTE ADULT - ASSESSMENT
IMPRESSION:  Weakness - likely MG crisis s/p [facility] placement for plasmapheresis  Hematuria?      SUGGEST:      CNS:  plasmapheresis 2 more session   Monitor NIF and VC Q shift .   Electively intubate if worsening  neuro F/U  cont pyriodstigmine  prednisone   HEENT: Oral care    PULMONARY:  HOB @ 45 degrees  follow NIF and VC Q 6 hrs   check pox on RA rest and exertion     CARDIOVASCULAR: I=O    GI: GI prophylaxis.  Feeding    RENAL:  Follow up lytes.  Correct as needed    INFECTIOUS DISEASE: Follow up cultures    HEMATOLOGICAL:  DVT prophylaxis.    ENDOCRINE:  Follow up FS.  Insulin protocol if needed.    MUSCULOSKELETAL: Bedrest    MICU monitoring

## 2020-06-15 NOTE — PROGRESS NOTE ADULT - SUBJECTIVE AND OBJECTIVE BOX
Patient is feeling less weak this am, less difficulty swallowing        T(F): 97.9 (06-15-20 @ 11:00), Max: 97.9 (06-14-20 @ 19:00)  HR: 60 (06-15-20 @ 07:47)  BP: 116/57 (06-15-20 @ 07:47)  RR: 13 (06-15-20 @ 11:00)  SpO2: 100% (06-15-20 @ 07:47) (98% - 100%)    PHYSICAL EXAM:  GENERAL: NAD  HEAD:  Atraumatic, Normocephalic  NERVOUS SYSTEM:  Alert & Oriented X3, no focal deficits   CHEST/LUNG: Clear to percussion bilaterally; No rales, rhonchi, wheezing, or rubs  HEART: Regular rate and rhythm; No murmurs, rubs, or gallops  ABDOMEN: Soft, Nontender, Nondistended; Bowel sounds present  EXTREMITIES:  2+ Peripheral Pulses, No clubbing, cyanosis, or edema  LYMPH: No lymphadenopathy noted  SKIN: No rashes or lesions    LABS  06-14    139  |  103  |  18  ----------------------------<  104<H>  4.1   |  27  |  0.7    Ca    9.2      14 Jun 2020 05:45  Mg     1.8     06-14                            12.0   10.85 )-----------( 252      ( 14 Jun 2020 05:45 )             34.8       Culture Results:   >=3 organisms. Probable collection contamination. (06-11-20)    RADIOLOGY  < from: Xray Chest 1 View- PORTABLE-Routine (06.14.20 @ 04:54) >  Impression: Near-complete resolution of left base opacity with minimal residual costophrenic angle reaction    < end of copied text >    MEDICATIONS  (STANDING):  aspirin  chewable 81 milliGRAM(s) Oral daily  atorvastatin 20 milliGRAM(s) Oral at bedtime  azaTHIOprine 50 milliGRAM(s) Oral <User Schedule>  budesonide  80 MICROgram(s)/formoterol 4.5 MICROgram(s) Inhaler 2 Puff(s) Inhalation two times a day  chlorhexidine 4% Liquid 1 Application(s) Topical two times a day  enoxaparin Injectable 40 milliGRAM(s) SubCutaneous daily  famotidine    Tablet 40 milliGRAM(s) Oral at bedtime  levothyroxine 50 MICROGram(s) Oral daily  methylPREDNISolone sodium succinate Injectable 40 milliGRAM(s) IV Push daily  ondansetron Injectable 4 milliGRAM(s) IV Push every 8 hours  pantoprazole    Tablet 40 milliGRAM(s) Oral before breakfast  polyethylene glycol 3350 17 Gram(s) Oral daily  pyridostigmine 90 milliGRAM(s) Oral four times a day    MEDICATIONS  (PRN):  acetaminophen   Tablet .. 650 milliGRAM(s) Oral every 6 hours PRN Mild Pain (1 - 3), Moderate Pain (4 - 6)

## 2020-06-15 NOTE — SWALLOW BEDSIDE ASSESSMENT ADULT - SLP PERTINENT HISTORY OF CURRENT PROBLEM
h/o sleep apnea, dysphagia, myasthenia gravis, pancreatitis, esophageal dilations, POTS, high cholesterol, admit with worsening dyspnea and weakness. Previously evaluated in december 2019 was recommedned to have MBS to evaluate orophayrgeanl phase of swallow

## 2020-06-15 NOTE — PROGRESS NOTE ADULT - SUBJECTIVE AND OBJECTIVE BOX
Neurology Follow up note    Name  GHADA ROPER    HPI:  59 year old owman with hx of myasthenia gravis on mestonin, prednisone daily, finished a 5 day IVIG regimen last week presents to the ED with increasing dyspnea and weakness. [facility] when she could not walk up 2 flights of stairs. Patient with hematuria noted since Sunday. Denies any chest pain, no sob, no fever, chills. (11 Jun 2020 18:30)      Interval History:    patient feeling better after 2nd PLEX  no new complaints    Vital Signs Last 24 Hrs  T(C): 36.4 (15 Willy 2020 07:01), Max: 37.1 (14 Jun 2020 11:00)  T(F): 97.6 (15 Willy 2020 07:01), Max: 98.8 (14 Jun 2020 11:00)  HR: 60 (15 Willy 2020 07:47) (51 - 84)  BP: 116/57 (15 Willy 2020 07:47) (78/43 - 143/71)  BP(mean): 58 (15 Willy 2020 07:47) (58 - 97)  RR: 18 (15 Willy 2020 07:47) (10 - 44)  SpO2: 100% (15 Willy 2020 07:47) (98% - 100%)    Neurological Exam:   Mental status: Awake, alert and oriented x3.  Recent and remote memory intact.  Naming, repetition and comprehension intact.  Attention/concentration intact.  No dysarthria, no aphasia.  Fund of knowledge appropriate.    Cranial nerves: Pupils equally round and reactive to light, visual fields full, no nystagmus, extraocular muscles intact, V1 through V3 intact bilaterally and symmetric, face symmetric, hearing intact to finger rub, palate elevation symmetric, tongue was midline.  Motor:  MRC grading 5/5 b/l UE/LE except 4+ b delts.   strength 5/5.  Normal tone and bulk.  No abnormal movements.  full jaw clench. neck flexion 4+  Sensation: Intact to light touch, proprioception, and pinprick.   Coordination: No dysmetria on finger-to-nose and heel-to-shin.  No dysdiadokinesia.  Reflexes: 2+ in bilateral UE/LE, downgoing toes bilaterally. (-) Lance.      Medications  acetaminophen   Tablet .. 650 milliGRAM(s) Oral every 6 hours PRN  aspirin  chewable 81 milliGRAM(s) Oral daily  atorvastatin 20 milliGRAM(s) Oral at bedtime  azaTHIOprine 50 milliGRAM(s) Oral <User Schedule>  budesonide  80 MICROgram(s)/formoterol 4.5 MICROgram(s) Inhaler 2 Puff(s) Inhalation two times a day  chlorhexidine 4% Liquid 1 Application(s) Topical two times a day  enoxaparin Injectable 40 milliGRAM(s) SubCutaneous daily  famotidine    Tablet 40 milliGRAM(s) Oral at bedtime  levothyroxine 50 MICROGram(s) Oral daily  methylPREDNISolone sodium succinate Injectable 40 milliGRAM(s) IV Push daily  ondansetron Injectable 4 milliGRAM(s) IV Push every 8 hours  pantoprazole    Tablet 40 milliGRAM(s) Oral before breakfast  pyridostigmine 90 milliGRAM(s) Oral four times a day      Lab  06-14    139  |  103  |  18  ----------------------------<  104<H>  4.1   |  27  |  0.7    Ca    9.2      14 Jun 2020 05:45  Mg     1.8     06-14                            12.0   10.85 )-----------( 252      ( 14 Jun 2020 05:45 )             34.8               Radiology      Assessment:  exacerbation of MG  pt improving with PLEX  cont for 5 exchanges  cont mestinon  cont methylprednisolone 40 mg qd  cont imuran  swallow eval    Plan:

## 2020-06-15 NOTE — PROGRESS NOTE ADULT - SUBJECTIVE AND OBJECTIVE BOX
Hospital Day:  4d    Subjective:  No acute events overnight pt didn't tolerate the BiPAP due to mask discomfort. Pt examined at bedside. c/o continued nausea with frequent need to spit up but tolerating PO intake for the most part. Notes breathing and muscle strength much improved from prior.    Past Medical Hx:   Myasthenia gravis  High blood cholesterol  Pancreatitis  POTS (postural orthostatic tachycardia syndrome)    Past Sx:  History of foot surgery    Allergies:  sulfa drugs (Unknown)    Current Meds:   Standng Meds:  aspirin  chewable 81 milliGRAM(s) Oral daily  atorvastatin 20 milliGRAM(s) Oral at bedtime  azaTHIOprine 50 milliGRAM(s) Oral <User Schedule>  budesonide  80 MICROgram(s)/formoterol 4.5 MICROgram(s) Inhaler 2 Puff(s) Inhalation two times a day  chlorhexidine 4% Liquid 1 Application(s) Topical two times a day  enoxaparin Injectable 40 milliGRAM(s) SubCutaneous daily  famotidine    Tablet 40 milliGRAM(s) Oral at bedtime  levothyroxine 50 MICROGram(s) Oral daily  methylPREDNISolone sodium succinate Injectable 40 milliGRAM(s) IV Push daily  ondansetron Injectable 4 milliGRAM(s) IV Push every 8 hours  pantoprazole    Tablet 40 milliGRAM(s) Oral before breakfast  pyridostigmine 90 milliGRAM(s) Oral four times a day    PRN Meds:  acetaminophen   Tablet .. 650 milliGRAM(s) Oral every 6 hours PRN Mild Pain (1 - 3), Moderate Pain (4 - 6)    Vital Signs:   T(F): 97.6 (06-15-20 @ 07:01), Max: 97.9 (20 @ 19:00)  HR: 60 (06-15-20 @ 07:47) (51 - 84)  BP: 116/57 (06-15-20 @ 07:47) (78/43 - 143/71)  RR: 18 (06-15-20 @ 07:47) (10 - 44)  SpO2: 100% (06-15-20 @ 07:47) (98% - 100%)      20 @ 07:01  -  06-15-20 @ 07:00  --------------------------------------------------------  IN: 535 mL / OUT: 1850 mL / NET: -1315 mL    06-15-20 @ 07:01  -  06-15-20 @ 11:16  --------------------------------------------------------  IN: 0 mL / OUT: 850 mL / NET: -850 mL        Physical Exam:   GENERAL: NAD  HEENT: NCAT  CHEST/LUNG: CTAB  HEART: Regular rate and rhythm; s1 s2 appreciated, No murmurs, rubs, or gallops  ABDOMEN: Soft, Nontender, Nondistended; Bowel sounds present  EXTREMITIES: No LE edema b/l  SKIN: no rashes, no new lesions  NERVOUS SYSTEM:  Alert & Oriented X3, no UE weakness, LE with b/l 4/5 extension and 4/5 flexion  LINES/CATHETERS: right femoral Udal- no erythema, tenderness or bleeding        Labs:                         12.0   10.85 )-----------( 252      ( 2020 05:45 )             34.8       2020 05:45    139    |  103    |  18     ----------------------------<  104    4.1     |  27     |  0.7      Ca    9.2        2020 05:45  Mg     1.8       2020 05:45    Urinalysis Basic - ( 2020 13:08 )    Color: Yellow / Appearance: Clear / S.020 / pH: x  Gluc: x / Ketone: Negative  / Bili: Negative / Urobili: 0.2 mg/dL   Blood: x / Protein: Negative mg/dL / Nitrite: Negative   Leuk Esterase: Trace / RBC: x / WBC 3-5 /HPF   Sq Epi: x / Non Sq Epi: Occasional /HPF / Bacteria: Moderate        Radiology:   no new imaging    Assessment and Plan:   60 y/o F PMHx Myasthenia gravis, hypothyroid, dysphagia with ? esophogeal strictures s/p dilatation, HLD, KADI on CPAP presenting with myasthenia crisis.    # Myasthenia crisis  -recent worsening of symptoms s/p 5 days IVIG as outpatient with no improvement  -neurology recs noted  -s/p plasma exchange x2/5 with plan for next exchange    -c/w azathioprine, solumedrol, pyridostigmine  -monitor NIF/VC q6hrs (last NIF -60, VC 2400)    # Dysphagia  -Hx of strictures s/p diation  last ? Oct 2019  -d/w speech pathology, pt previously scheduled for modified barium swallow in the past that could not be done due to pt becoming sick,   -speech to consider doing it now pending discussion with GI as they may want to do EGD with dilatation   -f/u GI and speech recs  -c/w DASH diet for now    # KADI on CPAP  -currently on BiPAP 10/5 40% QHS but not tolerating well  -check PM ABG for elevated PCO2 to see if BiPAP is needed or pt can return to CPAP    # HLD  -c/w Lipiotor    # DVT ppx  -c/w lovenox    # Diet  -DASH    # Activity  -OOBTC as tolerated    # Code Status  -FULL CODE    # Dispo  -likely d/c home pending completion of PLEX Hospital Day:  4d    Subjective:  No acute events overnight pt didn't tolerate the BiPAP due to mask discomfort. Pt examined at bedside. c/o continued nausea with frequent need to spit up but tolerating PO intake for the most part. Notes breathing and muscle strength much improved from prior.    Past Medical Hx:   Myasthenia gravis  High blood cholesterol  Pancreatitis  POTS (postural orthostatic tachycardia syndrome)    Past Sx:  History of foot surgery    Allergies:  sulfa drugs (Unknown)    Current Meds:   Standng Meds:  aspirin  chewable 81 milliGRAM(s) Oral daily  atorvastatin 20 milliGRAM(s) Oral at bedtime  azaTHIOprine 50 milliGRAM(s) Oral <User Schedule>  budesonide  80 MICROgram(s)/formoterol 4.5 MICROgram(s) Inhaler 2 Puff(s) Inhalation two times a day  chlorhexidine 4% Liquid 1 Application(s) Topical two times a day  enoxaparin Injectable 40 milliGRAM(s) SubCutaneous daily  famotidine    Tablet 40 milliGRAM(s) Oral at bedtime  levothyroxine 50 MICROGram(s) Oral daily  methylPREDNISolone sodium succinate Injectable 40 milliGRAM(s) IV Push daily  ondansetron Injectable 4 milliGRAM(s) IV Push every 8 hours  pantoprazole    Tablet 40 milliGRAM(s) Oral before breakfast  pyridostigmine 90 milliGRAM(s) Oral four times a day    PRN Meds:  acetaminophen   Tablet .. 650 milliGRAM(s) Oral every 6 hours PRN Mild Pain (1 - 3), Moderate Pain (4 - 6)    Vital Signs:   T(F): 97.6 (06-15-20 @ 07:01), Max: 97.9 (20 @ 19:00)  HR: 60 (06-15-20 @ 07:47) (51 - 84)  BP: 116/57 (06-15-20 @ 07:47) (78/43 - 143/71)  RR: 18 (06-15-20 @ 07:47) (10 - 44)  SpO2: 100% (06-15-20 @ 07:47) (98% - 100%)      20 @ 07:01  -  06-15-20 @ 07:00  --------------------------------------------------------  IN: 535 mL / OUT: 1850 mL / NET: -1315 mL    06-15-20 @ 07:01  -  06-15-20 @ 11:16  --------------------------------------------------------  IN: 0 mL / OUT: 850 mL / NET: -850 mL        Physical Exam:   GENERAL: NAD  HEENT: NCAT  CHEST/LUNG: CTAB  HEART: Regular rate and rhythm; s1 s2 appreciated, No murmurs, rubs, or gallops  ABDOMEN: Soft, Nontender, Nondistended; Bowel sounds present  EXTREMITIES: No LE edema b/l  SKIN: no rashes, no new lesions  NERVOUS SYSTEM:  Alert & Oriented X3, no UE weakness, LE with b/l 4/5 extension and 4/5 flexion  LINES/CATHETERS: right femoral Udal- no erythema, tenderness or bleeding        Labs:                         12.0   10.85 )-----------( 252      ( 2020 05:45 )             34.8       2020 05:45    139    |  103    |  18     ----------------------------<  104    4.1     |  27     |  0.7      Ca    9.2        2020 05:45  Mg     1.8       2020 05:45    Urinalysis Basic - ( 2020 13:08 )    Color: Yellow / Appearance: Clear / S.020 / pH: x  Gluc: x / Ketone: Negative  / Bili: Negative / Urobili: 0.2 mg/dL   Blood: x / Protein: Negative mg/dL / Nitrite: Negative   Leuk Esterase: Trace / RBC: x / WBC 3-5 /HPF   Sq Epi: x / Non Sq Epi: Occasional /HPF / Bacteria: Moderate        Radiology:   no new imaging    Assessment and Plan:   60 y/o F PMHx Myasthenia gravis, hypothyroid, dysphagia with ? esophogeal strictures s/p dilatation, HLD, KADI on CPAP presenting with myasthenia crisis.    # Myasthenia crisis  -recent worsening of symptoms s/p 5 days IVIG as outpatient with no improvement  -neurology recs noted  -s/p plasma exchange x2/5 with plan for next exchange    -c/w azathioprine, solumedrol, pyridostigmine  -monitor NIF/VC q6hrs (last NIF -60, VC 2400)    # Dysphagia  -Hx of strictures s/p diation  last ? Oct 2019  -d/w speech pathology, pt previously scheduled for modified barium swallow in the past that could not be done due to pt becoming sick,   -speech to consider doing it now pending discussion with GI as they may want to do EGD with dilatation   -f/u GI and speech recs  -c/w DASH diet for now  -c/w pepcid, zofran protonix    # KADI on CPAP  -currently on BiPAP 10/5 40% QHS but not tolerating well  -check PM ABG for elevated PCO2 to see if BiPAP is needed or pt can return to CPAP    # Hypothyroid  -c/w levothyroxine home dose    # HLD  -c/w Lipitor    # DVT ppx  -c/w lovenox    # Diet  -DASH    # Activity  -OOBTC as tolerated    # Code Status  -FULL CODE    # Dispo  -likely d/c home pending completion of PLEX

## 2020-06-15 NOTE — CONSULT NOTE ADULT - SUBJECTIVE AND OBJECTIVE BOX
Chief complaint/Reason for consult: Intermittent vomiting    HPI:  59 year old owman with hx of myasthenia gravis on mestonin, prednisone daily, finished a 5 day IVIG regimen last week presents to the ED with increasing dyspnea and weakness. [facility] when she could not walk up 2 flights of stairs. Patient with hematuria noted since Sunday. Denies any chest pain, no sob, no fever, chills. (11 Jun 2020 18:30)    GI Updates: 59yFemale pmh MG admitted with myasthenia gravis crisis consulted to GI for intermittent post prandial emesis for 3 weeks. Patient reports she feels epigastric discomfort and as if food gets stuck at her epigastric region. Patient denies nausea, hematemesis, melena, blood in stool, diarrhea, constipation, abdominal pain. Patient reports emesis without nausea. Patient reports she had ane EGD with Dr. Ag with dilation 10/2019 showed grade A esophagitis. Patient had barium esophagram and was scheduled for repeat EGD then the COVID-19 pandemic happened and everything was put off.       PAST MEDICAL & SURGICAL HISTORY:  Myasthenia gravis  High blood cholesterol  Pancreatitis  POTS (postural orthostatic tachycardia syndrome)  History of foot surgery        Family history:  FAMILY HISTORY:  No pertinent family history in first degree relatives    No GI cancers in first or second degree relatives    Social History: No smoking. No alcohol. No illegal drug use.    Allergies:  sulfa drugs (Unknown)      MEDICATIONS: Home Medications:  albuterol:  (11 Jun 2020 19:46)  aspirin 81 mg oral tablet, chewable: 1 tab(s) orally once a day (11 Jun 2020 19:46)  atorvastatin 20 mg oral tablet: 1 tab(s) orally once a day (11 Jun 2020 19:46)  doxepin 10 mg oral capsule:  (11 Jun 2020 19:46)  fluticasone nasal:  (11 Jun 2020 19:46)  Imuran 50 mg oral tablet: 1 tab(s) orally 3 times a day (11 Jun 2020 19:46)  Mestinon 60 mg oral tablet: 1.5 tab(s) orally 4 times a day (11 Jun 2020 19:46)  Pepcid:  (11 Jun 2020 19:46)  predniSONE 20 mg oral tablet: 1 tab(s) orally once a day (11 Jun 2020 19:46)  Protonix 40 mg oral delayed release tablet: 1 tab(s) orally once a day (11 Jun 2020 19:46)  Symbicort: inhaled 2 times a day (11 Jun 2020 19:46)  Toprol-XL 25 mg oral tablet, extended release: 1 tab(s) orally once a day (11 Jun 2020 19:46)  Vitamin D2: every friday (11 Jun 2020 19:46)      MEDICATIONS  (STANDING):  aspirin  chewable 81 milliGRAM(s) Oral daily  atorvastatin 20 milliGRAM(s) Oral at bedtime  azaTHIOprine 50 milliGRAM(s) Oral <User Schedule>  budesonide  80 MICROgram(s)/formoterol 4.5 MICROgram(s) Inhaler 2 Puff(s) Inhalation two times a day  chlorhexidine 4% Liquid 1 Application(s) Topical two times a day  enoxaparin Injectable 40 milliGRAM(s) SubCutaneous daily  famotidine    Tablet 40 milliGRAM(s) Oral at bedtime  levothyroxine 50 MICROGram(s) Oral daily  methylPREDNISolone sodium succinate Injectable 40 milliGRAM(s) IV Push daily  ondansetron Injectable 4 milliGRAM(s) IV Push every 8 hours  pantoprazole    Tablet 40 milliGRAM(s) Oral before breakfast  polyethylene glycol 3350 17 Gram(s) Oral daily  pyridostigmine 90 milliGRAM(s) Oral four times a day    MEDICATIONS  (PRN):  acetaminophen   Tablet .. 650 milliGRAM(s) Oral every 6 hours PRN Mild Pain (1 - 3), Moderate Pain (4 - 6)        REVIEW OF SYSTEMS  General:  No weight loss, fevers, or chills.  Eyes:  No reported pain or visual changes  ENT:  No sore throat or runny nose.  NECK: No stiffness or lymphadenopathy  CV:  No chest pain or palpitations.  Resp:  No shortness of breath, cough, wheezing or hemoptysis  GI:  +epigastric discomfort abdominal pain, No nausea, +vomiting, + dysphagia, No diarrhea or constipation. No rectal bleeding, melena, or hematemesis.  Neuro:  No tingling, numbness       VITALS:   T(F): 97.9 (06-15-20 @ 11:00), Max: 97.9 (06-14-20 @ 19:00)  HR: 60 (06-15-20 @ 07:47) (51 - 84)  BP: 116/57 (06-15-20 @ 07:47) (78/43 - 143/71)  RR: 13 (06-15-20 @ 11:00) (10 - 44)  SpO2: 100% (06-15-20 @ 07:47) (98% - 100%)    PHYSICAL EXAM:  GENERAL: AAOx3, no acute distress.  HEAD:  Atraumatic, Normocephalic  EYES: conjunctiva and sclera clear  NECK: Supple, No thyromegaly   CHEST/LUNG: Clear to auscultation bilaterally; No wheeze, rhonchi, or rales  HEART: Regular rate and rhythm; normal S1, S2, No murmurs.  ABDOMEN: Soft, nontender, nondistended; Bowel sounds present, no abdominal bruit, masses, or hepatosplenomegaly  NEUROLOGY: No asterixis or tremor  SKIN: Intact, no jaundice          LABS:  06-14    139  |  103  |  18  ----------------------------<  104<H>  4.1   |  27  |  0.7    Ca    9.2      14 Jun 2020 05:45  Mg     1.8     06-14                            12.0   10.85 )-----------( 252      ( 14 Jun 2020 05:45 )             34.8           IMAGING:    < from: Xray Chest 1 View- PORTABLE-Routine (06.14.20 @ 04:54) >  EXAM:  XR CHEST PORTABLE ROUTINE 1V            PROCEDURE DATE:  06/14/2020            INTERPRETATION:  Clinical History / Reason for exam: Shortness of breath    Comparison : Chest radiograph 6/13/2020.    Technique/Positioning: AP portable.    Findings:    Support devices: None.    Cardiac/mediastinum/hilum: Unchanged    Lung parenchyma/Pleura: Near-complete resolution of left base opacity with minimal left costophrenic pleural reaction remaining. No pneumothorax or pleural effusion.    Skeleton/soft tissues: Unchanged    Impression: Near-complete resolution of left base opacity with minimal residual costophrenic angle reaction                      JENI BERNARDO M.D., ATTENDING RADIOLOGIST  This document has been electronically signed. Jun14 2020 11:37AM        < end of copied text >

## 2020-06-15 NOTE — CONSULT NOTE ADULT - ASSESSMENT
59yFemale pmh MG admitted with myasthenia gravis crisis consulted to GI for intermittent post prandial emesis for 3 weeks. Patient reports she feels epigastric discomfort and as if food gets stuck at her epigastric region. Patient denies nausea, hematemesis, melena, blood in stool, diarrhea, constipation, abdominal pain. Patient reports emesis without nausea. Patient reports she had ane EGD with Dr. Ag with dilation 10/2019 showed grade A esophagitis. Patient had barium esophagram and was scheduled for repeat EGD then the COVID-19 pandemic happened and everything was put off    Problem 1-Post-Prandial Emesis history of esophageal strictures, EGD with dilation in October Rec  -Possibly exacerbation due to MG crisis   -Zofran prn  -PPI daily  -SLP team on board case discussed with SLP, barium esophagram Wednesday if no improvement in post prandial emesis followed by EGD if patient unable to keep food down with persistent vomiting  -The benefits of endoscopy as well as the risks, such as GI bleeding, aspiration pneumonia, perforation, damage or loss of teeth, reaction to medication, or death  were reviewed with the patient.   -Will follow-up 59yFemale pmh MG admitted with myasthenia gravis crisis consulted to GI for intermittent post prandial emesis for 3 weeks. Patient reports she feels epigastric discomfort and as if food gets stuck at her epigastric region. Patient denies nausea, hematemesis, melena, blood in stool, diarrhea, constipation, abdominal pain. Patient reports emesis without nausea. Patient reports she had ane EGD with Dr. Ag with dilation 10/2019 showed grade A esophagitis. Patient had barium esophagram and was scheduled for repeat EGD then the COVID-19 pandemic happened and everything was put off    Problem 1-Post-Prandial Emesis history of esophageal strictures, EGD with dilation in October Rec  -Possibly exacerbation due to MG crisis, patient s/p PLEX  -Zofran prn  -PPI daily  -SLP team on board case discussed with SLP, barium esophagram Wednesday if no improvement in post prandial emesis followed by EGD if patient unable to keep food down with persistent vomiting  -The benefits of endoscopy as well as the risks, such as GI bleeding, aspiration pneumonia, perforation, damage or loss of teeth, reaction to medication, or death  were reviewed with the patient.   -Will follow-up 59yFemale pmh MG admitted with myasthenia gravis crisis consulted to GI for intermittent post prandial emesis for 3 weeks. Patient reports she feels epigastric discomfort and as if food gets stuck at her epigastric region. Patient denies nausea, hematemesis, melena, blood in stool, diarrhea, constipation, abdominal pain. Patient reports emesis without nausea. Patient reports she had ane EGD with Dr. Ag with dilation 10/2019 showed grade A esophagitis. Patient had barium esophagram and was scheduled for repeat EGD then the COVID-19 pandemic happened and everything was put off    Problem 1-Post-Prandial Emesis history of esophageal strictures, EGD with dilation in October Rec  -Possibly exacerbation due to MG crisis, patient s/p PLEX  -Zofran prn  -PPI daily  -SLP team on board case discussed with SLP, barium esophagram Wednesday if no improvement in post prandial emesis followed by EGD if patient unable to keep food down with persistent vomiting, if we can obtain neurology clearance for EGD with anesthesia given MG crisis    -The benefits of endoscopy as well as the risks, such as GI bleeding, aspiration pneumonia, perforation, damage or loss of teeth, reaction to medication, or death  were reviewed with the patient.   -Will follow-up 59yFemale pmh MG admitted with myasthenia gravis crisis consulted to GI for intermittent post prandial emesis for 3 weeks. Patient reports she feels epigastric discomfort and as if food gets stuck at her epigastric region. Patient denies nausea, hematemesis, melena, blood in stool, diarrhea, constipation, abdominal pain. Patient reports emesis without nausea. Patient reports she had ane EGD with Dr. Ag with dilation 10/2019 showed grade A esophagitis. Patient had barium esophagram and was scheduled for repeat EGD then the COVID-19 pandemic happened and everything was put off    Problem 1-Post-Prandial Emesis history of esophageal strictures, EGD with dilation in October by Dr Ag  Rec  -Possibly exacerbation due to MG crisis, patient s/p PLEX  -Zofran prn  -PPI daily  -SLP team on board case discussed with SLP, barium esophagram Wednesday if no improvement in post prandial emesis followed by EGD if patient unable to keep food down with persistent vomiting, if we can obtain neurology clearance for EGD with anesthesia given MG crisis    -The benefits of endoscopy as well as the risks, such as GI bleeding, aspiration pneumonia, perforation, damage or loss of teeth, reaction to medication, or death  were reviewed with the patient.   -Will follow-up

## 2020-06-16 LAB
ANION GAP SERPL CALC-SCNC: 9 MMOL/L — SIGNIFICANT CHANGE UP (ref 7–14)
BUN SERPL-MCNC: 19 MG/DL — SIGNIFICANT CHANGE UP (ref 10–20)
CALCIUM SERPL-MCNC: 8.9 MG/DL — SIGNIFICANT CHANGE UP (ref 8.5–10.1)
CHLORIDE SERPL-SCNC: 102 MMOL/L — SIGNIFICANT CHANGE UP (ref 98–110)
CO2 SERPL-SCNC: 29 MMOL/L — SIGNIFICANT CHANGE UP (ref 17–32)
CREAT SERPL-MCNC: 0.7 MG/DL — SIGNIFICANT CHANGE UP (ref 0.7–1.5)
GLUCOSE BLDC GLUCOMTR-MCNC: 159 MG/DL — HIGH (ref 70–99)
GLUCOSE BLDC GLUCOMTR-MCNC: 161 MG/DL — HIGH (ref 70–99)
GLUCOSE BLDC GLUCOMTR-MCNC: 194 MG/DL — HIGH (ref 70–99)
GLUCOSE BLDC GLUCOMTR-MCNC: 214 MG/DL — HIGH (ref 70–99)
GLUCOSE SERPL-MCNC: 117 MG/DL — HIGH (ref 70–99)
HCT VFR BLD CALC: 33.5 % — LOW (ref 37–47)
HGB BLD-MCNC: 11.5 G/DL — LOW (ref 12–16)
MAGNESIUM SERPL-MCNC: 1.9 MG/DL — SIGNIFICANT CHANGE UP (ref 1.8–2.4)
MCHC RBC-ENTMCNC: 31.9 PG — HIGH (ref 27–31)
MCHC RBC-ENTMCNC: 34.3 G/DL — SIGNIFICANT CHANGE UP (ref 32–37)
MCV RBC AUTO: 93.1 FL — SIGNIFICANT CHANGE UP (ref 81–99)
NRBC # BLD: 0 /100 WBCS — SIGNIFICANT CHANGE UP (ref 0–0)
PLATELET # BLD AUTO: 243 K/UL — SIGNIFICANT CHANGE UP (ref 130–400)
POTASSIUM SERPL-MCNC: 4 MMOL/L — SIGNIFICANT CHANGE UP (ref 3.5–5)
POTASSIUM SERPL-SCNC: 4 MMOL/L — SIGNIFICANT CHANGE UP (ref 3.5–5)
RBC # BLD: 3.6 M/UL — LOW (ref 4.2–5.4)
RBC # FLD: 16.2 % — HIGH (ref 11.5–14.5)
SODIUM SERPL-SCNC: 140 MMOL/L — SIGNIFICANT CHANGE UP (ref 135–146)
WBC # BLD: 9.07 K/UL — SIGNIFICANT CHANGE UP (ref 4.8–10.8)
WBC # FLD AUTO: 9.07 K/UL — SIGNIFICANT CHANGE UP (ref 4.8–10.8)

## 2020-06-16 PROCEDURE — 99233 SBSQ HOSP IP/OBS HIGH 50: CPT

## 2020-06-16 PROCEDURE — 99232 SBSQ HOSP IP/OBS MODERATE 35: CPT

## 2020-06-16 RX ADMIN — CHLORHEXIDINE GLUCONATE 1 APPLICATION(S): 213 SOLUTION TOPICAL at 21:47

## 2020-06-16 RX ADMIN — POLYETHYLENE GLYCOL 3350 17 GRAM(S): 17 POWDER, FOR SOLUTION ORAL at 11:11

## 2020-06-16 RX ADMIN — ONDANSETRON 4 MILLIGRAM(S): 8 TABLET, FILM COATED ORAL at 05:36

## 2020-06-16 RX ADMIN — ONDANSETRON 4 MILLIGRAM(S): 8 TABLET, FILM COATED ORAL at 13:26

## 2020-06-16 RX ADMIN — Medication 81 MILLIGRAM(S): at 11:09

## 2020-06-16 RX ADMIN — Medication 100 GRAM(S): at 13:10

## 2020-06-16 RX ADMIN — AZATHIOPRINE 50 MILLIGRAM(S): 100 TABLET ORAL at 13:26

## 2020-06-16 RX ADMIN — FAMOTIDINE 40 MILLIGRAM(S): 10 INJECTION INTRAVENOUS at 21:18

## 2020-06-16 RX ADMIN — CHLORHEXIDINE GLUCONATE 1 APPLICATION(S): 213 SOLUTION TOPICAL at 05:49

## 2020-06-16 RX ADMIN — PANTOPRAZOLE SODIUM 40 MILLIGRAM(S): 20 TABLET, DELAYED RELEASE ORAL at 06:21

## 2020-06-16 RX ADMIN — BUDESONIDE AND FORMOTEROL FUMARATE DIHYDRATE 2 PUFF(S): 160; 4.5 AEROSOL RESPIRATORY (INHALATION) at 11:09

## 2020-06-16 RX ADMIN — ENOXAPARIN SODIUM 40 MILLIGRAM(S): 100 INJECTION SUBCUTANEOUS at 11:09

## 2020-06-16 RX ADMIN — Medication 50 MICROGRAM(S): at 05:44

## 2020-06-16 RX ADMIN — Medication 650 MILLIGRAM(S): at 12:48

## 2020-06-16 RX ADMIN — AZATHIOPRINE 50 MILLIGRAM(S): 100 TABLET ORAL at 05:43

## 2020-06-16 RX ADMIN — Medication 2000 MILLILITER(S): at 14:59

## 2020-06-16 RX ADMIN — PYRIDOSTIGMINE BROMIDE 90 MILLIGRAM(S): 60 SOLUTION ORAL at 11:11

## 2020-06-16 RX ADMIN — HEPARIN SODIUM 1000 UNIT(S): 5000 INJECTION INTRAVENOUS; SUBCUTANEOUS at 14:32

## 2020-06-16 RX ADMIN — PYRIDOSTIGMINE BROMIDE 90 MILLIGRAM(S): 60 SOLUTION ORAL at 21:19

## 2020-06-16 RX ADMIN — AZATHIOPRINE 50 MILLIGRAM(S): 100 TABLET ORAL at 21:19

## 2020-06-16 RX ADMIN — PYRIDOSTIGMINE BROMIDE 90 MILLIGRAM(S): 60 SOLUTION ORAL at 18:09

## 2020-06-16 RX ADMIN — BUDESONIDE AND FORMOTEROL FUMARATE DIHYDRATE 2 PUFF(S): 160; 4.5 AEROSOL RESPIRATORY (INHALATION) at 21:14

## 2020-06-16 RX ADMIN — ATORVASTATIN CALCIUM 20 MILLIGRAM(S): 80 TABLET, FILM COATED ORAL at 21:20

## 2020-06-16 RX ADMIN — PYRIDOSTIGMINE BROMIDE 90 MILLIGRAM(S): 60 SOLUTION ORAL at 05:44

## 2020-06-16 RX ADMIN — ONDANSETRON 4 MILLIGRAM(S): 8 TABLET, FILM COATED ORAL at 21:14

## 2020-06-16 RX ADMIN — Medication 40 MILLIGRAM(S): at 05:35

## 2020-06-16 NOTE — PROGRESS NOTE ADULT - SUBJECTIVE AND OBJECTIVE BOX
59yFemale  Being followed for dysphagia, vomiting   Interval history: Patient reports further emesis with after anything PO, patient reports no nausea. Patient reports 3 episodes of emesis from breakfast alone. Patient denies nausea, hematemesis, melena, blood in stool, diarrhea, constipation, abdominal pain.      PAST MEDICAL & SURGICAL HISTORY:   Myasthenia gravis  High blood cholesterol  Pancreatitis  POTS (postural orthostatic tachycardia syndrome)  History of foot surgery          Social History: No smoking. No alcohol. No illegal drug use.        MEDICATIONS  (STANDING):  albumin human  5% IVPB 4000 milliLiter(s) IV Intermittent once  aspirin  chewable 81 milliGRAM(s) Oral daily  atorvastatin 20 milliGRAM(s) Oral at bedtime  azaTHIOprine 50 milliGRAM(s) Oral <User Schedule>  budesonide  80 MICROgram(s)/formoterol 4.5 MICROgram(s) Inhaler 2 Puff(s) Inhalation two times a day  calcium gluconate IVPB 1 Gram(s) IV Intermittent once  chlorhexidine 4% Liquid 1 Application(s) Topical two times a day  enoxaparin Injectable 40 milliGRAM(s) SubCutaneous daily  famotidine    Tablet 40 milliGRAM(s) Oral at bedtime  heparin   Injectable 1000 Unit(s) IV Push once  levothyroxine 50 MICROGram(s) Oral daily  methylPREDNISolone sodium succinate Injectable 40 milliGRAM(s) IV Push daily  ondansetron Injectable 4 milliGRAM(s) IV Push every 8 hours  pantoprazole    Tablet 40 milliGRAM(s) Oral before breakfast  polyethylene glycol 3350 17 Gram(s) Oral daily  pyridostigmine 90 milliGRAM(s) Oral four times a day    MEDICATIONS  (PRN):  acetaminophen   Tablet .. 650 milliGRAM(s) Oral every 6 hours PRN Mild Pain (1 - 3), Moderate Pain (4 - 6)      Allergies:   sulfa drugs (Unknown)          REVIEW OF SYSTEMS:  General:  No weight loss, fevers, or chills.  Eyes:  No reported pain or visual changes  ENT:  No sore throat or runny nose.  NECK: No stiffness   CV:  No chest pain or palpitations.  Resp:  No shortness of breath, cough  GI:  No abdominal pain, nausea, +vomiting, +dysphagia, No diarrhea or constipation. No rectal bleeding, melena, or hematemesis.  Neuro:  No tingling, numbness           VITAL SIGNS:   T(F): 96.8 (06-16-20 @ 05:00), Max: 98.3 (06-15-20 @ 15:00)  HR: 51 (06-16-20 @ 05:00) (48 - 89)  BP: 122/60 (06-16-20 @ 05:00) (118/56 - 129/67)  RR: 21 (06-16-20 @ 09:04) (10 - 28)  SpO2: 100% (06-16-20 @ 05:00) (98% - 100%)    PHYSICAL EXAM:  GENERAL: AAOx3, no acute distress.  HEAD:  Atraumatic, Normocephalic  EYES: conjunctiva and sclera clear  NECK: Supple, no JVD or thyromegaly  CHEST/LUNG: Clear to auscultation bilaterally; No wheeze, rhonchi, or rales  HEART: Regular rate and rhythm; normal S1, S2, No murmurs.  ABDOMEN: Soft, nontender, nondistended; Bowel sounds present, no abdominal bruit, masses, or hepatosplenomegaly  NEUROLOGY: No asterixis or tremor.   SKIN: Intact, no jaundice            LABS:                        11.5   9.07  )-----------( 243      ( 16 Jun 2020 05:19 )             33.5     06-16    140  |  102  |  19  ----------------------------<  117<H>  4.0   |  29  |  0.7    Ca    8.9      16 Jun 2020 05:19  Mg     1.9     06-16            IMAGING:    < from: Xray Chest 1 View- PORTABLE-Routine (06.14.20 @ 04:54) >  EXAM:  XR CHEST PORTABLE ROUTINE 1V            PROCEDURE DATE:  06/14/2020            INTERPRETATION:  Clinical History / Reason for exam: Shortness of breath    Comparison : Chest radiograph 6/13/2020.    Technique/Positioning: AP portable.    Findings:    Support devices: None.    Cardiac/mediastinum/hilum: Unchanged    Lung parenchyma/Pleura: Near-complete resolution of left base opacity with minimal left costophrenic pleural reaction remaining. No pneumothorax or pleural effusion.    Skeleton/soft tissues: Unchanged    Impression: Near-complete resolution of left base opacity with minimal residual costophrenic angle reaction                      JENI BERNARDO M.D., ATTENDING RADIOLOGIST  This document has been electronically signed. Jun14 2020 11:37AM              < end of copied text >

## 2020-06-16 NOTE — PROGRESS NOTE ADULT - ASSESSMENT
IMPRESSION:  Weakness - likely MG crisis s/p Combs placement for plasmapheresis  Hematuria?  KADI    SUGGEST:      CNS:  plasmapheresis 2 more session   Monitor NIF and VC Q shift .   Electively intubate if worsening  neuro F/U  cont pyriodstigmine  prednisone     HEENT: Oral care    PULMONARY:  HOB @ 45 degrees  follow NIF and VC Q 6 hrs   check pox on RA rest and exertion   cpap at night for KADI   CARDIOVASCULAR: I=O    GI: GI prophylaxis.  Feeding    RENAL:  Follow up lytes.  Correct as needed    INFECTIOUS DISEASE: Follow up cultures    HEMATOLOGICAL:  DVT prophylaxis.    ENDOCRINE:  Follow up FS.  Insulin protocol if needed.    MUSCULOSKELETAL: Bedrest    MICU monitoring

## 2020-06-16 NOTE — PROGRESS NOTE ADULT - ASSESSMENT
59 year old owman with hx of myasthenia gravis on mestonin, prednisone daily, finished a 5 day IVIG regimen last week presents to the ED with increasing dyspnea and weakness. [facility] when she could not walk up 2 flights of stairs. Denies any chest pain, no sob, no fever, chills.         Myasthenia gravis crisis       - plasma exchange x 5 (#3 today)    - nebs prn    - monitor NIF    - continue home meds  - DVT prophylaxis     Hypothyroid / Hyperlipidemia / nausea      - GI following    - continue home meds

## 2020-06-16 NOTE — PROGRESS NOTE ADULT - SUBJECTIVE AND OBJECTIVE BOX
Patient is a 59y old  Female who presents with a chief complaint of Weakness (15 Willy 2020 12:10)      Over Night Events:  Patient seen and examined.   stable not on distress no cp     ROS:  See HPI    PHYSICAL EXAM    ICU Vital Signs Last 24 Hrs  T(C): 36 (16 Jun 2020 05:00), Max: 36.8 (15 Willy 2020 15:00)  T(F): 96.8 (16 Jun 2020 05:00), Max: 98.3 (15 Willy 2020 15:00)  HR: 51 (16 Jun 2020 05:00) (48 - 89)  BP: 122/60 (16 Jun 2020 05:00) (118/56 - 129/67)  BP(mean): 86 (16 Jun 2020 05:00) (80 - 96)  ABP: --  ABP(mean): --  RR: 16 (16 Jun 2020 07:10) (10 - 28)  SpO2: 100% (16 Jun 2020 05:00) (98% - 100%)      General: Aox3  HEENT:            [place]    Lymph Nodes: NO cervical LN   Lungs: Bilateral BS  Cardiovascular: Regular   Abdomen: Soft, Positive BS  Extremities: No clubbing   Skin: warm   Neurological: no focal deficit   Musculoskeletal: move all ext     I&O's Detail    15 Willy 2020 07:01  -  16 Jun 2020 07:00  --------------------------------------------------------  IN:    Oral Fluid: 840 mL  Total IN: 840 mL    OUT:    Voided: 2150 mL  Total OUT: 2150 mL    Total NET: -1310 mL          LABS:                          11.5   9.07  )-----------( 243      ( 16 Jun 2020 05:19 )             33.5         16 Jun 2020 05:19    140    |  102    |  19     ----------------------------<  117    4.0     |  29     |  0.7      Ca    8.9        16 Jun 2020 05:19  Mg     1.9       16 Jun 2020 05:19                                                                                                                                                                                                                                   ABG - ( 15 Willy 2020 22:57 )  pH, Arterial: x     pH, Blood: 7.42  /  pCO2: 40    /  pO2: 80    / HCO3: 26    / Base Excess: 1.9   /  SaO2: 97                  MEDICATIONS  (STANDING):  albumin human  5% IVPB 4000 milliLiter(s) IV Intermittent once  aspirin  chewable 81 milliGRAM(s) Oral daily  atorvastatin 20 milliGRAM(s) Oral at bedtime  azaTHIOprine 50 milliGRAM(s) Oral <User Schedule>  budesonide  80 MICROgram(s)/formoterol 4.5 MICROgram(s) Inhaler 2 Puff(s) Inhalation two times a day  calcium gluconate IVPB 1 Gram(s) IV Intermittent once  chlorhexidine 4% Liquid 1 Application(s) Topical two times a day  enoxaparin Injectable 40 milliGRAM(s) SubCutaneous daily  famotidine    Tablet 40 milliGRAM(s) Oral at bedtime  heparin   Injectable 1000 Unit(s) IV Push once  levothyroxine 50 MICROGram(s) Oral daily  methylPREDNISolone sodium succinate Injectable 40 milliGRAM(s) IV Push daily  ondansetron Injectable 4 milliGRAM(s) IV Push every 8 hours  pantoprazole    Tablet 40 milliGRAM(s) Oral before breakfast  polyethylene glycol 3350 17 Gram(s) Oral daily  pyridostigmine 90 milliGRAM(s) Oral four times a day    MEDICATIONS  (PRN):  acetaminophen   Tablet .. 650 milliGRAM(s) Oral every 6 hours PRN Mild Pain (1 - 3), Moderate Pain (4 - 6)          Xrays:  TLC:  OG:  ET tube:                                                                                       ECHO:  [facility] ICU:

## 2020-06-16 NOTE — PROGRESS NOTE ADULT - SUBJECTIVE AND OBJECTIVE BOX
Patient is feeling weaker today, still with nausea       T(F): 98.3 (06-16-20 @ 11:00), Max: 98.3 (06-15-20 @ 15:00)  HR: 84 (06-16-20 @ 10:00)  BP: 125/65 (06-16-20 @ 09:04)  RR: 20  SpO2: 98% (06-16-20 @ 10:00) (98% - 100%)    PHYSICAL EXAM:  GENERAL: NAD  HEAD:  Atraumatic, Normocephalic  NERVOUS SYSTEM:  Alert & Oriented X3, no focal deficits   CHEST/LUNG: Clear to percussion bilaterally; No rales, rhonchi, wheezing, or rubs  HEART: Regular rate and rhythm; No murmurs, rubs, or gallops  ABDOMEN: Soft, Nontender, Nondistended; Bowel sounds present  EXTREMITIES:  2+ Peripheral Pulses, No clubbing, cyanosis, or edema  LYMPH: No lymphadenopathy noted  SKIN: No rashes or lesions    LABS  06-16    140  |  102  |  19  ----------------------------<  117<H>  4.0   |  29  |  0.7    Ca    8.9      16 Jun 2020 05:19  Mg     1.9     06-16                          11.5   9.07  )-----------( 243      ( 16 Jun 2020 05:19 )             33.5     Culture Results:   >=3 organisms. Probable collection contamination. (06-11-20)    RADIOLOGY  < from: Xray Chest 1 View- PORTABLE-Routine (06.14.20 @ 04:54) >  Impression: Near-complete resolution of left base opacity with minimal residual costophrenic angle reaction    < end of copied text >    MEDICATIONS  (STANDING):  albumin human  5% IVPB 4000 milliLiter(s) IV Intermittent once  aspirin  chewable 81 milliGRAM(s) Oral daily  atorvastatin 20 milliGRAM(s) Oral at bedtime  azaTHIOprine 50 milliGRAM(s) Oral <User Schedule>  budesonide  80 MICROgram(s)/formoterol 4.5 MICROgram(s) Inhaler 2 Puff(s) Inhalation two times a day  calcium gluconate IVPB 1 Gram(s) IV Intermittent once  chlorhexidine 4% Liquid 1 Application(s) Topical two times a day  enoxaparin Injectable 40 milliGRAM(s) SubCutaneous daily  famotidine    Tablet 40 milliGRAM(s) Oral at bedtime  heparin   Injectable 1000 Unit(s) IV Push once  levothyroxine 50 MICROGram(s) Oral daily  methylPREDNISolone sodium succinate Injectable 40 milliGRAM(s) IV Push daily  ondansetron Injectable 4 milliGRAM(s) IV Push every 8 hours  pantoprazole    Tablet 40 milliGRAM(s) Oral before breakfast  polyethylene glycol 3350 17 Gram(s) Oral daily  pyridostigmine 90 milliGRAM(s) Oral four times a day    MEDICATIONS  (PRN):  acetaminophen   Tablet .. 650 milliGRAM(s) Oral every 6 hours PRN Mild Pain (1 - 3), Moderate Pain (4 - 6)

## 2020-06-16 NOTE — PROGRESS NOTE ADULT - SUBJECTIVE AND OBJECTIVE BOX
Hospital Day:  5d    Subjective:  No acute events overnight pt notes using the BiPAP for a few hours last night. Pt examined at bedside feels a bit weaker today. She is still having continued nausea with frequent need to spit up but tolerating PO intake for the most part. No complaints of pain or dyspnea.    Past Medical Hx:   Myasthenia gravis  High blood cholesterol  Pancreatitis  POTS (postural orthostatic tachycardia syndrome)    Past Sx:  History of foot surgery    Allergies:  sulfa drugs (Unknown)    Current Meds:   Standng Meds:  albumin human  5% IVPB 4000 milliLiter(s) IV Intermittent once  aspirin  chewable 81 milliGRAM(s) Oral daily  atorvastatin 20 milliGRAM(s) Oral at bedtime  azaTHIOprine 50 milliGRAM(s) Oral <User Schedule>  budesonide  80 MICROgram(s)/formoterol 4.5 MICROgram(s) Inhaler 2 Puff(s) Inhalation two times a day  calcium gluconate IVPB 1 Gram(s) IV Intermittent once  chlorhexidine 4% Liquid 1 Application(s) Topical two times a day  enoxaparin Injectable 40 milliGRAM(s) SubCutaneous daily  famotidine    Tablet 40 milliGRAM(s) Oral at bedtime  heparin   Injectable 1000 Unit(s) IV Push once  levothyroxine 50 MICROGram(s) Oral daily  methylPREDNISolone sodium succinate Injectable 40 milliGRAM(s) IV Push daily  ondansetron Injectable 4 milliGRAM(s) IV Push every 8 hours  pantoprazole    Tablet 40 milliGRAM(s) Oral before breakfast  polyethylene glycol 3350 17 Gram(s) Oral daily  pyridostigmine 90 milliGRAM(s) Oral four times a day    PRN Meds:  acetaminophen   Tablet .. 650 milliGRAM(s) Oral every 6 hours PRN Mild Pain (1 - 3), Moderate Pain (4 - 6)    Vital Signs:   T(F): 98.3 (06-16-20 @ 11:00), Max: 98.3 (06-15-20 @ 15:00)  HR: 84 (06-16-20 @ 10:00) (48 - 89)  BP: 125/65 (06-16-20 @ 09:04) (118/56 - 129/67)  RR: 20 (06-16-20 @ 13:00) (10 - 28)  SpO2: 98% (06-16-20 @ 10:00) (98% - 100%)      06-15-20 @ 07:01  -  06-16-20 @ 07:00  --------------------------------------------------------  IN: 840 mL / OUT: 2150 mL / NET: -1310 mL    06-16-20 @ 07:01  -  06-16-20 @ 13:34  --------------------------------------------------------  IN: 0 mL / OUT: 200 mL / NET: -200 mL        Physical Exam:   GENERAL: NAD  HEENT: NCAT  CHEST/LUNG: CTAB  HEART: Regular rate and rhythm; s1 s2 appreciated, No murmurs, rubs, or gallops  ABDOMEN: Soft, Nontender, Nondistended; Bowel sounds present  EXTREMITIES: No LE edema b/l  SKIN: no rashes, no new lesions  NERVOUS SYSTEM:  Alert & Oriented X3, no UE weakness, lower legs with b/l 4/5 extension and 4/5 flexion  LINES/CATHETERS: right femoral Udal- no erythema, tenderness or bleeding      Labs:                         11.5   9.07  )-----------( 243      ( 16 Jun 2020 05:19 )             33.5     Neutophil% 86.8, Lymphocyte% 8.3, Monocyte% 4.4, Bands% 0.4 06-15-20 @ 15:50    16 Jun 2020 05:19    140    |  102    |  19     ----------------------------<  117    4.0     |  29     |  0.7      Ca    8.9        16 Jun 2020 05:19  Mg     1.9       16 Jun 2020 05:19    Radiology:   no new imaging    Assessment and Plan:   58 y/o F PMHx Myasthenia gravis, hypothyroid, dysphagia with ? esophogeal strictures s/p dilatation, HLD, KADI on CPAP presenting with myasthenia crisis.    # Myasthenia crisis  -recent worsening of symptoms s/p 5 days IVIG as outpatient with no improvement  -neurology recs noted  -getting plasma exchange x3/5 today   -check fibrinogen level  -c/w azathioprine, solumedrol, pyridostigmine  -monitor NIF/VC q6hrs (last NIF -42, VC 1900)    # Dysphagia / Postprandial Nausea/vomiting  -Hx of strictures s/p dilation  last ? Oct 2019  -GI plan for EGD under anesthesia on 6/18 or 6/19 requesting neurology clearance for anesthesia given MG crisis. D/w Dr Dolan ok for anesthesia with propofol after 4th or fifth plasma exchange but requested Transfusion medicine input. D/w Dr Gardner ([phone]) ok to do procedure after 4th exchange and if needed after procedure would be able to push back 5th exchange  -Speech pathology planning for likely modified barium swallow on 6/17  -c/w DASH diet for now  -c/w pepcid, zofran protonix    # KADI on CPAP  -currently on BiPAP 10/5 40% QHS  -Abg 6/15 evening with normal PCO2 no need for BiPAP can c/w CPAP on discharge     # Hypothyroid  -c/w levothyroxine home dose    # HLD  -c/w Lipitor    # DVT ppx  -c/w lovenox    # Diet  -DASH    # Activity  -OOBTC as tolerated    # Code Status  -FULL CODE    # Dispo  -likely d/c home pending completion of PLEX and GI interventions

## 2020-06-16 NOTE — PROGRESS NOTE ADULT - SUBJECTIVE AND OBJECTIVE BOX
Neurology Follow up note    Name  GHADA ROPER    HPI:  59 year old owman with hx of myasthenia gravis on mestonin, prednisone daily, finished a 5 day IVIG regimen last week presents to the ED with increasing dyspnea and weakness. [facility] when she could not walk up 2 flights of stairs. Patient with hematuria noted since Sunday. Denies any chest pain, no sob, no fever, chills. (11 Jun 2020 18:30)      Interval History:    patient is weaker today but has been vomiting due to concomitant GI issues  3rd plex today      Vital Signs Last 24 Hrs  T(C): 36 (16 Jun 2020 05:00), Max: 36.8 (15 Willy 2020 15:00)  T(F): 96.8 (16 Jun 2020 05:00), Max: 98.3 (15 Willy 2020 15:00)  HR: 51 (16 Jun 2020 05:00) (48 - 89)  BP: 122/60 (16 Jun 2020 05:00) (118/56 - 129/67)  BP(mean): 86 (16 Jun 2020 05:00) (80 - 96)  RR: 21 (16 Jun 2020 09:04) (10 - 28)  SpO2: 100% (16 Jun 2020 05:00) (98% - 100%)    Neurological Exam:   Mental status: Awake, alert and oriented x3.  Recent and remote memory intact.  Naming, repetition and comprehension intact.  Attention/concentration intact.  No dysarthria, no aphasia.  Fund of knowledge appropriate.    Cranial nerves: Pupils equally round and reactive to light, visual fields full, no nystagmus, extraocular muscles intact, V1 through V3 intact bilaterally and symmetric, face symmetric, hearing intact to finger rub, palate elevation symmetric, tongue was midline.  Motor:  diffuse 4/4   Sensation: Intact to light touch, proprioception, and pinprick.   Coordination: No dysmetria on finger-to-nose and heel-to-shin.  No dysdiadokinesia.  Reflexes: 2+ in bilateral UE/LE, downgoing toes bilaterally. (-) Lucas.      Medications  acetaminophen   Tablet .. 650 milliGRAM(s) Oral every 6 hours PRN  albumin human  5% IVPB 4000 milliLiter(s) IV Intermittent once  aspirin  chewable 81 milliGRAM(s) Oral daily  atorvastatin 20 milliGRAM(s) Oral at bedtime  azaTHIOprine 50 milliGRAM(s) Oral <User Schedule>  budesonide  80 MICROgram(s)/formoterol 4.5 MICROgram(s) Inhaler 2 Puff(s) Inhalation two times a day  calcium gluconate IVPB 1 Gram(s) IV Intermittent once  chlorhexidine 4% Liquid 1 Application(s) Topical two times a day  enoxaparin Injectable 40 milliGRAM(s) SubCutaneous daily  famotidine    Tablet 40 milliGRAM(s) Oral at bedtime  heparin   Injectable 1000 Unit(s) IV Push once  levothyroxine 50 MICROGram(s) Oral daily  methylPREDNISolone sodium succinate Injectable 40 milliGRAM(s) IV Push daily  ondansetron Injectable 4 milliGRAM(s) IV Push every 8 hours  pantoprazole    Tablet 40 milliGRAM(s) Oral before breakfast  polyethylene glycol 3350 17 Gram(s) Oral daily  pyridostigmine 90 milliGRAM(s) Oral four times a day      Lab  06-16    140  |  102  |  19  ----------------------------<  117<H>  4.0   |  29  |  0.7    Ca    8.9      16 Jun 2020 05:19  Mg     1.9     06-16                            11.5   9.07  )-----------( 243      ( 16 Jun 2020 05:19 )             33.5         1.9        Radiology      Assessment:  exacerbation of MG  patient a bit weaker today but this is likely 2/2 vomiting rel to GI issues.  cont PLEX x 5  CONT IMURAN, methylprednisolone and mestinon at current dosages  pfts q shift  Plan:

## 2020-06-16 NOTE — PROGRESS NOTE ADULT - ASSESSMENT
59yFemale pmh MG admitted with myasthenia gravis crisis consulted to GI for intermittent post prandial emesis for 3 weeks. Patient reports she feels epigastric discomfort and as if food gets stuck at her epigastric region. Patient denies nausea, hematemesis, melena, blood in stool, diarrhea, constipation, abdominal pain. Patient reports emesis without nausea. Patient reports she had ane EGD with Dr. Ag with dilation 10/2019 showed grade A esophagitis. Patient had barium esophagram and was scheduled for repeat EGD then the COVID-19 pandemic happened and everything was put off    Problem 1-Post-Prandial Emesis history of esophageal strictures, EGD with dilation in October by Dr Ag  Rec  -Possibly exacerbation due to MG crisis, patient s/p PLEX  -Zofran prn  -PPI daily  -SLP team on board case discussed with SLP, barium esophagram Wednesday if no improvement in post prandial emesis followed by EGD if patient unable to keep food down with persistent vomiting, if we can obtain neurology clearance for EGD with anesthesia given MG crisis    -The benefits of endoscopy as well as the risks, such as GI bleeding, aspiration pneumonia, perforation, damage or loss of teeth, reaction to medication, or death  were reviewed with the patient.   -Will continue to follow

## 2020-06-17 LAB
ALBUMIN SERPL ELPH-MCNC: 4.6 G/DL — SIGNIFICANT CHANGE UP (ref 3.5–5.2)
ALP SERPL-CCNC: 28 U/L — LOW (ref 30–115)
ALT FLD-CCNC: 10 U/L — SIGNIFICANT CHANGE UP (ref 0–41)
ANION GAP SERPL CALC-SCNC: 8 MMOL/L — SIGNIFICANT CHANGE UP (ref 7–14)
AST SERPL-CCNC: 16 U/L — SIGNIFICANT CHANGE UP (ref 0–41)
BASOPHILS # BLD AUTO: 0.03 K/UL — SIGNIFICANT CHANGE UP (ref 0–0.2)
BASOPHILS NFR BLD AUTO: 0.3 % — SIGNIFICANT CHANGE UP (ref 0–1)
BILIRUB DIRECT SERPL-MCNC: <0.2 MG/DL — SIGNIFICANT CHANGE UP (ref 0–0.2)
BILIRUB INDIRECT FLD-MCNC: >0.4 MG/DL — SIGNIFICANT CHANGE UP (ref 0.2–1.2)
BILIRUB SERPL-MCNC: 0.6 MG/DL — SIGNIFICANT CHANGE UP (ref 0.2–1.2)
BUN SERPL-MCNC: 21 MG/DL — HIGH (ref 10–20)
CALCIUM SERPL-MCNC: 8.9 MG/DL — SIGNIFICANT CHANGE UP (ref 8.5–10.1)
CHLORIDE SERPL-SCNC: 104 MMOL/L — SIGNIFICANT CHANGE UP (ref 98–110)
CO2 SERPL-SCNC: 28 MMOL/L — SIGNIFICANT CHANGE UP (ref 17–32)
CREAT SERPL-MCNC: 0.7 MG/DL — SIGNIFICANT CHANGE UP (ref 0.7–1.5)
EOSINOPHIL # BLD AUTO: 0.04 K/UL — SIGNIFICANT CHANGE UP (ref 0–0.7)
EOSINOPHIL NFR BLD AUTO: 0.4 % — SIGNIFICANT CHANGE UP (ref 0–8)
FIBRINOGEN PPP-MCNC: 145 MG/DL — LOW (ref 204.4–570.6)
GLUCOSE BLDC GLUCOMTR-MCNC: 117 MG/DL — HIGH (ref 70–99)
GLUCOSE BLDC GLUCOMTR-MCNC: 160 MG/DL — HIGH (ref 70–99)
GLUCOSE BLDC GLUCOMTR-MCNC: 161 MG/DL — HIGH (ref 70–99)
GLUCOSE BLDC GLUCOMTR-MCNC: 166 MG/DL — HIGH (ref 70–99)
GLUCOSE SERPL-MCNC: 112 MG/DL — HIGH (ref 70–99)
HCT VFR BLD CALC: 34.6 % — LOW (ref 37–47)
HGB BLD-MCNC: 11.7 G/DL — LOW (ref 12–16)
IMM GRANULOCYTES NFR BLD AUTO: 0.4 % — HIGH (ref 0.1–0.3)
LYMPHOCYTES # BLD AUTO: 2.74 K/UL — SIGNIFICANT CHANGE UP (ref 1.2–3.4)
LYMPHOCYTES # BLD AUTO: 25.6 % — SIGNIFICANT CHANGE UP (ref 20.5–51.1)
MCHC RBC-ENTMCNC: 31.8 PG — HIGH (ref 27–31)
MCHC RBC-ENTMCNC: 33.8 G/DL — SIGNIFICANT CHANGE UP (ref 32–37)
MCV RBC AUTO: 94 FL — SIGNIFICANT CHANGE UP (ref 81–99)
MONOCYTES # BLD AUTO: 0.71 K/UL — HIGH (ref 0.1–0.6)
MONOCYTES NFR BLD AUTO: 6.6 % — SIGNIFICANT CHANGE UP (ref 1.7–9.3)
NEUTROPHILS # BLD AUTO: 7.16 K/UL — HIGH (ref 1.4–6.5)
NEUTROPHILS NFR BLD AUTO: 66.7 % — SIGNIFICANT CHANGE UP (ref 42.2–75.2)
NRBC # BLD: 0 /100 WBCS — SIGNIFICANT CHANGE UP (ref 0–0)
PLATELET # BLD AUTO: 237 K/UL — SIGNIFICANT CHANGE UP (ref 130–400)
POTASSIUM SERPL-MCNC: 4.3 MMOL/L — SIGNIFICANT CHANGE UP (ref 3.5–5)
POTASSIUM SERPL-SCNC: 4.3 MMOL/L — SIGNIFICANT CHANGE UP (ref 3.5–5)
PROT SERPL-MCNC: 5 G/DL — LOW (ref 6–8)
RBC # BLD: 3.68 M/UL — LOW (ref 4.2–5.4)
RBC # FLD: 16.5 % — HIGH (ref 11.5–14.5)
SODIUM SERPL-SCNC: 140 MMOL/L — SIGNIFICANT CHANGE UP (ref 135–146)
WBC # BLD: 10.72 K/UL — SIGNIFICANT CHANGE UP (ref 4.8–10.8)
WBC # FLD AUTO: 10.72 K/UL — SIGNIFICANT CHANGE UP (ref 4.8–10.8)

## 2020-06-17 PROCEDURE — 74230 X-RAY XM SWLNG FUNCJ C+: CPT | Mod: 26

## 2020-06-17 PROCEDURE — 99233 SBSQ HOSP IP/OBS HIGH 50: CPT

## 2020-06-17 PROCEDURE — 99232 SBSQ HOSP IP/OBS MODERATE 35: CPT

## 2020-06-17 PROCEDURE — 76705 ECHO EXAM OF ABDOMEN: CPT | Mod: 26

## 2020-06-17 RX ORDER — CALCIUM GLUCONATE 100 MG/ML
1 VIAL (ML) INTRAVENOUS ONCE
Refills: 0 | Status: COMPLETED | OUTPATIENT
Start: 2020-06-17 | End: 2020-06-17

## 2020-06-17 RX ORDER — ALBUMIN HUMAN 25 %
4000 VIAL (ML) INTRAVENOUS ONCE
Refills: 0 | Status: DISCONTINUED | OUTPATIENT
Start: 2020-06-17 | End: 2020-06-17

## 2020-06-17 RX ORDER — HEPARIN SODIUM 5000 [USP'U]/ML
1000 INJECTION INTRAVENOUS; SUBCUTANEOUS ONCE
Refills: 0 | Status: COMPLETED | OUTPATIENT
Start: 2020-06-17 | End: 2020-06-17

## 2020-06-17 RX ORDER — ALBUMIN HUMAN 25 %
3500 VIAL (ML) INTRAVENOUS ONCE
Refills: 0 | Status: COMPLETED | OUTPATIENT
Start: 2020-06-17 | End: 2020-06-17

## 2020-06-17 RX ADMIN — POLYETHYLENE GLYCOL 3350 17 GRAM(S): 17 POWDER, FOR SOLUTION ORAL at 11:08

## 2020-06-17 RX ADMIN — ONDANSETRON 4 MILLIGRAM(S): 8 TABLET, FILM COATED ORAL at 05:30

## 2020-06-17 RX ADMIN — PYRIDOSTIGMINE BROMIDE 90 MILLIGRAM(S): 60 SOLUTION ORAL at 11:05

## 2020-06-17 RX ADMIN — PYRIDOSTIGMINE BROMIDE 90 MILLIGRAM(S): 60 SOLUTION ORAL at 17:24

## 2020-06-17 RX ADMIN — ONDANSETRON 4 MILLIGRAM(S): 8 TABLET, FILM COATED ORAL at 21:03

## 2020-06-17 RX ADMIN — CHLORHEXIDINE GLUCONATE 1 APPLICATION(S): 213 SOLUTION TOPICAL at 21:04

## 2020-06-17 RX ADMIN — Medication 81 MILLIGRAM(S): at 11:03

## 2020-06-17 RX ADMIN — PYRIDOSTIGMINE BROMIDE 90 MILLIGRAM(S): 60 SOLUTION ORAL at 06:11

## 2020-06-17 RX ADMIN — CHLORHEXIDINE GLUCONATE 1 APPLICATION(S): 213 SOLUTION TOPICAL at 05:31

## 2020-06-17 RX ADMIN — ONDANSETRON 4 MILLIGRAM(S): 8 TABLET, FILM COATED ORAL at 13:20

## 2020-06-17 RX ADMIN — AZATHIOPRINE 50 MILLIGRAM(S): 100 TABLET ORAL at 06:10

## 2020-06-17 RX ADMIN — Medication 100 GRAM(S): at 11:06

## 2020-06-17 RX ADMIN — AZATHIOPRINE 50 MILLIGRAM(S): 100 TABLET ORAL at 21:03

## 2020-06-17 RX ADMIN — ENOXAPARIN SODIUM 40 MILLIGRAM(S): 100 INJECTION SUBCUTANEOUS at 11:03

## 2020-06-17 RX ADMIN — AZATHIOPRINE 50 MILLIGRAM(S): 100 TABLET ORAL at 13:20

## 2020-06-17 RX ADMIN — FAMOTIDINE 40 MILLIGRAM(S): 10 INJECTION INTRAVENOUS at 21:03

## 2020-06-17 RX ADMIN — PYRIDOSTIGMINE BROMIDE 90 MILLIGRAM(S): 60 SOLUTION ORAL at 21:04

## 2020-06-17 RX ADMIN — PANTOPRAZOLE SODIUM 40 MILLIGRAM(S): 20 TABLET, DELAYED RELEASE ORAL at 06:10

## 2020-06-17 RX ADMIN — BUDESONIDE AND FORMOTEROL FUMARATE DIHYDRATE 2 PUFF(S): 160; 4.5 AEROSOL RESPIRATORY (INHALATION) at 21:04

## 2020-06-17 RX ADMIN — HEPARIN SODIUM 1000 UNIT(S): 5000 INJECTION INTRAVENOUS; SUBCUTANEOUS at 12:07

## 2020-06-17 RX ADMIN — Medication 40 MILLIGRAM(S): at 05:30

## 2020-06-17 RX ADMIN — ATORVASTATIN CALCIUM 20 MILLIGRAM(S): 80 TABLET, FILM COATED ORAL at 21:03

## 2020-06-17 RX ADMIN — Medication 650 MILLIGRAM(S): at 11:05

## 2020-06-17 RX ADMIN — Medication 1750 MILLILITER(S): at 12:08

## 2020-06-17 RX ADMIN — BUDESONIDE AND FORMOTEROL FUMARATE DIHYDRATE 2 PUFF(S): 160; 4.5 AEROSOL RESPIRATORY (INHALATION) at 07:43

## 2020-06-17 RX ADMIN — Medication 50 MICROGRAM(S): at 06:10

## 2020-06-17 NOTE — PROGRESS NOTE ADULT - SUBJECTIVE AND OBJECTIVE BOX
Patient is feeling a little better       T(F): 96.3 (06-17-20 @ 07:02), Max: 98.1 (06-16-20 @ 19:08)  HR: 73 (06-17-20 @ 12:47)  BP: 117/61 (06-17-20 @ 12:47)  RR: 21 (06-17-20 @ 13:00)  SpO2: 98% (06-17-20 @ 12:47) (97% - 100%)    PHYSICAL EXAM:  GENERAL: NAD  HEAD:  Atraumatic, Normocephalic  NERVOUS SYSTEM:  Alert & Oriented X3, no focal deficits   CHEST/LUNG: Clear to percussion bilaterally; No rales, rhonchi, wheezing, or rubs  HEART: Regular rate and rhythm; No murmurs, rubs, or gallops  ABDOMEN: Soft, Nontender, Nondistended; Bowel sounds present  EXTREMITIES:  2+ Peripheral Pulses, No clubbing, cyanosis, or edema  LYMPH: No lymphadenopathy noted  SKIN: No rashes or lesions    LABS  06-17    140  |  104  |  21<H>  ----------------------------<  112<H>  4.3   |  28  |  0.7    Ca    8.9      17 Jun 2020 05:25  Mg     1.9     06-16    TPro  5.0<L>  /  Alb  4.6  /  TBili  0.6  /  DBili  <0.2  /  AST  16  /  ALT  10  /  AlkPhos  28<L>  06-17                          11.7   10.72 )-----------( 237      ( 17 Jun 2020 05:25 )             34.6       Culture Results:   >=3 organisms. Probable collection contamination. (06-11-20)    RADIOLOGY  < from: US Abdomen Limited (06.17.20 @ 12:11) >  IMPRESSION:    Cholelithiasis without sonographic evidence of cholecystitis.    < end of copied text >    MEDICATIONS  (STANDING):  aspirin  chewable 81 milliGRAM(s) Oral daily  atorvastatin 20 milliGRAM(s) Oral at bedtime  azaTHIOprine 50 milliGRAM(s) Oral <User Schedule>  budesonide  80 MICROgram(s)/formoterol 4.5 MICROgram(s) Inhaler 2 Puff(s) Inhalation two times a day  chlorhexidine 4% Liquid 1 Application(s) Topical two times a day  enoxaparin Injectable 40 milliGRAM(s) SubCutaneous daily  famotidine    Tablet 40 milliGRAM(s) Oral at bedtime  levothyroxine 50 MICROGram(s) Oral daily  ondansetron Injectable 4 milliGRAM(s) IV Push every 8 hours  pantoprazole    Tablet 40 milliGRAM(s) Oral before breakfast  polyethylene glycol 3350 17 Gram(s) Oral daily  pyridostigmine 90 milliGRAM(s) Oral four times a day    MEDICATIONS  (PRN):  acetaminophen   Tablet .. 650 milliGRAM(s) Oral every 6 hours PRN Mild Pain (1 - 3), Moderate Pain (4 - 6)

## 2020-06-17 NOTE — SWALLOW VFSS/MBS ASSESSMENT ADULT - SLP PERTINENT HISTORY OF CURRENT PROBLEM
patient with recurrent esophageal dilations, recently in October 2019, with recommendations for MBS due to recurrent Globus. Patient with h/o myasthenia gravis, pancreatitis, high cholesterol

## 2020-06-17 NOTE — PROGRESS NOTE ADULT - SUBJECTIVE AND OBJECTIVE BOX
59 year old female with myasthenia gravis accepted for 5 sessions of plasmapheresis with 5% albumin, with first procedure initiated on 6/12/20.  Patient will have received a total of four procedures by the end of the day today.  Fifth and final procedure scheduled for Friday (6/19/20).  Thanks so much for allowing us to participate in the care of your patient.  Please do not hesitate to call us with questions or concerns.    Pamela Gardner MD, MAGGIE  [phone]   of Blood Bank and Transfusion Medicine  Associate Residency , Clinical Pathology, [facility]   of Pathology at the [employer] [facility] [employer] at [facility]

## 2020-06-17 NOTE — PROGRESS NOTE ADULT - ASSESSMENT
59yFemale pmh MG admitted with myasthenia gravis crisis consulted to GI for intermittent post prandial emesis for 3 weeks. Patient reports she feels epigastric discomfort and as if food gets stuck at her epigastric region. Patient denies nausea, hematemesis, melena, blood in stool, diarrhea, constipation, abdominal pain. Patient reports emesis without nausea. Patient reports she had ane EGD with Dr. Ag with dilation 10/2019 showed grade A esophagitis. Patient had barium esophagram and was scheduled for repeat EGD then the COVID-19 pandemic happened and everything was put off    Problem 1-Post-Prandial Emesis history of esophageal strictures, EGD with dilation in October by Dr Ag  Rec  -Possibly exacerbation due to MG crisis, patient s/p PLEX  -Zofran prn  -PPI daily  -Nuero clearance with propofol obtained and transfusion team approval obtained   -EGD likely tomorrow   -Will discuss with SLP today  -The benefits of endoscopy as well as the risks, such as GI bleeding, aspiration pneumonia, perforation, damage or loss of teeth, reaction to medication, or death  were reviewed with the patient.   -Outpatient motility study with Dr. Parks Follow up with our GI office located on [address] [place] [address], Phone number [phone] with Dr. Parks, reached out to office to make appointment 59yFemale pmh MG admitted with myasthenia gravis crisis consulted to GI for intermittent post prandial emesis for 3 weeks. Patient reports she feels epigastric discomfort and as if food gets stuck at her epigastric region. Patient denies nausea, hematemesis, melena, blood in stool, diarrhea, constipation, abdominal pain. Patient reports emesis without nausea. Patient reports she had ane EGD with Dr. Ag with dilation 10/2019 showed grade A esophagitis. Patient had barium esophagram and was scheduled for repeat EGD then the COVID-19 pandemic happened and everything was put off    Problem 1-Post-Prandial Emesis history of esophageal strictures, EGD with dilation in October by Dr Ag  Rec  -Possibly exacerbation due to MG crisis however patient reports vomiting happens when patient is not in MG crisis as well, patient s/p fourth PLEX today  -Zofran prn  -PPI daily  -Nuero clearance with propofol obtained and transfusion team approval obtained   -EGD tomorrow at 11A.M.  -Modified barium esophagram today at 3p.m.   -The benefits of endoscopy as well as the risks, such as GI bleeding, aspiration pneumonia, perforation, damage or loss of teeth, reaction to medication, or death  were reviewed with the patient.   -Outpatient motility study with Dr. Parks Follow up with our GI office located on [address] [place] [address], Phone number [phone] with Dr. Parks, reached out to office to make appointment 59yFemale pmh MG admitted with myasthenia gravis crisis consulted to GI for intermittent post prandial emesis for 3 weeks. Patient reports she feels epigastric discomfort and as if food gets stuck at her epigastric region. Patient denies nausea, hematemesis, melena, blood in stool, diarrhea, constipation, abdominal pain. Patient reports emesis without nausea. Patient reports she had ane EGD with Dr. Ag with dilation 10/2019 showed grade A esophagitis. Patient had barium esophagram and was scheduled for repeat EGD then the COVID-19 pandemic happened and everything was put off    Problem 1-Post-Prandial Emesis history of esophageal strictures, EGD with dilation in October by Dr Ag  Rec  -Possibly exacerbation due to MG crisis however patient reports vomiting happens when patient is not in MG crisis as well, patient s/p fourth PLEX today  -Zofran prn  -PPI daily  -Nuero clearance with propofol obtained and transfusion team approval obtained   -EGD tomorrow at 11A.M.  -Modified barium esophagram today at 3p.m.   -The benefits of endoscopy as well as the risks, such as GI bleeding, aspiration pneumonia, perforation, damage or loss of teeth, reaction to medication, or death  were reviewed with the patient.   -Outpatient motility study with Dr. Parks Follow up with our GI office located on [address] [place] [address], Phone number [phone] with Dr. Parks, reached out to office to make appointment      Problem 2-Cholelithiasis Asymptomatic  Rec  -Watchful waiting 59yFemale pmh MG admitted with myasthenia gravis crisis consulted to GI for intermittent post prandial emesis for 3 weeks. Patient reports she feels epigastric discomfort and as if food gets stuck at her epigastric region. Patient denies nausea, hematemesis, melena, blood in stool, diarrhea, constipation, abdominal pain. Patient reports emesis without nausea. Patient reports she had ane EGD with Dr. Ag with dilation 10/2019 showed grade A esophagitis. Patient had barium esophagram and was scheduled for repeat EGD then the COVID-19 pandemic happened and everything was put off  patient s/p fourth PLEX today    Problem 1-Post-Prandial Emesis history of esophageal strictures, EGD with dilation in October by Dr Ag  Rec  -Possibly exacerbation due to MG crisis however patient reports vomiting happens when patient is not in MG crisis as well,   -Zofran prn  -PPI daily  -Neuro clearance for EGD with propofol obtained and transfusion team approval obtained   -EGD tomorrow at 11A.M.  - NPO post midnight  -Modified barium esophagram today at 3p.m.   -The benefits of endoscopy as well as the risks, such as GI bleeding, aspiration pneumonia, perforation, damage or loss of teeth, reaction to medication, or death  were reviewed with the patient.   -Outpatient motility study with Dr. Parks Follow up with our GI office located on [address] [place] [address], Phone number [phone] with Dr. Parks, reached out to office to make appointment      Problem 2-Cholelithiasis Asymptomatic  Rec  -Watchful waiting

## 2020-06-17 NOTE — PHARMACOTHERAPY INTERVENTION NOTE - NSPHMCOMMSECONDPHM_PHM_A_CORE FT
originally ordered for 5 doses on alternate days. But renal wants dose 3 and 4 back ro back not alternate days.

## 2020-06-17 NOTE — PROGRESS NOTE ADULT - SUBJECTIVE AND OBJECTIVE BOX
Patient is a 59y old  Female who presents with a chief complaint of Weakness (16 Jun 2020 13:34)      Over Night Events:  Patient seen and examined.   stable complaining of NX seen by gI   s/p plasmapheresis yesterday     ROS:  See HPI    PHYSICAL EXAM    ICU Vital Signs Last 24 Hrs  T(C): 35.7 (17 Jun 2020 07:02), Max: 36.8 (16 Jun 2020 11:00)  T(F): 96.3 (17 Jun 2020 07:02), Max: 98.3 (16 Jun 2020 11:00)  HR: 63 (17 Jun 2020 07:09) (46 - 94)  BP: 97/55 (17 Jun 2020 07:09) (97/55 - 139/63)  BP(mean): 71 (17 Jun 2020 07:09) (71 - 95)  ABP: --  ABP(mean): --  RR: 21 (17 Jun 2020 07:09) (10 - 27)  SpO2: 99% (17 Jun 2020 07:09) (98% - 100%)      General: Aox3  HEENT:      [place]          Lymph Nodes: NO cervical LN   Lungs: Bilateral BS  Cardiovascular: Regular   Abdomen: Soft, Positive BS  Extremities: No clubbing   Skin: warm   Neurological: stable no focal   Musculoskeletal: move all ext     I&O's Detail    16 Jun 2020 07:01  -  17 Jun 2020 07:00  --------------------------------------------------------  IN:    Oral Fluid: 660 mL  Total IN: 660 mL    OUT:    Voided: 1950 mL  Total OUT: 1950 mL    Total NET: -1290 mL          LABS:                          11.7   10.72 )-----------( 237      ( 17 Jun 2020 05:25 )             34.6         17 Jun 2020 05:25    140    |  104    |  21     ----------------------------<  112    4.3     |  28     |  0.7      Ca    8.9        17 Jun 2020 05:25  Mg     1.9       16 Jun 2020 05:19                                                                                                                                                                                                                                   ABG - ( 15 Willy 2020 22:57 )  pH, Arterial: x     pH, Blood: 7.42  /  pCO2: 40    /  pO2: 80    / HCO3: 26    / Base Excess: 1.9   /  SaO2: 97                  MEDICATIONS  (STANDING):  albumin human  5% IVPB 4000 milliLiter(s) IV Intermittent once  aspirin  chewable 81 milliGRAM(s) Oral daily  atorvastatin 20 milliGRAM(s) Oral at bedtime  azaTHIOprine 50 milliGRAM(s) Oral <User Schedule>  budesonide  80 MICROgram(s)/formoterol 4.5 MICROgram(s) Inhaler 2 Puff(s) Inhalation two times a day  calcium gluconate IVPB 1 Gram(s) IV Intermittent once  chlorhexidine 4% Liquid 1 Application(s) Topical two times a day  enoxaparin Injectable 40 milliGRAM(s) SubCutaneous daily  famotidine    Tablet 40 milliGRAM(s) Oral at bedtime  heparin   Injectable 1000 Unit(s) IV Push once  levothyroxine 50 MICROGram(s) Oral daily  methylPREDNISolone sodium succinate Injectable 40 milliGRAM(s) IV Push daily  ondansetron Injectable 4 milliGRAM(s) IV Push every 8 hours  pantoprazole    Tablet 40 milliGRAM(s) Oral before breakfast  polyethylene glycol 3350 17 Gram(s) Oral daily  pyridostigmine 90 milliGRAM(s) Oral four times a day    MEDICATIONS  (PRN):  acetaminophen   Tablet .. 650 milliGRAM(s) Oral every 6 hours PRN Mild Pain (1 - 3), Moderate Pain (4 - 6)          Xrays:  TLC:  OG:  ET tube:                                                                                       ECHO:  [facility] ICU:

## 2020-06-17 NOTE — PHARMACOTHERAPY INTERVENTION NOTE - COMMENTS
for plasmapharesis by plasma exchange team
As per dr Mckeon Renal MD Fu wants 3rd and 4rth dose on tue and wed not alternate days. also approved by Dr Kelsi benz.
as per MD Plasma exchange team wants 4000ml albumin during plasma exchange
as per dr Mckeon dose decreased from 4000ml and 3500ml.

## 2020-06-17 NOTE — PROGRESS NOTE ADULT - SUBJECTIVE AND OBJECTIVE BOX
59yFemale  Being followed for intermittent vomiting   Interval history: Patient denies nausea, hematemesis, melena, blood in stool, diarrhea, constipation, abdominal pain. +post prandial vomiting intermittently.      PAST MEDICAL & SURGICAL HISTORY: PAST MEDICAL & SURGICAL HISTORY:  Myasthenia gravis  High blood cholesterol  Pancreatitis  POTS (postural orthostatic tachycardia syndrome)  History of foot surgery          Social History: No smoking. No alcohol. No illegal drug use.            MEDICATIONS  (STANDING):  aspirin  chewable 81 milliGRAM(s) Oral daily  atorvastatin 20 milliGRAM(s) Oral at bedtime  azaTHIOprine 50 milliGRAM(s) Oral <User Schedule>  budesonide  80 MICROgram(s)/formoterol 4.5 MICROgram(s) Inhaler 2 Puff(s) Inhalation two times a day  chlorhexidine 4% Liquid 1 Application(s) Topical two times a day  enoxaparin Injectable 40 milliGRAM(s) SubCutaneous daily  famotidine    Tablet 40 milliGRAM(s) Oral at bedtime  levothyroxine 50 MICROGram(s) Oral daily  methylPREDNISolone sodium succinate Injectable 40 milliGRAM(s) IV Push daily  ondansetron Injectable 4 milliGRAM(s) IV Push every 8 hours  pantoprazole    Tablet 40 milliGRAM(s) Oral before breakfast  polyethylene glycol 3350 17 Gram(s) Oral daily  pyridostigmine 90 milliGRAM(s) Oral four times a day    MEDICATIONS  (PRN):  acetaminophen   Tablet .. 650 milliGRAM(s) Oral every 6 hours PRN Mild Pain (1 - 3), Moderate Pain (4 - 6)      Allergies:   sulfa drugs (Unknown)            REVIEW OF SYSTEMS:  General:  No weight loss, fevers, or chills.  Eyes:  No reported pain or visual changes  ENT:  No sore throat or runny nose.  NECK: No stiffness   CV:  No chest pain or palpitations.  Resp:  No shortness of breath, cough  GI:  No abdominal pain, No nausea, +vomiting, +dysphagia, No diarrhea or constipation. No rectal bleeding, melena, or hematemesis.  Neuro:  No tingling, numbness           VITAL SIGNS:   T(F): 96.3 (06-17-20 @ 07:02), Max: 98.1 (06-16-20 @ 19:08)  HR: 73 (06-17-20 @ 11:13) (46 - 94)  BP: 113/57 (06-17-20 @ 11:13) (97/55 - 139/59)  RR: 15 (06-17-20 @ 11:13) (10 - 27)  SpO2: 99% (06-17-20 @ 11:13) (97% - 100%)    PHYSICAL EXAM:  GENERAL: AAOx3, no acute distress.  HEAD:  Atraumatic, Normocephalic  EYES: conjunctiva and sclera clear  NECK: Supple, no JVD or thyromegaly  CHEST/LUNG: Clear to auscultation bilaterally; No wheeze, rhonchi, or rales  HEART: Regular rate and rhythm; normal S1, S2, No murmurs.  ABDOMEN: Soft, nontender, nondistended; Bowel sounds present, no abdominal bruit, masses, or hepatosplenomegaly  NEUROLOGY: No asterixis or tremor.   SKIN: Intact, no jaundice            LABS:                        11.7   10.72 )-----------( 237      ( 17 Jun 2020 05:25 )             34.6     06-17    140  |  104  |  21<H>  ----------------------------<  112<H>  4.3   |  28  |  0.7    Ca    8.9      17 Jun 2020 05:25  Mg     1.9     06-16    TPro  5.0<L>  /  Alb  4.6  /  TBili  0.6  /  DBili  <0.2  /  AST  16  /  ALT  10  /  AlkPhos  28<L>  06-17    LIVER FUNCTIONS - ( 17 Jun 2020 05:25 )  Alb: 4.6 g/dL / Pro: 5.0 g/dL / ALK PHOS: 28 U/L / ALT: 10 U/L / AST: 16 U/L / GGT: x               IMAGING:  < from: Xray Chest 1 View- PORTABLE-Routine (06.14.20 @ 04:54) >  EXAM:  XR CHEST PORTABLE ROUTINE 1V            PROCEDURE DATE:  06/14/2020            INTERPRETATION:  Clinical History / Reason for exam: Shortness of breath    Comparison : Chest radiograph 6/13/2020.    Technique/Positioning: AP portable.    Findings:    Support devices: None.    Cardiac/mediastinum/hilum: Unchanged    Lung parenchyma/Pleura: Near-complete resolution of left base opacity with minimal left costophrenic pleural reaction remaining. No pneumothorax or pleural effusion.    Skeleton/soft tissues: Unchanged    Impression: Near-complete resolution of left base opacity with minimal residual costophrenic angle reaction                      JENI BERNARDO M.D., ATTENDING RADIOLOGIST  This document has been electronically signed. Jun14 2020 11:37AM              < end of copied text > 59yFemale  Being followed for intermittent vomiting   Interval history: Patient denies nausea, hematemesis, melena, blood in stool, diarrhea, constipation, abdominal pain. +post prandial vomiting intermittently.      PAST MEDICAL & SURGICAL HISTORY: PAST MEDICAL & SURGICAL HISTORY:  Myasthenia gravis  High blood cholesterol  Pancreatitis  POTS (postural orthostatic tachycardia syndrome)  History of foot surgery          Social History: No smoking. No alcohol. No illegal drug use.            MEDICATIONS  (STANDING):  aspirin  chewable 81 milliGRAM(s) Oral daily  atorvastatin 20 milliGRAM(s) Oral at bedtime  azaTHIOprine 50 milliGRAM(s) Oral <User Schedule>  budesonide  80 MICROgram(s)/formoterol 4.5 MICROgram(s) Inhaler 2 Puff(s) Inhalation two times a day  chlorhexidine 4% Liquid 1 Application(s) Topical two times a day  enoxaparin Injectable 40 milliGRAM(s) SubCutaneous daily  famotidine    Tablet 40 milliGRAM(s) Oral at bedtime  levothyroxine 50 MICROGram(s) Oral daily  methylPREDNISolone sodium succinate Injectable 40 milliGRAM(s) IV Push daily  ondansetron Injectable 4 milliGRAM(s) IV Push every 8 hours  pantoprazole    Tablet 40 milliGRAM(s) Oral before breakfast  polyethylene glycol 3350 17 Gram(s) Oral daily  pyridostigmine 90 milliGRAM(s) Oral four times a day    MEDICATIONS  (PRN):  acetaminophen   Tablet .. 650 milliGRAM(s) Oral every 6 hours PRN Mild Pain (1 - 3), Moderate Pain (4 - 6)      Allergies:   sulfa drugs (Unknown)            REVIEW OF SYSTEMS:  General:  No weight loss, fevers, or chills.  Eyes:  No reported pain or visual changes  ENT:  No sore throat or runny nose.  NECK: No stiffness   CV:  No chest pain or palpitations.  Resp:  No shortness of breath, cough  GI:  No abdominal pain, No nausea, +vomiting, +dysphagia, No diarrhea or constipation. No rectal bleeding, melena, or hematemesis.  Neuro:  No tingling, numbness           VITAL SIGNS:   T(F): 96.3 (06-17-20 @ 07:02), Max: 98.1 (06-16-20 @ 19:08)  HR: 73 (06-17-20 @ 11:13) (46 - 94)  BP: 113/57 (06-17-20 @ 11:13) (97/55 - 139/59)  RR: 15 (06-17-20 @ 11:13) (10 - 27)  SpO2: 99% (06-17-20 @ 11:13) (97% - 100%)    PHYSICAL EXAM:  GENERAL: AAOx3, no acute distress.  HEAD:  Atraumatic, Normocephalic  EYES: conjunctiva and sclera clear  NECK: Supple, no JVD or thyromegaly  CHEST/LUNG: Clear to auscultation bilaterally; No wheeze, rhonchi, or rales  HEART: Regular rate and rhythm; normal S1, S2, No murmurs.  ABDOMEN: Soft, nontender, nondistended; Bowel sounds present, no abdominal bruit, masses, or hepatosplenomegaly  NEUROLOGY: No asterixis or tremor.   SKIN: Intact, no jaundice            LABS:                        11.7   10.72 )-----------( 237      ( 17 Jun 2020 05:25 )             34.6     06-17    140  |  104  |  21<H>  ----------------------------<  112<H>  4.3   |  28  |  0.7    Ca    8.9      17 Jun 2020 05:25  Mg     1.9     06-16    TPro  5.0<L>  /  Alb  4.6  /  TBili  0.6  /  DBili  <0.2  /  AST  16  /  ALT  10  /  AlkPhos  28<L>  06-17    LIVER FUNCTIONS - ( 17 Jun 2020 05:25 )  Alb: 4.6 g/dL / Pro: 5.0 g/dL / ALK PHOS: 28 U/L / ALT: 10 U/L / AST: 16 U/L / GGT: x               IMAGING:  < from: Xray Chest 1 View- PORTABLE-Routine (06.14.20 @ 04:54) >  EXAM:  XR CHEST PORTABLE ROUTINE 1V            PROCEDURE DATE:  06/14/2020            INTERPRETATION:  Clinical History / Reason for exam: Shortness of breath    Comparison : Chest radiograph 6/13/2020.    Technique/Positioning: AP portable.    Findings:    Support devices: None.    Cardiac/mediastinum/hilum: Unchanged    Lung parenchyma/Pleura: Near-complete resolution of left base opacity with minimal left costophrenic pleural reaction remaining. No pneumothorax or pleural effusion.    Skeleton/soft tissues: Unchanged    Impression: Near-complete resolution of left base opacity with minimal residual costophrenic angle reaction                      JENI BERNARDO M.D., ATTENDING RADIOLOGIST  This document has been electronically signed. Jun14 2020 11:37AM              < end of copied text >      < from: US Abdomen Limited (06.17.20 @ 12:11) >  EXAM:  US ABDOMEN LIMITED            PROCEDURE DATE:  06/17/2020            INTERPRETATION:  CLINICAL HISTORY: Postprandial nausea.    COMPARISON: CT abdomen and pelvis 9/21/2017..    PROCEDURE: Ultrasound of the right upper quadrant was performed.    FINDINGS:    LIVER:  Normal in contour and echogenicity measuring 14.0 cm in length.     GALLBLADDER/BILIARY TREE:  Cholelithiasis and sludge. No wall thickening or pericholecystic fluid. Reportedly negative sonographic Sharif's sign. No intrahepatic biliary ductal dilatation. The common bile duct measures 4 mm, which is normal.     PANCREAS:  Visualized head and body are unremarkable. Remainder obscured by overlying bowel gas.    KIDNEY:  Right kidney measures 10.0 cm in length. No hydronephrosis, calculi or solid mass.    AORTA/IVC:  Visualized proximal portions unremarkable.    ASCITES:  None.    IMPRESSION:    Cholelithiasis without sonographic evidence of cholecystitis.                  EVAN ANDERSON M.D., ATTENDING RADIOLOGIST  This document has been electronically signed. Jun 17 2020 12:26PM              < end of copied text > 59yFemale  Being followed for intermittent vomiting   Interval history: Patient denies nausea, hematemesis, melena, blood in stool, diarrhea, constipation, abdominal pain. +post prandial vomiting intermittently. patient states the she sometimes feels food in her esophagus that "doesn't go into the stomach". This always resolves spontaneously      PAST MEDICAL & SURGICAL HISTORY: PAST MEDICAL & SURGICAL HISTORY:  Myasthenia gravis  High blood cholesterol  Pancreatitis  POTS (postural orthostatic tachycardia syndrome)  History of foot surgery          Social History: No smoking. No alcohol. No illegal drug use.            MEDICATIONS  (STANDING):  aspirin  chewable 81 milliGRAM(s) Oral daily  atorvastatin 20 milliGRAM(s) Oral at bedtime  azaTHIOprine 50 milliGRAM(s) Oral <User Schedule>  budesonide  80 MICROgram(s)/formoterol 4.5 MICROgram(s) Inhaler 2 Puff(s) Inhalation two times a day  chlorhexidine 4% Liquid 1 Application(s) Topical two times a day  enoxaparin Injectable 40 milliGRAM(s) SubCutaneous daily  famotidine    Tablet 40 milliGRAM(s) Oral at bedtime  levothyroxine 50 MICROGram(s) Oral daily  methylPREDNISolone sodium succinate Injectable 40 milliGRAM(s) IV Push daily  ondansetron Injectable 4 milliGRAM(s) IV Push every 8 hours  pantoprazole    Tablet 40 milliGRAM(s) Oral before breakfast  polyethylene glycol 3350 17 Gram(s) Oral daily  pyridostigmine 90 milliGRAM(s) Oral four times a day    MEDICATIONS  (PRN):  acetaminophen   Tablet .. 650 milliGRAM(s) Oral every 6 hours PRN Mild Pain (1 - 3), Moderate Pain (4 - 6)      Allergies:   sulfa drugs (Unknown)            REVIEW OF SYSTEMS:  General:  No weight loss, fevers, or chills.  Eyes:  No reported pain or visual changes  ENT:  No sore throat or runny nose.  NECK: No stiffness   CV:  No chest pain or palpitations.  Resp:  No shortness of breath, cough  GI:  No abdominal pain, No nausea, +vomiting, +dysphagia, No diarrhea or constipation. No rectal bleeding, melena, or hematemesis.  Neuro:  No tingling, numbness           VITAL SIGNS:   T(F): 96.3 (06-17-20 @ 07:02), Max: 98.1 (06-16-20 @ 19:08)  HR: 73 (06-17-20 @ 11:13) (46 - 94)  BP: 113/57 (06-17-20 @ 11:13) (97/55 - 139/59)  RR: 15 (06-17-20 @ 11:13) (10 - 27)  SpO2: 99% (06-17-20 @ 11:13) (97% - 100%)    PHYSICAL EXAM:  GENERAL: AAOx3, no acute distress.  HEAD:  Atraumatic, Normocephalic  EYES: conjunctiva and sclera clear  NECK: Supple, no JVD or thyromegaly  CHEST/LUNG: Clear to auscultation bilaterally; No wheeze, rhonchi, or rales  HEART: Regular rate and rhythm; normal S1, S2, No murmurs.  ABDOMEN: Soft, nontender, nondistended; Bowel sounds present, no abdominal bruit, masses, or hepatosplenomegaly  NEUROLOGY: No asterixis or tremor.   SKIN: Intact, no jaundice            LABS:                        11.7   10.72 )-----------( 237      ( 17 Jun 2020 05:25 )             34.6     06-17    140  |  104  |  21<H>  ----------------------------<  112<H>  4.3   |  28  |  0.7    Ca    8.9      17 Jun 2020 05:25  Mg     1.9     06-16    TPro  5.0<L>  /  Alb  4.6  /  TBili  0.6  /  DBili  <0.2  /  AST  16  /  ALT  10  /  AlkPhos  28<L>  06-17    LIVER FUNCTIONS - ( 17 Jun 2020 05:25 )  Alb: 4.6 g/dL / Pro: 5.0 g/dL / ALK PHOS: 28 U/L / ALT: 10 U/L / AST: 16 U/L / GGT: x               IMAGING:  < from: Xray Chest 1 View- PORTABLE-Routine (06.14.20 @ 04:54) >  EXAM:  XR CHEST PORTABLE ROUTINE 1V            PROCEDURE DATE:  06/14/2020            INTERPRETATION:  Clinical History / Reason for exam: Shortness of breath    Comparison : Chest radiograph 6/13/2020.    Technique/Positioning: AP portable.    Findings:    Support devices: None.    Cardiac/mediastinum/hilum: Unchanged    Lung parenchyma/Pleura: Near-complete resolution of left base opacity with minimal left costophrenic pleural reaction remaining. No pneumothorax or pleural effusion.    Skeleton/soft tissues: Unchanged    Impression: Near-complete resolution of left base opacity with minimal residual costophrenic angle reaction                      JENI BERNARDO M.D., ATTENDING RADIOLOGIST  This document has been electronically signed. Jun14 2020 11:37AM              < end of copied text >      < from: US Abdomen Limited (06.17.20 @ 12:11) >  EXAM:  US ABDOMEN LIMITED            PROCEDURE DATE:  06/17/2020            INTERPRETATION:  CLINICAL HISTORY: Postprandial nausea.    COMPARISON: CT abdomen and pelvis 9/21/2017..    PROCEDURE: Ultrasound of the right upper quadrant was performed.    FINDINGS:    LIVER:  Normal in contour and echogenicity measuring 14.0 cm in length.     GALLBLADDER/BILIARY TREE:  Cholelithiasis and sludge. No wall thickening or pericholecystic fluid. Reportedly negative sonographic Sharif's sign. No intrahepatic biliary ductal dilatation. The common bile duct measures 4 mm, which is normal.     PANCREAS:  Visualized head and body are unremarkable. Remainder obscured by overlying bowel gas.    KIDNEY:  Right kidney measures 10.0 cm in length. No hydronephrosis, calculi or solid mass.    AORTA/IVC:  Visualized proximal portions unremarkable.    ASCITES:  None.    IMPRESSION:    Cholelithiasis without sonographic evidence of cholecystitis.                  EVAN ANDERSON M.D., ATTENDING RADIOLOGIST  This document has been electronically signed. Jun 17 2020 12:26PM              < end of copied text >

## 2020-06-17 NOTE — PROGRESS NOTE ADULT - ASSESSMENT
59 year old owman with hx of myasthenia gravis on mestonin, prednisone daily, finished a 5 day IVIG regimen last week presents to the ED with increasing dyspnea and weakness. Pt was [facility] getting [facility] draw when she could not walk up 2 flights of stairs. Denies any chest pain, no sob, no fever, chills.         Myasthenia gravis crisis       - plasma exchange x 5 (#4 today)    - nebs prn    - monitor NIF    - continue home meds  - DVT prophylaxis     Hypothyroid / Hyperlipidemia / nausea      - GI following    - continue home meds

## 2020-06-17 NOTE — SWALLOW VFSS/MBS ASSESSMENT ADULT - ADDITIONAL RECOMMENDATIONS
discussed possible benefit of having 6 smaller means or 3 large meals and avoid fiber and fatty meals.  Patient would benefit from evaluation from airway centric dental provider, as she reports significant shift in her dentition

## 2020-06-17 NOTE — PROGRESS NOTE ADULT - SUBJECTIVE AND OBJECTIVE BOX
Neurology Follow up note    Name  GHADA ROPER    HPI:  59 year old owman with hx of myasthenia gravis on mestonin, prednisone daily, finished a 5 day IVIG regimen last week presents to the ED with increasing dyspnea and weakness. Pt was [facility] getting [facility] draw when she could not walk up 2 flights of stairs. Patient with hematuria noted since Sunday. Denies any chest pain, no sob, no fever, chills. (11 Jun 2020 18:30)      Interval History:    patient states she is better than yesterday after plex #3  having #4 today  going for EGD either tomorrow or friday  no new complaints  no sob      Vital Signs Last 24 Hrs  T(C): 35.7 (17 Jun 2020 07:02), Max: 36.8 (16 Jun 2020 11:00)  T(F): 96.3 (17 Jun 2020 07:02), Max: 98.3 (16 Jun 2020 11:00)  HR: 63 (17 Jun 2020 07:09) (46 - 94)  BP: 97/55 (17 Jun 2020 07:09) (97/55 - 139/63)  BP(mean): 71 (17 Jun 2020 07:09) (71 - 95)  RR: 21 (17 Jun 2020 07:09) (10 - 27)  SpO2: 99% (17 Jun 2020 07:09) (98% - 100%)    Neurological Exam:   Mental status: Awake, alert and oriented x3.  Recent and remote memory intact.  Naming, repetition and comprehension intact.  Attention/concentration intact.  No dysarthria, no aphasia.  Fund of knowledge appropriate.    Cranial nerves: Pupils equally round and reactive to light, visual fields full, no nystagmus, extraocular muscles intact, V1 through V3 intact bilaterally and symmetric, face symmetric, hearing intact to finger rub, palate elevation symmetric, tongue was midline.  Motor:  MRC grading 5/5 b/l UE/LE.   strength 5/5.  Normal tone and bulk.  No abnormal movements.    Sensation: Intact to light touch, proprioception, and pinprick.   Coordination: No dysmetria on finger-to-nose and heel-to-shin.  No dysdiadokinesia.  Reflexes: 2+ in bilateral UE/LE, downgoing toes bilaterally. (-) Lucas.  jaw clench, neck flexion and ext full    Medications  acetaminophen   Tablet .. 650 milliGRAM(s) Oral every 6 hours PRN  albumin human  5% IVPB 4000 milliLiter(s) IV Intermittent once  aspirin  chewable 81 milliGRAM(s) Oral daily  atorvastatin 20 milliGRAM(s) Oral at bedtime  azaTHIOprine 50 milliGRAM(s) Oral <User Schedule>  budesonide  80 MICROgram(s)/formoterol 4.5 MICROgram(s) Inhaler 2 Puff(s) Inhalation two times a day  calcium gluconate IVPB 1 Gram(s) IV Intermittent once  chlorhexidine 4% Liquid 1 Application(s) Topical two times a day  enoxaparin Injectable 40 milliGRAM(s) SubCutaneous daily  famotidine    Tablet 40 milliGRAM(s) Oral at bedtime  heparin   Injectable 1000 Unit(s) IV Push once  levothyroxine 50 MICROGram(s) Oral daily  methylPREDNISolone sodium succinate Injectable 40 milliGRAM(s) IV Push daily  ondansetron Injectable 4 milliGRAM(s) IV Push every 8 hours  pantoprazole    Tablet 40 milliGRAM(s) Oral before breakfast  polyethylene glycol 3350 17 Gram(s) Oral daily  pyridostigmine 90 milliGRAM(s) Oral four times a day      Lab  06-17    140  |  104  |  21<H>  ----------------------------<  112<H>  4.3   |  28  |  0.7    Ca    8.9      17 Jun 2020 05:25  Mg     1.9     06-16                            11.7   10.72 )-----------( 237      ( 17 Jun 2020 05:25 )             34.6               Radiology      Assessment:  exacerbation of MG  cont PLEX X 5 EXCHANGES  CONT IMURAN AND MESTINON AT CURRENT DOSAGE  SWITCH METHYLPREDNISOLONE TO 50 MG PREDNISONE PO  PFTS Q SHIFT    Plan:

## 2020-06-17 NOTE — PROGRESS NOTE ADULT - ASSESSMENT
IMPRESSION:  Weakness - likely MG crisis s/p Parrish placement for plasmapheresis  Hematuria?  KADI    SUGGEST:      CNS:  plasmapheresis 1 more session   Monitor NIF and VC Q shift .   Electively intubate if worsening  neuro F/U  cont pyriodstigmine  prednisone/imuran      HEENT: Oral care    PULMONARY:  HOB @ 45 degrees  follow NIF and VC Q 6 hrs   check pox on RA rest and exertion   cpap at night for KADI   CARDIOVASCULAR: I=O    GI: GI prophylaxis.  do RUQ sono   repeat LFT   follow GI recommendation     RENAL:  Follow up lytes.  Correct as needed    INFECTIOUS DISEASE: Follow up cultures    HEMATOLOGICAL:  DVT prophylaxis.    ENDOCRINE:  Follow up FS.  Insulin protocol if needed.    MUSCULOSKELETAL: Bedrest    MICU monitoring

## 2020-06-17 NOTE — SWALLOW VFSS/MBS ASSESSMENT ADULT - DIAGNOSTIC IMPRESSIONS
mild oral impairment no pharyngeal impairment noted, esophageal impairments noted. Patient with regurgitation of bile noted post thin liquid trials. no sensation of regurgitation after puree consistency. discussed with GI SABINA Claros

## 2020-06-17 NOTE — PROGRESS NOTE ADULT - SUBJECTIVE AND OBJECTIVE BOX
Hospital Day:  6d    Subjective:  No acute events overnight. Pt examined at bedside. Pt notes feeling much better and stronger today. Still having intermittent post prandial vomiting. No SOB or pain noted    Past Medical Hx:   Myasthenia gravis  High blood cholesterol  Pancreatitis  POTS (postural orthostatic tachycardia syndrome)    Past Sx:  History of foot surgery    Allergies:  sulfa drugs (Unknown)    Current Meds:   Standng Meds:  aspirin  chewable 81 milliGRAM(s) Oral daily  atorvastatin 20 milliGRAM(s) Oral at bedtime  azaTHIOprine 50 milliGRAM(s) Oral <User Schedule>  budesonide  80 MICROgram(s)/formoterol 4.5 MICROgram(s) Inhaler 2 Puff(s) Inhalation two times a day  chlorhexidine 4% Liquid 1 Application(s) Topical two times a day  enoxaparin Injectable 40 milliGRAM(s) SubCutaneous daily  famotidine    Tablet 40 milliGRAM(s) Oral at bedtime  levothyroxine 50 MICROGram(s) Oral daily  methylPREDNISolone sodium succinate Injectable 40 milliGRAM(s) IV Push daily  ondansetron Injectable 4 milliGRAM(s) IV Push every 8 hours  pantoprazole    Tablet 40 milliGRAM(s) Oral before breakfast  polyethylene glycol 3350 17 Gram(s) Oral daily  pyridostigmine 90 milliGRAM(s) Oral four times a day    PRN Meds:  acetaminophen   Tablet .. 650 milliGRAM(s) Oral every 6 hours PRN Mild Pain (1 - 3), Moderate Pain (4 - 6)    Vital Signs:   T(F): 96.3 (06-17-20 @ 07:02), Max: 98.1 (06-16-20 @ 19:08)  HR: 73 (06-17-20 @ 11:13) (46 - 80)  BP: 113/57 (06-17-20 @ 11:13) (97/55 - 139/59)  RR: 21 (06-17-20 @ 13:00) (10 - 27)  SpO2: 99% (06-17-20 @ 11:13) (97% - 100%)      06-16-20 @ 07:01  -  06-17-20 @ 07:00  --------------------------------------------------------  IN: 660 mL / OUT: 1950 mL / NET: -1290 mL    06-17-20 @ 07:01  -  06-17-20 @ 13:19  --------------------------------------------------------  IN: 120 mL / OUT: 650 mL / NET: -530 mL        Physical Exam:   GENERAL: NAD  HEENT: NCAT  CHEST/LUNG: CTAB  HEART: Regular rate and rhythm; s1 s2 appreciated, No murmurs, rubs, or gallops  ABDOMEN: Soft, Nontender, Nondistended; Bowel sounds present  EXTREMITIES: No LE edema b/l  SKIN: no rashes, no new lesions  NERVOUS SYSTEM:  Alert & Oriented X3, no UE weakness, lower legs with b/l 4/5 extension and 4/5 flexion  LINES/CATHETERS: right femoral [place]        Labs:                         11.7   10.72 )-----------( 237      ( 17 Jun 2020 05:25 )             34.6     Neutophil% 66.7, Lymphocyte% 25.6, Monocyte% 6.6, Bands% 0.4 06-17-20 @ 05:25    17 Jun 2020 05:25    140    |  104    |  21     ----------------------------<  112    4.3     |  28     |  0.7      Ca    8.9        17 Jun 2020 05:25  Mg     1.9       16 Jun 2020 05:19    TPro  5.0    /  Alb  4.6    /  TBili  0.6    /  DBili  <0.2   /  AST  16     /  ALT  10     /  AlkPhos  28     17 Jun 2020 05:25      Fibrinogen Assay (06.17.20 @ 05:25)    Fibrinogen Assay: 145.0 mg/dL    Radiology:   < from: US Abdomen Limited (06.17.20 @ 12:11) >  IMPRESSION:  Cholelithiasis without sonographic evidence of cholecystitis.  < end of copied text >      Assessment and Plan:   58 y/o F PMHx Myasthenia gravis, hypothyroid, dysphagia with ? esophogeal strictures s/p dilatation, HLD, KADI on CPAP presenting with myasthenia crisis.    # Myasthenia crisis  -recent worsening of symptoms s/p 5 days IVIG as outpatient with no improvement  -neurology recs noted  -fibrinogen level low, exchange decreased from 4000 to 3500mg albumin  -s/p plasma exchange x4/5 today, 5th exchange planned for friday  -repeat fibrinogen level Friday morning  -c/w azathioprine pyridostigmine  -change solumedrol to prednisone  -monitor NIF/VC qShift (last NIF -50, VC 2300)    # Dysphagia / Postprandial Nausea/vomiting  -Hx of strictures s/p dilation  last ? Oct 2019  -plan for modified barium swallow with speech pathology today  -plan for EGD with GI tomorrow morning (see progress note 6/16 regarding neuro/transfusion medicine clearance)  -plan for outpatient motility studies after discharge (see GI note today)  -RUQ US with stones bt no cholecystitis  -c/w DASH diet for now, NPO after midnight  -c/w pepcid, zofran, protonix    # KADI on CPAP  -currently on BiPAP 10/5 40% QHS  -Abg 6/15 evening with normal PCO2 no need for BiPAP can c/w CPAP on discharge     # Hypothyroid  -c/w levothyroxine home dose    # HLD  -c/w Lipitor    # DVT ppx  -c/w lovenox    # Diet  -DASH    # Activity  -OOBTC as tolerated    # Code Status  -FULL CODE    # Dispo  -likely d/c home pending completion of PLEX and GI interventions

## 2020-06-18 ENCOUNTER — RESULT REVIEW (OUTPATIENT)
Age: 60
End: 2020-06-18

## 2020-06-18 ENCOUNTER — TRANSCRIPTION ENCOUNTER (OUTPATIENT)
Age: 60
End: 2020-06-18

## 2020-06-18 LAB
ANION GAP SERPL CALC-SCNC: 5 MMOL/L — LOW (ref 7–14)
APTT BLD: 23.9 SEC — CRITICAL LOW (ref 27–39.2)
BASOPHILS # BLD AUTO: 0.02 K/UL — SIGNIFICANT CHANGE UP (ref 0–0.2)
BASOPHILS NFR BLD AUTO: 0.2 % — SIGNIFICANT CHANGE UP (ref 0–1)
BUN SERPL-MCNC: 22 MG/DL — HIGH (ref 10–20)
CALCIUM SERPL-MCNC: 9.1 MG/DL — SIGNIFICANT CHANGE UP (ref 8.5–10.1)
CHLORIDE SERPL-SCNC: 105 MMOL/L — SIGNIFICANT CHANGE UP (ref 98–110)
CO2 SERPL-SCNC: 30 MMOL/L — SIGNIFICANT CHANGE UP (ref 17–32)
CREAT SERPL-MCNC: 0.8 MG/DL — SIGNIFICANT CHANGE UP (ref 0.7–1.5)
EOSINOPHIL # BLD AUTO: 0.04 K/UL — SIGNIFICANT CHANGE UP (ref 0–0.7)
EOSINOPHIL NFR BLD AUTO: 0.4 % — SIGNIFICANT CHANGE UP (ref 0–8)
GLUCOSE BLDC GLUCOMTR-MCNC: 141 MG/DL — HIGH (ref 70–99)
GLUCOSE BLDC GLUCOMTR-MCNC: 148 MG/DL — HIGH (ref 70–99)
GLUCOSE BLDC GLUCOMTR-MCNC: 165 MG/DL — HIGH (ref 70–99)
GLUCOSE SERPL-MCNC: 100 MG/DL — HIGH (ref 70–99)
HCT VFR BLD CALC: 34.4 % — LOW (ref 37–47)
HGB BLD-MCNC: 11.2 G/DL — LOW (ref 12–16)
IMM GRANULOCYTES NFR BLD AUTO: 0.5 % — HIGH (ref 0.1–0.3)
INR BLD: 0.9 RATIO — SIGNIFICANT CHANGE UP (ref 0.65–1.3)
LYMPHOCYTES # BLD AUTO: 2.67 K/UL — SIGNIFICANT CHANGE UP (ref 1.2–3.4)
LYMPHOCYTES # BLD AUTO: 26.6 % — SIGNIFICANT CHANGE UP (ref 20.5–51.1)
MCHC RBC-ENTMCNC: 31 PG — SIGNIFICANT CHANGE UP (ref 27–31)
MCHC RBC-ENTMCNC: 32.6 G/DL — SIGNIFICANT CHANGE UP (ref 32–37)
MCV RBC AUTO: 95.3 FL — SIGNIFICANT CHANGE UP (ref 81–99)
MONOCYTES # BLD AUTO: 0.64 K/UL — HIGH (ref 0.1–0.6)
MONOCYTES NFR BLD AUTO: 6.4 % — SIGNIFICANT CHANGE UP (ref 1.7–9.3)
NEUTROPHILS # BLD AUTO: 6.6 K/UL — HIGH (ref 1.4–6.5)
NEUTROPHILS NFR BLD AUTO: 65.9 % — SIGNIFICANT CHANGE UP (ref 42.2–75.2)
NRBC # BLD: 0 /100 WBCS — SIGNIFICANT CHANGE UP (ref 0–0)
PLATELET # BLD AUTO: 243 K/UL — SIGNIFICANT CHANGE UP (ref 130–400)
POTASSIUM SERPL-MCNC: 4.1 MMOL/L — SIGNIFICANT CHANGE UP (ref 3.5–5)
POTASSIUM SERPL-SCNC: 4.1 MMOL/L — SIGNIFICANT CHANGE UP (ref 3.5–5)
PROTHROM AB SERPL-ACNC: 10.4 SEC — SIGNIFICANT CHANGE UP (ref 9.95–12.87)
RBC # BLD: 3.61 M/UL — LOW (ref 4.2–5.4)
RBC # FLD: 16.3 % — HIGH (ref 11.5–14.5)
SODIUM SERPL-SCNC: 140 MMOL/L — SIGNIFICANT CHANGE UP (ref 135–146)
WBC # BLD: 10.02 K/UL — SIGNIFICANT CHANGE UP (ref 4.8–10.8)
WBC # FLD AUTO: 10.02 K/UL — SIGNIFICANT CHANGE UP (ref 4.8–10.8)

## 2020-06-18 PROCEDURE — 43239 EGD BIOPSY SINGLE/MULTIPLE: CPT | Mod: XU

## 2020-06-18 PROCEDURE — 88305 TISSUE EXAM BY PATHOLOGIST: CPT | Mod: 26

## 2020-06-18 PROCEDURE — 99233 SBSQ HOSP IP/OBS HIGH 50: CPT

## 2020-06-18 PROCEDURE — 88312 SPECIAL STAINS GROUP 1: CPT | Mod: 26

## 2020-06-18 PROCEDURE — 99232 SBSQ HOSP IP/OBS MODERATE 35: CPT

## 2020-06-18 PROCEDURE — 43249 ESOPH EGD DILATION <30 MM: CPT | Mod: XU

## 2020-06-18 RX ADMIN — ATORVASTATIN CALCIUM 20 MILLIGRAM(S): 80 TABLET, FILM COATED ORAL at 21:16

## 2020-06-18 RX ADMIN — ENOXAPARIN SODIUM 40 MILLIGRAM(S): 100 INJECTION SUBCUTANEOUS at 12:56

## 2020-06-18 RX ADMIN — BUDESONIDE AND FORMOTEROL FUMARATE DIHYDRATE 2 PUFF(S): 160; 4.5 AEROSOL RESPIRATORY (INHALATION) at 21:00

## 2020-06-18 RX ADMIN — PYRIDOSTIGMINE BROMIDE 90 MILLIGRAM(S): 60 SOLUTION ORAL at 05:25

## 2020-06-18 RX ADMIN — Medication 650 MILLIGRAM(S): at 08:19

## 2020-06-18 RX ADMIN — FAMOTIDINE 40 MILLIGRAM(S): 10 INJECTION INTRAVENOUS at 21:16

## 2020-06-18 RX ADMIN — CHLORHEXIDINE GLUCONATE 1 APPLICATION(S): 213 SOLUTION TOPICAL at 05:26

## 2020-06-18 RX ADMIN — PANTOPRAZOLE SODIUM 40 MILLIGRAM(S): 20 TABLET, DELAYED RELEASE ORAL at 06:08

## 2020-06-18 RX ADMIN — AZATHIOPRINE 50 MILLIGRAM(S): 100 TABLET ORAL at 13:01

## 2020-06-18 RX ADMIN — ONDANSETRON 4 MILLIGRAM(S): 8 TABLET, FILM COATED ORAL at 13:01

## 2020-06-18 RX ADMIN — PYRIDOSTIGMINE BROMIDE 90 MILLIGRAM(S): 60 SOLUTION ORAL at 13:00

## 2020-06-18 RX ADMIN — Medication 50 MICROGRAM(S): at 05:25

## 2020-06-18 RX ADMIN — ONDANSETRON 4 MILLIGRAM(S): 8 TABLET, FILM COATED ORAL at 21:16

## 2020-06-18 RX ADMIN — PYRIDOSTIGMINE BROMIDE 90 MILLIGRAM(S): 60 SOLUTION ORAL at 18:49

## 2020-06-18 RX ADMIN — POLYETHYLENE GLYCOL 3350 17 GRAM(S): 17 POWDER, FOR SOLUTION ORAL at 12:56

## 2020-06-18 RX ADMIN — ONDANSETRON 4 MILLIGRAM(S): 8 TABLET, FILM COATED ORAL at 05:24

## 2020-06-18 RX ADMIN — PYRIDOSTIGMINE BROMIDE 90 MILLIGRAM(S): 60 SOLUTION ORAL at 21:16

## 2020-06-18 RX ADMIN — BUDESONIDE AND FORMOTEROL FUMARATE DIHYDRATE 2 PUFF(S): 160; 4.5 AEROSOL RESPIRATORY (INHALATION) at 08:21

## 2020-06-18 RX ADMIN — AZATHIOPRINE 50 MILLIGRAM(S): 100 TABLET ORAL at 21:16

## 2020-06-18 RX ADMIN — Medication 81 MILLIGRAM(S): at 12:56

## 2020-06-18 RX ADMIN — AZATHIOPRINE 50 MILLIGRAM(S): 100 TABLET ORAL at 05:25

## 2020-06-18 RX ADMIN — Medication 50 MILLIGRAM(S): at 05:25

## 2020-06-18 NOTE — PROGRESS NOTE ADULT - ASSESSMENT
59 year old owman with hx of myasthenia gravis on mestonin, prednisone daily, finished a 5 day IVIG regimen last week presents to the ED with increasing dyspnea and weakness. [facility] when she could not walk up 2 flights of stairs. Denies any chest pain, no sob, no fever, chills.         Myasthenia gravis crisis       - plasma exchange x 5   - nebs prn    - monitor NIF    - continue home meds  - DVT prophylaxis     Hypothyroid / Hyperlipidemia / nausea      - npo for egd today   - continue home meds

## 2020-06-18 NOTE — DIETITIAN INITIAL EVALUATION ADULT. - FACTORS AFF FOOD INTAKE
Pt reports that she has a good appetite and that she was doing well with meals throughout LOS. No nausea/abdominal pain at this time. Po intake varies % per EMR. Pt states that she sometimes has difficulty chewing/swallowing depending on the day d/t myasthenia gravis, and that it comes and goes. When she is having difficulty with foods, she makes sure to take in liquids (typically can not drink thicker liquids as she finds them harder to swallow). Pt is interested in trying Ensure Clear supplement as the consistency is different than Ensure Enlive. Pt states that she eats small, more frequent meals pta. When pt is having issues with meals she will also choose softer items/cut them up before eating. She has not been having any difficulties during this current admission. NKFA/intolerances. No food preferences r/t culture/Christianity. No vitamins/supplements pta. Pt reports that she requested a bowel regimen as she sometimes experiences constipation when she is not in her regular routine. Last BM was today (6/18). Pt denies any recent wt loss and states a steady wt increase d/t long term steroids. Current wt is accurate per pt (211 lbs). No physical signs of muscle wasting/fat loss present upon RD observation.

## 2020-06-18 NOTE — DIETITIAN INITIAL EVALUATION ADULT. - OTHER INFO
Pt admitted d/t myasthenia gravis in crisis. Pt is sp [place] placement for plasmapheresis.  Pt had US abdomen on 6/17 which showed cholelithiasis without sonographic evidence of cholecystitis. Pt having EGD today (6/18) per GI sp neuro clearance. Pt seen by SLP on 6/17 for VFSS/MBS which showed mild oral impairment no pharyngeal impairment noted, esophageal impairments noted. Patient with regurgitation of bile noted post thin liquid trials. no sensation of regurgitation after puree consistency." and recommended continue current diet texture/consistency as tolerated.

## 2020-06-18 NOTE — PROGRESS NOTE ADULT - SUBJECTIVE AND OBJECTIVE BOX
Patient is comfortable in bed, less weak today       T(F): 97.5 (06-18-20 @ 07:06), Max: 98.4 (06-17-20 @ 16:04)  HR: 68 (06-18-20 @ 07:16)  BP: 107/62 (06-18-20 @ 07:16)  RR: 20 (06-18-20 @ 09:05)  SpO2: 98% (06-18-20 @ 09:05) (97% - 100%)    PHYSICAL EXAM:  GENERAL: NAD  HEAD:  Atraumatic, Normocephalic  NERVOUS SYSTEM:  Alert & Oriented X3, no focal deficits   CHEST/LUNG: Clear to percussion bilaterally; No rales, rhonchi, wheezing, or rubs  HEART: Regular rate and rhythm; No murmurs, rubs, or gallops  ABDOMEN: Soft, Nontender, Nondistended; Bowel sounds present  EXTREMITIES:  2+ Peripheral Pulses, No clubbing, cyanosis, or edema  LYMPH: No lymphadenopathy noted  SKIN: No rashes or lesions    LABS  06-18    140  |  105  |  22<H>  ----------------------------<  100<H>  4.1   |  30  |  0.8    Ca    9.1      18 Jun 2020 05:26    TPro  5.0<L>  /  Alb  4.6  /  TBili  0.6  /  DBili  <0.2  /  AST  16  /  ALT  10  /  AlkPhos  28<L>  06-17                          11.2   10.02 )-----------( 243      ( 18 Jun 2020 05:26 )             34.4     PT/INR - ( 18 Jun 2020 05:26 )   PT: 10.40 sec;   INR: 0.90 ratio         PTT - ( 18 Jun 2020 05:26 )  PTT:23.9 sec      Culture Results:   >=3 organisms. Probable collection contamination. (06-11-20)    RADIOLOGY  < from: US Abdomen Limited (06.17.20 @ 12:11) >  IMPRESSION:    Cholelithiasis without sonographic evidence of cholecystitis.    < end of copied text >    MEDICATIONS  (STANDING):  aspirin  chewable 81 milliGRAM(s) Oral daily  atorvastatin 20 milliGRAM(s) Oral at bedtime  azaTHIOprine 50 milliGRAM(s) Oral <User Schedule>  budesonide  80 MICROgram(s)/formoterol 4.5 MICROgram(s) Inhaler 2 Puff(s) Inhalation two times a day  chlorhexidine 4% Liquid 1 Application(s) Topical two times a day  enoxaparin Injectable 40 milliGRAM(s) SubCutaneous daily  famotidine    Tablet 40 milliGRAM(s) Oral at bedtime  levothyroxine 50 MICROGram(s) Oral daily  ondansetron Injectable 4 milliGRAM(s) IV Push every 8 hours  pantoprazole    Tablet 40 milliGRAM(s) Oral before breakfast  polyethylene glycol 3350 17 Gram(s) Oral daily  predniSONE   Tablet 50 milliGRAM(s) Oral daily  pyridostigmine 90 milliGRAM(s) Oral four times a day    MEDICATIONS  (PRN):  acetaminophen   Tablet .. 650 milliGRAM(s) Oral every 6 hours PRN Mild Pain (1 - 3), Moderate Pain (4 - 6)

## 2020-06-18 NOTE — DIETITIAN INITIAL EVALUATION ADULT. - RD TO REMAIN AVAILABLE
INTERVENTION: meals and snacks, medical food supplements, coordination of care. ME: RD to monitor diet order, energy intake, body composition, NFPF/yes

## 2020-06-18 NOTE — DIETITIAN INITIAL EVALUATION ADULT. - ENERGY NEEDS
kcal: 9646-6998 (MSJ x 1-1.1 AF) obese BMI considered  protein: 65-76 g (1.2-1.4 g/kg IBW) same as above + significant difference between CBW and IBW considered  fluid: 1mL/kcal or per LIP

## 2020-06-18 NOTE — PROGRESS NOTE ADULT - ASSESSMENT
IMPRESSION:  Weakness - likely MG crisis s/p [place] placement for plasmapheresis  Hematuria?  KADI    SUGGEST:      CNS:  plasmapheresis 1 more session trevor   Monitor NIF and VC Q shift .   Electively intubate if worsening  neuro F/U  cont pyriodstigmine  prednisone/imuran      HEENT: Oral care    PULMONARY:  HOB @ 45 degrees  follow NIF and VC Q 6 hrs   check pox on RA rest and exertion   cpap at night for KADI   CARDIOVASCULAR: I=O    GI: GI prophylaxis.  follow GI recommendation   follow esophagogram result   ?? pending EGD   RENAL:  Follow up lytes.  Correct as needed    INFECTIOUS DISEASE: Follow up cultures    HEMATOLOGICAL:  DVT prophylaxis.    ENDOCRINE:  Follow up FS.  Insulin protocol if needed.    MUSCULOSKELETAL: Bedrest    MICU monitoring

## 2020-06-18 NOTE — PROGRESS NOTE ADULT - SUBJECTIVE AND OBJECTIVE BOX
Hospital Day:  7d    Subjective:  No acute events overnight. Pt examined at bedside. Doing well but notes headache, says strength improving but still having trouble lifting hands above head and making her ponytail. Discussed findings of esophogram and plan for EGD today as well as motility studies as outpatient.       Past Medical Hx:   Myasthenia gravis  High blood cholesterol  Pancreatitis  POTS (postural orthostatic tachycardia syndrome)    Past Sx:  History of foot surgery    Allergies:  sulfa drugs (Unknown)    Current Meds:   Standng Meds:  aspirin  chewable 81 milliGRAM(s) Oral daily  atorvastatin 20 milliGRAM(s) Oral at bedtime  azaTHIOprine 50 milliGRAM(s) Oral <User Schedule>  budesonide  80 MICROgram(s)/formoterol 4.5 MICROgram(s) Inhaler 2 Puff(s) Inhalation two times a day  chlorhexidine 4% Liquid 1 Application(s) Topical two times a day  enoxaparin Injectable 40 milliGRAM(s) SubCutaneous daily  famotidine    Tablet 40 milliGRAM(s) Oral at bedtime  levothyroxine 50 MICROGram(s) Oral daily  ondansetron Injectable 4 milliGRAM(s) IV Push every 8 hours  pantoprazole    Tablet 40 milliGRAM(s) Oral before breakfast  polyethylene glycol 3350 17 Gram(s) Oral daily  predniSONE   Tablet 50 milliGRAM(s) Oral daily  pyridostigmine 90 milliGRAM(s) Oral four times a day    PRN Meds:  acetaminophen   Tablet .. 650 milliGRAM(s) Oral every 6 hours PRN Mild Pain (1 - 3), Moderate Pain (4 - 6)    Vital Signs:   T(F): 97.5 (06-18-20 @ 07:06), Max: 98.4 (06-17-20 @ 16:04)  HR: 68 (06-18-20 @ 07:16) (45 - 89)  BP: 107/62 (06-18-20 @ 07:16) (92/55 - 124/59)  RR: 20 (06-18-20 @ 09:05) (12 - 33)  SpO2: 98% (06-18-20 @ 09:05) (97% - 100%)      06-17-20 @ 07:01  -  06-18-20 @ 07:00  --------------------------------------------------------  IN: 320 mL / OUT: 1425 mL / NET: -1105 mL        Physical Exam:   GENERAL: NAD  HEENT: NCAT  CHEST/LUNG: CTAB  HEART: Regular rate and rhythm; s1 s2 appreciated, No murmurs, rubs, or gallops  ABDOMEN: Soft, Nontender, Nondistended; Bowel sounds present  EXTREMITIES: No LE edema b/l  SKIN: no rashes, no new lesions  NERVOUS SYSTEM:  Alert & Oriented X3  LINES/CATHETERS: right femoral [place]        Labs:                         11.2   10.02 )-----------( 243      ( 18 Jun 2020 05:26 )             34.4     Neutophil% 65.9, Lymphocyte% 26.6, Monocyte% 6.4, Bands% 0.5 06-18-20 @ 05:26    18 Jun 2020 05:26    140    |  105    |  22     ----------------------------<  100    4.1     |  30     |  0.8      Ca    9.1        18 Jun 2020 05:26    TPro  5.0    /  Alb  4.6    /  TBili  0.6    /  DBili  <0.2   /  AST  16     /  ALT  10     /  AlkPhos  28     17 Jun 2020 05:25       pTT    23.9             ----< 0.90 INR  (06-18-20 @ 05:26)    10.40        PT    Radiology:   no new imaging    Assessment and Plan:   58 y/o F PMHx Myasthenia gravis, hypothyroid, dysphagia with ? esophogeal strictures s/p dilatation, HLD, KADI on CPAP presenting with myasthenia crisis.    # Myasthenia crisis  -s/p 5 days IVIG as outpatient with no improvement  -neurology recs noted  -s/p plasma exchange x4/5, 5th exchange planned for 6/19  -repeat fibrinogen level in AM  -c/w azathioprine pyridostigmine  -c/w prednisone, will be tapered by neurology as outpatinet  -monitor NIF/VC qShift (last NIF -45, VC 2100)    # Dysphagia / Postprandial Nausea/vomiting  -Hx of strictures s/p dilation  last ? Oct 2019  -s/p modified barium swallow with speech pathology: mild oral impairment no pharyngeal impairment noted, esophageal impairments noted  -plan for EGD with GI this AM  -plan for outpatient motility studies after discharge  -RUQ US with stones but no cholecystitis, will need to follow with GI/surgery  -c/w pepcid, zofran, protonix    # KADI on CPAP  -currently on BiPAP 10/5 40% QHS  -Abg 6/15 evening with normal PCO2 no need for BiPAP can c/w CPAP on discharge     # Hypothyroid  -c/w levothyroxine home dose    # HLD  -c/w Lipitor    # DVT ppx  -c/w lovenox    # Diet  -DASH    # Activity  -OOBTC as tolerated    # Code Status  -FULL CODE    # Dispo  -likely d/c home pending completion of PLEX and GI interventions

## 2020-06-18 NOTE — PROGRESS NOTE ADULT - SUBJECTIVE AND OBJECTIVE BOX
Patient is a 59y old  Female who presents with a chief complaint of Weakness (18 Jun 2020 07:25)      Over Night Events:  Patient seen and examined feel better regarding strength no sob   but still complaining of NX   s/p esophagogram   US some cholelithiases       ROS:  See HPI    PHYSICAL EXAM    ICU Vital Signs Last 24 Hrs  T(C): 36.6 (18 Jun 2020 03:10), Max: 36.9 (17 Jun 2020 16:04)  T(F): 97.8 (18 Jun 2020 03:10), Max: 98.4 (17 Jun 2020 16:04)  HR: 50 (18 Jun 2020 05:00) (45 - 89)  BP: 97/58 (18 Jun 2020 05:00) (92/55 - 124/59)  BP(mean): 73 (18 Jun 2020 05:00) (69 - 86)  ABP: --  ABP(mean): --  RR: 15 (18 Jun 2020 05:10) (12 - 33)  SpO2: 99% (18 Jun 2020 05:10) (97% - 100%)      General: AOx3  HEENT:        [place]        Lymph Nodes: NO cervical LN   Lungs: Bilateral BS  Cardiovascular: Regular   Abdomen: Soft, Positive BS  Extremities: No clubbing   Skin: warm   Neurological: no focal   Musculoskeletal: move all ext     I&O's Detail    17 Jun 2020 07:01  -  18 Jun 2020 07:00  --------------------------------------------------------  IN:    Oral Fluid: 320 mL  Total IN: 320 mL    OUT:    Voided: 1425 mL  Total OUT: 1425 mL    Total NET: -1105 mL          LABS:                          11.2   10.02 )-----------( 243      ( 18 Jun 2020 05:26 )             34.4         18 Jun 2020 05:26    140    |  105    |  22     ----------------------------<  100    4.1     |  30     |  0.8      Ca    9.1        18 Jun 2020 05:26                                                                                                                                                                                                                                       MEDICATIONS  (STANDING):  aspirin  chewable 81 milliGRAM(s) Oral daily  atorvastatin 20 milliGRAM(s) Oral at bedtime  azaTHIOprine 50 milliGRAM(s) Oral <User Schedule>  budesonide  80 MICROgram(s)/formoterol 4.5 MICROgram(s) Inhaler 2 Puff(s) Inhalation two times a day  chlorhexidine 4% Liquid 1 Application(s) Topical two times a day  enoxaparin Injectable 40 milliGRAM(s) SubCutaneous daily  famotidine    Tablet 40 milliGRAM(s) Oral at bedtime  levothyroxine 50 MICROGram(s) Oral daily  ondansetron Injectable 4 milliGRAM(s) IV Push every 8 hours  pantoprazole    Tablet 40 milliGRAM(s) Oral before breakfast  polyethylene glycol 3350 17 Gram(s) Oral daily  predniSONE   Tablet 50 milliGRAM(s) Oral daily  pyridostigmine 90 milliGRAM(s) Oral four times a day    MEDICATIONS  (PRN):  acetaminophen   Tablet .. 650 milliGRAM(s) Oral every 6 hours PRN Mild Pain (1 - 3), Moderate Pain (4 - 6)          Xrays:  TLC:  OG:  ET tube:                                                                                       ECHO:  [facility] ICU:

## 2020-06-19 LAB
ANION GAP SERPL CALC-SCNC: 9 MMOL/L — SIGNIFICANT CHANGE UP (ref 7–14)
BASOPHILS # BLD AUTO: 0.01 K/UL — SIGNIFICANT CHANGE UP (ref 0–0.2)
BASOPHILS NFR BLD AUTO: 0.1 % — SIGNIFICANT CHANGE UP (ref 0–1)
BUN SERPL-MCNC: 23 MG/DL — HIGH (ref 10–20)
CALCIUM SERPL-MCNC: 8.9 MG/DL — SIGNIFICANT CHANGE UP (ref 8.5–10.1)
CHLORIDE SERPL-SCNC: 105 MMOL/L — SIGNIFICANT CHANGE UP (ref 98–110)
CO2 SERPL-SCNC: 29 MMOL/L — SIGNIFICANT CHANGE UP (ref 17–32)
CREAT SERPL-MCNC: 0.6 MG/DL — LOW (ref 0.7–1.5)
EOSINOPHIL # BLD AUTO: 0.03 K/UL — SIGNIFICANT CHANGE UP (ref 0–0.7)
EOSINOPHIL NFR BLD AUTO: 0.3 % — SIGNIFICANT CHANGE UP (ref 0–8)
FIBRINOGEN PPP-MCNC: 276 MG/DL — SIGNIFICANT CHANGE UP (ref 204.4–570.6)
GLUCOSE BLDC GLUCOMTR-MCNC: 127 MG/DL — HIGH (ref 70–99)
GLUCOSE BLDC GLUCOMTR-MCNC: 129 MG/DL — HIGH (ref 70–99)
GLUCOSE BLDC GLUCOMTR-MCNC: 160 MG/DL — HIGH (ref 70–99)
GLUCOSE SERPL-MCNC: 98 MG/DL — SIGNIFICANT CHANGE UP (ref 70–99)
HCT VFR BLD CALC: 32.1 % — LOW (ref 37–47)
HGB BLD-MCNC: 10.4 G/DL — LOW (ref 12–16)
IMM GRANULOCYTES NFR BLD AUTO: 0.4 % — HIGH (ref 0.1–0.3)
LYMPHOCYTES # BLD AUTO: 2.82 K/UL — SIGNIFICANT CHANGE UP (ref 1.2–3.4)
LYMPHOCYTES # BLD AUTO: 28.7 % — SIGNIFICANT CHANGE UP (ref 20.5–51.1)
MCHC RBC-ENTMCNC: 31.3 PG — HIGH (ref 27–31)
MCHC RBC-ENTMCNC: 32.4 G/DL — SIGNIFICANT CHANGE UP (ref 32–37)
MCV RBC AUTO: 96.7 FL — SIGNIFICANT CHANGE UP (ref 81–99)
MONOCYTES # BLD AUTO: 0.67 K/UL — HIGH (ref 0.1–0.6)
MONOCYTES NFR BLD AUTO: 6.8 % — SIGNIFICANT CHANGE UP (ref 1.7–9.3)
NEUTROPHILS # BLD AUTO: 6.24 K/UL — SIGNIFICANT CHANGE UP (ref 1.4–6.5)
NEUTROPHILS NFR BLD AUTO: 63.7 % — SIGNIFICANT CHANGE UP (ref 42.2–75.2)
NRBC # BLD: 0 /100 WBCS — SIGNIFICANT CHANGE UP (ref 0–0)
PLATELET # BLD AUTO: 244 K/UL — SIGNIFICANT CHANGE UP (ref 130–400)
POTASSIUM SERPL-MCNC: 3.9 MMOL/L — SIGNIFICANT CHANGE UP (ref 3.5–5)
POTASSIUM SERPL-SCNC: 3.9 MMOL/L — SIGNIFICANT CHANGE UP (ref 3.5–5)
RBC # BLD: 3.32 M/UL — LOW (ref 4.2–5.4)
RBC # FLD: 16.3 % — HIGH (ref 11.5–14.5)
SODIUM SERPL-SCNC: 143 MMOL/L — SIGNIFICANT CHANGE UP (ref 135–146)
SURGICAL PATHOLOGY STUDY: SIGNIFICANT CHANGE UP
WBC # BLD: 9.81 K/UL — SIGNIFICANT CHANGE UP (ref 4.8–10.8)
WBC # FLD AUTO: 9.81 K/UL — SIGNIFICANT CHANGE UP (ref 4.8–10.8)

## 2020-06-19 PROCEDURE — 99232 SBSQ HOSP IP/OBS MODERATE 35: CPT

## 2020-06-19 PROCEDURE — 99233 SBSQ HOSP IP/OBS HIGH 50: CPT

## 2020-06-19 RX ORDER — CALCIUM GLUCONATE 100 MG/ML
1 VIAL (ML) INTRAVENOUS ONCE
Refills: 0 | Status: COMPLETED | OUTPATIENT
Start: 2020-06-19 | End: 2020-06-19

## 2020-06-19 RX ORDER — ALBUMIN HUMAN 25 %
3500 VIAL (ML) INTRAVENOUS ONCE
Refills: 0 | Status: COMPLETED | OUTPATIENT
Start: 2020-06-19 | End: 2020-06-19

## 2020-06-19 RX ADMIN — Medication 50 MICROGRAM(S): at 05:19

## 2020-06-19 RX ADMIN — CHLORHEXIDINE GLUCONATE 1 APPLICATION(S): 213 SOLUTION TOPICAL at 05:21

## 2020-06-19 RX ADMIN — POLYETHYLENE GLYCOL 3350 17 GRAM(S): 17 POWDER, FOR SOLUTION ORAL at 11:34

## 2020-06-19 RX ADMIN — PYRIDOSTIGMINE BROMIDE 90 MILLIGRAM(S): 60 SOLUTION ORAL at 05:19

## 2020-06-19 RX ADMIN — CHLORHEXIDINE GLUCONATE 1 APPLICATION(S): 213 SOLUTION TOPICAL at 21:46

## 2020-06-19 RX ADMIN — AZATHIOPRINE 50 MILLIGRAM(S): 100 TABLET ORAL at 13:40

## 2020-06-19 RX ADMIN — PYRIDOSTIGMINE BROMIDE 90 MILLIGRAM(S): 60 SOLUTION ORAL at 21:50

## 2020-06-19 RX ADMIN — PYRIDOSTIGMINE BROMIDE 90 MILLIGRAM(S): 60 SOLUTION ORAL at 17:44

## 2020-06-19 RX ADMIN — ONDANSETRON 4 MILLIGRAM(S): 8 TABLET, FILM COATED ORAL at 05:20

## 2020-06-19 RX ADMIN — PYRIDOSTIGMINE BROMIDE 90 MILLIGRAM(S): 60 SOLUTION ORAL at 11:33

## 2020-06-19 RX ADMIN — ATORVASTATIN CALCIUM 20 MILLIGRAM(S): 80 TABLET, FILM COATED ORAL at 21:51

## 2020-06-19 RX ADMIN — ONDANSETRON 4 MILLIGRAM(S): 8 TABLET, FILM COATED ORAL at 13:40

## 2020-06-19 RX ADMIN — BUDESONIDE AND FORMOTEROL FUMARATE DIHYDRATE 2 PUFF(S): 160; 4.5 AEROSOL RESPIRATORY (INHALATION) at 07:58

## 2020-06-19 RX ADMIN — PANTOPRAZOLE SODIUM 40 MILLIGRAM(S): 20 TABLET, DELAYED RELEASE ORAL at 06:06

## 2020-06-19 RX ADMIN — FAMOTIDINE 40 MILLIGRAM(S): 10 INJECTION INTRAVENOUS at 21:51

## 2020-06-19 RX ADMIN — Medication 650 MILLIGRAM(S): at 11:33

## 2020-06-19 RX ADMIN — Medication 1750 MILLILITER(S): at 11:17

## 2020-06-19 RX ADMIN — Medication 50 MILLIGRAM(S): at 05:19

## 2020-06-19 RX ADMIN — Medication 100 GRAM(S): at 11:18

## 2020-06-19 RX ADMIN — ONDANSETRON 4 MILLIGRAM(S): 8 TABLET, FILM COATED ORAL at 21:50

## 2020-06-19 RX ADMIN — BUDESONIDE AND FORMOTEROL FUMARATE DIHYDRATE 2 PUFF(S): 160; 4.5 AEROSOL RESPIRATORY (INHALATION) at 21:46

## 2020-06-19 RX ADMIN — AZATHIOPRINE 50 MILLIGRAM(S): 100 TABLET ORAL at 05:19

## 2020-06-19 RX ADMIN — Medication 81 MILLIGRAM(S): at 11:33

## 2020-06-19 RX ADMIN — AZATHIOPRINE 50 MILLIGRAM(S): 100 TABLET ORAL at 21:51

## 2020-06-19 NOTE — PROGRESS NOTE ADULT - ASSESSMENT
59 year old owman with hx of myasthenia gravis on mestonin, prednisone daily, finished a 5 day IVIG regimen last week presents to the ED with increasing dyspnea and weakness. [facility] when she could not walk up 2 flights of stairs. Denies any chest pain, no sob, no fever, chills.         Myasthenia gravis crisis       - plasma exchange x 5th today   - may DC R femoral catheter after PLEX today   - nebs prn    - monitor NIF    - pt/rehab  - DVT prophylaxis     Hypothyroid / Hyperlipidemia / nausea      - s/p egd as above   - continue home meds

## 2020-06-19 NOTE — PROGRESS NOTE ADULT - ASSESSMENT
IMPRESSION:  Weakness - likely MG crisis s/p [place] placement for plasmapheresis  Hematuria?  KADI    SUGGEST:      CNS:  plasmapheresis 1 more session today   Monitor NIF and VC Q shift .   Electively intubate if worsening  neuro F/U  cont pyriodstigmine  prednisone/imuran      HEENT: Oral care    PULMONARY:  HOB @ 45 degrees  follow NIF and VC Q 6 hrs   check pox on RA rest and exertion   cpap at night for KADI   CARDIOVASCULAR: I=O    GI: GI prophylaxis.  follow GI recommendation   follow esophagogram result     RENAL:  Follow up lytes.  Correct as needed    INFECTIOUS DISEASE: Follow up cultures    HEMATOLOGICAL:  DVT prophylaxis.    ENDOCRINE:  Follow up FS.  Insulin protocol if needed.    MUSCULOSKELETAL: Bedrest    MICU monitoring  d.c udull after today plasmapheresis if ok by neurology and no more session needed

## 2020-06-19 NOTE — PHYSICAL THERAPY INITIAL EVALUATION ADULT - GENERAL OBSERVATIONS, REHAB EVAL
As discussed with KIKE Shah, hold PT eval 2* pt bleeding from site of femoral line that was removed this morning. PT will follow up tomorrow.

## 2020-06-19 NOTE — SWALLOW BEDSIDE ASSESSMENT ADULT - COMMENTS
intermittent vomiting
known to SLP from December 2019, recommended MBS when recovered from RSV at the time and follow up with sleep apnea management.

## 2020-06-19 NOTE — PROGRESS NOTE ADULT - SUBJECTIVE AND OBJECTIVE BOX
59yFemale  Being followed for Post prandial emesis   Interval history: Patient denies nausea, hematemesis, melena, blood in stool, diarrhea, constipation, abdominal pain. Patient reports improved emesis episodes. No emesis all day yesterday, small episode of emesis today after breakfast.  Overall patient feeling better.    PAST MEDICAL & SURGICAL HISTORY:  Myasthenia gravis  High blood cholesterol  Pancreatitis  POTS (postural orthostatic tachycardia syndrome)  History of foot surgery          Social History: No smoking. No alcohol. No illegal drug use.            MEDICATIONS  (STANDING):  albumin human  5% IVPB 3500 milliLiter(s) IV Intermittent once  aspirin  chewable 81 milliGRAM(s) Oral daily  atorvastatin 20 milliGRAM(s) Oral at bedtime  azaTHIOprine 50 milliGRAM(s) Oral <User Schedule>  budesonide  80 MICROgram(s)/formoterol 4.5 MICROgram(s) Inhaler 2 Puff(s) Inhalation two times a day  calcium gluconate IVPB 1 Gram(s) IV Intermittent once  chlorhexidine 4% Liquid 1 Application(s) Topical two times a day  enoxaparin Injectable 40 milliGRAM(s) SubCutaneous daily  famotidine    Tablet 40 milliGRAM(s) Oral at bedtime  levothyroxine 50 MICROGram(s) Oral daily  ondansetron Injectable 4 milliGRAM(s) IV Push every 8 hours  pantoprazole    Tablet 40 milliGRAM(s) Oral before breakfast  polyethylene glycol 3350 17 Gram(s) Oral daily  predniSONE   Tablet 50 milliGRAM(s) Oral daily  pyridostigmine 90 milliGRAM(s) Oral four times a day    MEDICATIONS  (PRN):  acetaminophen   Tablet .. 650 milliGRAM(s) Oral every 6 hours PRN Mild Pain (1 - 3), Moderate Pain (4 - 6)      Allergies:   sulfa drugs (Unknown)              REVIEW OF SYSTEMS:  General:  No weight loss, fevers, or chills.  Eyes:  No reported pain or visual changes  ENT:  No sore throat or runny nose.  NECK: No stiffness   CV:  No chest pain or palpitations.  Resp:  No shortness of breath, cough  GI:  No abdominal pain, 1 episode of emesis today no nausea, dysphagia, diarrhea or constipation. No rectal bleeding, melena, or hematemesis.  Neuro:  No tingling, numbness         VITAL SIGNS:   T(F): 97.8 (20 @ 07:10), Max: 98 (20 @ 15:00)  HR: 68 (20 @ 08:04) (51 - 88)  BP: 97/55 (20 @ 08:04) (94/53 - 140/65)  RR: 21 (20 @ 09:00) (12 - 40)  SpO2: 99% (20 @ 08:04) (97% - 100%)    PHYSICAL EXAM:  GENERAL: AAOx3, no acute distress.  HEAD:  Atraumatic, Normocephalic  EYES: conjunctiva and sclera clear  NECK: Supple, no JVD or thyromegaly  CHEST/LUNG: Clear to auscultation bilaterally; No wheeze, rhonchi, or rales  HEART: Regular rate and rhythm; normal S1, S2, No murmurs.  ABDOMEN: Soft, nontender, nondistended; Bowel sounds present, no abdominal bruit, masses, or hepatosplenomegaly  NEUROLOGY: No asterixis or tremor.   SKIN: Intact, no jaundice            LABS:                        10.4   9.81  )-----------( 244      ( 2020 05:36 )             32.1     06-    143  |  105  |  23<H>  ----------------------------<  98  3.9   |  29  |  0.6<L>    Ca    8.9      2020 05:36        PT/INR - ( 2020 05:26 )   PT: 10.40 sec;   INR: 0.90 ratio         PTT - ( 2020 05:26 )  PTT:23.9 sec    IMAGING:      < from: Xray Chest 1 View- PORTABLE-Routine (20 @ 04:54) >  EXAM:  XR CHEST PORTABLE ROUTINE 1V            PROCEDURE DATE:  2020            INTERPRETATION:  Clinical History / Reason for exam: Shortness of breath    Comparison : Chest radiograph 2020.    Technique/Positioning: AP portable.    Findings:    Support devices: None.    Cardiac/mediastinum/hilum: Unchanged    Lung parenchyma/Pleura: Near-complete resolution of left base opacity with minimal left costophrenic pleural reaction remaining. No pneumothorax or pleural effusion.    Skeleton/soft tissues: Unchanged    Impression: Near-complete resolution of left base opacity with minimal residual costophrenic angle reaction                      JENI BERNARDO M.D., ATTENDING RADIOLOGIST  This document has been electronically signed. 2020 11:37AM              < end of copied text >      < from: EGD (20 @ 11:00) >    EGD Report Date: 2020 11:00 AM   Patient Name: GHADA ROPER   MRN: [id number]   Account Number: [id number]  Gender: Female    (age): 1960 (59)   Instrument(s): GIF-[id number] ([id number])([id number])    Attending:   John Zhang MD       Referring Physician:   DAVON HUDSON  [address] [place] [address]  [phone] (phone)  [phone] (fax)             Indications: Dysphagia: 787.20 - R13.10  Nausea with vomiting, unspecified: 787.01 - R11.2  Regurgitation: 787.99 - R11.10  Procedure:   The procedure, indications, preparation and potential complications were  explained to the patient, who indicated understanding and signed the  corresponding consent forms. MAC was administered by anesthesiologist.  Continuous pulse oximetry and blood pressure monitoring were used throughout the  procedure. Supplemental oxygen was used. Patient was placed in the left lateral  decubitus position. The endoscope was introduced through the mouth and advanced  under direct visualization until the second part of the duodenum was reached.  Patient tolerance to the procedure was good. The procedure was not difficult.  Blood loss was minimal.      Limitations/Complications: There were no apparent limitations or complications  Findings:   Esophagus Mucosa Normal mucosa was noted in the whole esophagus. Though there  was no stricture, the distal esophagus was dilated to 12 as this had provided  temporary relief in the past.   Stomach Mucosa Erythema of the mucosa wasnoted in the stomach. Six cold forceps  biopsies were performed for histology.   Duodenum Normal duodenum.                Impressions:    Normal duodenum.    Normal mucosa in the whole esophagus.    Erythema in the stomach. (Biopsy).     Plan: Await pathology results  GERD/Ulcer Diet  Follow-up office visit in 2-3 weeks  follow up modified barium swallow. Proceed with outpatient esophageal motility  study       John Zhang MD    Version 1, Electronically signed on 2020 11:37:04 AM by John Zhang MD    < end of copied text > 59yFemale  Being followed for Post prandial emesis   Interval history: Patient denies nausea, hematemesis, melena, blood in stool, diarrhea, constipation, abdominal pain. Patient reports improved emesis episodes. No emesis all day yesterday, small episode of emesis today after breakfast.  Overall patient feeling better.    PAST MEDICAL & SURGICAL HISTORY:  Myasthenia gravis  High blood cholesterol  Pancreatitis  POTS (postural orthostatic tachycardia syndrome)  History of foot surgery          Social History: No smoking. No alcohol. No illegal drug use.            MEDICATIONS  (STANDING):  albumin human  5% IVPB 3500 milliLiter(s) IV Intermittent once  aspirin  chewable 81 milliGRAM(s) Oral daily  atorvastatin 20 milliGRAM(s) Oral at bedtime  azaTHIOprine 50 milliGRAM(s) Oral <User Schedule>  budesonide  80 MICROgram(s)/formoterol 4.5 MICROgram(s) Inhaler 2 Puff(s) Inhalation two times a day  calcium gluconate IVPB 1 Gram(s) IV Intermittent once  chlorhexidine 4% Liquid 1 Application(s) Topical two times a day  enoxaparin Injectable 40 milliGRAM(s) SubCutaneous daily  famotidine    Tablet 40 milliGRAM(s) Oral at bedtime  levothyroxine 50 MICROGram(s) Oral daily  ondansetron Injectable 4 milliGRAM(s) IV Push every 8 hours  pantoprazole    Tablet 40 milliGRAM(s) Oral before breakfast  polyethylene glycol 3350 17 Gram(s) Oral daily  predniSONE   Tablet 50 milliGRAM(s) Oral daily  pyridostigmine 90 milliGRAM(s) Oral four times a day    MEDICATIONS  (PRN):  acetaminophen   Tablet .. 650 milliGRAM(s) Oral every 6 hours PRN Mild Pain (1 - 3), Moderate Pain (4 - 6)      Allergies:   sulfa drugs (Unknown)            REVIEW OF SYSTEMS:  General:  No weight loss, fevers, or chills.  Eyes:  No reported pain or visual changes  ENT:  No sore throat or runny nose.  NECK: No stiffness   CV:  No chest pain or palpitations.  Resp:  No shortness of breath, cough  GI:  No abdominal pain, 1 episode of emesis today no nausea, dysphagia, diarrhea or constipation. No rectal bleeding, melena, or hematemesis.  Neuro:  No tingling, numbness         VITAL SIGNS:   T(F): 97.8 (20 @ 07:10), Max: 98 (20 @ 15:00)  HR: 68 (20 @ 08:04) (51 - 88)  BP: 97/55 (20 @ 08:04) (94/53 - 140/65)  RR: 21 (20 @ 09:00) (12 - 40)  SpO2: 99% (20 @ 08:04) (97% - 100%)    PHYSICAL EXAM:  GENERAL: AAOx3, no acute distress.  HEAD:  Atraumatic, Normocephalic  EYES: conjunctiva and sclera clear  NECK: Supple, no JVD or thyromegaly  CHEST/LUNG: Clear to auscultation bilaterally; No wheeze, rhonchi, or rales  HEART: Regular rate and rhythm; normal S1, S2, No murmurs.  ABDOMEN: Soft, nontender, nondistended; Bowel sounds present, no abdominal bruit, masses, or hepatosplenomegaly  NEUROLOGY: No asterixis or tremor.   SKIN: Intact, no jaundice            LABS:                        10.4   9.81  )-----------( 244      ( 2020 05:36 )             32.1     06-    143  |  105  |  23<H>  ----------------------------<  98  3.9   |  29  |  0.6<L>    Ca    8.9      2020 05:36        PT/INR - ( 2020 05:26 )   PT: 10.40 sec;   INR: 0.90 ratio         PTT - ( 2020 05:26 )  PTT:23.9 sec    IMAGING:      < from: Xray Chest 1 View- PORTABLE-Routine (20 @ 04:54) >  EXAM:  XR CHEST PORTABLE ROUTINE 1V            PROCEDURE DATE:  2020            INTERPRETATION:  Clinical History / Reason for exam: Shortness of breath    Comparison : Chest radiograph 2020.    Technique/Positioning: AP portable.    Findings:    Support devices: None.    Cardiac/mediastinum/hilum: Unchanged    Lung parenchyma/Pleura: Near-complete resolution of left base opacity with minimal left costophrenic pleural reaction remaining. No pneumothorax or pleural effusion.    Skeleton/soft tissues: Unchanged    Impression: Near-complete resolution of left base opacity with minimal residual costophrenic angle reaction                      JENI BERNARDO M.D., ATTENDING RADIOLOGIST  This document has been electronically signed. 2020 11:37AM              < end of copied text >      < from: EGD (20 @ 11:00) >    EGD Report Date: 2020 11:00 AM   Patient Name: GHADA ROPER   MRN: [id number]   Account Number: [id number]  Gender: Female    (age): 1960 (59)   Instrument(s): GIF-[id number] ([id number])([id number])    Attending:   John Zhang MD       Referring Physician:   DAVON HUDSON  [address] [place] [address]  [phone] (phone)  [phone] (fax)             Indications: Dysphagia: 787.20 - R13.10  Nausea with vomiting, unspecified: 787.01 - R11.2  Regurgitation: 787.99 - R11.10  Procedure:   The procedure, indications, preparation and potential complications were  explained to the patient, who indicated understanding and signed the  corresponding consent forms. MAC was administered by anesthesiologist.  Continuous pulse oximetry and blood pressure monitoring were used throughout the  procedure. Supplemental oxygen was used. Patient was placed in the left lateral  decubitus position. The endoscope was introduced through the mouth and advanced  under direct visualization until the second part of the duodenum was reached.  Patient tolerance to the procedure was good. The procedure was not difficult.  Blood loss was minimal.      Limitations/Complications: There were no apparent limitations or complications  Findings:   Esophagus Mucosa Normal mucosa was noted in the whole esophagus. Though there  was no stricture, the distal esophagus was dilated to 12 as this had provided  temporary relief in the past.   Stomach Mucosa Erythema of the mucosa wasnoted in the stomach. Six cold forceps  biopsies were performed for histology.   Duodenum Normal duodenum.                Impressions:    Normal duodenum.    Normal mucosa in the whole esophagus.    Erythema in the stomach. (Biopsy).     Plan: Await pathology results  GERD/Ulcer Diet  Follow-up office visit in 2-3 weeks  follow up modified barium swallow. Proceed with outpatient esophageal motility  study       John Zhang MD    Version 1, Electronically signed on 2020 11:37:04 AM by John Zhang MD    < end of copied text >

## 2020-06-19 NOTE — PROGRESS NOTE ADULT - ASSESSMENT
59yFemale pmh MG admitted with myasthenia gravis crisis consulted to GI for intermittent post prandial emesis for 3 weeks. Patient reports she feels epigastric discomfort and as if food gets stuck at her epigastric region. Patient denies nausea, hematemesis, melena, blood in stool, diarrhea, constipation, abdominal pain. Patient reports emesis without nausea. Patient reports she had ane EGD with Dr. Ag with dilation 10/2019 showed grade A esophagitis. Patient had barium esophagram and was scheduled for repeat EGD then the COVID-19 pandemic happened and everything was put off    S/P EGD yesterday with Dr. Zhang  Impressions:    Normal duodenum.    Normal mucosa in the whole esophagus.    Erythema in the stomach. (Biopsy).       Problem 1-Post-Prandial Emesis history of esophageal strictures, EGD with dilation in October by Dr Ag,/ R/O motility disorder   Rec  -Zofran prn  -PPI daily  -GERD/Ulcer Diet  -Follow-up office visit in 2-3 weeks  -Modified barium swallow appreciated case discussed with SLP, no retention, slight residue in esophagus with liquids  -Proceed with outpatient esophageal motility  -The benefits of endoscopy as well as the risks, such as GI bleeding, aspiration pneumonia, perforation, damage or loss of teeth, reaction to medication, or death  were reviewed with the patient.   - Follow up with our [facility] located at [address] [place] [address]. Phone Number: [phone], appointment made 7/31/2020     Problem 2-Cholelithiasis Asymptomatic  Rec  -Watchful waiting 59yFemale pmh MG admitted with myasthenia gravis crisis consulted to GI for intermittent post prandial emesis for 3 weeks. Patient reports she feels epigastric discomfort and as if food gets stuck at her epigastric region. Patient denies nausea, hematemesis, melena, blood in stool, diarrhea, constipation, abdominal pain. Patient reports emesis without nausea. Patient reports she had ane EGD with Dr. Ag with dilation 10/2019 showed grade A esophagitis. Patient had barium esophagram and was scheduled for repeat EGD then the COVID-19 pandemic happened and everything was put off    S/P EGD yesterday with Dr. Zhang  Impressions:    Normal duodenum.    Normal mucosa in the whole esophagus.    Erythema in the stomach. (Biopsy).     Problem 1-Post-Prandial Emesis history of esophageal strictures, EGD with dilation in October by Dr Ag,/ R/O motility disorder   Rec  -Zofran prn  -PPI daily  -GERD/Ulcer Diet  -Follow-up office visit in 2-3 weeks  -Modified barium swallow appreciated case discussed with SLP, no retention, slight residue in esophagus with liquids  -Proceed with outpatient esophageal motility  -The benefits of endoscopy as well as the risks, such as GI bleeding, aspiration pneumonia, perforation, damage or loss of teeth, reaction to medication, or death  were reviewed with the patient.   -Follow up with our [facility] located at [address] [place] [address]. Phone Number: [phone], appointment made 7/31/2020     Problem 2-Cholelithiasis Asymptomatic  Rec  -Watchful waiting

## 2020-06-19 NOTE — CONSULT NOTE ADULT - ASSESSMENT
IMPRESSION: Rehab of   58 yo F with myasthenia Gravis crisis, s/p plasma exchange for 5 days    PRECAUTIONS: [  ] Cardiac  [  ] Respiratory  [  ] Seizures [  ] Contact Isolation  [  ] Droplet Isolation  [  ] Other    Weight Bearing Status:     RECOMMENDATION:    Out of Bed to Chair     DVT/Decubiti Prophylaxis    REHAB PLAN:     [ x  ] Bedside P/T 3-5 times a week   [ x  ]   Bedside O/T  2-3 times a week             [   ] No Rehab Therapy Indicated                   [   ]  Speech Therapy   Conditioning/ROM                                    ADL  Bed Mobility                                               Conditioning/ROM  Transfers                                                     Bed Mobility  Sitting /Standing Balance                         Transfers                                        Gait Training                                               Sitting/Standing Balance  Stair Training [   ]Applicable                    Home equipment Eval                                                                        Splinting  [   ] Only      GOALS:   ADL   [x   ]   Independent                    Transfers  [x   ] Independent                          Ambulation  [  x ] Independent     [  x  ] With device                            [   ]  CG                                                         [   ]  CG                                                                  [   ] CG                            [    ] Min A                                                   [   ] Min A                                                              [   ] Min  A          DISCHARGE PLAN:   [x   ]  Good candidate for Intensive Rehabilitation/Hospital based-[facility] ([facility]) which is suggested.                                             Will tolerate 3hrs Intensive Rehab Daily                                             [facility] won't open until 6/29.                                              She hasn't had much improvement up until this point. She doesn't want to go to a SNF.                                      [    ]  Short Term Rehab in Skilled Nursing Facility                                       [    ]  Home with Outpatient or VN services                                         [    ]  Possible Candidate for Intensive Hospital based Rehab IMPRESSION: Rehab of   60 yo F with myasthenia Gravis crisis, s/p plasma exchange for 5 days    PRECAUTIONS: [  ] Cardiac  [  ] Respiratory  [  ] Seizures [  ] Contact Isolation  [  ] Droplet Isolation  [  ] Other    Weight Bearing Status:     RECOMMENDATION:    Out of Bed to Chair     DVT/Decubiti Prophylaxis    REHAB PLAN:     [ x  ] Bedside P/T 3-5 times a week   [ x  ]   Bedside O/T  2-3 times a week             [   ] No Rehab Therapy Indicated                   [   ]  Speech Therapy   Conditioning/ROM                                    ADL  Bed Mobility                                               Conditioning/ROM  Transfers                                                     Bed Mobility  Sitting /Standing Balance                         Transfers                                        Gait Training                                               Sitting/Standing Balance  Stair Training [   ]Applicable                    Home equipment Eval                                                                        Splinting  [   ] Only      GOALS:   ADL   [x   ]   Independent                    Transfers  [x   ] Independent                          Ambulation  [  x ] Independent     [  x  ] With device                            [   ]  CG                                                         [   ]  CG                                                                  [   ] CG                            [    ] Min A                                                   [   ] Min A                                                              [   ] Min  A          DISCHARGE PLAN:   [x   ]  Good candidate for Intensive Rehabilitation/Hospital based-which is suggested.                                             Will tolerate 3hrs Intensive Rehab Daily                                             [facility] won't open until 6/29.                                              She hasn't had much improvement up until this point.                                       [ x   ]  Short Term Rehab in Skilled Nursing Facility                                       [    ]  Home with Outpatient or VN services                                         [    ]  Possible Candidate for Intensive Hospital based Rehab IMPRESSION: Rehab of   60 yo F with myasthenia Gravis crisis, s/p plasma exchange for 5 days    PRECAUTIONS: [  ] Cardiac  [  ] Respiratory  [  ] Seizures [  ] Contact Isolation  [  ] Droplet Isolation  [  ] Other    Weight Bearing Status:     RECOMMENDATION:    Out of Bed to Chair     DVT/Decubiti Prophylaxis    REHAB PLAN:     [ x  ] Bedside P/T 3-5 times a week   [ x  ]   Bedside O/T  2-3 times a week             [   ] No Rehab Therapy Indicated                   [   ]  Speech Therapy   Conditioning/ROM                                    ADL  Bed Mobility                                               Conditioning/ROM  Transfers                                                     Bed Mobility  Sitting /Standing Balance                         Transfers                                        Gait Training                                               Sitting/Standing Balance  Stair Training [   ]Applicable                    Home equipment Eval                                                                        Splinting  [   ] Only      GOALS:   ADL   [x   ]   Independent                    Transfers  [x   ] Independent                          Ambulation  [  x ] Independent     [  x  ] With device                            [   ]  CG                                                         [   ]  CG                                                                  [   ] CG                            [    ] Min A                                                   [   ] Min A                                                              [   ] Min  A          DISCHARGE PLAN:   [x   ]  Good candidate for Intensive Rehabilitation/Hospital based-which is suggested.                                             Will tolerate 3hrs Intensive Rehab Daily ([facility])                                             [facility] won't open until 6/29.                                              She hasn't had much improvement up until this point.                                       [ x   ]  Short Term Rehab in Skilled Nursing Facility                                       [    ]  Home with Outpatient or VN services                                         [    ]  Possible Candidate for Intensive Hospital based Rehab

## 2020-06-19 NOTE — PROGRESS NOTE ADULT - ASSESSMENT
IMPRESSION:  Weakness - likely MG crisis s/p [place] placement for plasmapheresis  Hematuria?  KADI    CNS:  plasmapheresis 1 more session today   Monitor NIF and VC Q shift .   Electively intubate if worsening  neuro F/U  cont pyridostigmine  prednisone/imuran      HEENT: Oral care    PULMONARY:  HOB @ 45 degrees  follow NIF and VC Q 6 hrs   check pox on RA rest and exertion   cpap at night for KADI   CARDIOVASCULAR: I=O    GI: GI prophylaxis.  follow GI recommendation   follow esophagogram result     RENAL:  Follow up lytes.  Correct as needed    INFECTIOUS DISEASE: Follow up cultures    HEMATOLOGICAL:  DVT prophylaxis.    ENDOCRINE:  Follow up FS.  Insulin protocol if needed.    MUSCULOSKELETAL: Bedrest    MICU monitoring  d.c udull after today plasmapheresis if ok by neurology and no more session needed

## 2020-06-19 NOTE — PROGRESS NOTE ADULT - SUBJECTIVE AND OBJECTIVE BOX
Patient is still feeling weak       T(F): 97.8 (06-19-20 @ 07:10), Max: 98 (06-18-20 @ 15:00)  HR: 68 (06-19-20 @ 08:04)  BP: 97/55 (06-19-20 @ 08:04)  RR: 21 (06-19-20 @ 09:00)  SpO2: 99% (06-19-20 @ 08:04) (97% - 100%)    PHYSICAL EXAM:  GENERAL: NAD  HEAD:  Atraumatic, Normocephalic  NERVOUS SYSTEM:  Alert & Oriented X3, no focal deficits   CHEST/LUNG: Clear to percussion bilaterally; No rales, rhonchi, wheezing, or rubs  HEART: Regular rate and rhythm; No murmurs, rubs, or gallops  ABDOMEN: Soft, Nontender, Nondistended; Bowel sounds present  EXTREMITIES:  2+ Peripheral Pulses, No clubbing, cyanosis, or edema  LYMPH: No lymphadenopathy noted  SKIN: No rashes or lesions    LABS  06-19    143  |  105  |  23<H>  ----------------------------<  98  3.9   |  29  |  0.6<L>    Ca    8.9      19 Jun 2020 05:36                            10.4   9.81  )-----------( 244      ( 19 Jun 2020 05:36 )             32.1     PT/INR - ( 18 Jun 2020 05:26 )   PT: 10.40 sec;   INR: 0.90 ratio         PTT - ( 18 Jun 2020 05:26 )  PTT:23.9 sec      Culture Results:   >=3 organisms. Probable collection contamination. (06-11-20)  < from: EGD (06.18.20 @ 11:00) >    Impressions:    Normal duodenum.    Normal mucosa in the whole esophagus.    Erythema in the stomach. (Biopsy).     Plan: Await pathology results  GERD/Ulcer Diet  Follow-up office visit in 2-3 weeks  follow up modified barium swallow. Proceed with outpatient esophageal motility  study    John Zhang MD    Version 1, Electronically signed on 6/18/2020     RADIOLOGY  < from: US Abdomen Limited (06.17.20 @ 12:11) >  IMPRESSION:    Cholelithiasis without sonographic evidence of cholecystitis.    < end of copied text >    MEDICATIONS  (STANDING):  albumin human  5% IVPB 3500 milliLiter(s) IV Intermittent once  aspirin  chewable 81 milliGRAM(s) Oral daily  atorvastatin 20 milliGRAM(s) Oral at bedtime  azaTHIOprine 50 milliGRAM(s) Oral <User Schedule>  budesonide  80 MICROgram(s)/formoterol 4.5 MICROgram(s) Inhaler 2 Puff(s) Inhalation two times a day  calcium gluconate IVPB 1 Gram(s) IV Intermittent once  chlorhexidine 4% Liquid 1 Application(s) Topical two times a day  enoxaparin Injectable 40 milliGRAM(s) SubCutaneous daily  famotidine    Tablet 40 milliGRAM(s) Oral at bedtime  levothyroxine 50 MICROGram(s) Oral daily  ondansetron Injectable 4 milliGRAM(s) IV Push every 8 hours  pantoprazole    Tablet 40 milliGRAM(s) Oral before breakfast  polyethylene glycol 3350 17 Gram(s) Oral daily  predniSONE   Tablet 50 milliGRAM(s) Oral daily  pyridostigmine 90 milliGRAM(s) Oral four times a day    MEDICATIONS  (PRN):  acetaminophen   Tablet .. 650 milliGRAM(s) Oral every 6 hours PRN Mild Pain (1 - 3), Moderate Pain (4 - 6)

## 2020-06-19 NOTE — PROGRESS NOTE ADULT - SUBJECTIVE AND OBJECTIVE BOX
Neurology Follow up note    Name  GHADA ROPER    HPI:  59 year old owman with hx of myasthenia gravis on mestonin, prednisone daily, finished a 5 day IVIG regimen last week presents to the ED with increasing dyspnea and weakness. [facility] when she could not walk up 2 flights of stairs. Patient with hematuria noted since Sunday. Denies any chest pain, no sob, no fever, chills. (11 Jun 2020 18:30)      Interval History:    patient is neurologially stable   she is presently receiving 5th PLEX      Vital Signs Last 24 Hrs  T(C): 36.6 (19 Jun 2020 07:10), Max: 36.7 (18 Jun 2020 15:00)  T(F): 97.8 (19 Jun 2020 07:10), Max: 98 (18 Jun 2020 15:00)  HR: 68 (19 Jun 2020 08:04) (51 - 88)  BP: 97/55 (19 Jun 2020 08:04) (94/53 - 140/65)  BP(mean): 70 (19 Jun 2020 08:04) (67 - 93)  RR: 28 (19 Jun 2020 08:04) (12 - 40)  SpO2: 99% (19 Jun 2020 08:04) (97% - 100%)    Neurological Exam:   Mental status: Awake, alert and oriented x3.  Recent and remote memory intact.  Naming, repetition and comprehension intact.  Attention/concentration intact.  No dysarthria, no aphasia.  Fund of knowledge appropriate.    Cranial nerves: Pupils equally round and reactive to light, visual fields full, no nystagmus, extraocular muscles intact, V1 through V3 intact bilaterally and symmetric, face symmetric, hearing intact to finger rub, palate elevation symmetric, tongue was midline.  Motor:  MRC grading 5/5 b/l UE/LE.   strength 5/5.  Normal tone and bulk.  No abnormal movements.    Sensation: Intact to light touch, proprioception, and pinprick.   Coordination: No dysmetria on finger-to-nose and heel-to-shin.  No dysdiadokinesia.  Reflexes: 2+ in bilateral UE/LE, downgoing toes bilaterally. (-) Lance.      Medications  acetaminophen   Tablet .. 650 milliGRAM(s) Oral every 6 hours PRN  albumin human  5% IVPB 3500 milliLiter(s) IV Intermittent once  aspirin  chewable 81 milliGRAM(s) Oral daily  atorvastatin 20 milliGRAM(s) Oral at bedtime  azaTHIOprine 50 milliGRAM(s) Oral <User Schedule>  budesonide  80 MICROgram(s)/formoterol 4.5 MICROgram(s) Inhaler 2 Puff(s) Inhalation two times a day  calcium gluconate IVPB 1 Gram(s) IV Intermittent once  chlorhexidine 4% Liquid 1 Application(s) Topical two times a day  enoxaparin Injectable 40 milliGRAM(s) SubCutaneous daily  famotidine    Tablet 40 milliGRAM(s) Oral at bedtime  levothyroxine 50 MICROGram(s) Oral daily  ondansetron Injectable 4 milliGRAM(s) IV Push every 8 hours  pantoprazole    Tablet 40 milliGRAM(s) Oral before breakfast  polyethylene glycol 3350 17 Gram(s) Oral daily  predniSONE   Tablet 50 milliGRAM(s) Oral daily  pyridostigmine 90 milliGRAM(s) Oral four times a day      Lab  06-19    143  |  105  |  23<H>  ----------------------------<  98  3.9   |  29  |  0.6<L>    Ca    8.9      19 Jun 2020 05:36                            10.4   9.81  )-----------( 244      ( 19 Jun 2020 05:36 )             32.1               Radiology      Assessment:  EXACERBATION OF MG. pt is stable  Plan:  PLEX completed as of today  cont imuran, prednisone 50 mg qd and mestinon  patient can be downgraded from unit after PLEX  will need PT Eval and rehab  should f/u with dr hernandez 1 week after d/c

## 2020-06-19 NOTE — PROGRESS NOTE ADULT - SUBJECTIVE AND OBJECTIVE BOX
SUBJECTIVE:    Patient is a 59y old Female who presents with a chief complaint of Weakness (19 Jun 2020 09:38)    Currently admitted to medicine with the primary diagnosis of Myasthenia gravis in crisis     Today is hospital day 8d. This morning she is resting comfortably in bed and reports no new issues or overnight events.     PAST MEDICAL & SURGICAL HISTORY  Myasthenia gravis  High blood cholesterol  Pancreatitis  POTS (postural orthostatic tachycardia syndrome)  History of foot surgery    SOCIAL HISTORY:  Negative for smoking/alcohol/drug use.     ALLERGIES:  sulfa drugs (Unknown)    MEDICATIONS:  STANDING MEDICATIONS  albumin human  5% IVPB 3500 milliLiter(s) IV Intermittent once  aspirin  chewable 81 milliGRAM(s) Oral daily  atorvastatin 20 milliGRAM(s) Oral at bedtime  azaTHIOprine 50 milliGRAM(s) Oral <User Schedule>  budesonide  80 MICROgram(s)/formoterol 4.5 MICROgram(s) Inhaler 2 Puff(s) Inhalation two times a day  calcium gluconate IVPB 1 Gram(s) IV Intermittent once  chlorhexidine 4% Liquid 1 Application(s) Topical two times a day  enoxaparin Injectable 40 milliGRAM(s) SubCutaneous daily  famotidine    Tablet 40 milliGRAM(s) Oral at bedtime  levothyroxine 50 MICROGram(s) Oral daily  ondansetron Injectable 4 milliGRAM(s) IV Push every 8 hours  pantoprazole    Tablet 40 milliGRAM(s) Oral before breakfast  polyethylene glycol 3350 17 Gram(s) Oral daily  predniSONE   Tablet 50 milliGRAM(s) Oral daily  pyridostigmine 90 milliGRAM(s) Oral four times a day    PRN MEDICATIONS  acetaminophen   Tablet .. 650 milliGRAM(s) Oral every 6 hours PRN    VITALS:   T(F): 97.8  HR: 68  BP: 97/55  RR: 21  SpO2: 99%    LABS:                        10.4   9.81  )-----------( 244      ( 19 Jun 2020 05:36 )             32.1     06-19    143  |  105  |  23<H>  ----------------------------<  98  3.9   |  29  |  0.6<L>    Ca    8.9      19 Jun 2020 05:36      PT/INR - ( 18 Jun 2020 05:26 )   PT: 10.40 sec;   INR: 0.90 ratio         PTT - ( 18 Jun 2020 05:26 )  PTT:23.9 sec              RADIOLOGY:    PHYSICAL EXAM:  GEN: No acute distress  LUNGS: Clear to auscultation bilaterally   HEART: S1/S2 present. RRR.   ABD: Soft, non-tender, non-distended. Bowel sounds present  EXT: NC/NC/NE/2+PP/ADHIKARI  NEURO: AAOX3

## 2020-06-19 NOTE — CONSULT NOTE ADULT - SUBJECTIVE AND OBJECTIVE BOX
HPI:  59 year old owman with hx of myasthenia gravis on mestonin, prednisone daily, finished a 5 day IVIG regimen last week presents to the ED with increasing dyspnea and weakness. [facility] when she could not walk up 2 flights of stairs. Patient with hematuria noted since Sunday. Denies any chest pain, no sob, no fever, chills. (11 Jun 2020 18:30)      PAST MEDICAL & SURGICAL HISTORY:  Myasthenia gravis  High blood cholesterol  Pancreatitis  POTS (postural orthostatic tachycardia syndrome)  History of foot surgery      Hospital Course:  She has myasthenia gravis. Hospitalized with exacerbation. Weakness worsening over the last 2 months that progressed over the last 2 weeks. She has received PLEX (plasma exchange).  She is on Mestinon, Imuran, Mestinon, history recent  IVIG. She hasn't had much improvement with her treatment yet so far during this hospitalization. She had some drainage from the plasma exchange cath so Pt had to be held today.   TODAY'S SUBJECTIVE & REVIEW OF SYMPTOMS:     Constitutional WNL   Cardio WNL   Resp WNL   GI WNL  Heme WNL  Endo WNL  Skin WNL  MSK WNL  Neuro WNL  Cognitive WNL  Psych WNL      MEDICATIONS  (STANDING):  aspirin  chewable 81 milliGRAM(s) Oral daily  atorvastatin 20 milliGRAM(s) Oral at bedtime  azaTHIOprine 50 milliGRAM(s) Oral <User Schedule>  budesonide  80 MICROgram(s)/formoterol 4.5 MICROgram(s) Inhaler 2 Puff(s) Inhalation two times a day  chlorhexidine 4% Liquid 1 Application(s) Topical two times a day  enoxaparin Injectable 40 milliGRAM(s) SubCutaneous daily  famotidine    Tablet 40 milliGRAM(s) Oral at bedtime  levothyroxine 50 MICROGram(s) Oral daily  ondansetron Injectable 4 milliGRAM(s) IV Push every 8 hours  pantoprazole    Tablet 40 milliGRAM(s) Oral before breakfast  polyethylene glycol 3350 17 Gram(s) Oral daily  predniSONE   Tablet 50 milliGRAM(s) Oral daily  pyridostigmine 90 milliGRAM(s) Oral four times a day    MEDICATIONS  (PRN):  acetaminophen   Tablet .. 650 milliGRAM(s) Oral every 6 hours PRN Mild Pain (1 - 3), Moderate Pain (4 - 6)      FAMILY HISTORY:  No pertinent family history in first degree relatives      Allergies    sulfa drugs (Unknown)    Intolerances        SOCIAL HISTORY:    [  ] Etoh  [  ] Smoking  [  ] Substance abuse     Home Environment:  [  ] Home Alone  [ x ] Lives with Family-  [  ] Home Health Aid    Dwelling:  [  ] Apartment  [  ] Private House  [  ] Adult Home  [  ] Skilled Nursing Facility      [  ] Short Term  [  ] Long Term  [ x ] Stairs-few steps to enter and a flight of stairs to the bedroom       Elevator [  ]    FUNCTIONAL STATUS PTA: (Check all that apply)  Ambulation: [   ]Independent    [  ] Dependent     [  ] Non-Ambulatory  Assistive Device: [  ] SA Cane  [  ]  Q Cane  [  ] Walker  [  ]  Wheelchair  ADL : [  ] Independent  [  ]  Dependent       Vital Signs Last 24 Hrs  T(C): 36.5 (19 Jun 2020 15:00), Max: 36.7 (19 Jun 2020 13:00)  T(F): 97.7 (19 Jun 2020 15:00), Max: 98.1 (19 Jun 2020 13:00)  HR: 63 (19 Jun 2020 15:38) (51 - 78)  BP: 112/57 (19 Jun 2020 15:38) (92/55 - 119/58)  BP(mean): 77 (19 Jun 2020 15:38) (67 - 84)  RR: 18 (19 Jun 2020 15:38) (12 - 28)  SpO2: 99% (19 Jun 2020 15:38) (97% - 100%)      PHYSICAL EXAM: Alert & Oriented X3  GENERAL: NAD, well-groomed, well-developed  HEAD:  Atraumatic, Normocephalic  EYES: EOMI, PERRLA, conjunctiva and sclera clear  NECK: Supple, No JVD, Normal thyroid  CHEST/LUNG: CTA bilaterally; No rales, rhonchi, wheezing, or rubs  HEART: Regular rate and rhythm; No murmurs, rubs, or gallops  ABDOMEN: Soft, Nontender, Nondistended; Bowel sounds present  EXTREMITIES:  2+ Peripheral Pulses, No clubbing, cyanosis, or edema    NERVOUS SYSTEM:  Cranial Nerves 2-12 intact [  ] Abnormal  [  ]  bilat eye ptosis-mild  ROM: WFL all extremities [  ]  Abnormal [  ]  Motor Strength: WFL all extremities  [  ]  Abnormal [  ]bilat hip flex 3/5; bilat sh FF 4-/5  Sensation: intact to light touch [ x ] Abnormal [  ]  Reflexes: Symmetric [  ]  Abnormal [  ]    FUNCTIONAL STATUS:  Bed Mobility: Independent [  ]  Supervision [  ]  Needs Assistance [  ]  N/A [  ]  Transfers: Independent [  ]  Supervision [  ]  Needs Assistance [  ]  N/A [  ]   Ambulation: Independent [  ]  Supervision [  ]  Needs Assistance [  ]  N/A [  ]  ADL: Independent [  ] Requires Assistance [  ] N/A [  ]      LABS:                        10.4   9.81  )-----------( 244      ( 19 Jun 2020 05:36 )             32.1     06-19    143  |  105  |  23<H>  ----------------------------<  98  3.9   |  29  |  0.6<L>    Ca    8.9      19 Jun 2020 05:36      PT/INR - ( 18 Jun 2020 05:26 )   PT: 10.40 sec;   INR: 0.90 ratio         PTT - ( 18 Jun 2020 05:26 )  PTT:23.9 sec      RADIOLOGY & ADDITIONAL STUDIES:    Assesment:

## 2020-06-19 NOTE — PROGRESS NOTE ADULT - SUBJECTIVE AND OBJECTIVE BOX
Patient is a 59y old  Female who presents with a chief complaint of Weakness (18 Jun 2020 10:57)      Over Night Events:  Patient seen and examined.   s/p EGD no active disease some erythema in stomach     ROS:  See HPI    PHYSICAL EXAM    ICU Vital Signs Last 24 Hrs  T(C): 36.6 (19 Jun 2020 07:10), Max: 36.7 (18 Jun 2020 15:00)  T(F): 97.8 (19 Jun 2020 07:10), Max: 98 (18 Jun 2020 15:00)  HR: 68 (19 Jun 2020 08:04) (51 - 88)  BP: 97/55 (19 Jun 2020 08:04) (94/53 - 140/65)  BP(mean): 70 (19 Jun 2020 08:04) (67 - 93)  ABP: --  ABP(mean): --  RR: 28 (19 Jun 2020 08:04) (12 - 40)  SpO2: 99% (19 Jun 2020 08:04) (97% - 100%)      General: Aox3  HEENT:       per         Lymph Nodes: NO cervical LN   Lungs: Bilateral BS  Cardiovascular: Regular   Abdomen: Soft, Positive BS  Extremities: No clubbing   Skin: warm   Neurological: no focal deficit   Musculoskeletal: move all ext     I&O's Detail    18 Jun 2020 07:01  -  19 Jun 2020 07:00  --------------------------------------------------------  IN:  Total IN: 0 mL    OUT:    Voided: 325 mL  Total OUT: 325 mL    Total NET: -325 mL      19 Jun 2020 07:01  -  19 Jun 2020 08:30  --------------------------------------------------------  IN:  Total IN: 0 mL    OUT:    Voided: 200 mL  Total OUT: 200 mL    Total NET: -200 mL          LABS:                          10.4   9.81  )-----------( 244      ( 19 Jun 2020 05:36 )             32.1         19 Jun 2020 05:36    143    |  105    |  23     ----------------------------<  98     3.9     |  29     |  0.6      Ca    8.9        19 Jun 2020 05:36                                               PT/INR - ( 18 Jun 2020 05:26 )   PT: 10.40 sec;   INR: 0.90 ratio         PTT - ( 18 Jun 2020 05:26 )  PTT:23.9 sec                                                                                                                                                                                        MEDICATIONS  (STANDING):  albumin human  5% IVPB 3500 milliLiter(s) IV Intermittent once  aspirin  chewable 81 milliGRAM(s) Oral daily  atorvastatin 20 milliGRAM(s) Oral at bedtime  azaTHIOprine 50 milliGRAM(s) Oral <User Schedule>  budesonide  80 MICROgram(s)/formoterol 4.5 MICROgram(s) Inhaler 2 Puff(s) Inhalation two times a day  calcium gluconate IVPB 1 Gram(s) IV Intermittent once  chlorhexidine 4% Liquid 1 Application(s) Topical two times a day  enoxaparin Injectable 40 milliGRAM(s) SubCutaneous daily  famotidine    Tablet 40 milliGRAM(s) Oral at bedtime  levothyroxine 50 MICROGram(s) Oral daily  ondansetron Injectable 4 milliGRAM(s) IV Push every 8 hours  pantoprazole    Tablet 40 milliGRAM(s) Oral before breakfast  polyethylene glycol 3350 17 Gram(s) Oral daily  predniSONE   Tablet 50 milliGRAM(s) Oral daily  pyridostigmine 90 milliGRAM(s) Oral four times a day    MEDICATIONS  (PRN):  acetaminophen   Tablet .. 650 milliGRAM(s) Oral every 6 hours PRN Mild Pain (1 - 3), Moderate Pain (4 - 6)          Xrays:  TLC:  OG:  ET tube:                                                                                       ECHO:  [facility] ICU:

## 2020-06-19 NOTE — SWALLOW BEDSIDE ASSESSMENT ADULT - NS SPL SWALLOW CLINIC TRIAL FT
reports vomiting post breakfast and residual nausea.
discussed timing MBS for patient, as previously recommended, when stable. Discussed with Dr. Mckeon and SABINA Claros to aim Friday.

## 2020-06-20 LAB
ALBUMIN SERPL ELPH-MCNC: 4.1 G/DL — SIGNIFICANT CHANGE UP (ref 3.5–5.2)
ALP SERPL-CCNC: 66 U/L — SIGNIFICANT CHANGE UP (ref 30–115)
ALT FLD-CCNC: 18 U/L — SIGNIFICANT CHANGE UP (ref 0–41)
ANION GAP SERPL CALC-SCNC: 8 MMOL/L — SIGNIFICANT CHANGE UP (ref 7–14)
AST SERPL-CCNC: 21 U/L — SIGNIFICANT CHANGE UP (ref 0–41)
BILIRUB SERPL-MCNC: 0.5 MG/DL — SIGNIFICANT CHANGE UP (ref 0.2–1.2)
BUN SERPL-MCNC: 21 MG/DL — HIGH (ref 10–20)
CALCIUM SERPL-MCNC: 9 MG/DL — SIGNIFICANT CHANGE UP (ref 8.5–10.1)
CHLORIDE SERPL-SCNC: 101 MMOL/L — SIGNIFICANT CHANGE UP (ref 98–110)
CO2 SERPL-SCNC: 31 MMOL/L — SIGNIFICANT CHANGE UP (ref 17–32)
CREAT SERPL-MCNC: 0.7 MG/DL — SIGNIFICANT CHANGE UP (ref 0.7–1.5)
GLUCOSE BLDC GLUCOMTR-MCNC: 124 MG/DL — HIGH (ref 70–99)
GLUCOSE BLDC GLUCOMTR-MCNC: 146 MG/DL — HIGH (ref 70–99)
GLUCOSE BLDC GLUCOMTR-MCNC: 155 MG/DL — HIGH (ref 70–99)
GLUCOSE SERPL-MCNC: 98 MG/DL — SIGNIFICANT CHANGE UP (ref 70–99)
HCT VFR BLD CALC: 32.9 % — LOW (ref 37–47)
HGB BLD-MCNC: 10.8 G/DL — LOW (ref 12–16)
MAGNESIUM SERPL-MCNC: 2 MG/DL — SIGNIFICANT CHANGE UP (ref 1.8–2.4)
MCHC RBC-ENTMCNC: 31.8 PG — HIGH (ref 27–31)
MCHC RBC-ENTMCNC: 32.8 G/DL — SIGNIFICANT CHANGE UP (ref 32–37)
MCV RBC AUTO: 96.8 FL — SIGNIFICANT CHANGE UP (ref 81–99)
NRBC # BLD: 0 /100 WBCS — SIGNIFICANT CHANGE UP (ref 0–0)
PLATELET # BLD AUTO: 239 K/UL — SIGNIFICANT CHANGE UP (ref 130–400)
POTASSIUM SERPL-MCNC: 4.1 MMOL/L — SIGNIFICANT CHANGE UP (ref 3.5–5)
POTASSIUM SERPL-SCNC: 4.1 MMOL/L — SIGNIFICANT CHANGE UP (ref 3.5–5)
PROT SERPL-MCNC: 4.9 G/DL — LOW (ref 6–8)
RBC # BLD: 3.4 M/UL — LOW (ref 4.2–5.4)
RBC # FLD: 16.4 % — HIGH (ref 11.5–14.5)
SODIUM SERPL-SCNC: 140 MMOL/L — SIGNIFICANT CHANGE UP (ref 135–146)
WBC # BLD: 11.66 K/UL — HIGH (ref 4.8–10.8)
WBC # FLD AUTO: 11.66 K/UL — HIGH (ref 4.8–10.8)

## 2020-06-20 PROCEDURE — 99232 SBSQ HOSP IP/OBS MODERATE 35: CPT

## 2020-06-20 PROCEDURE — 99233 SBSQ HOSP IP/OBS HIGH 50: CPT

## 2020-06-20 RX ADMIN — AZATHIOPRINE 50 MILLIGRAM(S): 100 TABLET ORAL at 13:04

## 2020-06-20 RX ADMIN — Medication 81 MILLIGRAM(S): at 11:26

## 2020-06-20 RX ADMIN — ENOXAPARIN SODIUM 40 MILLIGRAM(S): 100 INJECTION SUBCUTANEOUS at 11:25

## 2020-06-20 RX ADMIN — PYRIDOSTIGMINE BROMIDE 90 MILLIGRAM(S): 60 SOLUTION ORAL at 21:35

## 2020-06-20 RX ADMIN — Medication 50 MICROGRAM(S): at 05:25

## 2020-06-20 RX ADMIN — FAMOTIDINE 40 MILLIGRAM(S): 10 INJECTION INTRAVENOUS at 21:35

## 2020-06-20 RX ADMIN — ONDANSETRON 4 MILLIGRAM(S): 8 TABLET, FILM COATED ORAL at 21:36

## 2020-06-20 RX ADMIN — PANTOPRAZOLE SODIUM 40 MILLIGRAM(S): 20 TABLET, DELAYED RELEASE ORAL at 06:10

## 2020-06-20 RX ADMIN — BUDESONIDE AND FORMOTEROL FUMARATE DIHYDRATE 2 PUFF(S): 160; 4.5 AEROSOL RESPIRATORY (INHALATION) at 21:36

## 2020-06-20 RX ADMIN — ATORVASTATIN CALCIUM 20 MILLIGRAM(S): 80 TABLET, FILM COATED ORAL at 21:35

## 2020-06-20 RX ADMIN — BUDESONIDE AND FORMOTEROL FUMARATE DIHYDRATE 2 PUFF(S): 160; 4.5 AEROSOL RESPIRATORY (INHALATION) at 11:24

## 2020-06-20 RX ADMIN — POLYETHYLENE GLYCOL 3350 17 GRAM(S): 17 POWDER, FOR SOLUTION ORAL at 11:25

## 2020-06-20 RX ADMIN — ONDANSETRON 4 MILLIGRAM(S): 8 TABLET, FILM COATED ORAL at 13:05

## 2020-06-20 RX ADMIN — PYRIDOSTIGMINE BROMIDE 90 MILLIGRAM(S): 60 SOLUTION ORAL at 11:26

## 2020-06-20 RX ADMIN — AZATHIOPRINE 50 MILLIGRAM(S): 100 TABLET ORAL at 21:36

## 2020-06-20 RX ADMIN — Medication 50 MILLIGRAM(S): at 05:25

## 2020-06-20 RX ADMIN — CHLORHEXIDINE GLUCONATE 1 APPLICATION(S): 213 SOLUTION TOPICAL at 09:46

## 2020-06-20 RX ADMIN — PYRIDOSTIGMINE BROMIDE 90 MILLIGRAM(S): 60 SOLUTION ORAL at 05:24

## 2020-06-20 RX ADMIN — AZATHIOPRINE 50 MILLIGRAM(S): 100 TABLET ORAL at 05:25

## 2020-06-20 RX ADMIN — PYRIDOSTIGMINE BROMIDE 90 MILLIGRAM(S): 60 SOLUTION ORAL at 17:04

## 2020-06-20 RX ADMIN — Medication 650 MILLIGRAM(S): at 11:29

## 2020-06-20 RX ADMIN — ONDANSETRON 4 MILLIGRAM(S): 8 TABLET, FILM COATED ORAL at 05:25

## 2020-06-20 NOTE — PHYSICAL THERAPY INITIAL EVALUATION ADULT - PLANNED THERAPY INTERVENTIONS, PT EVAL
complains of pain/discomfort gait training/transfer training/balance training/bed mobility training/strengthening

## 2020-06-20 NOTE — PROGRESS NOTE ADULT - ASSESSMENT
IMPRESSION:  Weakness - likely MG crisis s/p Cruger placement for plasmapheresis  Hematuria?  KADI    SUGGEST:      CNS: s/p plasmapheresis  Monitor NIF and VC Q shift .   Electively intubate if worsening  neuro F/U  cont pyriodstigmine  prednisone/imuran      HEENT: Oral care    PULMONARY:  HOB @ 45 degrees  follow NIF and VC Q 6 hrs   check pox on RA rest and exertion   cpap at night for KADI   CARDIOVASCULAR: I=O    GI: GI prophylaxis.  follow GI recommendation   follow esophagogram result     RENAL:  Follow up lytes.  Correct as needed    INFECTIOUS DISEASE: Follow up cultures    HEMATOLOGICAL:  DVT prophylaxis.    ENDOCRINE:  Follow up FS.  Insulin protocol if needed.    MUSCULOSKELETAL: Bedrest    MICU monitoring  downgrade to floor

## 2020-06-20 NOTE — PHYSICAL THERAPY INITIAL EVALUATION ADULT - MANUAL MUSCLE TESTING RESULTS, REHAB EVAL
bilateral lower extremity strength 3- to 3/5 throughout,  except b/l dorsiflexion approx 1+ to 2-/5, assessed in sitting

## 2020-06-20 NOTE — PROGRESS NOTE ADULT - SUBJECTIVE AND OBJECTIVE BOX
Patient is a 59y old  Female who presents with a chief complaint of Weakness (19 Jun 2020 16:53)      Over Night Events:  Patient seen and examined.   feel good more energetic   s/p plasmapheresis     ROS:  See HPI    PHYSICAL EXAM    ICU Vital Signs Last 24 Hrs  T(C): 36.3 (19 Jun 2020 23:11), Max: 36.7 (19 Jun 2020 13:00)  T(F): 97.3 (19 Jun 2020 23:11), Max: 98.1 (19 Jun 2020 13:00)  HR: 84 (20 Jun 2020 05:20) (49 - 84)  BP: 94/53 (20 Jun 2020 05:20) (92/55 - 118/57)  BP(mean): 69 (20 Jun 2020 05:20) (69 - 82)  ABP: --  ABP(mean): --  RR: 50 (20 Jun 2020 05:20) (13 - 50)  SpO2: 98% (20 Jun 2020 05:20) (95% - 100%)      General: AOx3  HEENT:      [place]          Lymph Nodes: NO cervical LN   Lungs: Bilateral BS  Cardiovascular: Regular   Abdomen: Soft, Positive BS  Extremities: No clubbing   Skin: warm   Neurological: no focla deficit   Musculoskeletal: move all ext     I&O's Detail    18 Jun 2020 07:01  -  19 Jun 2020 07:00  --------------------------------------------------------  IN:  Total IN: 0 mL    OUT:    Voided: 325 mL  Total OUT: 325 mL    Total NET: -325 mL      19 Jun 2020 07:01  -  20 Jun 2020 06:53  --------------------------------------------------------  IN:    Oral Fluid: 720 mL  Total IN: 720 mL    OUT:    Voided: 600 mL  Total OUT: 600 mL    Total NET: 120 mL          LABS:                          10.4   9.81  )-----------( 244      ( 19 Jun 2020 05:36 )             32.1         19 Jun 2020 05:36    143    |  105    |  23     ----------------------------<  98     3.9     |  29     |  0.6      Ca    8.9        19 Jun 2020 05:36                                                                                                                                                                                                                                       MEDICATIONS  (STANDING):  aspirin  chewable 81 milliGRAM(s) Oral daily  atorvastatin 20 milliGRAM(s) Oral at bedtime  azaTHIOprine 50 milliGRAM(s) Oral <User Schedule>  budesonide  80 MICROgram(s)/formoterol 4.5 MICROgram(s) Inhaler 2 Puff(s) Inhalation two times a day  chlorhexidine 4% Liquid 1 Application(s) Topical two times a day  enoxaparin Injectable 40 milliGRAM(s) SubCutaneous daily  famotidine    Tablet 40 milliGRAM(s) Oral at bedtime  levothyroxine 50 MICROGram(s) Oral daily  ondansetron Injectable 4 milliGRAM(s) IV Push every 8 hours  pantoprazole    Tablet 40 milliGRAM(s) Oral before breakfast  polyethylene glycol 3350 17 Gram(s) Oral daily  predniSONE   Tablet 50 milliGRAM(s) Oral daily  pyridostigmine 90 milliGRAM(s) Oral four times a day    MEDICATIONS  (PRN):  acetaminophen   Tablet .. 650 milliGRAM(s) Oral every 6 hours PRN Mild Pain (1 - 3), Moderate Pain (4 - 6)          Xrays:  TLC:  OG:  ET tube:                                                                                       ECHO:  [facility] ICU:

## 2020-06-20 NOTE — PROGRESS NOTE ADULT - SUBJECTIVE AND OBJECTIVE BOX
GHADA ROPER  59y  Female    Patient is a 59y old  Female who presents with a chief complaint of Weakness (20 Jun 2020 09:20)      INTERVAL HPI/OVERNIGHT EVENTS:  No interval events.  Patient has no new complaints except generalized weakness.  No dyspnea.      REVIEW OF SYSTEMS:  At least 10 systems were reviewed in ROS.   All systems reviewed are within normal limits except for that listed above.      VITALS:  T(F): 96.3 (06-20-20 @ 09:48), Max: 98.1 (06-19-20 @ 13:00)  HR: 68 (06-20-20 @ 09:48) (49 - 84)  BP: 111/62 (06-20-20 @ 09:48) (81/42 - 127/59)  RR: 20 (06-20-20 @ 09:48) (13 - 50)  SpO2: 99% (06-20-20 @ 08:03) (95% - 100%)      CAPILLARY BLOOD GLUCOSE  POCT Blood Glucose.: 155 mg/dL (20 Jun 2020 11:18)  POCT Blood Glucose.: 124 mg/dL (20 Jun 2020 07:30)  POCT Blood Glucose.: 129 mg/dL (19 Jun 2020 16:45)      PHYSICAL EXAM:  GENERAL: NAD, well-developed  HEAD:  Atraumatic, Normocephalic  EYES: conjunctiva and sclera clear  ENMT: Moist mucous membranes  NECK: Supple, Normal thyroid  NERVOUS SYSTEM:  Alert & Oriented X3, Good concentration; Motor Strength 5/5 B/L upper and lower extremities  CHEST/LUNG: Clear to auscultation bilaterally; No rales, rhonchi, wheezing, or rubs  HEART: Regular rate and rhythm; No murmurs, rubs, or gallops  ABDOMEN: Soft, Nontender, Nondistended; Bowel sounds present  EXTREMITIES:  2+ Peripheral Pulses, No clubbing, cyanosis, or edema  LYMPH: No lymphadenopathy noted  SKIN: Rt groin Pressure dressing. No rashes or lesions    Consultant(s) Notes Reviewed:  [x ] YES  [ ] NO  Care Discussed with Consultants/Other Providers [ x] YES  [ ] NO    LABS:                        10.8   11.66 )-----------( 239      ( 20 Jun 2020 06:07 )             32.9       06-20    140  |  101  |  21<H>  ----------------------------<  98  4.1   |  31  |  0.7    Ca    9.0      20 Jun 2020 06:07  Mg     2.0     06-20    TPro  4.9<L>  /  Alb  4.1  /  TBili  0.5  /  DBili  x   /  AST  21  /  ALT  18  /  AlkPhos  66  06-20      MICROBIOLOGY:  Culture - Urine (06.11.20 @ 13:08)    Specimen Source: .Urine Clean Catch (Midstream)    Culture Results:   >=3 organisms. Probable collection contamination.      COVID-19 PCR: NotDetec (06.11.20 @ 13:25)      RADIOLOGY & ADDITIONAL TESTS:  X-ray Chest 1 View- PORTABLE-Routine (06.14.20 @ 04:54)   Impression: Near-complete resolution of left base opacity with minimal residual costophrenic angle reaction      US Abdomen Limited (06.17.20 @ 12:11)   Cholelithiasis without sonographic evidence of cholecystitis.      EGD (06.18.20 @ 11:00)   Impressions:    Normal duodenum.    Normal mucosa in the whole esophagus.    Erythema in the stomach. (Biopsy).       Imaging Personally Reviewed:  [x] YES  [ ] NO      MEDICATIONS  (STANDING):  aspirin  chewable 81 milliGRAM(s) Oral daily  atorvastatin 20 milliGRAM(s) Oral at bedtime  azaTHIOprine 50 milliGRAM(s) Oral <User Schedule>  budesonide  80 MICROgram(s)/formoterol 4.5 MICROgram(s) Inhaler 2 Puff(s) Inhalation two times a day  chlorhexidine 4% Liquid 1 Application(s) Topical two times a day  enoxaparin Injectable 40 milliGRAM(s) SubCutaneous daily  famotidine    Tablet 40 milliGRAM(s) Oral at bedtime  levothyroxine 50 MICROGram(s) Oral daily  ondansetron Injectable 4 milliGRAM(s) IV Push every 8 hours  pantoprazole    Tablet 40 milliGRAM(s) Oral before breakfast  polyethylene glycol 3350 17 Gram(s) Oral daily  predniSONE   Tablet 50 milliGRAM(s) Oral daily  pyridostigmine 90 milliGRAM(s) Oral four times a day      MEDICATIONS  (PRN):  acetaminophen   Tablet .. 650 milliGRAM(s) Oral every 6 hours PRN Mild Pain (1 - 3), Moderate Pain (4 - 6)        Home Medications:  albuterol:  (11 Jun 2020 19:46)  aspirin 81 mg oral tablet, chewable: 1 tab(s) orally once a day (11 Jun 2020 19:46)  atorvastatin 20 mg oral tablet: 1 tab(s) orally once a day (11 Jun 2020 19:46)  doxepin 10 mg oral capsule:  (11 Jun 2020 19:46)  fluticasone nasal:  (11 Jun 2020 19:46)  Imuran 50 mg oral tablet: 1 tab(s) orally 3 times a day (11 Jun 2020 19:46)  Mestinon 60 mg oral tablet: 1.5 tab(s) orally 4 times a day (11 Jun 2020 19:46)  Pepcid:  (11 Jun 2020 19:46)  predniSONE 20 mg oral tablet: 1 tab(s) orally once a day (11 Jun 2020 19:46)  Protonix 40 mg oral delayed release tablet: 1 tab(s) orally once a day (11 Jun 2020 19:46)  Symbicort: inhaled 2 times a day (11 Jun 2020 19:46)  Toprol-XL 25 mg oral tablet, extended release: 1 tab(s) orally once a day (11 Jun 2020 19:46)  Vitamin D2: every friday (11 Jun 2020 19:46)        HEALTH ISSUES - PROBLEM Dx:  Myasthenia gravis exacerbation  High blood cholesterol  Pancreatitis  POTS (postural orthostatic tachycardia syndrome)  History of foot surgery

## 2020-06-20 NOTE — PHYSICAL THERAPY INITIAL EVALUATION ADULT - GAIT DEVIATIONS NOTED, PT EVAL
decreased heel strike / push off/decreased rodney/decreased step length/increased time in double stance/decreased weight-shifting ability

## 2020-06-20 NOTE — PROGRESS NOTE ADULT - ASSESSMENT
59 year old owman with hx of myasthenia gravis on mestonin, prednisone daily, finished a 5 day IVIG regimen last week presented to the ED with increasing dyspnea and weakness. Pt was at [facility] getting blood drawn when she could not walk up 2 flights of stairs. Denied any chest pain, no sob, no fever, chills.  She was admitted for Myasthenia crisis and started on PLEX.    Assessment & Plan:    1. Myasthenia Gravis crisis:  Completed Plasma exchange x 5 6/19/20.  Neurology following: Patient is at baseline. Weakness likely due to deconditioning.  NIF and VC wnl.   Continue Imuran and Mestinon home doses.  Prednisone 50 mg qd and will wean as out pt. Follow up with Dr. Velazquez in 1 week.  PT evaluation done: STR recommended but patient refuses.       2. Hypothyroid:  On Synthroid.      3. Hyperlipidemia:  On Statin.      4. Post Prandial Emesis:  history of esophageal strictures, EGD with dilation in October by Dr Ag.  s/p Modified barium swallow: no retention, slight residue in esophagus with liquids.  EGD 6/18: Gastric Erythema.  Zofran prn. PPI daily & GERD/Ulcer Diet  Follow up with our [facility] located at [address] [place] [address]. Phone Number: [phone], appointment made 7/31/2020   Outpatient esophageal motility testing.      5. Cholelithiasis Asymptomatic:  Watchful waiting.        Prophylaxis: Lovenox.  Code status: Full code    Progress Note Handoff:  Pending consults: None  Pending Tests: None  Family/Patient discussion: Plan of care discussed with patient who understands the need for intensive therapy but refuses.  Disposition: Refused STR. Home with Home care.     Attending: Dr. Shauna Burleson. Spectra [phone].

## 2020-06-20 NOTE — PHYSICAL THERAPY INITIAL EVALUATION ADULT - ADDITIONAL COMMENTS
As per patient, 3 steps to enter home but can enter through garage if needed, 1 flight of stairs inside

## 2020-06-20 NOTE — PHYSICAL THERAPY INITIAL EVALUATION ADULT - GENERAL OBSERVATIONS, REHAB EVAL
10:55 - 11:15. Chart reviewed. Patient available to be seen for physical therapy, confirmed with nurse. Patient encountered semi-reclined in bed. Denies pain at rest, but c/o generalized weakness. Agreeable for PT evaluation.

## 2020-06-20 NOTE — PROGRESS NOTE ADULT - SUBJECTIVE AND OBJECTIVE BOX
Neurology Follow up note    Name  GHADA ROPER    HPI:  59 year old owman with hx of myasthenia gravis on mestonin, prednisone daily, finished a 5 day IVIG regimen last week presents to the ED with increasing dyspnea and weakness. Pt was [facility] getting [facility] draw when she could not walk up 2 flights of stairs. Patient with hematuria noted since Sunday. Denies any chest pain, no sob, no fever, chills. (11 Jun 2020 18:30)      Interval History:    patient doing well post PLEX x5  stable with normal exam    Vital Signs Last 24 Hrs  T(C): 36.5 (20 Jun 2020 07:02), Max: 36.7 (19 Jun 2020 13:00)  T(F): 97.7 (20 Jun 2020 07:02), Max: 98.1 (19 Jun 2020 13:00)  HR: 63 (20 Jun 2020 08:03) (49 - 84)  BP: 127/59 (20 Jun 2020 08:03) (81/42 - 127/59)  BP(mean): 85 (20 Jun 2020 08:03) (56 - 85)  RR: 24 (20 Jun 2020 08:03) (13 - 50)  SpO2: 99% (20 Jun 2020 08:03) (95% - 100%)    Neurological Exam:   Mental status: Awake, alert and oriented x3.  Recent and remote memory intact.  Naming, repetition and comprehension intact.  Attention/concentration intact.  No dysarthria, no aphasia.  Fund of knowledge appropriate.    Cranial nerves: Pupils equally round and reactive to light, visual fields full, no nystagmus, extraocular muscles intact, V1 through V3 intact bilaterally and symmetric, face symmetric, hearing intact to finger rub, palate elevation symmetric, tongue was midline.  Motor:  MRC grading 5/5 b/l UE/LE.   strength 5/5.  Normal tone and bulk.  No abnormal movements.    Sensation: Intact to light touch, proprioception, and pinprick.   Coordination: No dysmetria on finger-to-nose and heel-to-shin.  No dysdiadokinesia.  Reflexes: 2+ in bilateral UE/LE, downgoing toes bilaterally. (-) Lucas.      Medications  acetaminophen   Tablet .. 650 milliGRAM(s) Oral every 6 hours PRN  aspirin  chewable 81 milliGRAM(s) Oral daily  atorvastatin 20 milliGRAM(s) Oral at bedtime  azaTHIOprine 50 milliGRAM(s) Oral <User Schedule>  budesonide  80 MICROgram(s)/formoterol 4.5 MICROgram(s) Inhaler 2 Puff(s) Inhalation two times a day  chlorhexidine 4% Liquid 1 Application(s) Topical two times a day  enoxaparin Injectable 40 milliGRAM(s) SubCutaneous daily  famotidine    Tablet 40 milliGRAM(s) Oral at bedtime  levothyroxine 50 MICROGram(s) Oral daily  ondansetron Injectable 4 milliGRAM(s) IV Push every 8 hours  pantoprazole    Tablet 40 milliGRAM(s) Oral before breakfast  polyethylene glycol 3350 17 Gram(s) Oral daily  predniSONE   Tablet 50 milliGRAM(s) Oral daily  pyridostigmine 90 milliGRAM(s) Oral four times a day      Lab  06-20    140  |  101  |  21<H>  ----------------------------<  98  4.1   |  31  |  0.7    Ca    9.0      20 Jun 2020 06:07  Mg     2.0     06-20    TPro  4.9<L>  /  Alb  4.1  /  TBili  0.5  /  DBili  x   /  AST  21  /  ALT  18  /  AlkPhos  66  06-20                          10.8   11.66 )-----------( 239      ( 20 Jun 2020 06:07 )             32.9     LIVER FUNCTIONS - ( 20 Jun 2020 06:07 )  Alb: 4.1 g/dL / Pro: 4.9 g/dL / ALK PHOS: 66 U/L / ALT: 18 U/L / AST: 21 U/L / GGT: x             2.0        Radiology      Assessment:  MG with exacerbation despite IVIG tx. now s/p PLEX x5. patient is at baseline. likely deconditioned  no sob. regular consistency diet recommended  Plan:  cont imuran and mestinon at home dosage  prednisone 50 mg  qd and will wean as outpt  pt/rehab  f/u with dr hernandez in 1 week post d/c  please call with any questions

## 2020-06-21 LAB
ANION GAP SERPL CALC-SCNC: 9 MMOL/L — SIGNIFICANT CHANGE UP (ref 7–14)
BASOPHILS # BLD AUTO: 0.02 K/UL — SIGNIFICANT CHANGE UP (ref 0–0.2)
BASOPHILS NFR BLD AUTO: 0.2 % — SIGNIFICANT CHANGE UP (ref 0–1)
BUN SERPL-MCNC: 22 MG/DL — HIGH (ref 10–20)
CALCIUM SERPL-MCNC: 9.1 MG/DL — SIGNIFICANT CHANGE UP (ref 8.5–10.1)
CHLORIDE SERPL-SCNC: 101 MMOL/L — SIGNIFICANT CHANGE UP (ref 98–110)
CO2 SERPL-SCNC: 29 MMOL/L — SIGNIFICANT CHANGE UP (ref 17–32)
CREAT SERPL-MCNC: 0.7 MG/DL — SIGNIFICANT CHANGE UP (ref 0.7–1.5)
EOSINOPHIL # BLD AUTO: 0.08 K/UL — SIGNIFICANT CHANGE UP (ref 0–0.7)
EOSINOPHIL NFR BLD AUTO: 0.7 % — SIGNIFICANT CHANGE UP (ref 0–8)
GLUCOSE BLDC GLUCOMTR-MCNC: 118 MG/DL — HIGH (ref 70–99)
GLUCOSE BLDC GLUCOMTR-MCNC: 182 MG/DL — HIGH (ref 70–99)
GLUCOSE SERPL-MCNC: 91 MG/DL — SIGNIFICANT CHANGE UP (ref 70–99)
HCT VFR BLD CALC: 31.7 % — LOW (ref 37–47)
HGB BLD-MCNC: 10.5 G/DL — LOW (ref 12–16)
IMM GRANULOCYTES NFR BLD AUTO: 0.4 % — HIGH (ref 0.1–0.3)
LYMPHOCYTES # BLD AUTO: 19.9 % — LOW (ref 20.5–51.1)
LYMPHOCYTES # BLD AUTO: 2.37 K/UL — SIGNIFICANT CHANGE UP (ref 1.2–3.4)
MAGNESIUM SERPL-MCNC: 2.1 MG/DL — SIGNIFICANT CHANGE UP (ref 1.8–2.4)
MCHC RBC-ENTMCNC: 32.3 PG — HIGH (ref 27–31)
MCHC RBC-ENTMCNC: 33.1 G/DL — SIGNIFICANT CHANGE UP (ref 32–37)
MCV RBC AUTO: 97.5 FL — SIGNIFICANT CHANGE UP (ref 81–99)
MONOCYTES # BLD AUTO: 0.82 K/UL — HIGH (ref 0.1–0.6)
MONOCYTES NFR BLD AUTO: 6.9 % — SIGNIFICANT CHANGE UP (ref 1.7–9.3)
NEUTROPHILS # BLD AUTO: 8.58 K/UL — HIGH (ref 1.4–6.5)
NEUTROPHILS NFR BLD AUTO: 71.9 % — SIGNIFICANT CHANGE UP (ref 42.2–75.2)
NRBC # BLD: 0 /100 WBCS — SIGNIFICANT CHANGE UP (ref 0–0)
PLATELET # BLD AUTO: 234 K/UL — SIGNIFICANT CHANGE UP (ref 130–400)
POTASSIUM SERPL-MCNC: 4 MMOL/L — SIGNIFICANT CHANGE UP (ref 3.5–5)
POTASSIUM SERPL-SCNC: 4 MMOL/L — SIGNIFICANT CHANGE UP (ref 3.5–5)
RBC # BLD: 3.25 M/UL — LOW (ref 4.2–5.4)
RBC # FLD: 15.9 % — HIGH (ref 11.5–14.5)
SODIUM SERPL-SCNC: 139 MMOL/L — SIGNIFICANT CHANGE UP (ref 135–146)
TSH SERPL-MCNC: 2.81 UIU/ML — SIGNIFICANT CHANGE UP (ref 0.27–4.2)
WBC # BLD: 11.92 K/UL — HIGH (ref 4.8–10.8)
WBC # FLD AUTO: 11.92 K/UL — HIGH (ref 4.8–10.8)

## 2020-06-21 PROCEDURE — 99232 SBSQ HOSP IP/OBS MODERATE 35: CPT

## 2020-06-21 RX ADMIN — Medication 650 MILLIGRAM(S): at 15:30

## 2020-06-21 RX ADMIN — AZATHIOPRINE 50 MILLIGRAM(S): 100 TABLET ORAL at 21:54

## 2020-06-21 RX ADMIN — PYRIDOSTIGMINE BROMIDE 90 MILLIGRAM(S): 60 SOLUTION ORAL at 11:14

## 2020-06-21 RX ADMIN — POLYETHYLENE GLYCOL 3350 17 GRAM(S): 17 POWDER, FOR SOLUTION ORAL at 11:14

## 2020-06-21 RX ADMIN — ONDANSETRON 4 MILLIGRAM(S): 8 TABLET, FILM COATED ORAL at 06:03

## 2020-06-21 RX ADMIN — FAMOTIDINE 40 MILLIGRAM(S): 10 INJECTION INTRAVENOUS at 21:54

## 2020-06-21 RX ADMIN — Medication 81 MILLIGRAM(S): at 11:14

## 2020-06-21 RX ADMIN — PYRIDOSTIGMINE BROMIDE 90 MILLIGRAM(S): 60 SOLUTION ORAL at 06:03

## 2020-06-21 RX ADMIN — AZATHIOPRINE 50 MILLIGRAM(S): 100 TABLET ORAL at 06:03

## 2020-06-21 RX ADMIN — BUDESONIDE AND FORMOTEROL FUMARATE DIHYDRATE 2 PUFF(S): 160; 4.5 AEROSOL RESPIRATORY (INHALATION) at 20:02

## 2020-06-21 RX ADMIN — PYRIDOSTIGMINE BROMIDE 90 MILLIGRAM(S): 60 SOLUTION ORAL at 17:02

## 2020-06-21 RX ADMIN — PANTOPRAZOLE SODIUM 40 MILLIGRAM(S): 20 TABLET, DELAYED RELEASE ORAL at 06:03

## 2020-06-21 RX ADMIN — ONDANSETRON 4 MILLIGRAM(S): 8 TABLET, FILM COATED ORAL at 13:56

## 2020-06-21 RX ADMIN — AZATHIOPRINE 50 MILLIGRAM(S): 100 TABLET ORAL at 13:56

## 2020-06-21 RX ADMIN — ONDANSETRON 4 MILLIGRAM(S): 8 TABLET, FILM COATED ORAL at 21:54

## 2020-06-21 RX ADMIN — ATORVASTATIN CALCIUM 20 MILLIGRAM(S): 80 TABLET, FILM COATED ORAL at 21:54

## 2020-06-21 RX ADMIN — PYRIDOSTIGMINE BROMIDE 90 MILLIGRAM(S): 60 SOLUTION ORAL at 21:54

## 2020-06-21 RX ADMIN — BUDESONIDE AND FORMOTEROL FUMARATE DIHYDRATE 2 PUFF(S): 160; 4.5 AEROSOL RESPIRATORY (INHALATION) at 08:03

## 2020-06-21 RX ADMIN — Medication 50 MILLIGRAM(S): at 06:03

## 2020-06-21 RX ADMIN — ENOXAPARIN SODIUM 40 MILLIGRAM(S): 100 INJECTION SUBCUTANEOUS at 11:14

## 2020-06-21 RX ADMIN — Medication 50 MICROGRAM(S): at 06:03

## 2020-06-21 NOTE — PROGRESS NOTE ADULT - SUBJECTIVE AND OBJECTIVE BOX
GHADA ROPER  59y  Female    Patient is a 59y old  Female who presents with a chief complaint of Weakness (20 Jun 2020 09:20)      INTERVAL HPI/OVERNIGHT EVENTS:  No interval events.  Patient has no new complaints.  Generalized weakness improved. Refuses STR.   Minimal dyspnea on exertion.      REVIEW OF SYSTEMS:  At least 10 systems were reviewed in ROS.   All systems reviewed are within normal limits except for that listed above.      VITALS:  T(C): 36.2 (21 Jun 2020 05:00), Max: 36.9 (20 Jun 2020 14:04)  T(F): 97.2 (21 Jun 2020 05:00), Max: 98.5 (20 Jun 2020 14:04)  HR: 68 (21 Jun 2020 09:23) (48 - 68)  BP: 108/56 (21 Jun 2020 09:23) (96/51 - 117/58)  BP(mean): --  RR: 20 (21 Jun 2020 05:00) (20 - 20)  SpO2: --      CAPILLARY BLOOD GLUCOSE  POCT Blood Glucose.: 118 mg/dL (21 Jun 2020 07:21)  POCT Blood Glucose.: 146 mg/dL (20 Jun 2020 16:37)  POCT Blood Glucose.: 155 mg/dL (20 Jun 2020 11:18)      PHYSICAL EXAM:  GENERAL: NAD, well-developed  HEAD:  Atraumatic, Normocephalic  EYES: conjunctiva and sclera clear  ENMT: Moist mucous membranes  NECK: Supple, Normal thyroid  NERVOUS SYSTEM:  Alert & Oriented X3, Good concentration; Motor Strength 5/5 B/L upper and lower extremities  CHEST/LUNG: Clear to auscultation bilaterally; No rales, rhonchi, wheezing, or rubs  HEART: Regular rate and rhythm; No murmurs, rubs, or gallops  ABDOMEN: Soft, Nontender, Nondistended; Bowel sounds present  EXTREMITIES:  2+ Peripheral Pulses, No clubbing, cyanosis, or edema  LYMPH: No lymphadenopathy noted  SKIN: Rt groin Pressure dressing. No rashes or lesions    Consultant(s) Notes Reviewed:  [x ] YES  [ ] NO  Care Discussed with Consultants/Other Providers [ x] YES  [ ] NO    LABS:                        10.5   11.92 )-----------( 234      ( 21 Jun 2020 06:43 )             31.7       06-21    139  |  101  |  22<H>  ----------------------------<  91  4.0   |  29  |  0.7    Ca    9.1      21 Jun 2020 06:43  Mg     2.1     06-21    TPro  4.9<L>  /  Alb  4.1  /  TBili  0.5  /  DBili  x   /  AST  21  /  ALT  18  /  AlkPhos  66  06-20                 MICROBIOLOGY:  Culture - Urine (06.11.20 @ 13:08)    Specimen Source: .Urine Clean Catch (Midstream)    Culture Results:   >=3 organisms. Probable collection contamination.      COVID-19 PCR: NotDetec (06.11.20 @ 13:25)      RADIOLOGY & ADDITIONAL TESTS:  X-ray Chest 1 View- PORTABLE-Routine (06.14.20 @ 04:54)   Impression: Near-complete resolution of left base opacity with minimal residual costophrenic angle reaction      US Abdomen Limited (06.17.20 @ 12:11)   Cholelithiasis without sonographic evidence of cholecystitis.      EGD (06.18.20 @ 11:00)   Impressions:    Normal duodenum.    Normal mucosa in the whole esophagus.    Erythema in the stomach. (Biopsy).       Imaging Personally Reviewed:  [x] YES  [ ] NO      MEDICATIONS  (STANDING):  aspirin  chewable 81 milliGRAM(s) Oral daily  atorvastatin 20 milliGRAM(s) Oral at bedtime  azaTHIOprine 50 milliGRAM(s) Oral <User Schedule>  budesonide  80 MICROgram(s)/formoterol 4.5 MICROgram(s) Inhaler 2 Puff(s) Inhalation two times a day  chlorhexidine 4% Liquid 1 Application(s) Topical two times a day  enoxaparin Injectable 40 milliGRAM(s) SubCutaneous daily  famotidine    Tablet 40 milliGRAM(s) Oral at bedtime  levothyroxine 50 MICROGram(s) Oral daily  ondansetron Injectable 4 milliGRAM(s) IV Push every 8 hours  pantoprazole    Tablet 40 milliGRAM(s) Oral before breakfast  polyethylene glycol 3350 17 Gram(s) Oral daily  predniSONE   Tablet 50 milliGRAM(s) Oral daily  pyridostigmine 90 milliGRAM(s) Oral four times a day      MEDICATIONS  (PRN):  acetaminophen   Tablet .. 650 milliGRAM(s) Oral every 6 hours PRN Mild Pain (1 - 3), Moderate Pain (4 - 6)        Home Medications:  albuterol:  (11 Jun 2020 19:46)  aspirin 81 mg oral tablet, chewable: 1 tab(s) orally once a day (11 Jun 2020 19:46)  atorvastatin 20 mg oral tablet: 1 tab(s) orally once a day (11 Jun 2020 19:46)  doxepin 10 mg oral capsule:  (11 Jun 2020 19:46)  fluticasone nasal:  (11 Jun 2020 19:46)  Imuran 50 mg oral tablet: 1 tab(s) orally 3 times a day (11 Jun 2020 19:46)  Mestinon 60 mg oral tablet: 1.5 tab(s) orally 4 times a day (11 Jun 2020 19:46)  Pepcid:  (11 Jun 2020 19:46)  predniSONE 20 mg oral tablet: 1 tab(s) orally once a day (11 Jun 2020 19:46)  Protonix 40 mg oral delayed release tablet: 1 tab(s) orally once a day (11 Jun 2020 19:46)  Symbicort: inhaled 2 times a day (11 Jun 2020 19:46)  Toprol-XL 25 mg oral tablet, extended release: 1 tab(s) orally once a day (11 Jun 2020 19:46)  Vitamin D2: every friday (11 Jun 2020 19:46)        HEALTH ISSUES - PROBLEM Dx:  Myasthenia gravis exacerbation  High blood cholesterol  Pancreatitis  POTS (postural orthostatic tachycardia syndrome)  History of foot surgery

## 2020-06-21 NOTE — PROGRESS NOTE ADULT - ASSESSMENT
59 year old owman with hx of myasthenia gravis on mestonin, prednisone daily, finished a 5 day IVIG regimen last week presented to the ED with increasing dyspnea and weakness. Pt was at [facility] getting blood drawn when she could not walk up 2 flights of stairs. Denied any chest pain, no sob, no fever, chills.  She was admitted for Myasthenia crisis and started on PLEX.    Assessment & Plan:    1. Myasthenia Gravis crisis:  Completed Plasma exchange x 5 6/19/20.  Neurology following: Patient is at baseline. Weakness likely due to deconditioning.  NIF and VC wnl.   Continue Imuran and Mestinon home doses.  Prednisone 50 mg qd and will wean as out pt. Follow up with Dr. Velazquez in 1 week.  PT evaluation done: STR recommended but patient refuses. Home with Home care.      2. Hypothyroid:  On Synthroid. Follow up TSH.      3. Hyperlipidemia:  On Statin.      4. Post Prandial Emesis:  history of esophageal strictures, EGD with dilation in October by Dr Ag.  s/p Modified barium swallow: no retention, slight residue in esophagus with liquids.  EGD 6/18: Gastric Erythema.  Zofran prn. PPI daily & GERD/Ulcer Diet  Follow up with our [facility] located at [address] [place] [address]. Phone Number: [phone], appointment made 7/31/2020   Outpatient esophageal motility testing.      5. Cholelithiasis Asymptomatic:  Watchful waiting.        Prophylaxis: Lovenox.  Code status: Full code    Progress Note Handoff:  Pending consults: None  Pending Tests: None  Family/Patient discussion: Plan of care discussed with patient who understands the need for intensive therapy but refuses.  Disposition: Refused STR. Home with Home care.     Attending: Dr. Shauna Burleson. Spectra [phone].

## 2020-06-22 ENCOUNTER — TRANSCRIPTION ENCOUNTER (OUTPATIENT)
Age: 60
End: 2020-06-22

## 2020-06-22 VITALS
RESPIRATION RATE: 17 BRPM | SYSTOLIC BLOOD PRESSURE: 116 MMHG | HEART RATE: 74 BPM | DIASTOLIC BLOOD PRESSURE: 60 MMHG | TEMPERATURE: 98 F

## 2020-06-22 LAB
GLUCOSE BLDC GLUCOMTR-MCNC: 137 MG/DL — HIGH (ref 70–99)
GLUCOSE BLDC GLUCOMTR-MCNC: 158 MG/DL — HIGH (ref 70–99)

## 2020-06-22 PROCEDURE — 99239 HOSP IP/OBS DSCHRG MGMT >30: CPT

## 2020-06-22 RX ORDER — ONDANSETRON 8 MG/1
1 TABLET, FILM COATED ORAL
Qty: 28 | Refills: 0
Start: 2020-06-22 | End: 2020-06-28

## 2020-06-22 RX ORDER — FLUTICASONE PROPIONATE 50 MCG
0 SPRAY, SUSPENSION NASAL
Qty: 0 | Refills: 0 | DISCHARGE

## 2020-06-22 RX ORDER — FAMOTIDINE 10 MG/ML
0 INJECTION INTRAVENOUS
Qty: 0 | Refills: 0 | DISCHARGE

## 2020-06-22 RX ORDER — ONDANSETRON 8 MG/1
4 TABLET, FILM COATED ORAL ONCE
Refills: 0 | Status: COMPLETED | OUTPATIENT
Start: 2020-06-22 | End: 2020-06-22

## 2020-06-22 RX ORDER — ALBUTEROL 90 UG/1
0 AEROSOL, METERED ORAL
Qty: 0 | Refills: 0 | DISCHARGE

## 2020-06-22 RX ADMIN — ONDANSETRON 4 MILLIGRAM(S): 8 TABLET, FILM COATED ORAL at 06:35

## 2020-06-22 RX ADMIN — BUDESONIDE AND FORMOTEROL FUMARATE DIHYDRATE 2 PUFF(S): 160; 4.5 AEROSOL RESPIRATORY (INHALATION) at 08:23

## 2020-06-22 RX ADMIN — PANTOPRAZOLE SODIUM 40 MILLIGRAM(S): 20 TABLET, DELAYED RELEASE ORAL at 05:41

## 2020-06-22 RX ADMIN — AZATHIOPRINE 50 MILLIGRAM(S): 100 TABLET ORAL at 14:45

## 2020-06-22 RX ADMIN — Medication 81 MILLIGRAM(S): at 11:25

## 2020-06-22 RX ADMIN — PYRIDOSTIGMINE BROMIDE 90 MILLIGRAM(S): 60 SOLUTION ORAL at 05:41

## 2020-06-22 RX ADMIN — AZATHIOPRINE 50 MILLIGRAM(S): 100 TABLET ORAL at 05:40

## 2020-06-22 RX ADMIN — PYRIDOSTIGMINE BROMIDE 90 MILLIGRAM(S): 60 SOLUTION ORAL at 11:25

## 2020-06-22 RX ADMIN — Medication 50 MICROGRAM(S): at 05:40

## 2020-06-22 RX ADMIN — Medication 50 MILLIGRAM(S): at 05:41

## 2020-06-22 NOTE — PROGRESS NOTE ADULT - SUBJECTIVE AND OBJECTIVE BOX
59 year old female with myasthenia gravis accepted for 5 sessions of plasmapheresis with 5% albumin, with all five procedures completed as of last Friday.  Thanks so much for allowing us to participate in the care of your patient.  Please do not hesitate to call us with questions or concerns.    Pamela Gardner MD, MAGGIE  [phone]   of Blood Bank and Transfusion Medicine  Associate Residency , Clinical Pathology, [facility]   of Pathology at the [employer] at [facility]

## 2020-06-22 NOTE — DISCHARGE NOTE PROVIDER - INSTRUCTIONS
DASH diet (Dietary Approaches to Stop Hypertension) diet – This diet is low in salt and fat. It includes 4 to 5 servings each of fruits and vegetables and 2 to 3 servings of low-fat dairy products per day. This diet can lower your blood pressure, weight, and blood sugar, and improve lipid levels.

## 2020-06-22 NOTE — DISCHARGE NOTE PROVIDER - HOSPITAL COURSE
To be completed by the attending         59 year old Female with a past medical history of myasthenia gravis on mestonin, prednisone daily, finished a 5 day IVIG regimen last week presented to the Emergency room on 06/11/2020 with increasing dyspnea and weakness. [facility] when she could not walk up 2 flights of stairs. Patient with hematuria noted since Sunday. Patient was admitted to medicine for Myasthenia gravis crisis and received a right [place] and received plasma exchange in am     - neuro fu To be completed by the attending         59 year old Female with a past medical history of myasthenia gravis on mestonin, prednisone daily, finished a 5 day IVIG regimen last week presented to the Emergency room on 06/11/2020 with increasing dyspnea and weakness. [facility] when she could not walk up 2 flights of stairs. Patient with hematuria noted since Sunday. Patient was admitted to medicine for Myasthenia gravis crisis and received a right [place] and received plasma exchange x 5. Patient is at baseline. Weakness likely due to deconditioning. Continue Imuran and Mestinon home doses. Prednisone 50 mg qd and will wean as out pt. Follow up with Dr. Velazquez in 1 week. PT evaluation done: STR recommended but patient refuses. Home with Home care.         VITALS:    T(C): 36.2 (21 Jun 2020 05:00), Max: 36.9 (20 Jun 2020 14:04)    T(F): 97.2 (21 Jun 2020 05:00), Max: 98.5 (20 Jun 2020 14:04)    HR: 68 (21 Jun 2020 09:23) (48 - 68)    BP: 108/56 (21 Jun 2020 09:23) (96/51 - 117/58)    BP(mean): --    RR: 20 (21 Jun 2020 05:00) (20 - 20)    SpO2: --            CAPILLARY BLOOD GLUCOSE    POCT Blood Glucose.: 118 mg/dL (21 Jun 2020 07:21)    POCT Blood Glucose.: 146 mg/dL (20 Jun 2020 16:37)    POCT Blood Glucose.: 155 mg/dL (20 Jun 2020 11:18)            PHYSICAL EXAM:    GENERAL: NAD, well-developed    HEAD:  Atraumatic, Normocephalic    EYES: conjunctiva and sclera clear    ENMT: Moist mucous membranes    NECK: Supple, Normal thyroid    NERVOUS SYSTEM:  Alert & Oriented X3, Good concentration; Motor Strength 5/5 B/L upper and lower extremities    CHEST/LUNG: Clear to auscultation bilaterally; No rales, rhonchi, wheezing, or rubs    HEART: Regular rate and rhythm; No murmurs, rubs, or gallops    ABDOMEN: Soft, Nontender, Nondistended; Bowel sounds present    EXTREMITIES:  2+ Peripheral Pulses, No clubbing, cyanosis, or edema    LYMPH: No lymphadenopathy noted    SKIN: Rt groin Pressure dressing. No rashes or lesions        Consultant(s) Notes Reviewed:  [x ] YES  [ ] NO    Care Discussed with Consultants/Other Providers [ x] YES  [ ] NO        LABS:                            10.5     11.92 )-----------( 234      ( 21 Jun 2020 06:43 )               31.7             06-21        139  |  101  |  22<H>    ----------------------------<  91    4.0   |  29  |  0.7        Ca    9.1      21 Jun 2020 06:43    Mg     2.1     06-21        TPro  4.9<L>  /  Alb  4.1  /  TBili  0.5  /  DBili  x   /  AST  21  /  ALT  18  /  AlkPhos  66  06-20                         MICROBIOLOGY:    Culture - Urine (06.11.20 @ 13:08)      Specimen Source: .Urine Clean Catch (Midstream)      Culture Results:     >=3 organisms. Probable collection contamination.            COVID-19 PCR: NotDetec (06.11.20 @ 13:25)            RADIOLOGY & ADDITIONAL TESTS:    X-ray Chest 1 View- PORTABLE-Routine (06.14.20 @ 04:54)     Impression: Near-complete resolution of left base opacity with minimal residual costophrenic angle reaction            US Abdomen Limited (06.17.20 @ 12:11)     Cholelithiasis without sonographic evidence of cholecystitis.            EGD (06.18.20 @ 11:00)     Impressions:      Normal duodenum.      Normal mucosa in the whole esophagus.      Erythema in the stomach. (Biopsy).             Imaging Personally Reviewed:  [x] YES  [ ] NO 59 year old owman with hx of myasthenia gravis on mestonin, prednisone daily, finished a 5 day IVIG regimen last week presented to the ED with increasing dyspnea and weakness. Pt was at [facility] getting blood drawn when she could not walk up 2 flights of stairs. Denied any chest pain, no sob, no fever, chills.    She was admitted for Myasthenia crisis and started on PLEX.            59 yr old male admitted for Myasthenia Gravis Exacerbation. This patient requires volume augmented ventilation to decrease the work of breathing. This NIV is to be used during naptime, bedtime and periods of shortness of breath. Without this volume augmented NIV this patient will continue to decline causing readmissions.             Assessment & Plan:        1. Myasthenia Gravis crisis:    Neurology consulted: Completed Plasma exchange x 5 6/19/20 and Patient is at currently at baseline. Persistent weakness likely due to deconditioning.    NIF and VC wnl. Discharged home on AVAP.    Continued Imuran and Mestinon home doses.    Prednisone 50 mg qd and will wean as out pt. Follow up with Dr. Velazquez in 1 week.    PT evaluation done: STR recommended but patient refused. Home with Home care.            2. Hypothyroid: On Synthroid. TSH wnl.        3. Hyperlipidemia: On Statin.        4. Post Prandial Emesis:    history of esophageal strictures, EGD with dilation in October by Dr Ag.    s/p Modified barium swallow: no retention, slight residue in esophagus with liquids.    EGD 6/18: Gastric Erythema.    Zofran prn. PPI daily & GERD/Ulcer Diet    Follow up with our [facility] located at [address] [place] [address]. Phone  Number: [phone], appointment made 7/31/2020. Outpatient esophageal motility testing.            5. Cholelithiasis Asymptomatic:    Watchful waiting. 59 year old woman with hx of myasthenia gravis on Mestinon, prednisone daily, finished a 5 day IVIG regimen last week presented to the ED with increasing dyspnea and weakness. Pt was at [facility] getting blood drawn when she could not walk up 2 flights of stairs. Denied any chest pain, no sob, no fever, chills.    She was admitted for Myasthenia crisis and started on PLEX.            59 yr old F admitted for Myasthenia Gravis Exacerbation. This patient requires volume augmented ventilation to decrease the work of breathing. This NIV is to be used during naptime, bedtime and periods of shortness of breath. Without this volume augmented NIV this patient will continue to decline causing readmissions.             Assessment & Plan:        1. Myasthenia Gravis crisis:    Neurology consulted: Completed Plasma exchange x 5 6/19/20 and Patient is at currently at baseline. Persistent weakness likely due to deconditioning.    NIF and VC wnl. Discharged home on AVAP.    Continued Imuran and Mestinon home doses.    Prednisone 50 mg qd and will wean as out pt. Follow up with Dr. Velazquez in 1 week.    PT evaluation done: STR recommended but patient refused. Home with Home care.            2. Hypothyroid: On Synthroid. TSH wnl.        3. Hyperlipidemia: On Statin.        4. Post Prandial Emesis:    history of esophageal strictures, EGD with dilation in October by Dr Ag.    s/p Modified barium swallow: no retention, slight residue in esophagus with liquids.    EGD 6/18: Gastric Erythema.    Zofran prn. PPI daily & GERD/Ulcer Diet    Follow up with our [facility] located at [address] [place] [address]. Phone  Number: [phone], appointment made 7/31/2020. Outpatient esophageal motility testing.            5. Cholelithiasis Asymptomatic:    Watchful waiting.

## 2020-06-22 NOTE — OCCUPATIONAL THERAPY INITIAL EVALUATION ADULT - RANGE OF MOTION EXAMINATION
deficits as listed below/Left shoulder AROM 3/4 range; Elbow Wrist Hand WFLs PROM shoulder flex 0/120 IR 0/55; RUE AROM shoulder 1/2 range Elbow Wrist Hand WFLs PROM shoulder flex 0/100 IR 0/55

## 2020-06-22 NOTE — OCCUPATIONAL THERAPY INITIAL EVALUATION ADULT - GENERAL OBSERVATIONS, REHAB EVAL
08:10-08:40 chart reviewed, ok to treat by Occupational Therapist as confirmed by RN, Patient received semi-reclined in bed in No Apparent Distress. Patient agreeable for treatment.

## 2020-06-22 NOTE — OCCUPATIONAL THERAPY INITIAL EVALUATION ADULT - LEVEL OF INDEPENDENCE: EATING, OT EVAL
Patient reports some difficulty chewing and swallowing food and using a cup without straw/supervision

## 2020-06-22 NOTE — DISCHARGE NOTE PROVIDER - NSDCCPCAREPLAN_GEN_ALL_CORE_FT
PRINCIPAL DISCHARGE DIAGNOSIS  Diagnosis: Myasthenia gravis in crisis  Assessment and Plan of Treatment: -   - S/P  right [place] and received plasma exchange x 5.   - Neurology evaluated and followed up   - Patient is at baseline.      SECONDARY DISCHARGE DIAGNOSES  Diagnosis: Weakness generalized  Assessment and Plan of Treatment: -   - Patient refuses STR   - Home with home care PRINCIPAL DISCHARGE DIAGNOSIS  Diagnosis: Myasthenia gravis in crisis  Assessment and Plan of Treatment: You completed Plasma exchange x 5 on 6/19/20.   Continue Imuran and Mestinon home doses.  Prednisone 50 mg daily. Follow up with Tapering out patient. Follow up with Dr. Velazquez in 1 week.      SECONDARY DISCHARGE DIAGNOSES  Diagnosis: Emesis  Assessment and Plan of Treatment: Continue Zofran as needed, Protonix and Pepcid as prescribed.   Follow up with our [facility] located at [address] [place] [address]. Phone  Number: [phone], appointment made 7/31/2020. Outpatient esophageal motility testing.    Diagnosis: Asymptomatic cholelithiasis  Assessment and Plan of Treatment: No intervention needed now. Follow up out patient. Low fat diet.    Diagnosis: Weakness generalized  Assessment and Plan of Treatment: You refused Rehab. Continue with therapy at home. Ambulatecas tolerated. Avoid laying in bed for long periods.

## 2020-06-22 NOTE — OCCUPATIONAL THERAPY INITIAL EVALUATION ADULT - PLANNED THERAPY INTERVENTIONS, OT EVAL
ADL retraining/fine motor coordination training/ROM/motor coordination training/balance training/strengthening

## 2020-06-22 NOTE — DISCHARGE NOTE NURSING/CASE MANAGEMENT/SOCIAL WORK - PATIENT PORTAL LINK FT
You can access the FollowMyHealth Patient Portal offered by [facility] by registering at the following website: [email or web address]. By joining [employer]’s FollowMyHealth portal, you will also be able to view your health information using other applications (apps) compatible with our system.

## 2020-06-22 NOTE — PROGRESS NOTE ADULT - ASSESSMENT
59 year old owman with hx of myasthenia gravis on mestonin, prednisone daily, finished a 5 day IVIG regimen last week presented to the ED with increasing dyspnea and weakness. Pt was at [facility] getting blood drawn when she could not walk up 2 flights of stairs. Denied any chest pain, no sob, no fever, chills.  She was admitted for Myasthenia crisis and started on PLEX.      59 yr old male admitted for Myasthenia Gravis Exacerbation. This patient requires volume augmented ventilation to decrease the work of breathing. This NIV is to be used during naptime, bedtime and periods of shortness of breath. Without this volume augmented NIV this patient will continue to decline causing readmissions.       Assessment & Plan:    1. Myasthenia Gravis crisis:  Completed Plasma exchange x 5 6/19/20.  Neurology following: Patient is at baseline. Weakness likely due to deconditioning.  NIF and VC wnl.   Continue Imuran and Mestinon home doses.  Prednisone 50 mg qd and will wean as out pt. Follow up with Dr. Velazquez in 1 week.  PT evaluation done: STR recommended but patient refuses. Home with Home care.      2. Hypothyroid:  On Synthroid. Follow up TSH.      3. Hyperlipidemia:  On Statin.      4. Post Prandial Emesis:  history of esophageal strictures, EGD with dilation in October by Dr Ag.  s/p Modified barium swallow: no retention, slight residue in esophagus with liquids.  EGD 6/18: Gastric Erythema.  Zofran prn. PPI daily & GERD/Ulcer Diet  Follow up with our [facility] located at [address] [place] [address]. Phone Number: [phone], appointment made 7/31/2020. Outpatient esophageal motility testing.      5. Cholelithiasis Asymptomatic:  Watchful waiting.        Prophylaxis: Lovenox.  Code status: Full code    Progress Note Handoff:  Pending consults: None  Pending Tests: None  Family/Patient discussion: Plan of care discussed with patient who understands the need for intensive therapy but refuses.  Disposition: Refused STR. Home with Home care.     Attending: Dr. Shauna Burleson. Spectra 1503. 59 year old woman with hx of myasthenia gravis on mestonin, prednisone daily, finished a 5 day IVIG regimen last week presented to the ED with increasing dyspnea and weakness. Pt was at [facility] getting blood drawn when she could not walk up 2 flights of stairs. Denied any chest pain, no sob, no fever, chills.  She was admitted for Myasthenia crisis and started on PLEX.      59 yr old Female admitted for Myasthenia Gravis Exacerbation. This patient requires volume augmented ventilation to decrease the work of breathing. This NIV is to be used during naptime, bedtime and periods of shortness of breath. Without this volume augmented NIV this patient will continue to decline causing readmissions.       Assessment & Plan:    1. Myasthenia Gravis crisis:  Completed Plasma exchange x 5 6/19/20.  Neurology following: Patient is at baseline. Weakness likely due to deconditioning.  NIF and VC wnl.   Continue Imuran and Mestinon home doses.  Prednisone 50 mg qd and will wean as out pt. Follow up with Dr. Velazquez in 1 week.  PT evaluation done: STR recommended but patient refuses. Home with Home care.      2. Hypothyroid:  On Synthroid. Follow up TSH.      3. Hyperlipidemia:  On Statin.      4. Post Prandial Emesis:  history of esophageal strictures, EGD with dilation in October by Dr Ag.  s/p Modified barium swallow: no retention, slight residue in esophagus with liquids.  EGD 6/18: Gastric Erythema.  Zofran prn. PPI daily & GERD/Ulcer Diet  Follow up with our [facility] located at [address] [place] [address]. Phone Number: [phone], appointment made 7/31/2020. Outpatient esophageal motility testing.      5. Cholelithiasis Asymptomatic:  Watchful waiting.        Prophylaxis: Lovenox.  Code status: Full code    Progress Note Handoff:  Pending consults: None  Pending Tests: None  Family/Patient discussion: Plan of care discussed with patient who understands the need for intensive therapy but refuses.  Disposition: Refused STR. Home with Home care.     Attending: Dr. Shauna Burleson. Spectra 1503.

## 2020-06-22 NOTE — PROGRESS NOTE ADULT - SUBJECTIVE AND OBJECTIVE BOX
GHADA ROPER  59y  Female    Patient is a 59y old  Female who presents with a chief complaint of Weakness (20 Jun 2020 09:20)      INTERVAL HPI/OVERNIGHT EVENTS:  No interval events.  Patient has no new complaints.  Generalized weakness improved. Refuses STR.   Minimal dyspnea on exertion.      REVIEW OF SYSTEMS:  At least 10 systems were reviewed in ROS.   All systems reviewed are within normal limits except for that listed above.      VITALS:  T(C): 36.7 (22 Jun 2020 13:31), Max: 36.7 (22 Jun 2020 13:31)  T(F): 98 (22 Jun 2020 13:31), Max: 98 (22 Jun 2020 13:31)  HR: 74 (22 Jun 2020 13:31) (56 - 74)  BP: 116/60 (22 Jun 2020 13:31) (105/52 - 116/60)  BP(mean): --  RR: 17 (22 Jun 2020 13:31) (16 - 17)  SpO2: --      CAPILLARY BLOOD GLUCOSE  POCT Blood Glucose.: 158 mg/dL (22 Jun 2020 11:30)  POCT Blood Glucose.: 137 mg/dL (22 Jun 2020 07:20)  POCT Blood Glucose.: 182 mg/dL (21 Jun 2020 20:49)        PHYSICAL EXAM:  GENERAL: NAD, well-developed  HEAD:  Atraumatic, Normocephalic  EYES: conjunctiva and sclera clear  ENMT: Moist mucous membranes  NECK: Supple, Normal thyroid  NERVOUS SYSTEM:  Alert & Oriented X3, Good concentration; Motor Strength 5/5 B/L upper and lower extremities  CHEST/LUNG: Clear to auscultation bilaterally; No rales, rhonchi, wheezing, or rubs  HEART: Regular rate and rhythm; No murmurs, rubs, or gallops  ABDOMEN: Soft, Nontender, Nondistended; Bowel sounds present  EXTREMITIES:  2+ Peripheral Pulses, No clubbing, cyanosis, or edema  LYMPH: No lymphadenopathy noted  SKIN: Rt groin Pressure dressing. No rashes or lesions    Consultant(s) Notes Reviewed:  [x ] YES  [ ] NO  Care Discussed with Consultants/Other Providers [ x] YES  [ ] NO    LABS:                        10.5   11.92 )-----------( 234      ( 21 Jun 2020 06:43 )             31.7       06-21    139  |  101  |  22<H>  ----------------------------<  91  4.0   |  29  |  0.7    Ca    9.1      21 Jun 2020 06:43  Mg     2.1     06-21    TPro  4.9<L>  /  Alb  4.1  /  TBili  0.5  /  DBili  x   /  AST  21  /  ALT  18  /  AlkPhos  66  06-20                 MICROBIOLOGY:  Culture - Urine (06.11.20 @ 13:08)    Specimen Source: .Urine Clean Catch (Midstream)    Culture Results:   >=3 organisms. Probable collection contamination.      COVID-19 PCR: NotDetec (06.11.20 @ 13:25)      RADIOLOGY & ADDITIONAL TESTS:  X-ray Chest 1 View- PORTABLE-Routine (06.14.20 @ 04:54)   Impression: Near-complete resolution of left base opacity with minimal residual costophrenic angle reaction      US Abdomen Limited (06.17.20 @ 12:11)   Cholelithiasis without sonographic evidence of cholecystitis.      EGD (06.18.20 @ 11:00)   Impressions:    Normal duodenum.    Normal mucosa in the whole esophagus.    Erythema in the stomach. (Biopsy).       Imaging Personally Reviewed:  [x] YES  [ ] NO      MEDICATIONS  (STANDING):  aspirin  chewable 81 milliGRAM(s) Oral daily  atorvastatin 20 milliGRAM(s) Oral at bedtime  azaTHIOprine 50 milliGRAM(s) Oral <User Schedule>  budesonide  80 MICROgram(s)/formoterol 4.5 MICROgram(s) Inhaler 2 Puff(s) Inhalation two times a day  chlorhexidine 4% Liquid 1 Application(s) Topical two times a day  enoxaparin Injectable 40 milliGRAM(s) SubCutaneous daily  famotidine    Tablet 40 milliGRAM(s) Oral at bedtime  levothyroxine 50 MICROGram(s) Oral daily  ondansetron Injectable 4 milliGRAM(s) IV Push every 8 hours  pantoprazole    Tablet 40 milliGRAM(s) Oral before breakfast  polyethylene glycol 3350 17 Gram(s) Oral daily  predniSONE   Tablet 50 milliGRAM(s) Oral daily  pyridostigmine 90 milliGRAM(s) Oral four times a day      MEDICATIONS  (PRN):  acetaminophen   Tablet .. 650 milliGRAM(s) Oral every 6 hours PRN Mild Pain (1 - 3), Moderate Pain (4 - 6)      Home Medications:  albuterol:  (11 Jun 2020 19:46)  aspirin 81 mg oral tablet, chewable: 1 tab(s) orally once a day (11 Jun 2020 19:46)  atorvastatin 20 mg oral tablet: 1 tab(s) orally once a day (11 Jun 2020 19:46)  doxepin 10 mg oral capsule:  (11 Jun 2020 19:46)  fluticasone nasal:  (11 Jun 2020 19:46)  Imuran 50 mg oral tablet: 1 tab(s) orally 3 times a day (11 Jun 2020 19:46)  Mestinon 60 mg oral tablet: 1.5 tab(s) orally 4 times a day (11 Jun 2020 19:46)  Pepcid:  (11 Jun 2020 19:46)  predniSONE 20 mg oral tablet: 1 tab(s) orally once a day (11 Jun 2020 19:46)  Protonix 40 mg oral delayed release tablet: 1 tab(s) orally once a day (11 Jun 2020 19:46)  Symbicort: inhaled 2 times a day (11 Jun 2020 19:46)  Toprol-XL 25 mg oral tablet, extended release: 1 tab(s) orally once a day (11 Jun 2020 19:46)  Vitamin D2: every friday (11 Jun 2020 19:46)        HEALTH ISSUES - PROBLEM Dx:  Myasthenia gravis exacerbation  High blood cholesterol  Pancreatitis  POTS (postural orthostatic tachycardia syndrome)  History of foot surgery GHADA ROPER  59y  Female    Patient is a 59y old  Female who presents with a chief complaint of Weakness (20 Jun 2020 09:20)      INTERVAL HPI/OVERNIGHT EVENTS:  No interval events.  Patient has no new complaints.  Generalized weakness improved. Refuses STR.   Minimal dyspnea on exertion.      REVIEW OF SYSTEMS:  At least 10 systems were reviewed in ROS.   All systems reviewed are within normal limits except for that listed above.      VITALS:  T(C): 36.7 (22 Jun 2020 13:31), Max: 36.7 (22 Jun 2020 13:31)  T(F): 98 (22 Jun 2020 13:31), Max: 98 (22 Jun 2020 13:31)  HR: 74 (22 Jun 2020 13:31) (56 - 74)  BP: 116/60 (22 Jun 2020 13:31) (105/52 - 116/60)  BP(mean): --  RR: 17 (22 Jun 2020 13:31) (16 - 17)  SpO2: --      CAPILLARY BLOOD GLUCOSE  POCT Blood Glucose.: 158 mg/dL (22 Jun 2020 11:30)  POCT Blood Glucose.: 137 mg/dL (22 Jun 2020 07:20)  POCT Blood Glucose.: 182 mg/dL (21 Jun 2020 20:49)        PHYSICAL EXAM:  GENERAL: NAD, well-developed  HEAD:  Atraumatic, Normocephalic  EYES: conjunctiva and sclera clear  ENMT: Moist mucous membranes  NECK: Supple, Normal thyroid  NERVOUS SYSTEM:  Alert & Oriented X3, Good concentration; Motor Strength 5/5 B/L upper and lower extremities  CHEST/LUNG: Clear to auscultation bilaterally; No rales, rhonchi, wheezing, or rubs  HEART: Regular rate and rhythm; No murmurs, rubs, or gallops  ABDOMEN: Soft, Nontender, Nondistended; Bowel sounds present  EXTREMITIES:  2+ Peripheral Pulses, No clubbing, cyanosis, or edema  LYMPH: No lymphadenopathy noted  SKIN: No rashes or lesions    Consultant(s) Notes Reviewed:  [x ] YES  [ ] NO  Care Discussed with Consultants/Other Providers [ x] YES  [ ] NO    LABS:                        10.5   11.92 )-----------( 234      ( 21 Jun 2020 06:43 )             31.7       06-21    139  |  101  |  22<H>  ----------------------------<  91  4.0   |  29  |  0.7    Ca    9.1      21 Jun 2020 06:43  Mg     2.1     06-21    TPro  4.9<L>  /  Alb  4.1  /  TBili  0.5  /  DBili  x   /  AST  21  /  ALT  18  /  AlkPhos  66  06-20                 MICROBIOLOGY:  Culture - Urine (06.11.20 @ 13:08)    Specimen Source: .Urine Clean Catch (Midstream)    Culture Results:   >=3 organisms. Probable collection contamination.      COVID-19 PCR: NotDetec (06.11.20 @ 13:25)      RADIOLOGY & ADDITIONAL TESTS:  X-ray Chest 1 View- PORTABLE-Routine (06.14.20 @ 04:54)   Impression: Near-complete resolution of left base opacity with minimal residual costophrenic angle reaction      US Abdomen Limited (06.17.20 @ 12:11)   Cholelithiasis without sonographic evidence of cholecystitis.      EGD (06.18.20 @ 11:00)   Impressions:    Normal duodenum.    Normal mucosa in the whole esophagus.    Erythema in the stomach. (Biopsy).       Imaging Personally Reviewed:  [x] YES  [ ] NO      MEDICATIONS  (STANDING):  aspirin  chewable 81 milliGRAM(s) Oral daily  atorvastatin 20 milliGRAM(s) Oral at bedtime  azaTHIOprine 50 milliGRAM(s) Oral <User Schedule>  budesonide  80 MICROgram(s)/formoterol 4.5 MICROgram(s) Inhaler 2 Puff(s) Inhalation two times a day  chlorhexidine 4% Liquid 1 Application(s) Topical two times a day  enoxaparin Injectable 40 milliGRAM(s) SubCutaneous daily  famotidine    Tablet 40 milliGRAM(s) Oral at bedtime  levothyroxine 50 MICROGram(s) Oral daily  ondansetron Injectable 4 milliGRAM(s) IV Push every 8 hours  pantoprazole    Tablet 40 milliGRAM(s) Oral before breakfast  polyethylene glycol 3350 17 Gram(s) Oral daily  predniSONE   Tablet 50 milliGRAM(s) Oral daily  pyridostigmine 90 milliGRAM(s) Oral four times a day      MEDICATIONS  (PRN):  acetaminophen   Tablet .. 650 milliGRAM(s) Oral every 6 hours PRN Mild Pain (1 - 3), Moderate Pain (4 - 6)      Home Medications:  albuterol:  (11 Jun 2020 19:46)  aspirin 81 mg oral tablet, chewable: 1 tab(s) orally once a day (11 Jun 2020 19:46)  atorvastatin 20 mg oral tablet: 1 tab(s) orally once a day (11 Jun 2020 19:46)  doxepin 10 mg oral capsule:  (11 Jun 2020 19:46)  fluticasone nasal:  (11 Jun 2020 19:46)  Imuran 50 mg oral tablet: 1 tab(s) orally 3 times a day (11 Jun 2020 19:46)  Mestinon 60 mg oral tablet: 1.5 tab(s) orally 4 times a day (11 Jun 2020 19:46)  Pepcid:  (11 Jun 2020 19:46)  predniSONE 20 mg oral tablet: 1 tab(s) orally once a day (11 Jun 2020 19:46)  Protonix 40 mg oral delayed release tablet: 1 tab(s) orally once a day (11 Jun 2020 19:46)  Symbicort: inhaled 2 times a day (11 Jun 2020 19:46)  Toprol-XL 25 mg oral tablet, extended release: 1 tab(s) orally once a day (11 Jun 2020 19:46)  Vitamin D2: every friday (11 Jun 2020 19:46)        HEALTH ISSUES - PROBLEM Dx:  Myasthenia gravis exacerbation  High blood cholesterol  Pancreatitis  POTS (postural orthostatic tachycardia syndrome)  History of foot surgery

## 2020-06-22 NOTE — DISCHARGE NOTE PROVIDER - NSDCMRMEDTOKEN_GEN_ALL_CORE_FT
albuterol:   aspirin 81 mg oral tablet, chewable: 1 tab(s) orally once a day  atorvastatin 20 mg oral tablet: 1 tab(s) orally once a day  doxepin 10 mg oral capsule:   fluticasone nasal:   Imuran 50 mg oral tablet: 1 tab(s) orally 3 times a day  Mestinon 60 mg oral tablet: 1.5 tab(s) orally 4 times a day  Pepcid:   predniSONE 20 mg oral tablet: 1 tab(s) orally once a day  predniSONE 20 mg oral tablet: 1 tab(s) orally once a day   Protonix 40 mg oral delayed release tablet: 1 tab(s) orally once a day  pseudoephedrine 30 mg oral tablet: 1 tab(s) orally every 4 hours, As needed, nasal congestion  Symbicort: inhaled 2 times a day  Synthroid 50 mcg (0.05 mg) oral tablet: 1 tab(s) orally once a day   Toprol-XL 25 mg oral tablet, extended release: 1 tab(s) orally once a day  Vitamin D2: every friday albuterol 90 mcg/inh inhalation powder: 2 puff(s) inhaled every 6 hours, As Needed  aspirin 81 mg oral tablet, chewable: 1 tab(s) orally once a day  atorvastatin 20 mg oral tablet: 1 tab(s) orally once a day  doxepin 10 mg oral capsule:   fluticasone 50 mcg/inh nasal spray: 1 spray(s) nasal once a day  Imuran 50 mg oral tablet: 1 tab(s) orally 3 times a day  Mestinon 60 mg oral tablet: 1.5 tab(s) orally 4 times a day  Pepcid 40 mg oral tablet: 1 tab(s) orally once a day (at bedtime)  predniSONE 50 mg oral tablet: 1 tab(s) orally once a day  Protonix 40 mg oral delayed release tablet: 1 tab(s) orally once a day  Symbicort: inhaled 2 times a day  Synthroid 50 mcg (0.05 mg) oral tablet: 1 tab(s) orally once a day   Toprol-XL 25 mg oral tablet, extended release: 1 tab(s) orally once a day  Vitamin D2: every friday albuterol 90 mcg/inh inhalation powder: 2 puff(s) inhaled every 6 hours, As Needed  aspirin 81 mg oral tablet, chewable: 1 tab(s) orally once a day  atorvastatin 20 mg oral tablet: 1 tab(s) orally once a day  doxepin 10 mg oral capsule:   fluticasone 50 mcg/inh nasal spray: 1 spray(s) nasal once a day  Imuran 50 mg oral tablet: 1 tab(s) orally 3 times a day  Mestinon 60 mg oral tablet: 1.5 tab(s) orally 4 times a day  ondansetron 4 mg oral disintegrating strip: 1 each orally every 6 hours, As Needed -for nausea or vomiting.  Pepcid 40 mg oral tablet: 1 tab(s) orally once a day (at bedtime)  predniSONE 50 mg oral tablet: 1 tab(s) orally once a day  Protonix 40 mg oral delayed release tablet: 1 tab(s) orally once a day  Symbicort: inhaled 2 times a day  Synthroid 50 mcg (0.05 mg) oral tablet: 1 tab(s) orally once a day   Toprol-XL 25 mg oral tablet, extended release: 1 tab(s) orally once a day  Vitamin D2: every friday

## 2020-06-22 NOTE — CHART NOTE - NSCHARTNOTEFT_GEN_A_CORE
Registered Dietitian Follow-Up     Patient Profile Reviewed                           Yes [x]   No []     Nutrition History Previously Obtained        Yes [x]  No []       Pertinent Subjective Information: Pt reports that she was eating well up until yesterday (6/21) where she was feeling nauseous. Pt had an episode of emesis this morning (6/22), pt denies any current feelings of abdominal pain/discomfort. Provided nutrition education/NCM handout for nausea/vomiting nutrition therapy. Pt verbalizes understanding of the presented material. Pt reports that she has not been receiving Ensure Clear supplements as previously recommended and states that she would like to try them prior to d/c (states that she is leaving around lunch time today) so that she can purchase them on her own if she likes them. RD provided pt with supplements      Pertinent Medical Interventions: EGD  on 6/18 showed normal duodenum + mucosa in the esophagus, erythema in the stomach sp biopsy-- results not posted per EMR. Plan is for outpatient esophageal motility testing. Pt refuses STR + intensive therapy.      Diet order: DASH/TLC; pending order includes ensure clear BID     Anthropometrics:  - Ht. 64"  - Wt. dosing 95.8 kg/211 lbs; (6/20) 91.8 kg/202 lbs; (6/19) 94 kg/207 lbs  - %wt change ranges from 202-212 lbs throughout LOS per EMR; unknown etiology for wt fluctuations, will continue to monitor.   - BMI 36.3 using dosing wt vs 34.7 using 202 lbs (lowest recorded wt)  - IBW 54.5 kg/120 lbs     Pertinent Lab Data: (6/21) RBC 3.25, H/H 10.5/31.7, BUN 22; (6/22) POC glucose 137-158     Pertinent Meds: lovenox, prednisone, miralax, zofran, lipitor, imuran, pepcid, synthroid, protonix, mestinon     Physical Findings:  - Appearance: obese; no edema noted  - GI function: no diarrhea/constipation notes, last BM 6/19 per EMR. Emesis this am per pt.   - Tubes: NA  - Oral/Mouth cavity: no chewing/swallowing issues noted at this time.   - Skin: WDL (6/22)     Nutrition Requirements  Weight Used: dosing 95.8 kg/211 lbs; IBW 54.5 kg/120 lbs (needs continued from initial assessment on 6/18)     Estimated Energy Needs    Continue [x]  8823-2435 (MSJ x 1-1.1 AF) obese BMI considered     Estimated Protein Needs    Continue [x]  65-76 g (1.2-1.4 g/kg IBW) same as above + significant difference between CBW and IBW considered     Estimated Fluid Needs        Continue [x]  1 mL/kcal or per LIP     Nutrient Intake: % per EMR        [x] Previous Nutrition Diagnosis: Inadequate energy intake            [x] Ongoing              Nutrition Intervention meals and snacks, medical food supplements, nutrition education, coordination of care     Goal/Expected Outcome: pt to maintain po intake >75% over the next 3 days     Indicator/Monitoring: RD to monitor diet order, energy intake, body composition, NFPF, glucose profile    Recommendation: activate pending diet order to include Ensure Clear supplement BID, encourage po intake, provide assistance with meals PRN. NCM handout + education for nausea/vomiting provided.

## 2020-06-24 DIAGNOSIS — E03.9 HYPOTHYROIDISM, UNSPECIFIED: ICD-10-CM

## 2020-06-24 DIAGNOSIS — Z88.2 ALLERGY STATUS TO SULFONAMIDES: ICD-10-CM

## 2020-06-24 DIAGNOSIS — R31.9 HEMATURIA, UNSPECIFIED: ICD-10-CM

## 2020-06-24 DIAGNOSIS — K21.0 GASTRO-ESOPHAGEAL REFLUX DISEASE WITH ESOPHAGITIS: ICD-10-CM

## 2020-06-24 DIAGNOSIS — Z71.89 OTHER SPECIFIED COUNSELING: ICD-10-CM

## 2020-06-24 DIAGNOSIS — E78.5 HYPERLIPIDEMIA, UNSPECIFIED: ICD-10-CM

## 2020-06-24 DIAGNOSIS — G70.01 MYASTHENIA GRAVIS WITH (ACUTE) EXACERBATION: ICD-10-CM

## 2020-06-24 DIAGNOSIS — I49.8 OTHER SPECIFIED CARDIAC ARRHYTHMIAS: ICD-10-CM

## 2020-06-24 DIAGNOSIS — K80.20 CALCULUS OF GALLBLADDER WITHOUT CHOLECYSTITIS WITHOUT OBSTRUCTION: ICD-10-CM

## 2020-06-24 DIAGNOSIS — R11.10 VOMITING, UNSPECIFIED: ICD-10-CM

## 2020-06-24 DIAGNOSIS — K31.89 OTHER DISEASES OF STOMACH AND DUODENUM: ICD-10-CM

## 2020-06-24 DIAGNOSIS — R06.89 OTHER ABNORMALITIES OF BREATHING: ICD-10-CM

## 2020-06-24 DIAGNOSIS — G47.33 OBSTRUCTIVE SLEEP APNEA (ADULT) (PEDIATRIC): ICD-10-CM

## 2020-06-24 DIAGNOSIS — R13.10 DYSPHAGIA, UNSPECIFIED: ICD-10-CM

## 2020-06-29 ENCOUNTER — APPOINTMENT (OUTPATIENT)
Dept: NEUROLOGY | Facility: CLINIC | Age: 60
End: 2020-06-29
Payer: MEDICAID

## 2020-06-29 DIAGNOSIS — Z00.00 ENCOUNTER FOR GENERAL ADULT MEDICAL EXAMINATION W/OUT ABNORMAL FINDINGS: ICD-10-CM

## 2020-06-29 PROCEDURE — 99214 OFFICE O/P EST MOD 30 MIN: CPT | Mod: 95

## 2020-06-29 NOTE — PHYSICAL EXAM
[FreeTextEntry1] : Paitents speech is fluent, facial muscles are symmetric.\par She does not get out of breath during appointment.\par She has mild eyelid droop bilaterally.\par Denies diplopia currently.

## 2020-06-29 NOTE — HISTORY OF PRESENT ILLNESS
[FreeTextEntry1] : Patient consents for audiovisual telehealth visit.  She and MD are in [place].  Start of visit is 3:48 p.m.\par End of visit is 4:16 p.m.  Duration 28 min.\par \par States yesterday could barely stand.  Right leg swelling is going done from 2 days (same side as PLEX catheter).  ALso much less painful today.  She is more active today.   Has been using cane.  Can't stand for long periods of time but can walk from point A to point B.   Can go ~ 200 feet before has to sit down.   Her endurance she thinks is better but depends on the day.  Feels her breathing is not great and doesn't think it improved significantly after plasma exchange.  States after 2nd or 3rd treatment she felt really good (superwoman) but felt like improvement went down again after the additional exchanges.  She is due to see her pulmonologist next month.  Has GI motility tests pending.  States swallowing fluctuates.\par First diagnosed with myasthenia gravis October 2019.  Does think the mestinon improves her symptoms but doesn't feel back to normal.  If skips or misses does feels more generalized weakness. When she feels bad she has droopiness to eyes.  States she did have double vision once or twice (has lazy eye however).  Hospital made her initial diagnosis of myasthenia gravis and a single fiber test done by [facility].  \par \par Last exchange was June 19th.  \par \par EMG/NCS [facility].\par \par States each day feel feeling stronger.  Voice is stronger.  \par \par Has been on azathioprine 150 mg for 3 months. \par Weight is 208 lbs. \par Target imuran dose 200 mg.\par \par

## 2020-06-29 NOTE — ASSESSMENT
[FreeTextEntry1] : Myasthenia gravis, antibody negative so far (LRP ab pending), diagnosed clinically and confirmed with single fiber testing at [place].  She is 10 days s/p last exchange and is due for IVIG induction to start in a couple of weeks. \par She is on 50 mg/day of prednisone and her azathioiprine 150 mg dose has been for past 3 months.  She was due to get repeat EMG/NCS in [facility] but wishes to stay on [place] for this.\par \par Plan:\par 1. Increase azathioprine to 100 mg po bid to achieve a 2 mg/kg daily dose.\par 2. For now continue prednisone 50 mg/day but will anticipate starting to taper after she receives IVIG induction.\par 3. EMG/NCS for repetitive nerve stimulation.\par 4. Continue mestinon but stop it 1 day before the EMG test. Then restart it after testing.\par 5. Continue GI prophylaxis while on steroids.\par 6.  CBC and CMP in 1 month.\par 7.  f/u visit in 1 month.\par \par 28 min face-to-face time with > 50% of time spent in counselling and coordination of care.

## 2020-07-07 RX ORDER — BUDESONIDE AND FORMOTEROL FUMARATE DIHYDRATE 160; 4.5 UG/1; UG/1
160-4.5 AEROSOL RESPIRATORY (INHALATION) TWICE DAILY
Qty: 1 | Refills: 5 | Status: DISCONTINUED | COMMUNITY
Start: 2020-04-17 | End: 2020-07-07

## 2020-07-07 RX ORDER — BUDESONIDE AND FORMOTEROL FUMARATE DIHYDRATE 160; 4.5 UG/1; UG/1
160-4.5 AEROSOL RESPIRATORY (INHALATION) TWICE DAILY
Qty: 1 | Refills: 5 | Status: DISCONTINUED | COMMUNITY
Start: 2019-12-13 | End: 2020-07-07

## 2020-07-13 ENCOUNTER — OUTPATIENT (OUTPATIENT)
Dept: OUTPATIENT SERVICES | Facility: HOSPITAL | Age: 60
LOS: 1 days | Discharge: HOME | End: 2020-07-13
Payer: MEDICAID

## 2020-07-13 DIAGNOSIS — Z98.890 OTHER SPECIFIED POSTPROCEDURAL STATES: Chronic | ICD-10-CM

## 2020-07-13 PROCEDURE — 92012 INTRM OPH EXAM EST PATIENT: CPT

## 2020-07-15 DIAGNOSIS — H04.129 DRY EYE SYNDROME OF UNSPECIFIED LACRIMAL GLAND: ICD-10-CM

## 2020-07-15 DIAGNOSIS — G70.01 MYASTHENIA GRAVIS WITH (ACUTE) EXACERBATION: ICD-10-CM

## 2020-07-15 DIAGNOSIS — H53.022 REFRACTIVE AMBLYOPIA, LEFT EYE: ICD-10-CM

## 2020-07-31 ENCOUNTER — OUTPATIENT (OUTPATIENT)
Dept: OUTPATIENT SERVICES | Facility: HOSPITAL | Age: 60
LOS: 1 days | Discharge: HOME | End: 2020-07-31

## 2020-07-31 ENCOUNTER — APPOINTMENT (OUTPATIENT)
Dept: GASTROENTEROLOGY | Facility: CLINIC | Age: 60
End: 2020-07-31
Payer: MEDICAID

## 2020-07-31 VITALS
DIASTOLIC BLOOD PRESSURE: 80 MMHG | HEART RATE: 60 BPM | BODY MASS INDEX: 33.8 KG/M2 | WEIGHT: 198 LBS | HEIGHT: 64 IN | SYSTOLIC BLOOD PRESSURE: 152 MMHG | TEMPERATURE: 97 F

## 2020-07-31 DIAGNOSIS — Z98.890 OTHER SPECIFIED POSTPROCEDURAL STATES: Chronic | ICD-10-CM

## 2020-07-31 PROCEDURE — 99214 OFFICE O/P EST MOD 30 MIN: CPT

## 2020-07-31 NOTE — ASSESSMENT
[FreeTextEntry1] : 59 yo female patient with PMH of MG treated with mestinone and prednisone 20 mg oral and azathioprine, DLD, HTN, hypothyroidism, valvular heart disease, GERD  presented for follow up for dysphagia.\par \par #Intermittent dysphagia likely due to MG:\par - EGD on 6/18/20 showed non-erosive gastritis\par - Esophagogram unremarkable\par - Modified barium swallow unremarkable as per patient\par - will schedule for motility study ( manometry )\par \par \par

## 2020-07-31 NOTE — HISTORY OF PRESENT ILLNESS
[de-identified] : 61 yo female patient with PMH of MG treated with mestinone and prednisone 20 mg oral and azathioprin , DLD, HTN, hypothyroidism, valvular heart disease, GERD on protonix 40 po QD and pepcid presented for follow up for dysphagia.\par She had EGD on 6/18/20 which showed gastritis , H. Pylori was negative. Patient also had modified barium swallow by speech and swallow. as per patient, was not remarkable.\par Her symptoms are waxing and waning , getting worse at night, for both liquid and solid, regurgitation and feels food goes down slowly. \par No weight loss, no hematemesis, no abdominal pain.

## 2020-07-31 NOTE — END OF VISIT
[] : Fellow [FreeTextEntry3] : 61 y/o F with hx of MG who presents with dysphagia, EGD negative, esophagram negative, plan for manometry.

## 2020-08-01 PROCEDURE — G9005: CPT

## 2020-08-05 ENCOUNTER — APPOINTMENT (OUTPATIENT)
Dept: NEUROLOGY | Facility: CLINIC | Age: 60
End: 2020-08-05

## 2020-08-05 DIAGNOSIS — R13.10 DYSPHAGIA, UNSPECIFIED: ICD-10-CM

## 2020-08-12 ENCOUNTER — OUTPATIENT (OUTPATIENT)
Dept: OUTPATIENT SERVICES | Facility: HOSPITAL | Age: 60
LOS: 1 days | Discharge: HOME | End: 2020-08-12
Payer: MEDICAID

## 2020-08-12 ENCOUNTER — RESULT REVIEW (OUTPATIENT)
Age: 60
End: 2020-08-12

## 2020-08-12 DIAGNOSIS — Z12.31 ENCOUNTER FOR SCREENING MAMMOGRAM FOR MALIGNANT NEOPLASM OF BREAST: ICD-10-CM

## 2020-08-12 DIAGNOSIS — Z98.890 OTHER SPECIFIED POSTPROCEDURAL STATES: Chronic | ICD-10-CM

## 2020-08-12 PROCEDURE — 77063 BREAST TOMOSYNTHESIS BI: CPT | Mod: 26

## 2020-08-12 PROCEDURE — 77067 SCR MAMMO BI INCL CAD: CPT | Mod: 26

## 2020-08-21 ENCOUNTER — APPOINTMENT (OUTPATIENT)
Dept: OBGYN | Facility: CLINIC | Age: 60
End: 2020-08-21
Payer: MEDICAID

## 2020-08-21 ENCOUNTER — APPOINTMENT (OUTPATIENT)
Dept: PULMONOLOGY | Facility: CLINIC | Age: 60
End: 2020-08-21
Payer: MEDICAID

## 2020-08-21 ENCOUNTER — OUTPATIENT (OUTPATIENT)
Dept: OUTPATIENT SERVICES | Facility: HOSPITAL | Age: 60
LOS: 1 days | Discharge: HOME | End: 2020-08-21

## 2020-08-21 VITALS
WEIGHT: 198 LBS | TEMPERATURE: 97.4 F | OXYGEN SATURATION: 97 % | DIASTOLIC BLOOD PRESSURE: 85 MMHG | SYSTOLIC BLOOD PRESSURE: 147 MMHG | BODY MASS INDEX: 33.8 KG/M2 | HEART RATE: 60 BPM | HEIGHT: 64 IN

## 2020-08-21 DIAGNOSIS — Z98.890 OTHER SPECIFIED POSTPROCEDURAL STATES: Chronic | ICD-10-CM

## 2020-08-21 PROCEDURE — 99396 PREV VISIT EST AGE 40-64: CPT

## 2020-08-21 PROCEDURE — 99213 OFFICE O/P EST LOW 20 MIN: CPT | Mod: GC

## 2020-08-21 NOTE — PHYSICAL EXAM
[Awake] : awake [Alert] : alert [Oriented x3] : oriented to person, place, and time [Soft] : soft [Normal] : uterus [No Bleeding] : there was no active vaginal bleeding [Uterine Adnexae] : were not tender and not enlarged [Acute Distress] : no acute distress [Nipple Discharge] : no nipple discharge [Mass] : no breast mass [Axillary LAD] : no axillary lymphadenopathy [Tender] : non tender

## 2020-08-21 NOTE — HISTORY OF PRESENT ILLNESS
[FreeTextEntry1] : 60 year old female with PMH of myasthenia gravis, diverticular disease, hypothyroidism, kidney stones, IBS, chronic pancreatitis here for follow up due to SOB after MG diagnosis. She was recently in the hospital because of weakness and inability to speak. She is following with neurologist. She has been on symbicort and rescue inhalor (albuterol). She also currently takes prednisone 50 mg daily and Mestinon. She says she can walk roughly 100 to 200 feet before she starts feeling week.  \par \par

## 2020-08-21 NOTE — PHYSICAL EXAM
[Normal Rate/Rhythm] : normal rate/rhythm [Normal Appearance] : normal appearance [Normal S1, S2] : normal s1, s2 [No Resp Distress] : no resp distress [No Acc Muscle Use] : no acc muscle use [Clear to Auscultation Bilaterally] : clear to auscultation bilaterally [Benign] : benign [No Abnormalities] : no abnormalities [Not Tender] : not tender [Normal Gait] : normal gait [No Focal Deficits] : no focal deficits [No Clubbing] : no clubbing [TextBox_2] : sad and weak [TextBox_44] : enlarged [TextBox_11] : wears a mask

## 2020-08-21 NOTE — ASSESSMENT
[FreeTextEntry1] : 60 year old female with PMH of myasthenia gravis, diverticular disease, hypothyroidism, kidney stones, IBS, chronic pancreatitis here for follow up due to SOB after MG diagnosis. She was recently in the hospital because of weakness and inability to speak. She is following with neurologist. She has been on symbicort and rescue inhalor (albuterol). She also currently takes prednisone 50 mg daily and Mestinon. She says she can walk roughly 100 to 200 feet before she starts feeling week.  Myasthenia gravis, antibody negative so far (LRP ab pending), diagnosed clinically and confirmed with single fiber testing at [place].  She is a two months s/p last exchange and is due for IVIG induction to start in a couple of weeks. \par \par Myasthenia Gravis\par -She is on 50 mg/day of prednisone and azathioprine to 100 mg po bid to achieve a 2 mg/kg daily dose.\par -EMG/NCS for repetitive nerve stimulation. Continue mestinon but stop it 1 day before the EMG test.\par -Continue GI prophylaxis while on steroids.\par -Symbicort and Rescue Inhaler, had mild obstructive pattern on pfts\par \par Sleep Apnea\par -BIPAP at night\par \par follow up in 3 months

## 2020-08-26 LAB — HPV HIGH+LOW RISK DNA PNL CVX: NOT DETECTED

## 2020-09-06 ENCOUNTER — OUTPATIENT (OUTPATIENT)
Dept: OUTPATIENT SERVICES | Facility: HOSPITAL | Age: 60
LOS: 1 days | Discharge: HOME | End: 2020-09-06

## 2020-09-06 ENCOUNTER — LABORATORY RESULT (OUTPATIENT)
Age: 60
End: 2020-09-06

## 2020-09-06 DIAGNOSIS — Z11.59 ENCOUNTER FOR SCREENING FOR OTHER VIRAL DISEASES: ICD-10-CM

## 2020-09-06 DIAGNOSIS — Z98.890 OTHER SPECIFIED POSTPROCEDURAL STATES: Chronic | ICD-10-CM

## 2020-09-09 ENCOUNTER — APPOINTMENT (OUTPATIENT)
Dept: NEUROLOGY | Facility: CLINIC | Age: 60
End: 2020-09-09
Payer: MEDICAID

## 2020-09-09 VITALS — TEMPERATURE: 97.2 F

## 2020-09-09 DIAGNOSIS — D72.819 DECREASED WHITE BLOOD CELL COUNT, UNSPECIFIED: ICD-10-CM

## 2020-09-09 LAB
ALBUMIN SERPL ELPH-MCNC: 3.4 G/DL
ALP BLD-CCNC: 66 U/L
ALT SERPL-CCNC: 24 U/L
ANION GAP SERPL CALC-SCNC: 13 MMOL/L
AST SERPL-CCNC: 19 U/L
BASOPHILS # BLD AUTO: 0.01 K/UL
BASOPHILS NFR BLD AUTO: 0.2 %
BILIRUB SERPL-MCNC: 0.6 MG/DL
BUN SERPL-MCNC: 24 MG/DL
CALCIUM SERPL-MCNC: 9.2 MG/DL
CHLORIDE SERPL-SCNC: 107 MMOL/L
CO2 SERPL-SCNC: 26 MMOL/L
CREAT SERPL-MCNC: 0.9 MG/DL
EOSINOPHIL # BLD AUTO: 0.05 K/UL
EOSINOPHIL NFR BLD AUTO: 1.2 %
GLUCOSE SERPL-MCNC: 82 MG/DL
HCT VFR BLD CALC: 37.6 %
HGB BLD-MCNC: 11.2 G/DL
IMM GRANULOCYTES NFR BLD AUTO: 0.2 %
LYMPHOCYTES # BLD AUTO: 1.09 K/UL
LYMPHOCYTES NFR BLD AUTO: 27 %
MAN DIFF?: NORMAL
MCHC RBC-ENTMCNC: 29.8 G/DL
MCHC RBC-ENTMCNC: 30.6 PG
MCV RBC AUTO: 102.7 FL
MONOCYTES # BLD AUTO: 0.28 K/UL
MONOCYTES NFR BLD AUTO: 6.9 %
NEUTROPHILS # BLD AUTO: 2.6 K/UL
NEUTROPHILS NFR BLD AUTO: 64.5 %
PLATELET # BLD AUTO: 316 K/UL
POTASSIUM SERPL-SCNC: 4.7 MMOL/L
PROT SERPL-MCNC: 8 G/DL
RBC # BLD: 3.66 M/UL
RBC # FLD: 15.4 %
SODIUM SERPL-SCNC: 146 MMOL/L
WBC # FLD AUTO: 4.04 K/UL

## 2020-09-09 PROCEDURE — 95909 NRV CNDJ TST 5-6 STUDIES: CPT

## 2020-09-09 PROCEDURE — 95886 MUSC TEST DONE W/N TEST COMP: CPT

## 2020-09-18 LAB
MISCELLANEOUS TEST: NORMAL
PROC NAME: NORMAL

## 2020-09-26 ENCOUNTER — OUTPATIENT (OUTPATIENT)
Dept: OUTPATIENT SERVICES | Facility: HOSPITAL | Age: 60
LOS: 1 days | Discharge: HOME | End: 2020-09-26

## 2020-09-26 ENCOUNTER — LABORATORY RESULT (OUTPATIENT)
Age: 60
End: 2020-09-26

## 2020-09-26 DIAGNOSIS — Z98.890 OTHER SPECIFIED POSTPROCEDURAL STATES: Chronic | ICD-10-CM

## 2020-09-26 DIAGNOSIS — Z11.59 ENCOUNTER FOR SCREENING FOR OTHER VIRAL DISEASES: ICD-10-CM

## 2020-09-29 ENCOUNTER — OUTPATIENT (OUTPATIENT)
Dept: OUTPATIENT SERVICES | Facility: HOSPITAL | Age: 60
LOS: 1 days | Discharge: HOME | End: 2020-09-29
Payer: MEDICAID

## 2020-09-29 VITALS
TEMPERATURE: 98 F | WEIGHT: 207.9 LBS | RESPIRATION RATE: 19 BRPM | HEART RATE: 66 BPM | HEIGHT: 64 IN | SYSTOLIC BLOOD PRESSURE: 147 MMHG | DIASTOLIC BLOOD PRESSURE: 86 MMHG

## 2020-09-29 VITALS
SYSTOLIC BLOOD PRESSURE: 124 MMHG | TEMPERATURE: 97 F | RESPIRATION RATE: 19 BRPM | OXYGEN SATURATION: 98 % | HEART RATE: 67 BPM | DIASTOLIC BLOOD PRESSURE: 65 MMHG

## 2020-09-29 DIAGNOSIS — R13.10 DYSPHAGIA, UNSPECIFIED: ICD-10-CM

## 2020-09-29 DIAGNOSIS — Z98.890 OTHER SPECIFIED POSTPROCEDURAL STATES: Chronic | ICD-10-CM

## 2020-09-29 PROCEDURE — 91010 ESOPHAGUS MOTILITY STUDY: CPT | Mod: 26

## 2020-10-07 ENCOUNTER — APPOINTMENT (OUTPATIENT)
Dept: OPHTHALMOLOGY | Facility: CLINIC | Age: 60
End: 2020-10-07

## 2020-10-07 DIAGNOSIS — Z88.2 ALLERGY STATUS TO SULFONAMIDES: ICD-10-CM

## 2020-10-07 DIAGNOSIS — I10 ESSENTIAL (PRIMARY) HYPERTENSION: ICD-10-CM

## 2020-10-07 DIAGNOSIS — Z79.82 LONG TERM (CURRENT) USE OF ASPIRIN: ICD-10-CM

## 2020-10-13 ENCOUNTER — EMERGENCY (EMERGENCY)
Facility: HOSPITAL | Age: 60
LOS: 0 days | Discharge: HOME | End: 2020-10-13
Attending: EMERGENCY MEDICINE | Admitting: EMERGENCY MEDICINE
Payer: MEDICAID

## 2020-10-13 VITALS
SYSTOLIC BLOOD PRESSURE: 138 MMHG | HEIGHT: 64 IN | WEIGHT: 207.01 LBS | TEMPERATURE: 96 F | HEART RATE: 75 BPM | DIASTOLIC BLOOD PRESSURE: 91 MMHG | RESPIRATION RATE: 18 BRPM

## 2020-10-13 DIAGNOSIS — Z98.890 OTHER SPECIFIED POSTPROCEDURAL STATES: ICD-10-CM

## 2020-10-13 DIAGNOSIS — E78.5 HYPERLIPIDEMIA, UNSPECIFIED: ICD-10-CM

## 2020-10-13 DIAGNOSIS — S90.31XA CONTUSION OF RIGHT FOOT, INITIAL ENCOUNTER: ICD-10-CM
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
## 2020-10-13 DIAGNOSIS — Z79.899 OTHER LONG TERM (CURRENT) DRUG THERAPY: ICD-10-CM
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
## 2020-10-13 DIAGNOSIS — S89.90XA UNSPECIFIED INJURY OF UNSPECIFIED LOWER LEG, INITIAL ENCOUNTER: ICD-10-CM

## 2020-10-13 DIAGNOSIS — Z98.890 OTHER SPECIFIED POSTPROCEDURAL STATES: Chronic | ICD-10-CM
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
## 2020-10-13 DIAGNOSIS — Z88.2 ALLERGY STATUS TO SULFONAMIDES: ICD-10-CM

## 2020-10-13 PROCEDURE — 99283 EMERGENCY DEPT VISIT LOW MDM: CPT

## 2020-10-13 PROCEDURE — 73630 X-RAY EXAM OF FOOT: CPT | Mod: 26,LT

## 2020-10-13 NOTE — ED PROVIDER NOTE - PHYSICAL EXAMINATION
Physical Exam    Vital Signs: I have reviewed the initial vital signs.  Constitutional: well-nourished, appears stated age, no acute distress  Musculoskeletal: supple neck, no lower extremity edema, no midline tenderness + right foot tenderness over 1st metarsal. FROM of all toes and ankle. patient gait is stable but painful. good and equal DP pulses.   Integumentary: warm, dry, no rash  Neurologic: awake, alert, cranial nerves II-XII grossly intact, extremities’ motor and sensory functions grossly intact  Psychiatric: appropriate mood, appropriate affect

## 2020-10-13 NOTE — ED PROVIDER NOTE - PATIENT PORTAL LINK FT
You can access the FollowMyHealth Patient Portal offered by [facility] by registering at the following website: [email or web address]. By joining [employer]’s FollowMyHealth portal, you will also be able to view your health information using other applications (apps) compatible with our system.

## 2020-10-13 NOTE — ED ADULT NURSE NOTE - NSIMPLEMENTINTERV_GEN_ALL_ED
Implemented All Fall with Harm Risk Interventions:  [place] to call system. Call bell, personal items and telephone within reach. Instruct patient to call for assistance. Room bathroom lighting operational. Non-slip footwear when patient is off stretcher. Physically safe environment: no spills, clutter or unnecessary equipment. Stretcher in lowest position, wheels locked, appropriate side rails in place. Provide visual cue, wrist band, yellow gown, etc. Monitor gait and stability. Monitor for mental status changes and reorient to person, place, and time. Review medications for side effects contributing to fall risk. Reinforce activity limits and safety measures with patient and family. Provide visual clues: red socks.

## 2020-10-13 NOTE — ED PROVIDER NOTE - CLINICAL SUMMARY MEDICAL DECISION MAKING FREE TEXT BOX
pt  pw  left  foot[ain after fall today -   no other injuries,  exam  mild  swelling and tenderness dorsum  over  1st metatarsal, 2+ dp pulses  FROM  ankle  ,  toes.  xray  no fracture -  darco  shoe applied    outpt  ortho followup

## 2020-10-13 NOTE — ED PROVIDER NOTE - OBJECTIVE STATEMENT
60 year old female comes to emergency room for foot pain status post twisting injury yesterday. pt states she was at home and twitsed her foot took advil and states that the pain has been persistent. patient states seen by PMD who ordered an xray, but xray couldn't be read. patient went home and foot pain was still persistant so patient came to emergency room for ED to read her xray.

## 2020-10-13 NOTE — ED PROVIDER NOTE - PROGRESS NOTE DETAILS
Pt provided CD with x-ray - attempted on multiple computers to load x-rays - unable to open images. D/w Patient consents to re-x ray.

## 2020-10-14 ENCOUNTER — APPOINTMENT (OUTPATIENT)
Dept: OPHTHALMOLOGY | Facility: CLINIC | Age: 60
End: 2020-10-14

## 2020-10-19 ENCOUNTER — RX RENEWAL (OUTPATIENT)
Age: 60
End: 2020-10-19

## 2020-10-21 ENCOUNTER — OUTPATIENT (OUTPATIENT)
Dept: OUTPATIENT SERVICES | Facility: HOSPITAL | Age: 60
LOS: 1 days | Discharge: HOME | End: 2020-10-21
Payer: MEDICAID

## 2020-10-21 ENCOUNTER — APPOINTMENT (OUTPATIENT)
Dept: OPHTHALMOLOGY | Facility: CLINIC | Age: 60
End: 2020-10-21

## 2020-10-21 DIAGNOSIS — Z98.890 OTHER SPECIFIED POSTPROCEDURAL STATES: Chronic | ICD-10-CM

## 2020-10-21 PROCEDURE — 92014 COMPRE OPH EXAM EST PT 1/>: CPT

## 2020-10-21 PROCEDURE — 92250 FUNDUS PHOTOGRAPHY W/I&R: CPT | Mod: 26

## 2020-10-22 DIAGNOSIS — H04.129 DRY EYE SYNDROME OF UNSPECIFIED LACRIMAL GLAND: ICD-10-CM

## 2020-10-22 DIAGNOSIS — H02.429: ICD-10-CM

## 2020-10-22 DIAGNOSIS — H35.039 HYPERTENSIVE RETINOPATHY, UNSPECIFIED EYE: ICD-10-CM

## 2020-10-22 DIAGNOSIS — H25.041 POSTERIOR SUBCAPSULAR POLAR AGE-RELATED CATARACT, RIGHT EYE: ICD-10-CM

## 2020-10-22 DIAGNOSIS — H53.022 REFRACTIVE AMBLYOPIA, LEFT EYE: ICD-10-CM

## 2020-10-26 NOTE — SWALLOW BEDSIDE ASSESSMENT ADULT - SWALLOW EVAL: DIAGNOSIS
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 recent shift in dentition and reduced toleration of CPAP; hold therapy pending motility work up Attending Attestation (For Attendings USE Only)...

## 2020-11-05 ENCOUNTER — APPOINTMENT (OUTPATIENT)
Dept: NEUROLOGY | Facility: CLINIC | Age: 60
End: 2020-11-05
Payer: MEDICAID

## 2020-11-05 VITALS
OXYGEN SATURATION: 98 % | TEMPERATURE: 97.2 F | DIASTOLIC BLOOD PRESSURE: 82 MMHG | WEIGHT: 208 LBS | BODY MASS INDEX: 35.51 KG/M2 | HEIGHT: 64 IN | HEART RATE: 62 BPM | SYSTOLIC BLOOD PRESSURE: 151 MMHG

## 2020-11-05 DIAGNOSIS — E03.9 HYPOTHYROIDISM, UNSPECIFIED: ICD-10-CM

## 2020-11-05 PROCEDURE — 99215 OFFICE O/P EST HI 40 MIN: CPT

## 2020-11-05 PROCEDURE — 99072 ADDL SUPL MATRL&STAF TM PHE: CPT

## 2020-11-05 NOTE — HISTORY OF PRESENT ILLNESS
[FreeTextEntry1] : Pt presents for f/u of single fiber positive, antibody negative (including Acetyl Chloline binding, blocking, modulating, MUSK and LRP-4) CT chest negative generalized myasthenia gravis.  She has decreased prednisone to 10 mg/day from 20 mg/day due to moon face (had trouble seeing out of eyes because eyelids were closing), puffiness in face went down but overall weight is still up 208 lbs (when first got sick a year ago was about 150-160 lbs).  Remains on azathioprine 100 mg bid since February 2020.  Pyridostigmine remains at 90 mg four times a day.  She is getting IVIG every 3 weeks 1 gram/kg (90 grams).  States IVIG seems to help, it is carrying her the full 3 weeks, prior to that getting it once a month and the last week was petering out.\par \par States she feels swallowing is still an issue, received speech/swallow eval.  She cannot sip liquid without a straw.  Since sick feels abnormal in mouth constant numbness and tingling in mouth, feels oral motor is affected.  Feels extra sensitive to touch over upper and lower lips ever since beginning of symptoms.  She also noted tingling and numbness in hands and feet but it improved with all the meds and IVIG.  States used to wake up with sleepiness at night.\par \par States a while back was diagnosed with lyme disease a couple of weeks before she got sick with the myasthenia symptoms.  She went to infectious disease specialist on [place], Dr. Jhon Aaron and treated with oral antibiotics (she thinks a cocktail of abx two different ones for 1 month). When in hospital at [facility] after this she had blood test negative for lyme and CSF studies also negative for lyme she states and they told her she did not have lyme.   was admitted in October 2019 for 10 days a few weeks after completing a month of oral antibiotics.\par \par States she had single fiber testing a few months into this at [facility].  Saw ophthalmologist on [place] October 8, 2019 who said she had myasthenia too and she states ophthalmologist almost called an ambulance for her.  Then was admitted at [facility] a week later.  Then at [facility] was given med for myasthenia gravis (pyridostigmine).  When had single fiber testing she had the pyridostigmine that morning but they had her wait a little longer then did the test and it was still positive.  December 2019 had child virus (forgets name) and went to hospital.\par \par The episodes her her becoming weaker are becoming less.  She was hospitalized months ago for myasthenic crisis and got plasma exchange June 2020.\par \par Gets short of breath talking occassionally.\par States there are days when breathing and swallowing are worse.  Gets worse at end of day, more tired, doesn't like to sleep on her bed any more.  Sleeps in recliner chair.  Sees pulmonologist in a couple of weeks.  Not only due to breathing but gets hot spells.  Periods went away then they have come back.\par \par

## 2020-11-05 NOTE — PHYSICAL EXAM
[FreeTextEntry1] : strength 5/5 deltoids, 5/5 bic/tric/wr extension, hand intrinsics\par HF 4-/5, KE/KF, DF/PF 5/5\par Normal LT, PP and vibration in upper and lower extremities.\par able to maintain upgaze for 2 minutes without eyelid droop or misalignment of eyes.\par EOM's full\par 50 hand squeeze reps.  no proximal weakness in UE's following this repetitive test.

## 2020-11-05 NOTE — ASSESSMENT
[FreeTextEntry1] : Myasthenia gravis, some improvement on the more frequent IVIG regimen.  Will continue for now.\par She had previous diagnosis of lyme and treatment for that and subsequent testing including CSF studies negative.\par Will recheck given her ongoing c/o's of lip sensitivity (still present) and her description of previous hand and foot numbness (not detectable currently).\par Will also check MRI chest w/wo contrast for thymoma though reportedly CT chest a year ago negative for this.\par She will remain on 10 mg/day prednisone for now.  Recheck CBC, CMP.  She will return in 2 months.  \par She will f/u with pulmonary medicine.\par

## 2020-11-26 ENCOUNTER — INPATIENT (INPATIENT)
Facility: HOSPITAL | Age: 60
LOS: 2 days | Discharge: HOME | End: 2020-11-29
Attending: INTERNAL MEDICINE | Admitting: INTERNAL MEDICINE
Payer: MEDICAID

## 2020-11-26 VITALS
DIASTOLIC BLOOD PRESSURE: 52 MMHG | TEMPERATURE: 99 F | HEIGHT: 64 IN | HEART RATE: 73 BPM | RESPIRATION RATE: 20 BRPM | OXYGEN SATURATION: 95 % | SYSTOLIC BLOOD PRESSURE: 95 MMHG

## 2020-11-26 DIAGNOSIS — J12.89 OTHER VIRAL PNEUMONIA: ICD-10-CM

## 2020-11-26 DIAGNOSIS — Z98.890 OTHER SPECIFIED POSTPROCEDURAL STATES: Chronic | ICD-10-CM

## 2020-11-26 LAB
ALBUMIN SERPL ELPH-MCNC: 3.8 G/DL — SIGNIFICANT CHANGE UP (ref 3.5–5.2)
ALP SERPL-CCNC: 73 U/L — SIGNIFICANT CHANGE UP (ref 30–115)
ALT FLD-CCNC: 32 U/L — SIGNIFICANT CHANGE UP (ref 0–41)
ANION GAP SERPL CALC-SCNC: 10 MMOL/L — SIGNIFICANT CHANGE UP (ref 7–14)
AST SERPL-CCNC: 36 U/L — SIGNIFICANT CHANGE UP (ref 0–41)
BASOPHILS # BLD AUTO: 0 K/UL — SIGNIFICANT CHANGE UP (ref 0–0.2)
BASOPHILS NFR BLD AUTO: 0 % — SIGNIFICANT CHANGE UP (ref 0–1)
BILIRUB SERPL-MCNC: 0.5 MG/DL — SIGNIFICANT CHANGE UP (ref 0.2–1.2)
BUN SERPL-MCNC: 21 MG/DL — HIGH (ref 10–20)
CALCIUM SERPL-MCNC: 9.3 MG/DL — SIGNIFICANT CHANGE UP (ref 8.5–10.1)
CHLORIDE SERPL-SCNC: 98 MMOL/L — SIGNIFICANT CHANGE UP (ref 98–110)
CO2 SERPL-SCNC: 27 MMOL/L — SIGNIFICANT CHANGE UP (ref 17–32)
CREAT SERPL-MCNC: 1.2 MG/DL — SIGNIFICANT CHANGE UP (ref 0.7–1.5)
EOSINOPHIL NFR BLD AUTO: 0 % — SIGNIFICANT CHANGE UP (ref 0–8)
GLUCOSE SERPL-MCNC: 104 MG/DL — HIGH (ref 70–99)
HCT VFR BLD CALC: 42.5 % — SIGNIFICANT CHANGE UP (ref 37–47)
HGB BLD-MCNC: 14 G/DL — SIGNIFICANT CHANGE UP (ref 12–16)
LACTATE SERPL-SCNC: 1.3 MMOL/L — SIGNIFICANT CHANGE UP (ref 0.7–2)
LIDOCAIN IGE QN: 63 U/L — HIGH (ref 7–60)
LYMPHOCYTES # BLD AUTO: 0.86 K/UL — LOW (ref 1.2–3.4)
LYMPHOCYTES # BLD AUTO: 28 % — SIGNIFICANT CHANGE UP (ref 20.5–51.1)
MACROCYTES BLD QL: SLIGHT — SIGNIFICANT CHANGE UP
MAGNESIUM SERPL-MCNC: 2 MG/DL — SIGNIFICANT CHANGE UP (ref 1.8–2.4)
MANUAL SMEAR VERIFICATION: SIGNIFICANT CHANGE UP
MCHC RBC-ENTMCNC: 30.2 PG — SIGNIFICANT CHANGE UP (ref 27–31)
MCHC RBC-ENTMCNC: 32.9 G/DL — SIGNIFICANT CHANGE UP (ref 32–37)
MCV RBC AUTO: 91.8 FL — SIGNIFICANT CHANGE UP (ref 81–99)
MICROCYTES BLD QL: SLIGHT — SIGNIFICANT CHANGE UP
MONOCYTES # BLD AUTO: 0.34 K/UL — SIGNIFICANT CHANGE UP (ref 0.1–0.6)
MONOCYTES NFR BLD AUTO: 11 % — HIGH (ref 1.7–9.3)
NEUTROPHILS # BLD AUTO: 1.87 K/UL — SIGNIFICANT CHANGE UP (ref 1.4–6.5)
NEUTROPHILS NFR BLD AUTO: 61 % — SIGNIFICANT CHANGE UP (ref 42.2–75.2)
NRBC # BLD: 0 /100 — SIGNIFICANT CHANGE UP (ref 0–0)
NRBC # BLD: SIGNIFICANT CHANGE UP /100 WBCS (ref 0–0)
PLAT MORPH BLD: NORMAL — SIGNIFICANT CHANGE UP
PLATELET # BLD AUTO: 260 K/UL — SIGNIFICANT CHANGE UP (ref 130–400)
POTASSIUM SERPL-MCNC: 4.2 MMOL/L — SIGNIFICANT CHANGE UP (ref 3.5–5)
POTASSIUM SERPL-SCNC: 4.2 MMOL/L — SIGNIFICANT CHANGE UP (ref 3.5–5)
PROT SERPL-MCNC: 7.3 G/DL — SIGNIFICANT CHANGE UP (ref 6–8)
RBC # BLD: 4.63 M/UL — SIGNIFICANT CHANGE UP (ref 4.2–5.4)
RBC # FLD: 15.1 % — HIGH (ref 11.5–14.5)
RBC BLD AUTO: ABNORMAL
SODIUM SERPL-SCNC: 135 MMOL/L — SIGNIFICANT CHANGE UP (ref 135–146)
WBC # BLD: 3.06 K/UL — LOW (ref 4.8–10.8)
WBC # FLD AUTO: 3.06 K/UL — LOW (ref 4.8–10.8)

## 2020-11-26 PROCEDURE — 99285 EMERGENCY DEPT VISIT HI MDM: CPT

## 2020-11-26 PROCEDURE — 99497 ADVNCD CARE PLAN 30 MIN: CPT | Mod: 25

## 2020-11-26 PROCEDURE — 71045 X-RAY EXAM CHEST 1 VIEW: CPT | Mod: 26

## 2020-11-26 PROCEDURE — 99223 1ST HOSP IP/OBS HIGH 75: CPT

## 2020-11-26 RX ORDER — FLUTICASONE PROPIONATE 50 MCG
1 SPRAY, SUSPENSION NASAL
Refills: 0 | Status: DISCONTINUED | OUTPATIENT
Start: 2020-11-26 | End: 2020-11-29

## 2020-11-26 RX ORDER — PYRIDOSTIGMINE BROMIDE 60 MG/5ML
60 SOLUTION ORAL
Refills: 0 | Status: DISCONTINUED | OUTPATIENT
Start: 2020-11-26 | End: 2020-11-27

## 2020-11-26 RX ORDER — ONDANSETRON 8 MG/1
4 TABLET, FILM COATED ORAL ONCE
Refills: 0 | Status: COMPLETED | OUTPATIENT
Start: 2020-11-26 | End: 2020-11-26

## 2020-11-26 RX ORDER — ATORVASTATIN CALCIUM 80 MG/1
20 TABLET, FILM COATED ORAL AT BEDTIME
Refills: 0 | Status: DISCONTINUED | OUTPATIENT
Start: 2020-11-26 | End: 2020-11-29

## 2020-11-26 RX ORDER — BUDESONIDE AND FORMOTEROL FUMARATE DIHYDRATE 160; 4.5 UG/1; UG/1
2 AEROSOL RESPIRATORY (INHALATION)
Refills: 0 | Status: DISCONTINUED | OUTPATIENT
Start: 2020-11-26 | End: 2020-11-29

## 2020-11-26 RX ORDER — ALBUTEROL 90 UG/1
2 AEROSOL, METERED ORAL EVERY 6 HOURS
Refills: 0 | Status: DISCONTINUED | OUTPATIENT
Start: 2020-11-26 | End: 2020-11-29

## 2020-11-26 RX ORDER — ASPIRIN/CALCIUM CARB/MAGNESIUM 324 MG
81 TABLET ORAL DAILY
Refills: 0 | Status: DISCONTINUED | OUTPATIENT
Start: 2020-11-26 | End: 2020-11-29

## 2020-11-26 RX ORDER — SODIUM CHLORIDE 9 MG/ML
1000 INJECTION, SOLUTION INTRAVENOUS ONCE
Refills: 0 | Status: COMPLETED | OUTPATIENT
Start: 2020-11-26 | End: 2020-11-26

## 2020-11-26 RX ORDER — DEXAMETHASONE 0.5 MG/5ML
6 ELIXIR ORAL ONCE
Refills: 0 | Status: COMPLETED | OUTPATIENT
Start: 2020-11-26 | End: 2020-11-26

## 2020-11-26 RX ORDER — AZATHIOPRINE 100 MG/1
50 TABLET ORAL
Refills: 0 | Status: DISCONTINUED | OUTPATIENT
Start: 2020-11-26 | End: 2020-11-27

## 2020-11-26 RX ORDER — FAMOTIDINE 10 MG/ML
40 INJECTION INTRAVENOUS AT BEDTIME
Refills: 0 | Status: DISCONTINUED | OUTPATIENT
Start: 2020-11-26 | End: 2020-11-29

## 2020-11-26 RX ORDER — LEVOTHYROXINE SODIUM 125 MCG
50 TABLET ORAL DAILY
Refills: 0 | Status: DISCONTINUED | OUTPATIENT
Start: 2020-11-26 | End: 2020-11-29

## 2020-11-26 RX ORDER — METOPROLOL TARTRATE 50 MG
25 TABLET ORAL DAILY
Refills: 0 | Status: DISCONTINUED | OUTPATIENT
Start: 2020-11-26 | End: 2020-11-29

## 2020-11-26 RX ORDER — ONDANSETRON 8 MG/1
4 TABLET, FILM COATED ORAL EVERY 6 HOURS
Refills: 0 | Status: DISCONTINUED | OUTPATIENT
Start: 2020-11-26 | End: 2020-11-27

## 2020-11-26 RX ORDER — CHLORHEXIDINE GLUCONATE 213 G/1000ML
1 SOLUTION TOPICAL DAILY
Refills: 0 | Status: DISCONTINUED | OUTPATIENT
Start: 2020-11-26 | End: 2020-11-29

## 2020-11-26 RX ORDER — HEPARIN SODIUM 5000 [USP'U]/ML
5000 INJECTION INTRAVENOUS; SUBCUTANEOUS EVERY 8 HOURS
Refills: 0 | Status: DISCONTINUED | OUTPATIENT
Start: 2020-11-26 | End: 2020-11-29

## 2020-11-26 RX ADMIN — FAMOTIDINE 40 MILLIGRAM(S): 10 INJECTION INTRAVENOUS at 22:01

## 2020-11-26 RX ADMIN — PYRIDOSTIGMINE BROMIDE 60 MILLIGRAM(S): 60 SOLUTION ORAL at 22:00

## 2020-11-26 RX ADMIN — BUDESONIDE AND FORMOTEROL FUMARATE DIHYDRATE 2 PUFF(S): 160; 4.5 AEROSOL RESPIRATORY (INHALATION) at 22:01

## 2020-11-26 RX ADMIN — SODIUM CHLORIDE 1000 MILLILITER(S): 9 INJECTION, SOLUTION INTRAVENOUS at 13:00

## 2020-11-26 RX ADMIN — AZATHIOPRINE 50 MILLIGRAM(S): 100 TABLET ORAL at 22:01

## 2020-11-26 RX ADMIN — SODIUM CHLORIDE 1000 MILLILITER(S): 9 INJECTION, SOLUTION INTRAVENOUS at 13:37

## 2020-11-26 RX ADMIN — Medication 6 MILLIGRAM(S): at 16:00

## 2020-11-26 RX ADMIN — ONDANSETRON 4 MILLIGRAM(S): 8 TABLET, FILM COATED ORAL at 15:10

## 2020-11-26 RX ADMIN — Medication 1 SPRAY(S): at 22:01

## 2020-11-26 RX ADMIN — ATORVASTATIN CALCIUM 20 MILLIGRAM(S): 80 TABLET, FILM COATED ORAL at 22:04

## 2020-11-26 RX ADMIN — HEPARIN SODIUM 5000 UNIT(S): 5000 INJECTION INTRAVENOUS; SUBCUTANEOUS at 22:04

## 2020-11-26 RX ADMIN — SODIUM CHLORIDE 1000 MILLILITER(S): 9 INJECTION, SOLUTION INTRAVENOUS at 15:16

## 2020-11-26 RX ADMIN — ONDANSETRON 4 MILLIGRAM(S): 8 TABLET, FILM COATED ORAL at 13:00

## 2020-11-26 NOTE — ED PROVIDER NOTE - CARE PLAN
Principal Discharge DX:	COVID-19  Secondary Diagnosis:	Weakness  Secondary Diagnosis:	Myasthenia gravis  Secondary Diagnosis:	Shortness of breath

## 2020-11-26 NOTE — ED PROVIDER NOTE - OBJECTIVE STATEMENT
Patient c/o fatigue and weakness, fever, chills, FISHER< H/o covid + 6 days ago, C/o N/V, no appetite , Missed her IV IG for myasthenia gravis this week due to covid infection. No chest pain, no abdominal pain,

## 2020-11-26 NOTE — H&P ADULT - NSHPREVIEWOFSYSTEMS_GEN_ALL_CORE
REVIEW OF SYSTEMS:   · Constitutional (+) fever, chills  · EYES: no discharge, no irritation, no pain, no redness, and no visual changes.  · ENMT: Ears: no ear pain and no hearing problems. Nose: no nasal congestion and no nasal drainage. Mouth/Throat: no dysphagia, no hoarseness and no throat pain. Neck: no lumps, no pain, no stiffness and no swollen glands.  · CARDIOVASCULAR: no chest pain and no edema.  · Respiratory [+]: EXERTIONAL DYSPNEA, SHORTNESS OF BREATH  · Gastrointestinal [+]: NAUSEA, no abdominal pain  · GENITOURINARY: no dysuria, no frequency, and no hematuria.  · Musculoskeletal [+]: JOINT PAIN  · SKIN: no abrasions, no jaundice, no lesions, no pruritis, and no rashes.  · NEURO: no loss of consciousness, no gait abnormality, no headache, no sensory deficits, and no weakness.  · ENDOCRINE: no diabetes and no thyroid trouble.

## 2020-11-26 NOTE — H&P ADULT - HISTORY OF PRESENT ILLNESS
60y old female with pmhx of High blood cholesterol, Myasthenia gravis, Pancreatitis, postural orthostatic tachycardia syndrome, presents to the ED complaining of progressive and worsening generalised bodyache, headache, fatigue, generalised weakness, fever, chills since testing positive for covid-19 since last week wednesday. pt states stayed home hoping to get better but now with shortness of breath worse on exertion. pt also admits to decrease appetite, n/v, and diarrhea since 2days ago, but denies chest pain/abdominal pain. pt missed her treatment of IV IG for myasthenia gravis this week due to covid infection.

## 2020-11-26 NOTE — ED ADULT NURSE NOTE - NSIMPLEMENTINTERV_GEN_ALL_ED
Implemented All Fall with Harm Risk Interventions:  [place] to call system. Call bell, personal items and telephone within reach. Instruct patient to call for assistance. Room bathroom lighting operational. Non-slip footwear when patient is off stretcher. Physically safe environment: no spills, clutter or unnecessary equipment. Stretcher in lowest position, wheels locked, appropriate side rails in place. Provide visual cue, wrist band, yellow gown, etc. Monitor gait and stability. Monitor for mental status changes and reorient to person, place, and time. Review medications for side effects contributing to fall risk. Reinforce activity limits and safety measures with patient and family. Provide visual clues: red socks.

## 2020-11-26 NOTE — ED PROVIDER NOTE - PROGRESS NOTE DETAILS
after fluids, BP improved, Patient states she doesn't feel any  better, + nausea, no appetite, extreme fatigue. Dr Sotelo accepts patient , will be admitted to [facility]

## 2020-11-26 NOTE — H&P ADULT - NSHPPHYSICALEXAM_GEN_ALL_CORE
PHYSICAL EXAM:   · CONSTITUTIONAL: axox3, comfortable, NAD  · ENMT: Airway patent, Nasal mucosa clear. Mouth with normal mucosa.   · EYES: Clear bilaterally, pupils equal, round and reactive to light.  · CARDIAC: regular rate and rhythm.  Heart sounds S1, S2.    · RESPIRATORY: Breath sounds clear and equal bilaterally.  · GASTROINTESTINAL: Abdomen soft, non-tender, non distended, no guarding.  · MUSCULOSKELETAL: Spine appears normal, range of motion is not limited, no muscle or joint tenderness  · NEUROLOGICAL: Alert and oriented, no focal deficits, no motor or sensory deficits.  · SKIN: Skin normal color for race, warm, dry and intact. No evidence of rash.

## 2020-11-26 NOTE — GOALS OF CARE CONVERSATION - ADVANCED CARE PLANNING - CONVERSATION DETAILS
D/w pt and  at bedside re: resusictation status. She has not given thought to her resusictation status, will need time to discuss further with . Will remain full code at this time.

## 2020-11-26 NOTE — ED ADULT TRIAGE NOTE - CHIEF COMPLAINT QUOTE
Sent from [facility] for wound to right leg pt covid positive states she is feeling weak

## 2020-11-26 NOTE — ED PROVIDER NOTE - CLINICAL SUMMARY MEDICAL DECISION MAKING FREE TEXT BOX
patient continues to vomit with increasing weakness considering her past medical history of mg as well as the fact that she gets ivig and her persistent symptoms I will admit and continue to monitor at this time

## 2020-11-26 NOTE — ED PROVIDER NOTE - ATTENDING CONTRIBUTION TO CARE
I was present for and supervised the key and critical aspects of the procedures performed during the care of the patient.  Patient fever and chills with generlized weakness with vomiting she denies any headache, visual changes or focal deficits   On physical exam the patient has generalized weakness but no focal deficits at this time she is able to move all extremities with no loss of sensation, she is nc/at perrla eomi orohparynx clear with wheezing noted abd-soft nt ndbs+ ext from no edema noted   A/p- we obtained labs as well as cxr.  I will continue to monitor at this time

## 2020-11-26 NOTE — H&P ADULT - ASSESSMENT
Principal Diagnosis: COVID-19.                                 Weakness.                                 Shortness of breath.    Assesment/plan:  admit to med-surg  labs/ecg/c-xray  IV steroids  droplet precautions  oxygen  pain mgmt  pulmonary consult  respitory tx      Secondary Diagnosis: Myasthenia gravis                                  High cholesterol.   Assesment/plan:  continue previous meds  GI/DVT prophylaxis  monitor vss  monitor pt

## 2020-11-26 NOTE — H&P ADULT - ATTENDING COMMENTS
#Covid pneumonia  s/p decadron 6 in ed  hold on stress dose steroid given no hypoxia, cont prednisone 10  need to d/w neuro need to hold azathiprine  f/u bcx  check ferritin, crp, d-dimer, ldh, trop, ck

## 2020-11-27 LAB
D DIMER BLD IA.RAPID-MCNC: 261 NG/ML DDU — HIGH (ref 0–230)
GLUCOSE BLDC GLUCOMTR-MCNC: 109 MG/DL — HIGH (ref 70–99)
SARS-COV-2 IGG SERPL QL IA: NEGATIVE — SIGNIFICANT CHANGE UP
SARS-COV-2 IGM SERPL IA-ACNC: <0.1 INDEX — SIGNIFICANT CHANGE UP

## 2020-11-27 PROCEDURE — 99233 SBSQ HOSP IP/OBS HIGH 50: CPT

## 2020-11-27 PROCEDURE — 99222 1ST HOSP IP/OBS MODERATE 55: CPT

## 2020-11-27 RX ORDER — IMMUNE GLOBULIN (HUMAN) 10 G/100ML
70 INJECTION INTRAVENOUS; SUBCUTANEOUS ONCE
Refills: 0 | Status: COMPLETED | OUTPATIENT
Start: 2020-11-27 | End: 2020-11-28

## 2020-11-27 RX ORDER — AZATHIOPRINE 100 MG/1
100 TABLET ORAL
Refills: 0 | Status: DISCONTINUED | OUTPATIENT
Start: 2020-11-27 | End: 2020-11-29

## 2020-11-27 RX ORDER — PYRIDOSTIGMINE BROMIDE 60 MG/5ML
90 SOLUTION ORAL
Refills: 0 | Status: DISCONTINUED | OUTPATIENT
Start: 2020-11-27 | End: 2020-11-29

## 2020-11-27 RX ORDER — ONDANSETRON 8 MG/1
4 TABLET, FILM COATED ORAL ONCE
Refills: 0 | Status: COMPLETED | OUTPATIENT
Start: 2020-11-27 | End: 2020-11-27

## 2020-11-27 RX ADMIN — Medication 1 SPRAY(S): at 17:17

## 2020-11-27 RX ADMIN — HEPARIN SODIUM 5000 UNIT(S): 5000 INJECTION INTRAVENOUS; SUBCUTANEOUS at 21:16

## 2020-11-27 RX ADMIN — AZATHIOPRINE 50 MILLIGRAM(S): 100 TABLET ORAL at 17:17

## 2020-11-27 RX ADMIN — Medication 25 MILLIGRAM(S): at 05:18

## 2020-11-27 RX ADMIN — BUDESONIDE AND FORMOTEROL FUMARATE DIHYDRATE 2 PUFF(S): 160; 4.5 AEROSOL RESPIRATORY (INHALATION) at 11:17

## 2020-11-27 RX ADMIN — HEPARIN SODIUM 5000 UNIT(S): 5000 INJECTION INTRAVENOUS; SUBCUTANEOUS at 05:19

## 2020-11-27 RX ADMIN — AZATHIOPRINE 100 MILLIGRAM(S): 100 TABLET ORAL at 19:30

## 2020-11-27 RX ADMIN — BUDESONIDE AND FORMOTEROL FUMARATE DIHYDRATE 2 PUFF(S): 160; 4.5 AEROSOL RESPIRATORY (INHALATION) at 21:13

## 2020-11-27 RX ADMIN — HEPARIN SODIUM 5000 UNIT(S): 5000 INJECTION INTRAVENOUS; SUBCUTANEOUS at 14:13

## 2020-11-27 RX ADMIN — Medication 81 MILLIGRAM(S): at 11:23

## 2020-11-27 RX ADMIN — PYRIDOSTIGMINE BROMIDE 60 MILLIGRAM(S): 60 SOLUTION ORAL at 11:18

## 2020-11-27 RX ADMIN — CHLORHEXIDINE GLUCONATE 1 APPLICATION(S): 213 SOLUTION TOPICAL at 11:20

## 2020-11-27 RX ADMIN — PYRIDOSTIGMINE BROMIDE 60 MILLIGRAM(S): 60 SOLUTION ORAL at 17:17

## 2020-11-27 RX ADMIN — Medication 50 MICROGRAM(S): at 05:19

## 2020-11-27 RX ADMIN — PYRIDOSTIGMINE BROMIDE 60 MILLIGRAM(S): 60 SOLUTION ORAL at 05:18

## 2020-11-27 RX ADMIN — ONDANSETRON 4 MILLIGRAM(S): 8 TABLET, FILM COATED ORAL at 17:54

## 2020-11-27 RX ADMIN — ONDANSETRON 4 MILLIGRAM(S): 8 TABLET, FILM COATED ORAL at 17:17

## 2020-11-27 RX ADMIN — FAMOTIDINE 40 MILLIGRAM(S): 10 INJECTION INTRAVENOUS at 21:15

## 2020-11-27 RX ADMIN — ATORVASTATIN CALCIUM 20 MILLIGRAM(S): 80 TABLET, FILM COATED ORAL at 21:16

## 2020-11-27 RX ADMIN — Medication 10 MILLIGRAM(S): at 05:18

## 2020-11-27 RX ADMIN — Medication 1 SPRAY(S): at 05:24

## 2020-11-27 RX ADMIN — PYRIDOSTIGMINE BROMIDE 90 MILLIGRAM(S): 60 SOLUTION ORAL at 19:29

## 2020-11-27 RX ADMIN — AZATHIOPRINE 50 MILLIGRAM(S): 100 TABLET ORAL at 05:18

## 2020-11-27 NOTE — PROGRESS NOTE ADULT - SUBJECTIVE AND OBJECTIVE BOX
Hospital Day:  1d    Chief Complaint: Patient is a 60y old  Female who presents with a chief complaint of covid-19 (26 Nov 2020 15:59)    24 hour events: No acute overnight events.     Past Medical Hx:   Myasthenia gravis    High blood cholesterol    Pancreatitis    POTS (postural orthostatic tachycardia syndrome)      Past Sx:  History of foot surgery      Allergies:  sulfa drugs (Unknown)    Current Meds:   Standing Meds:  aspirin  chewable 81 milliGRAM(s) Oral daily  atorvastatin 20 milliGRAM(s) Oral at bedtime  azaTHIOprine 50 milliGRAM(s) Oral two times a day  budesonide  80 MICROgram(s)/formoterol 4.5 MICROgram(s) Inhaler 2 Puff(s) Inhalation two times a day  chlorhexidine 4% Liquid 1 Application(s) Topical daily  famotidine    Tablet 40 milliGRAM(s) Oral at bedtime  fluticasone propionate 50 MICROgram(s)/spray Nasal Spray 1 Spray(s) Both Nostrils two times a day  heparin   Injectable 5000 Unit(s) SubCutaneous every 8 hours  levothyroxine 50 MICROGram(s) Oral daily  metoprolol succinate ER 25 milliGRAM(s) Oral daily  predniSONE   Tablet 10 milliGRAM(s) Oral daily  pyridostigmine 60 milliGRAM(s) Oral four times a day    PRN Meds:  ALBUTerol    90 MICROgram(s) HFA Inhaler 2 Puff(s) Inhalation every 6 hours PRN Shortness of Breath and/or Wheezing  ondansetron   Disintegrating Tablet 4 milliGRAM(s) Oral every 6 hours PRN Nausea and/or Vomiting    HOME MEDICATIONS:  albuterol 90 mcg/inh inhalation powder: 2 puff(s) inhaled every 6 hours, As Needed  aspirin 81 mg oral tablet, chewable: 1 tab(s) orally once a day  atorvastatin 20 mg oral tablet: 1 tab(s) orally once a day  doxepin 10 mg oral capsule:   fluticasone 50 mcg/inh nasal spray: 1 spray(s) nasal once a day  Imuran 50 mg oral tablet: 1 tab(s) orally 3 times a day  Mestinon 60 mg oral tablet: 1.5 tab(s) orally 4 times a day  Pepcid 40 mg oral tablet: 1 tab(s) orally once a day (at bedtime)  Protonix 40 mg oral delayed release tablet: 1 tab(s) orally once a day  Symbicort: inhaled 2 times a day  Toprol-XL 25 mg oral tablet, extended release: 1 tab(s) orally once a day  Vitamin D2: every friday      Vital Signs:   T(F): 95.7 (11-27-20 @ 02:40), Max: 99 (11-26-20 @ 12:28)  HR: 55 (11-27-20 @ 02:40) (52 - 78)  BP: 127/70 (11-27-20 @ 02:40) (95/52 - 128/57)  RR: 17 (11-27-20 @ 02:40) (17 - 20)  SpO2: 99% (11-27-20 @ 02:40) (94% - 100%)      11-26-20 @ 07:01  -  11-27-20 @ 07:00  --------------------------------------------------------  IN: 0 mL / OUT: 850 mL / NET: -850 mL    Physical Exam:   GENERAL: NAD  HEENT: NCAT  CHEST/LUNG: CTAB  HEART: Regular rate and rhythm; s1 s2 appreciated, No murmurs, rubs, or gallops  ABDOMEN: Soft, Nontender, Nondistended; Bowel sounds present  EXTREMITIES: No LE edema b/l  SKIN: no rashes, no new lesions  NERVOUS SYSTEM:  Alert & Oriented X3  LINES/CATHETERS:      Labs:                         14.0   3.06  )-----------( 260      ( 26 Nov 2020 13:08 )             42.5     Neutophil% 61.0, Lymphocyte% 28.0, Monocyte% 11.0, Bands% -- 11-26-20 @ 13:08    26 Nov 2020 13:08    135    |  98     |  21     ----------------------------<  104    4.2     |  27     |  1.2      Ca    9.3        26 Nov 2020 13:08  Mg     2.0       26 Nov 2020 13:08    TPro  7.3    /  Alb  3.8    /  TBili  0.5    /  DBili  x      /  AST  36     /  ALT  32     /  AlkPhos  73     26 Nov 2020 13:08    Amylase --, Lipase 63, 11-26-20 @ 13:08      Radiology:    Hospital Day:  1d    Chief Complaint: Patient is a 60y old  Female who presents with a chief complaint of covid-19 (26 Nov 2020 15:59)    24 hour events: No acute overnight events. Patient seen resting comfortably in bed this AM, she does not endorse any new complaints.    Past Medical Hx:   Myasthenia gravis    High blood cholesterol    Pancreatitis    POTS (postural orthostatic tachycardia syndrome)      Past Sx:  History of foot surgery      Allergies:  sulfa drugs (Unknown)    Current Meds:   Standing Meds:  aspirin  chewable 81 milliGRAM(s) Oral daily  atorvastatin 20 milliGRAM(s) Oral at bedtime  azaTHIOprine 50 milliGRAM(s) Oral two times a day  budesonide  80 MICROgram(s)/formoterol 4.5 MICROgram(s) Inhaler 2 Puff(s) Inhalation two times a day  chlorhexidine 4% Liquid 1 Application(s) Topical daily  famotidine    Tablet 40 milliGRAM(s) Oral at bedtime  fluticasone propionate 50 MICROgram(s)/spray Nasal Spray 1 Spray(s) Both Nostrils two times a day  heparin   Injectable 5000 Unit(s) SubCutaneous every 8 hours  levothyroxine 50 MICROGram(s) Oral daily  metoprolol succinate ER 25 milliGRAM(s) Oral daily  predniSONE   Tablet 10 milliGRAM(s) Oral daily  pyridostigmine 60 milliGRAM(s) Oral four times a day    PRN Meds:  ALBUTerol    90 MICROgram(s) HFA Inhaler 2 Puff(s) Inhalation every 6 hours PRN Shortness of Breath and/or Wheezing  ondansetron   Disintegrating Tablet 4 milliGRAM(s) Oral every 6 hours PRN Nausea and/or Vomiting    HOME MEDICATIONS:  albuterol 90 mcg/inh inhalation powder: 2 puff(s) inhaled every 6 hours, As Needed  aspirin 81 mg oral tablet, chewable: 1 tab(s) orally once a day  atorvastatin 20 mg oral tablet: 1 tab(s) orally once a day  doxepin 10 mg oral capsule:   fluticasone 50 mcg/inh nasal spray: 1 spray(s) nasal once a day  Imuran 50 mg oral tablet: 1 tab(s) orally 3 times a day  Mestinon 60 mg oral tablet: 1.5 tab(s) orally 4 times a day  Pepcid 40 mg oral tablet: 1 tab(s) orally once a day (at bedtime)  Protonix 40 mg oral delayed release tablet: 1 tab(s) orally once a day  Symbicort: inhaled 2 times a day  Toprol-XL 25 mg oral tablet, extended release: 1 tab(s) orally once a day  Vitamin D2: every friday      Vital Signs:   T(F): 95.7 (11-27-20 @ 02:40), Max: 99 (11-26-20 @ 12:28)  HR: 55 (11-27-20 @ 02:40) (52 - 78)  BP: 127/70 (11-27-20 @ 02:40) (95/52 - 128/57)  RR: 17 (11-27-20 @ 02:40) (17 - 20)  SpO2: 99% (11-27-20 @ 02:40) (94% - 100%)      11-26-20 @ 07:01  -  11-27-20 @ 07:00  --------------------------------------------------------  IN: 0 mL / OUT: 850 mL / NET: -850 mL    Physical Exam:   GENERAL: NAD  HEENT: NCAT  CHEST/LUNG: audible breath sounds b/l  HEART: Regular rate and rhythm; s1 s2 appreciated  ABDOMEN: Soft, Nontender, Nondistended; Bowel sounds present  EXTREMITIES: No LE edema b/l  SKIN: no rashes, no new lesions  NERVOUS SYSTEM:  Alert & Oriented X3    Labs:                         14.0   3.06  )-----------( 260      ( 26 Nov 2020 13:08 )             42.5     Neutophil% 61.0, Lymphocyte% 28.0, Monocyte% 11.0, Bands% -- 11-26-20 @ 13:08    26 Nov 2020 13:08    135    |  98     |  21     ----------------------------<  104    4.2     |  27     |  1.2      Ca    9.3        26 Nov 2020 13:08  Mg     2.0       26 Nov 2020 13:08    TPro  7.3    /  Alb  3.8    /  TBili  0.5    /  DBili  x      /  AST  36     /  ALT  32     /  AlkPhos  73     26 Nov 2020 13:08    Amylase --, Lipase 63, 11-26-20 @ 13:08      Radiology:

## 2020-11-27 NOTE — PROGRESS NOTE ADULT - SUBJECTIVE AND OBJECTIVE BOX
ROLOROSENDAGHADA  60y  Female      Patient is a 60y old  Female who presents with a chief complaint of covid-19.      INTERVAL HPI/OVERNIGHT EVENTS: The patient was seen and examined at bedside.  Resting in bed. No distress.       ******************************* REVIEW OF SYSTEMS:**********************************************    All other review of systems negative    *********************** VITALS ******************************************    T(F): 97.9 (11-27-20 @ 16:59)  HR: 59 (11-27-20 @ 16:59) (52 - 71)  BP: 122/54 (11-27-20 @ 16:59) (96/53 - 136/70)  RR: 18 (11-27-20 @ 16:59) (17 - 19)  SpO2: 94% (11-27-20 @ 16:59) (94% - 99%)    11-26-20 @ 07:01  -  11-27-20 @ 07:00  --------------------------------------------------------  IN: 0 mL / OUT: 850 mL / NET: -850 mL            11-26-20 @ 07:01  -  11-27-20 @ 07:00  --------------------------------------------------------  IN: 0 mL / OUT: 850 mL / NET: -850 mL        ******************************** PHYSICAL EXAM:**************************************************  GENERAL: NAD    PSYCH: no agitation, baseline mentation  HEENT:     NERVOUS SYSTEM:  Alert & Oriented X3,  PULMONARY: OPAL, CTA    CARDIOVASCULAR: S1S2 RRR    GI: Soft, NT, ND; BS present.    EXTREMITIES:  2+ Peripheral Pulses, No clubbing, cyanosis, or edema    LYMPH: No lymphadenopathy noted    SKIN: No rashes or lesions      **************************** LABS *******************************************************                          14.0   3.06  )-----------( 260      ( 26 Nov 2020 13:08 )             42.5     11-26    135  |  98  |  21<H>  ----------------------------<  104<H>  4.2   |  27  |  1.2    Ca    9.3      26 Nov 2020 13:08  Mg     2.0     11-26    TPro  7.3  /  Alb  3.8  /  TBili  0.5  /  DBili  x   /  AST  36  /  ALT  32  /  AlkPhos  73  11-26          Lactate Trend  11-26 @ 13:26 Lactate:1.3         CAPILLARY BLOOD GLUCOSE      POCT Blood Glucose.: 109 mg/dL (27 Nov 2020 07:51)          **************************Active Medications *******************************************  sulfa drugs (Unknown)      ALBUTerol    90 MICROgram(s) HFA Inhaler 2 Puff(s) Inhalation every 6 hours PRN  aspirin  chewable 81 milliGRAM(s) Oral daily  atorvastatin 20 milliGRAM(s) Oral at bedtime  azaTHIOprine 50 milliGRAM(s) Oral two times a day  budesonide  80 MICROgram(s)/formoterol 4.5 MICROgram(s) Inhaler 2 Puff(s) Inhalation two times a day  chlorhexidine 4% Liquid 1 Application(s) Topical daily  famotidine    Tablet 40 milliGRAM(s) Oral at bedtime  fluticasone propionate 50 MICROgram(s)/spray Nasal Spray 1 Spray(s) Both Nostrils two times a day  heparin   Injectable 5000 Unit(s) SubCutaneous every 8 hours  levothyroxine 50 MICROGram(s) Oral daily  metoprolol succinate ER 25 milliGRAM(s) Oral daily  ondansetron   Disintegrating Tablet 4 milliGRAM(s) Oral every 6 hours PRN  predniSONE   Tablet 10 milliGRAM(s) Oral daily  pyridostigmine 60 milliGRAM(s) Oral four times a day      ***************************************************  RADIOLOGY & ADDITIONAL TESTS:    Imaging Personally Reviewed:  [ ] YES  [ ] NO    HEALTH ISSUES - PROBLEM Dx:

## 2020-11-27 NOTE — PROGRESS NOTE ADULT - ASSESSMENT
60y old female with pmhx of High blood cholesterol, Myasthenia gravis, Pancreatitis, postural orthostatic tachycardia syndrome, presents to the ED complaining of progressive and worsening generalised bodyache, headache, fatigue, generalised weakness, fever, chills since testing positive for covid-19 since last week wednesday.    # COVID 19  - no O2 requirements at this time  - s/p decadron 6mgx1, since no hypoxia, continue prednisone 10mg   - Follow up bcx  - Follow up inflammatory markers   - currently wearing 2L NC for comfort.   - Follow up with neuro about azathiprine     # ABUNDIO like prerenal from decreased PO intake secondary to COVID   - Cr on admission was 1.2, baseline 0.7  - encourage PO intake     # Dysuria at home   - Follow up UA     # HLD   - continue with atorvastatin 20mg PO daily     # Hypothyroidism   - continue with with levothyroxine     # Myasthenia gravis   - patient is due for IgG treatment however is uncertain if she can be taking it while she has COVID. She also takes azathioprine and wanted clarification about that.   - continue with pyridostigmine  - continue with home prednisone for now, may need to be switched to IV decadron if patient has O2 demand.   - follows with Dr. Velazquez OP, follow up Neurology consult     # Diet: DASH/TLC  # GI ppx: famotidine 40mg PO daily  # DVT ppx: HSQ   # Dispo: Acute for now    #Progress Note Handoff  Pending (specify):  Clinical improvement   Family discussion: NA  Disposition: Home_

## 2020-11-27 NOTE — CONSULT NOTE ADULT - SUBJECTIVE AND OBJECTIVE BOX
GHADA ROPER     60y     Female    MRN-[id number]                                                           CC:Patient is a 60y old  Female who presents with a chief complaint of covid-19 (27 Nov 2020 07:13)      HPI:   60y old female with pmhx of High blood cholesterol, Myasthenia gravis, Pancreatitis, postural orthostatic tachycardia syndrome, presents to the ED complaining of progressive and worsening generalised bodyache, headache, fatigue, generalised weakness, fever, chills since testing positive for covid-19 since last week wednesday. pt states stayed home hoping to get better but now with shortness of breath worse on exertion. pt also admits to decrease appetite, n/v, and diarrhea since 2days ago, but denies chest pain/abdominal pain. pt missed her treatment of IV IG for myasthenia gravis this week due to covid infection.    Patient seen and examined and history, notes, labs, imaging, meds and vitals reviewed.  She was scheduled for IVIG with 92grams of gammaguard on 11/24/2020 but did not receive it.  She spoke with DR. Velazquez and no contraindication to receiving IVIG with her current COVID19 infection   (26 Nov 2020 15:59)      ROS:  Constitutional, Neurological, Psychiatric, Eyes, ENT, Cardiovascular, Respiratory, Gastrointestinal, Genitourinary, Musculoskeletal, Integumentary, Endocrine and Heme/Lymph are otherwise negative. Except for SOB    Social History: No smoking, No drinking, No drug use    FAMILY HISTORY:  No pertinent family history in first degree relatives        HEALTH ISSUES - PROBLEM Dx:          Vital Signs Last 24 Hrs  T(C): 36.3 (27 Nov 2020 09:00), Max: 37.2 (26 Nov 2020 16:05)  T(F): 97.3 (27 Nov 2020 09:00), Max: 98.9 (26 Nov 2020 16:05)  HR: 71 (27 Nov 2020 09:00) (52 - 78)  BP: 136/70 (27 Nov 2020 09:00) (96/53 - 136/70)  BP(mean): --  RR: 18 (27 Nov 2020 09:00) (17 - 19)  SpO2: 98% (27 Nov 2020 09:00) (94% - 100%)    Physical Exam:  Constitutional: alert and in no acute distress.  Eyes: the sclera and conjunctiva were normal, pupils were equal in size, round, reactive to light, with normal accommodation and extraocular movements were intact.   Back: no costovertebral angle tenderness and no spinal tenderness.      Neuro Exam:  a+Ox3 language and attention normal  CN 2-12 normal no obvious fatigablity of lids or eye movements  Some weakness in neck extension and flexion  Able ot count to 14 in one breath  No weakness in extremities  FTN NL  DTR 1+ in UE    Allergies    sulfa drugs (Unknown)    Intolerances       Home Medications:  albuterol 90 mcg/inh inhalation powder: 2 puff(s) inhaled every 6 hours, As Needed (26 Nov 2020 15:43)  aspirin 81 mg oral tablet, chewable: 1 tab(s) orally once a day (26 Nov 2020 15:43)  atorvastatin 20 mg oral tablet: 1 tab(s) orally once a day (26 Nov 2020 15:43)  doxepin 10 mg oral capsule:  (26 Nov 2020 15:43)  fluticasone 50 mcg/inh nasal spray: 1 spray(s) nasal once a day (26 Nov 2020 15:43)  Imuran 50 mg oral tablet: 1 tab(s) orally 3 times a day (26 Nov 2020 15:43)  Mestinon 60 mg oral tablet: 1.5 tab(s) orally 4 times a day (26 Nov 2020 15:43)  Pepcid 40 mg oral tablet: 1 tab(s) orally once a day (at bedtime) (26 Nov 2020 15:43)  Protonix 40 mg oral delayed release tablet: 1 tab(s) orally once a day (26 Nov 2020 15:43)  Symbicort: inhaled 2 times a day (26 Nov 2020 15:43)  Toprol-XL 25 mg oral tablet, extended release: 1 tab(s) orally once a day (26 Nov 2020 15:43)  Vitamin D2: every friday (26 Nov 2020 15:43)      MEDICATIONS  (STANDING):  aspirin  chewable 81 milliGRAM(s) Oral daily  atorvastatin 20 milliGRAM(s) Oral at bedtime  azaTHIOprine 50 milliGRAM(s) Oral two times a day  budesonide  80 MICROgram(s)/formoterol 4.5 MICROgram(s) Inhaler 2 Puff(s) Inhalation two times a day  chlorhexidine 4% Liquid 1 Application(s) Topical daily  famotidine    Tablet 40 milliGRAM(s) Oral at bedtime  fluticasone propionate 50 MICROgram(s)/spray Nasal Spray 1 Spray(s) Both Nostrils two times a day  heparin   Injectable 5000 Unit(s) SubCutaneous every 8 hours  levothyroxine 50 MICROGram(s) Oral daily  metoprolol succinate ER 25 milliGRAM(s) Oral daily  predniSONE   Tablet 10 milliGRAM(s) Oral daily  pyridostigmine 60 milliGRAM(s) Oral four times a day    MEDICATIONS  (PRN):  ALBUTerol    90 MICROgram(s) HFA Inhaler 2 Puff(s) Inhalation every 6 hours PRN Shortness of Breath and/or Wheezing  ondansetron   Disintegrating Tablet 4 milliGRAM(s) Oral every 6 hours PRN Nausea and/or Vomiting      LABS:                        14.0   3.06  )-----------( 260      ( 26 Nov 2020 13:08 )             42.5     11-26    135  |  98  |  21<H>  ----------------------------<  104<H>  4.2   |  27  |  1.2    Ca    9.3      26 Nov 2020 13:08  Mg     2.0     11-26    TPro  7.3  /  Alb  3.8  /  TBili  0.5  /  DBili  x   /  AST  36  /  ALT  32  /  AlkPhos  73  11-26            [facility]   < from: CT Angio Neck w/ IV Cont (09.19.19 @ 14:56) >  Impression:    Unremarkable CT angiogram of the neck and head.    < end of copied text >      Assessment / Plan: This is a 60y year old Female presenting with COVID19 infection with SOB and history of Myasthenia gravis on azothioprine and IVIG with prednisone 10mg QD.  1. No contraindication to giving IVIG  2. Give IVIG 1g/kg over 5-6 hours  3. Premedicate with benadryl 25mg PO x1 and Tylenol 650mg PO x1  4. OK to continue azothioprine  5. Call for any worsening neurological status

## 2020-11-27 NOTE — PROGRESS NOTE ADULT - ASSESSMENT
60y old female with pmhx of High blood cholesterol, Myasthenia gravis, Pancreatitis, postural orthostatic tachycardia syndrome, presents to the ED complaining of progressive and worsening generalised bodyache, headache, fatigue, generalised weakness, fever, chills since testing positive for covid-19 since last week wednesday.    # COVID 19  - no O2 requirements at this time  - s/p decadron 6mgx1, since no hypoxia, continue prednisone 10mg   - Follow up bcx  - Follow up inflammatory markers   - Follow up with neuro about azathiprine     # HLD     # Myasthenia gravis    60y old female with pmhx of High blood cholesterol, Myasthenia gravis, Pancreatitis, postural orthostatic tachycardia syndrome, presents to the ED complaining of progressive and worsening generalised bodyache, headache, fatigue, generalised weakness, fever, chills since testing positive for covid-19 since last week wednesday.    # COVID 19  - no O2 requirements at this time  - s/p decadron 6mgx1, since no hypoxia, continue prednisone 10mg   - Follow up bcx  - Follow up inflammatory markers   - Follow up with neuro about azathiprine     # HLD     # Myasthenia gravis   - patient is due for IgG treatment however is uncertain if she can be taking it while she has COVID. She also takes azathioprine  - follows with Dr. Velazquez OP  - Follow up Neurology consult 60y old female with pmhx of High blood cholesterol, Myasthenia gravis, Pancreatitis, postural orthostatic tachycardia syndrome, presents to the ED complaining of progressive and worsening generalised bodyache, headache, fatigue, generalised weakness, fever, chills since testing positive for covid-19 since last week wednesday.    # COVID 19  - no O2 requirements at this time  - s/p decadron 6mgx1, since no hypoxia, continue prednisone 10mg   - Follow up bcx  - Follow up inflammatory markers   - currently wearing 2L NC for comfort.   - Follow up with neuro about azathiprine     # ABUNDIO like prerenal from decreased PO intake secondary to COVID   - Cr on admission was 1.2, baseline 0.7  - encourage PO intake     # Dysuria at home   - Follow up UA     # HLD   - continue with atorvastatin 20mg PO daily     # Hypothyroidism   - continue with with levothyroxine     # Myasthenia gravis   - patient is due for IgG treatment however is uncertain if she can be taking it while she has COVID. She also takes azathioprine and wanted clarification about that.   - continue with pyridostigmine  - continue with home prednisone for now, may need to be switched to IV decadron if patient has O2 demand.   - follows with Dr. Velazquez OP, follow up Neurology consult     # Diet: DASH/TLC  # GI ppx: famotidine 40mg PO daily  # DVT ppx: HSQ   # Dispo: Acute for now

## 2020-11-28 ENCOUNTER — TRANSCRIPTION ENCOUNTER (OUTPATIENT)
Age: 60
End: 2020-11-28

## 2020-11-28 LAB
ALBUMIN SERPL ELPH-MCNC: 3.2 G/DL — LOW (ref 3.5–5.2)
ALP SERPL-CCNC: 58 U/L — SIGNIFICANT CHANGE UP (ref 30–115)
ALT FLD-CCNC: 18 U/L — SIGNIFICANT CHANGE UP (ref 0–41)
ANION GAP SERPL CALC-SCNC: 12 MMOL/L — SIGNIFICANT CHANGE UP (ref 7–14)
AST SERPL-CCNC: 22 U/L — SIGNIFICANT CHANGE UP (ref 0–41)
BASOPHILS # BLD AUTO: 0 K/UL — SIGNIFICANT CHANGE UP (ref 0–0.2)
BASOPHILS NFR BLD AUTO: 0 % — SIGNIFICANT CHANGE UP (ref 0–1)
BILIRUB SERPL-MCNC: 0.4 MG/DL — SIGNIFICANT CHANGE UP (ref 0.2–1.2)
BUN SERPL-MCNC: 20 MG/DL — SIGNIFICANT CHANGE UP (ref 10–20)
CALCIUM SERPL-MCNC: 8.3 MG/DL — LOW (ref 8.5–10.1)
CHLORIDE SERPL-SCNC: 103 MMOL/L — SIGNIFICANT CHANGE UP (ref 98–110)
CO2 SERPL-SCNC: 24 MMOL/L — SIGNIFICANT CHANGE UP (ref 17–32)
CREAT SERPL-MCNC: 0.5 MG/DL — LOW (ref 0.7–1.5)
CRP SERPL-MCNC: 1.26 MG/DL — HIGH (ref 0–0.4)
EOSINOPHIL # BLD AUTO: 0 K/UL — SIGNIFICANT CHANGE UP (ref 0–0.7)
EOSINOPHIL NFR BLD AUTO: 0 % — SIGNIFICANT CHANGE UP (ref 0–8)
FERRITIN SERPL-MCNC: 415 NG/ML — HIGH (ref 15–150)
GLUCOSE SERPL-MCNC: 76 MG/DL — SIGNIFICANT CHANGE UP (ref 70–99)
HCT VFR BLD CALC: 38.6 % — SIGNIFICANT CHANGE UP (ref 37–47)
HGB BLD-MCNC: 12.3 G/DL — SIGNIFICANT CHANGE UP (ref 12–16)
IMM GRANULOCYTES NFR BLD AUTO: 0 % — LOW (ref 0.1–0.3)
LYMPHOCYTES # BLD AUTO: 1.31 K/UL — SIGNIFICANT CHANGE UP (ref 1.2–3.4)
LYMPHOCYTES # BLD AUTO: 33.9 % — SIGNIFICANT CHANGE UP (ref 20.5–51.1)
MCHC RBC-ENTMCNC: 29.9 PG — SIGNIFICANT CHANGE UP (ref 27–31)
MCHC RBC-ENTMCNC: 31.9 G/DL — LOW (ref 32–37)
MCV RBC AUTO: 93.9 FL — SIGNIFICANT CHANGE UP (ref 81–99)
MONOCYTES # BLD AUTO: 0.31 K/UL — SIGNIFICANT CHANGE UP (ref 0.1–0.6)
MONOCYTES NFR BLD AUTO: 8 % — SIGNIFICANT CHANGE UP (ref 1.7–9.3)
NEUTROPHILS # BLD AUTO: 2.25 K/UL — SIGNIFICANT CHANGE UP (ref 1.4–6.5)
NEUTROPHILS NFR BLD AUTO: 58.1 % — SIGNIFICANT CHANGE UP (ref 42.2–75.2)
NRBC # BLD: 0 /100 WBCS — SIGNIFICANT CHANGE UP (ref 0–0)
PLATELET # BLD AUTO: 274 K/UL — SIGNIFICANT CHANGE UP (ref 130–400)
POTASSIUM SERPL-MCNC: 4.1 MMOL/L — SIGNIFICANT CHANGE UP (ref 3.5–5)
POTASSIUM SERPL-SCNC: 4.1 MMOL/L — SIGNIFICANT CHANGE UP (ref 3.5–5)
PROCALCITONIN SERPL-MCNC: 0.04 NG/ML — SIGNIFICANT CHANGE UP (ref 0.02–0.1)
PROT SERPL-MCNC: 6.5 G/DL — SIGNIFICANT CHANGE UP (ref 6–8)
RBC # BLD: 4.11 M/UL — LOW (ref 4.2–5.4)
RBC # FLD: 14.9 % — HIGH (ref 11.5–14.5)
SODIUM SERPL-SCNC: 139 MMOL/L — SIGNIFICANT CHANGE UP (ref 135–146)
WBC # BLD: 3.87 K/UL — LOW (ref 4.8–10.8)
WBC # FLD AUTO: 3.87 K/UL — LOW (ref 4.8–10.8)

## 2020-11-28 PROCEDURE — 99233 SBSQ HOSP IP/OBS HIGH 50: CPT

## 2020-11-28 RX ORDER — ONDANSETRON 8 MG/1
4 TABLET, FILM COATED ORAL ONCE
Refills: 0 | Status: COMPLETED | OUTPATIENT
Start: 2020-11-28 | End: 2020-11-28

## 2020-11-28 RX ORDER — DIPHENHYDRAMINE HCL 50 MG
25 CAPSULE ORAL ONCE
Refills: 0 | Status: COMPLETED | OUTPATIENT
Start: 2020-11-28 | End: 2020-11-28

## 2020-11-28 RX ORDER — ACETAMINOPHEN 500 MG
650 TABLET ORAL ONCE
Refills: 0 | Status: COMPLETED | OUTPATIENT
Start: 2020-11-28 | End: 2020-11-28

## 2020-11-28 RX ORDER — IMMUNE GLOBULIN (HUMAN) 10 G/100ML
20 INJECTION INTRAVENOUS; SUBCUTANEOUS ONCE
Refills: 0 | Status: COMPLETED | OUTPATIENT
Start: 2020-11-28 | End: 2020-11-28

## 2020-11-28 RX ADMIN — Medication 50 MICROGRAM(S): at 05:20

## 2020-11-28 RX ADMIN — ONDANSETRON 4 MILLIGRAM(S): 8 TABLET, FILM COATED ORAL at 13:48

## 2020-11-28 RX ADMIN — PYRIDOSTIGMINE BROMIDE 90 MILLIGRAM(S): 60 SOLUTION ORAL at 05:20

## 2020-11-28 RX ADMIN — Medication 650 MILLIGRAM(S): at 16:38

## 2020-11-28 RX ADMIN — HEPARIN SODIUM 5000 UNIT(S): 5000 INJECTION INTRAVENOUS; SUBCUTANEOUS at 21:19

## 2020-11-28 RX ADMIN — PYRIDOSTIGMINE BROMIDE 90 MILLIGRAM(S): 60 SOLUTION ORAL at 18:42

## 2020-11-28 RX ADMIN — Medication 25 MILLIGRAM(S): at 05:20

## 2020-11-28 RX ADMIN — ATORVASTATIN CALCIUM 20 MILLIGRAM(S): 80 TABLET, FILM COATED ORAL at 21:18

## 2020-11-28 RX ADMIN — PYRIDOSTIGMINE BROMIDE 90 MILLIGRAM(S): 60 SOLUTION ORAL at 13:47

## 2020-11-28 RX ADMIN — FAMOTIDINE 40 MILLIGRAM(S): 10 INJECTION INTRAVENOUS at 21:18

## 2020-11-28 RX ADMIN — Medication 25 MILLIGRAM(S): at 14:45

## 2020-11-28 RX ADMIN — HEPARIN SODIUM 5000 UNIT(S): 5000 INJECTION INTRAVENOUS; SUBCUTANEOUS at 13:48

## 2020-11-28 RX ADMIN — Medication 650 MILLIGRAM(S): at 14:45

## 2020-11-28 RX ADMIN — AZATHIOPRINE 100 MILLIGRAM(S): 100 TABLET ORAL at 05:19

## 2020-11-28 RX ADMIN — Medication 1 SPRAY(S): at 18:43

## 2020-11-28 RX ADMIN — PYRIDOSTIGMINE BROMIDE 90 MILLIGRAM(S): 60 SOLUTION ORAL at 00:31

## 2020-11-28 RX ADMIN — CHLORHEXIDINE GLUCONATE 1 APPLICATION(S): 213 SOLUTION TOPICAL at 13:39

## 2020-11-28 RX ADMIN — Medication 1 SPRAY(S): at 05:21

## 2020-11-28 RX ADMIN — Medication 10 MILLIGRAM(S): at 05:20

## 2020-11-28 RX ADMIN — PYRIDOSTIGMINE BROMIDE 90 MILLIGRAM(S): 60 SOLUTION ORAL at 23:55

## 2020-11-28 RX ADMIN — ONDANSETRON 4 MILLIGRAM(S): 8 TABLET, FILM COATED ORAL at 05:36

## 2020-11-28 RX ADMIN — HEPARIN SODIUM 5000 UNIT(S): 5000 INJECTION INTRAVENOUS; SUBCUTANEOUS at 05:21

## 2020-11-28 RX ADMIN — IMMUNE GLOBULIN (HUMAN) 116.67 GRAM(S): 10 INJECTION INTRAVENOUS; SUBCUTANEOUS at 15:24

## 2020-11-28 RX ADMIN — AZATHIOPRINE 100 MILLIGRAM(S): 100 TABLET ORAL at 20:05

## 2020-11-28 RX ADMIN — Medication 81 MILLIGRAM(S): at 13:48

## 2020-11-28 RX ADMIN — IMMUNE GLOBULIN (HUMAN) 100 GRAM(S): 10 INJECTION INTRAVENOUS; SUBCUTANEOUS at 22:47

## 2020-11-28 NOTE — PROGRESS NOTE ADULT - ASSESSMENT
60y old female with pmhx of High blood cholesterol, Myasthenia gravis, Pancreatitis, postural orthostatic tachycardia syndrome, presents to the ED complaining of progressive and worsening generalised bodyache, headache, fatigue, generalised weakness, fever, chills since testing positive for covid-19 since last week wednesday.    # COVID 19  - no O2 requirements at this time  - s/p decadron 6mgx1, since no hypoxia, continue prednisone 10mg   - Follow up bcx  - Follow up inflammatory markers   - currently wearing 1L NC for comfort. Spo2 97%  - Follow up with neuro about azathiprine     # ABUNDIO like prerenal from decreased PO intake secondary to COVID   - Cr on admission was 1.2, baseline 0.7  - encourage PO intake     # Dysuria at home   - Follow up UA     # HLD   - continue with atorvastatin 20mg PO daily     # Hypothyroidism   - continue with with levothyroxine     # Myasthenia gravis   - patient is undergoing IVIG today.   - continue with pyridostigmine  - continue with home prednisone for now, may need to be switched to IV decadron if patient has O2 demand.   - follows with Dr. Velazquez OP,     # Diet: DASH/TLC  # GI ppx: famotidine 40mg PO daily  # DVT ppx: HSQ   # Dispo: Acute for now    #Progress Note Handoff  Pending (specify):  Clinical improvement   Family discussion: NA  Disposition: Home_ in 24-48 hrs

## 2020-11-28 NOTE — PROGRESS NOTE ADULT - SUBJECTIVE AND OBJECTIVE BOX
ROLOROSENDAGHADA  60y  Female      Patient is a 60y old  Female who presents with a chief complaint of covid-19.      INTERVAL HPI/OVERNIGHT EVENTS: The patient was seen and examined at bedside.   Resting in bed. Feeling feverish. Breathing comfortably.       ******************************* REVIEW OF SYSTEMS:**********************************************    All other review of systems negative    *********************** VITALS ******************************************    T(F): 98 (11-28-20 @ 12:50)  HR: 71 (11-28-20 @ 12:50) (59 - 74)  BP: 105/58 (11-28-20 @ 12:50) (105/58 - 122/54)  RR: 18 (11-28-20 @ 12:50) (18 - 18)  SpO2: 97% (11-28-20 @ 12:50) (92% - 97%)            ******************************** PHYSICAL EXAM:**************************************************  GENERAL: NAD    PSYCH: no agitation, baseline mentation  HEENT:     NERVOUS SYSTEM:  Alert & Oriented X3, MS intact   PULMONARY: OPAL, CTA    CARDIOVASCULAR: S1S2 RRR    GI: Soft, NT, ND; BS present.    EXTREMITIES:  2+ Peripheral Pulses, No clubbing, cyanosis, or edema    LYMPH: No lymphadenopathy noted    SKIN: No rashes or lesions      **************************** LABS *******************************************************                          12.3   3.87  )-----------( 274      ( 28 Nov 2020 06:16 )             38.6     11-28    139  |  103  |  20  ----------------------------<  76  4.1   |  24  |  0.5<L>    Ca    8.3<L>      28 Nov 2020 06:16    TPro  6.5  /  Alb  3.2<L>  /  TBili  0.4  /  DBili  x   /  AST  22  /  ALT  18  /  AlkPhos  58  11-28          Lactate Trend  11-26 @ 13:26 Lactate:1.3         CAPILLARY BLOOD GLUCOSE      POCT Blood Glucose.: 109 mg/dL (27 Nov 2020 07:51)          **************************Active Medications *******************************************  sulfa drugs (Unknown)      acetaminophen   Tablet .. 650 milliGRAM(s) Oral once  ALBUTerol    90 MICROgram(s) HFA Inhaler 2 Puff(s) Inhalation every 6 hours PRN  aspirin  chewable 81 milliGRAM(s) Oral daily  atorvastatin 20 milliGRAM(s) Oral at bedtime  azaTHIOprine 100 milliGRAM(s) Oral two times a day  budesonide  80 MICROgram(s)/formoterol 4.5 MICROgram(s) Inhaler 2 Puff(s) Inhalation two times a day  chlorhexidine 4% Liquid 1 Application(s) Topical daily  diphenhydrAMINE 25 milliGRAM(s) Oral once PRN  famotidine    Tablet 40 milliGRAM(s) Oral at bedtime  fluticasone propionate 50 MICROgram(s)/spray Nasal Spray 1 Spray(s) Both Nostrils two times a day  heparin   Injectable 5000 Unit(s) SubCutaneous every 8 hours  immune   globulin 10% (GAMMAGARD) IVPB 70 Gram(s) IV Intermittent once  levothyroxine 50 MICROGram(s) Oral daily  metoprolol succinate ER 25 milliGRAM(s) Oral daily  predniSONE   Tablet 10 milliGRAM(s) Oral daily  pyridostigmine 90 milliGRAM(s) Oral four times a day      ***************************************************  RADIOLOGY & ADDITIONAL TESTS:    Imaging Personally Reviewed:  [ ] YES  [ ] NO    HEALTH ISSUES - PROBLEM Dx:

## 2020-11-28 NOTE — DISCHARGE NOTE NURSING/CASE MANAGEMENT/SOCIAL WORK - PATIENT PORTAL LINK FT
You can access the FollowMyHealth Patient Portal offered by [facility] by registering at the following website: [email or web address]. By joining [employer]’s FollowMyHealth portal, you will also be able to view your health information using other applications (apps) compatible with our system.

## 2020-11-28 NOTE — CHART NOTE - NSCHARTNOTEFT_GEN_A_CORE
2L to room air    PLAN:  Continue monitoring Spo2  Inflammatory markers for morning ordered Per Am rounds and discussion with attending    Pt is doing better today.  Mental status:  Functional status:  Oxygen requirement titrated down 2L to room air, sating 97%,   morning labs and vitals reviewed    PLAN:  Continue monitoring Spo2  Inflammatory markers for morning ordered  Follow up markers and if down trending will start dc plan tomorrow Per Am rounds and discussion with attending    Pt is doing better today.  Mental status:  Functional status:  Oxygen requirement titrated down 2L to room air, sating 97%,   morning labs and vitals reviewed    PLAN:  Continue monitoring Spo2  Inflammatory markers for morning ordered  morning cbc bmp not required  Follow up markers and if down trending will start dc plan tomorrow

## 2020-11-29 ENCOUNTER — TRANSCRIPTION ENCOUNTER (OUTPATIENT)
Age: 60
End: 2020-11-29

## 2020-11-29 VITALS
OXYGEN SATURATION: 94 % | RESPIRATION RATE: 18 BRPM | HEART RATE: 61 BPM | TEMPERATURE: 98 F | DIASTOLIC BLOOD PRESSURE: 67 MMHG | SYSTOLIC BLOOD PRESSURE: 114 MMHG

## 2020-11-29 LAB — D DIMER BLD IA.RAPID-MCNC: 233 NG/ML DDU — HIGH (ref 0–230)

## 2020-11-29 PROCEDURE — 99233 SBSQ HOSP IP/OBS HIGH 50: CPT

## 2020-11-29 RX ORDER — FAMOTIDINE 10 MG/ML
1 INJECTION INTRAVENOUS
Qty: 30 | Refills: 0
Start: 2020-11-29 | End: 2020-12-28

## 2020-11-29 RX ORDER — FAMOTIDINE 10 MG/ML
1 INJECTION INTRAVENOUS
Qty: 0 | Refills: 0 | DISCHARGE

## 2020-11-29 RX ADMIN — Medication 81 MILLIGRAM(S): at 12:21

## 2020-11-29 RX ADMIN — Medication 50 MICROGRAM(S): at 05:38

## 2020-11-29 RX ADMIN — BUDESONIDE AND FORMOTEROL FUMARATE DIHYDRATE 2 PUFF(S): 160; 4.5 AEROSOL RESPIRATORY (INHALATION) at 08:00

## 2020-11-29 RX ADMIN — PYRIDOSTIGMINE BROMIDE 90 MILLIGRAM(S): 60 SOLUTION ORAL at 12:22

## 2020-11-29 RX ADMIN — AZATHIOPRINE 100 MILLIGRAM(S): 100 TABLET ORAL at 05:36

## 2020-11-29 RX ADMIN — Medication 1 SPRAY(S): at 05:36

## 2020-11-29 RX ADMIN — Medication 10 MILLIGRAM(S): at 05:40

## 2020-11-29 RX ADMIN — HEPARIN SODIUM 5000 UNIT(S): 5000 INJECTION INTRAVENOUS; SUBCUTANEOUS at 13:30

## 2020-11-29 RX ADMIN — Medication 25 MILLIGRAM(S): at 05:38

## 2020-11-29 RX ADMIN — HEPARIN SODIUM 5000 UNIT(S): 5000 INJECTION INTRAVENOUS; SUBCUTANEOUS at 05:37

## 2020-11-29 RX ADMIN — CHLORHEXIDINE GLUCONATE 1 APPLICATION(S): 213 SOLUTION TOPICAL at 12:23

## 2020-11-29 RX ADMIN — PYRIDOSTIGMINE BROMIDE 90 MILLIGRAM(S): 60 SOLUTION ORAL at 05:39

## 2020-11-29 NOTE — DISCHARGE NOTE PROVIDER - CARE PROVIDER_API CALL
Brian Velazquez  NEUROLOGY  [address], [address]  [place] [address]  Phone: [phone]  Fax: [phone]  Follow Up Time: Routine    Johnathan Washington  INTERNAL MEDICINE  [address] [place] [id number]  Phone: [phone]  Fax: [phone]  Follow Up Time: 2 weeks

## 2020-11-29 NOTE — DISCHARGE NOTE PROVIDER - PROVIDER TOKENS
PROVIDER:[TOKEN:[[id number]],FOLLOWUP:[Routine]],PROVIDER:[TOKEN:[[id number]],FOLLOWUP:[2 weeks]]

## 2020-11-29 NOTE — DISCHARGE NOTE PROVIDER - NSDCFUSCHEDAPPT_GEN_ALL_CORE_FT
GHADA ROPER ; 01/19/2021 ; NPP Ophthal  GHADA Torres ; 01/22/2021 ; NPP Gastro  GHADA Torres ; 02/09/2021 ; NP Neurology [address] [facility]

## 2020-11-29 NOTE — PROGRESS NOTE ADULT - ASSESSMENT
60y old female with pmhx of High blood cholesterol, Myasthenia gravis, Pancreatitis, postural orthostatic tachycardia syndrome, presents to the ED complaining of progressive and worsening generalised bodyache, headache, fatigue, generalised weakness, fever, chills since testing positive for covid-19 since last week wednesday.    # COVID 19  - no O2 requirements at this time  - s/p decadron 6mgx1, since no hypoxia, continue prednisone 10mg   - currently wearing 1L NC for comfort. Spo2 97%    # ABUNDIO like prerenal from decreased PO intake secondary to COVID   - Cr on admission was 1.2, baseline 0.7  - encourage PO intake     # Dysuria at home   - Follow up UA     # HLD   - continue with atorvastatin 20mg PO daily     # Hypothyroidism   - continue with with levothyroxine     # Myasthenia gravis   - patient received  IVIG yesterday.   - continue with pyridostigmine  - continue with home prednisone   - follows with Dr. Velazquez OP,     # Diet: DASH/TLC  # GI ppx: famotidine 40mg PO daily  # DVT ppx: HSQ   # Dispo: Acute for now    #Progress Note Handoff  Pending (specify):    Family discussion: NA  Disposition: Home_ today.

## 2020-11-29 NOTE — DISCHARGE NOTE PROVIDER - NSDCCPCAREPLAN_GEN_ALL_CORE_FT
PRINCIPAL DISCHARGE DIAGNOSIS  Diagnosis: COVID-19  Assessment and Plan of Treatment: Coronavirus disease 2019 (COVID-19) is a respiratory illness  that can spread from person to person. The virus that causes  COVID-19 is a novel coronavirus that was first identified during  an investigation into an outbreak in [place], [place].  The virus that causes COVID-19 probably emerged from an  animal source, but is now spreading from person to person.  The virus is thought to spread mainly between people who  are in close contact with one another (within about 6 feet)  through respiratory droplets produced when an infected  person coughs or sneezes. It also may be possible that a person  can get COVID-19 by touching a surface or object that has  the virus on it and then touching their own mouth, nose, or  possibly their eyes, but this is not thought to be the main  way the virus spreads.  Please stay home and avoid contact with others for at least a week after symptoms resolve and follow government guidelines.   Patients with COVID-19 have had mild to severe respiratory  illness with symptoms of  • fever  • cough  • shortness of breath  People can help protect themselves from respiratory illness with  everyday preventive actions.    • Avoid close contact with people who are sick.  • Avoid touching your eyes, nose, and mouth with  unwashed hands.  • Wash your hands often with soap and water for at least 20   seconds. Use an alcohol-based hand  that contains at  least 60% alcohol if soap and water are not available.   Stay home when you are sick.  • Cover your cough or sneeze with a tissue, then throw the  tissue in the trash.  • Clean and disinfect frequently touched objects  and surfaces.  Call 911 and inform them you are covid positive before you decide to go to the emergency room if you have chest pain, difficulty breathing, high fevers, worsening of your symptoms, feel unwell, or have nausea and vomiting.      SECONDARY DISCHARGE DIAGNOSES  Diagnosis: Myasthenia gravis  Assessment and Plan of Treatment: You have recieved IVIG while you were in the hospital. Please follow up with your neurologist.

## 2020-11-29 NOTE — PROGRESS NOTE ADULT - SUBJECTIVE AND OBJECTIVE BOX
GHADA ROPER  60y  Female      Patient is a 60y old  Female who presents with a chief complaint of covid-19.      INTERVAL HPI/OVERNIGHT EVENTS: The patient was seen and examined at bedside.   Resting in bed, comfortable.       ******************************* REVIEW OF SYSTEMS:**********************************************    All other review of systems negative    *********************** VITALS ******************************************    T(F): 97 (11-29-20 @ 12:49)  HR: 70 (11-29-20 @ 12:49) (53 - 71)  BP: 122/60 (11-29-20 @ 12:49) (107/53 - 129/62)  RR: 18 (11-29-20 @ 12:49) (18 - 18)  SpO2: 95% (11-29-20 @ 12:49) (93% - 97%)            ******************************** PHYSICAL EXAM:**************************************************  GENERAL: NAD    PSYCH: no agitation, baseline mentation  HEENT:     NERVOUS SYSTEM:  Alert & Oriented X3,   PULMONARY: OPAL, CTA    CARDIOVASCULAR: S1S2 RRR    GI: Soft, NT, ND; BS present.    EXTREMITIES:  2+ Peripheral Pulses, No clubbing, cyanosis, or edema    LYMPH: No lymphadenopathy noted    SKIN: No rashes or lesions      **************************** LABS *******************************************************                          12.3   3.87  )-----------( 274      ( 28 Nov 2020 06:16 )             38.6     11-28    139  |  103  |  20  ----------------------------<  76  4.1   |  24  |  0.5<L>    Ca    8.3<L>      28 Nov 2020 06:16    TPro  6.5  /  Alb  3.2<L>  /  TBili  0.4  /  DBili  x   /  AST  22  /  ALT  18  /  AlkPhos  58  11-28          Lactate Trend  11-26 @ 13:26 Lactate:1.3         CAPILLARY BLOOD GLUCOSE              **************************Active Medications *******************************************  sulfa drugs (Unknown)      ALBUTerol    90 MICROgram(s) HFA Inhaler 2 Puff(s) Inhalation every 6 hours PRN  aspirin  chewable 81 milliGRAM(s) Oral daily  atorvastatin 20 milliGRAM(s) Oral at bedtime  azaTHIOprine 100 milliGRAM(s) Oral two times a day  budesonide  80 MICROgram(s)/formoterol 4.5 MICROgram(s) Inhaler 2 Puff(s) Inhalation two times a day  chlorhexidine 4% Liquid 1 Application(s) Topical daily  famotidine    Tablet 40 milliGRAM(s) Oral at bedtime  fluticasone propionate 50 MICROgram(s)/spray Nasal Spray 1 Spray(s) Both Nostrils two times a day  heparin   Injectable 5000 Unit(s) SubCutaneous every 8 hours  levothyroxine 50 MICROGram(s) Oral daily  metoprolol succinate ER 25 milliGRAM(s) Oral daily  predniSONE   Tablet 10 milliGRAM(s) Oral daily  pyridostigmine 90 milliGRAM(s) Oral four times a day      ***************************************************  RADIOLOGY & ADDITIONAL TESTS:    Imaging Personally Reviewed:  [ ] YES  [ ] NO    HEALTH ISSUES - PROBLEM Dx:

## 2020-11-29 NOTE — DISCHARGE NOTE PROVIDER - NSDCPNSUBOBJ_GEN_ALL_CORE
<<<RESIDENT DISCHARGE NOTE>>>     GHADA ROPER  MRN-[id number]    VITAL SIGNS:  T(F): 97 (11-29-20 @ 12:49), Max: 97.2 (11-28-20 @ 16:00)  HR: 70 (11-29-20 @ 12:49)  BP: 122/60 (11-29-20 @ 12:49)  SpO2: 95% (11-29-20 @ 12:49)  Weight (kg): 87.4 (11-28-20 @ 20:41)    PHYSICAL EXAMINATION:  General: NAD  Head & Neck: NCAT  Pulmonary: audible breath sounds b/l  Cardiovascular: regular rate and rhythm   Gastrointestinal/Abdomen & Pelvis: soft nontender nondistended abdomen   Neurologic/Motor: AOx3    TEST RESULTS:                        12.3   3.87  )-----------( 274      ( 28 Nov 2020 06:16 )             38.6       11-28    139  |  103  |  20  ----------------------------<  76  4.1   |  24  |  0.5<L>    Ca    8.3<L>      28 Nov 2020 06:16    TPro  6.5  /  Alb  3.2<L>  /  TBili  0.4  /  DBili  x   /  AST  22  /  ALT  18  /  AlkPhos  58  11-28      FINAL DISCHARGE INTERVIEW:  Resident(s) Present: (Name: KIKE Cormier Present: (Name:  ___________)    DISCHARGE MEDICATION RECONCILIATION  reviewed with Attending (Name: Dr. Grijalva)    DISPOSITION:   [  ] Home,    [  ] Home with Visiting Nursing Services,   [    ]  SNF/ NH,    [   ] Acute Rehab (4A),   [   ] Other (Specify:_________)

## 2020-11-29 NOTE — DISCHARGE NOTE PROVIDER - NSDCMRMEDTOKEN_GEN_ALL_CORE_FT
albuterol 90 mcg/inh inhalation powder: 2 puff(s) inhaled every 6 hours, As Needed  aspirin 81 mg oral tablet, chewable: 1 tab(s) orally once a day  atorvastatin 20 mg oral tablet: 1 tab(s) orally once a day  doxepin 10 mg oral capsule:   fluticasone 50 mcg/inh nasal spray: 1 spray(s) nasal once a day  Imuran 50 mg oral tablet: 1 tab(s) orally 3 times a day  Mestinon 60 mg oral tablet: 1.5 tab(s) orally 4 times a day  ondansetron 4 mg oral disintegrating strip: 1 each orally every 6 hours, As Needed -for nausea or vomiting.  Pepcid 40 mg oral tablet: 1 tab(s) orally once a day (at bedtime)  predniSONE 50 mg oral tablet: 1 tab(s) orally once a day  Protonix 40 mg oral delayed release tablet: 1 tab(s) orally once a day  Symbicort: inhaled 2 times a day  Synthroid 50 mcg (0.05 mg) oral tablet: 1 tab(s) orally once a day   Toprol-XL 25 mg oral tablet, extended release: 1 tab(s) orally once a day  Vitamin D2: every friday   albuterol 90 mcg/inh inhalation powder: 2 puff(s) inhaled every 6 hours, As Needed  aspirin 81 mg oral tablet, chewable: 1 tab(s) orally once a day  atorvastatin 20 mg oral tablet: 1 tab(s) orally once a day  doxepin 10 mg oral capsule:   fluticasone 50 mcg/inh nasal spray: 1 spray(s) nasal once a day  Imuran 50 mg oral tablet: 1 tab(s) orally 3 times a day  Mestinon 60 mg oral tablet: 1.5 tab(s) orally 4 times a day  Pepcid 40 mg oral tablet: 1 tab(s) orally once a day (at bedtime)  predniSONE 50 mg oral tablet: 1 tab(s) orally once a day  Protonix 40 mg oral delayed release tablet: 1 tab(s) orally once a day  Symbicort: inhaled 2 times a day  Synthroid 50 mcg (0.05 mg) oral tablet: 1 tab(s) orally once a day   Toprol-XL 25 mg oral tablet, extended release: 1 tab(s) orally once a day  Vitamin D2: every friday

## 2020-11-29 NOTE — DISCHARGE NOTE NURSING/CASE MANAGEMENT/SOCIAL WORK - PATIENT PORTAL LINK FT
You can access the FollowMyHealth Patient Portal offered by [facility] by registering at the following website: [email or web address]. By joining [employer]’s FollowMyHealth portal, you will also be able to view your health information using other applications (apps) compatible with our system.

## 2020-11-29 NOTE — DISCHARGE NOTE PROVIDER - HOSPITAL COURSE
60y old female with pmhx of High blood cholesterol, Myasthenia gravis, Pancreatitis, postural orthostatic tachycardia syndrome, presents to the ED complaining of progressive and worsening generalised bodyache, headache, fatigue, generalised weakness, fever, chills since testing positive for covid-19 since the wednesday prior admission. Patient states stayed home hoping to get better but now with shortness of breath worse on exertion. pt also admits to decrease appetite, n/v, and diarrhea since 2days ago, but denies chest pain/abdominal pain. pt missed her treatment of IV IG for myasthenia gravis this week due to covid infection. The patient does not require O2 on this admission. Neurology was consulted to clear patient for IVIG and azathioprine, which she ultimately received. Currently the patient is stable and amenable to discharge home.

## 2020-11-30 LAB
CRP SERPL-MCNC: 1.63 MG/DL — HIGH (ref 0–0.4)
PROCALCITONIN SERPL-MCNC: 0.04 NG/ML — SIGNIFICANT CHANGE UP (ref 0.02–0.1)

## 2020-12-02 DIAGNOSIS — Z88.2 ALLERGY STATUS TO SULFONAMIDES: ICD-10-CM

## 2020-12-02 DIAGNOSIS — U07.1 COVID-19: ICD-10-CM

## 2020-12-02 DIAGNOSIS — N17.9 ACUTE KIDNEY FAILURE, UNSPECIFIED: ICD-10-CM

## 2020-12-02 DIAGNOSIS — E78.5 HYPERLIPIDEMIA, UNSPECIFIED: ICD-10-CM

## 2020-12-02 DIAGNOSIS — Z79.82 LONG TERM (CURRENT) USE OF ASPIRIN: ICD-10-CM

## 2020-12-02 DIAGNOSIS — R30.0 DYSURIA: ICD-10-CM

## 2020-12-02 DIAGNOSIS — E03.9 HYPOTHYROIDISM, UNSPECIFIED: ICD-10-CM

## 2020-12-02 DIAGNOSIS — G70.00 MYASTHENIA GRAVIS WITHOUT (ACUTE) EXACERBATION: ICD-10-CM

## 2020-12-02 DIAGNOSIS — R19.7 DIARRHEA, UNSPECIFIED: ICD-10-CM

## 2020-12-02 DIAGNOSIS — I49.8 OTHER SPECIFIED CARDIAC ARRHYTHMIAS: ICD-10-CM

## 2020-12-02 DIAGNOSIS — Z79.52 LONG TERM (CURRENT) USE OF SYSTEMIC STEROIDS: ICD-10-CM

## 2020-12-14 ENCOUNTER — TRANSCRIPTION ENCOUNTER (OUTPATIENT)
Age: 60
End: 2020-12-14

## 2020-12-16 NOTE — PATIENT PROFILE ADULT - DISASTER - FALL HARM RISK TYPE OF ASSESSMENT
Continue weights and use consistent measuring, either just in wheelchair or just standing. Update me on 12/21  BMP in one week - CKD, CHF  
Fax received from [facility] regarding Pt  Pt location at SNF: [facility]  Fax sent by:     Fax regarding concern: Lab results    Assessment: Pt had following labs: BMP, BNP    Results:        Any other lab results:       Any recommendations or new orders?      
Fax received from [facility] regarding Pt  Pt location at SNF: [facility]  Fax sent by:     Fax regarding concern: weights    Assessment:         Any recommendations or new orders?      
Noted.   Forwarded to [facility].    
Noted.   Forwarded to [facility].    
Please fax over weights as well,  Thanks!  
admission

## 2020-12-29 ENCOUNTER — OUTPATIENT (OUTPATIENT)
Dept: OUTPATIENT SERVICES | Facility: HOSPITAL | Age: 60
LOS: 1 days | Discharge: HOME | End: 2020-12-29

## 2020-12-29 DIAGNOSIS — Z98.890 OTHER SPECIFIED POSTPROCEDURAL STATES: Chronic | ICD-10-CM

## 2021-01-05 ENCOUNTER — TRANSCRIPTION ENCOUNTER (OUTPATIENT)
Age: 61
End: 2021-01-05

## 2021-01-19 ENCOUNTER — OUTPATIENT (OUTPATIENT)
Dept: OUTPATIENT SERVICES | Facility: HOSPITAL | Age: 61
LOS: 1 days | Discharge: HOME | End: 2021-01-19
Payer: MEDICAID

## 2021-01-19 ENCOUNTER — APPOINTMENT (OUTPATIENT)
Dept: OPHTHALMOLOGY | Facility: CLINIC | Age: 61
End: 2021-01-19

## 2021-01-19 DIAGNOSIS — Z98.890 OTHER SPECIFIED POSTPROCEDURAL STATES: Chronic | ICD-10-CM

## 2021-01-19 PROCEDURE — 92012 INTRM OPH EXAM EST PATIENT: CPT

## 2021-01-22 ENCOUNTER — APPOINTMENT (OUTPATIENT)
Dept: GASTROENTEROLOGY | Facility: CLINIC | Age: 61
End: 2021-01-22

## 2021-01-25 ENCOUNTER — INPATIENT (INPATIENT)
Facility: HOSPITAL | Age: 61
LOS: 9 days | Discharge: HOME | End: 2021-02-04
Attending: INTERNAL MEDICINE | Admitting: INTERNAL MEDICINE
Payer: MEDICAID

## 2021-01-25 VITALS
OXYGEN SATURATION: 100 % | RESPIRATION RATE: 19 BRPM | HEART RATE: 62 BPM | SYSTOLIC BLOOD PRESSURE: 150 MMHG | HEIGHT: 65 IN | TEMPERATURE: 97 F | WEIGHT: 207.9 LBS | DIASTOLIC BLOOD PRESSURE: 69 MMHG

## 2021-01-25 DIAGNOSIS — Z98.890 OTHER SPECIFIED POSTPROCEDURAL STATES: Chronic | ICD-10-CM

## 2021-01-25 LAB
ALBUMIN SERPL ELPH-MCNC: 3.8 G/DL — SIGNIFICANT CHANGE UP (ref 3.5–5.2)
ALP SERPL-CCNC: 217 U/L — HIGH (ref 30–115)
ALT FLD-CCNC: 36 U/L — SIGNIFICANT CHANGE UP (ref 0–41)
AMYLASE P1 CFR SERPL: 152 U/L — HIGH (ref 25–115)
ANION GAP SERPL CALC-SCNC: 6 MMOL/L — LOW (ref 7–14)
AST SERPL-CCNC: 54 U/L — HIGH (ref 0–41)
BASE EXCESS BLDV CALC-SCNC: 6.5 MMOL/L — HIGH (ref -2–2)
BASOPHILS # BLD AUTO: 0.02 K/UL — SIGNIFICANT CHANGE UP (ref 0–0.2)
BASOPHILS NFR BLD AUTO: 0.4 % — SIGNIFICANT CHANGE UP (ref 0–1)
BILIRUB SERPL-MCNC: 1.2 MG/DL — SIGNIFICANT CHANGE UP (ref 0.2–1.2)
BUN SERPL-MCNC: 24 MG/DL — HIGH (ref 10–20)
CA-I SERPL-SCNC: 1.15 MMOL/L — SIGNIFICANT CHANGE UP (ref 1.12–1.3)
CALCIUM SERPL-MCNC: 9.3 MG/DL — SIGNIFICANT CHANGE UP (ref 8.5–10.1)
CHLORIDE SERPL-SCNC: 102 MMOL/L — SIGNIFICANT CHANGE UP (ref 98–110)
CO2 SERPL-SCNC: 31 MMOL/L — SIGNIFICANT CHANGE UP (ref 17–32)
CREAT SERPL-MCNC: 0.8 MG/DL — SIGNIFICANT CHANGE UP (ref 0.7–1.5)
D DIMER BLD IA.RAPID-MCNC: 258 NG/ML DDU — HIGH (ref 0–230)
EOSINOPHIL # BLD AUTO: 0.03 K/UL — SIGNIFICANT CHANGE UP (ref 0–0.7)
EOSINOPHIL NFR BLD AUTO: 0.6 % — SIGNIFICANT CHANGE UP (ref 0–8)
GAS PNL BLDV: 143 MMOL/L — SIGNIFICANT CHANGE UP (ref 136–145)
GAS PNL BLDV: SIGNIFICANT CHANGE UP
GLUCOSE SERPL-MCNC: 105 MG/DL — HIGH (ref 70–99)
HCO3 BLDV-SCNC: 33 MMOL/L — HIGH (ref 22–29)
HCT VFR BLD CALC: 40.4 % — SIGNIFICANT CHANGE UP (ref 37–47)
HCT VFR BLDA CALC: 41.6 % — SIGNIFICANT CHANGE UP (ref 34–44)
HGB BLD CALC-MCNC: 13.6 G/DL — LOW (ref 14–18)
HGB BLD-MCNC: 12.8 G/DL — SIGNIFICANT CHANGE UP (ref 12–16)
HOROWITZ INDEX BLDV+IHG-RTO: 21 — SIGNIFICANT CHANGE UP
IMM GRANULOCYTES NFR BLD AUTO: 0.4 % — HIGH (ref 0.1–0.3)
LACTATE BLDV-MCNC: 1.3 MMOL/L — SIGNIFICANT CHANGE UP (ref 0.5–1.6)
LIDOCAIN IGE QN: 73 U/L — HIGH (ref 7–60)
LYMPHOCYTES # BLD AUTO: 1.19 K/UL — LOW (ref 1.2–3.4)
LYMPHOCYTES # BLD AUTO: 22.1 % — SIGNIFICANT CHANGE UP (ref 20.5–51.1)
MCHC RBC-ENTMCNC: 31.1 PG — HIGH (ref 27–31)
MCHC RBC-ENTMCNC: 31.7 G/DL — LOW (ref 32–37)
MCV RBC AUTO: 98.3 FL — SIGNIFICANT CHANGE UP (ref 81–99)
MONOCYTES # BLD AUTO: 0.47 K/UL — SIGNIFICANT CHANGE UP (ref 0.1–0.6)
MONOCYTES NFR BLD AUTO: 8.7 % — SIGNIFICANT CHANGE UP (ref 1.7–9.3)
NEUTROPHILS # BLD AUTO: 3.66 K/UL — SIGNIFICANT CHANGE UP (ref 1.4–6.5)
NEUTROPHILS NFR BLD AUTO: 67.8 % — SIGNIFICANT CHANGE UP (ref 42.2–75.2)
NRBC # BLD: 0 /100 WBCS — SIGNIFICANT CHANGE UP (ref 0–0)
NT-PROBNP SERPL-SCNC: 43 PG/ML — SIGNIFICANT CHANGE UP (ref 0–300)
PCO2 BLDV: 57 MMHG — HIGH (ref 41–51)
PH BLDV: 7.38 — SIGNIFICANT CHANGE UP (ref 7.26–7.43)
PLATELET # BLD AUTO: 353 K/UL — SIGNIFICANT CHANGE UP (ref 130–400)
PO2 BLDV: 22 MMHG — SIGNIFICANT CHANGE UP (ref 20–40)
POTASSIUM BLDV-SCNC: 3.8 MMOL/L — SIGNIFICANT CHANGE UP (ref 3.3–5.6)
POTASSIUM SERPL-MCNC: 3.7 MMOL/L — SIGNIFICANT CHANGE UP (ref 3.5–5)
POTASSIUM SERPL-SCNC: 3.7 MMOL/L — SIGNIFICANT CHANGE UP (ref 3.5–5)
PROT SERPL-MCNC: 7.8 G/DL — SIGNIFICANT CHANGE UP (ref 6–8)
RAPID RVP RESULT: SIGNIFICANT CHANGE UP
RBC # BLD: 4.11 M/UL — LOW (ref 4.2–5.4)
RBC # FLD: 15.8 % — HIGH (ref 11.5–14.5)
SAO2 % BLDV: 38 % — SIGNIFICANT CHANGE UP
SARS-COV-2 RNA SPEC QL NAA+PROBE: SIGNIFICANT CHANGE UP
SODIUM SERPL-SCNC: 139 MMOL/L — SIGNIFICANT CHANGE UP (ref 135–146)
TROPONIN T SERPL-MCNC: <0.01 NG/ML — SIGNIFICANT CHANGE UP
WBC # BLD: 5.39 K/UL — SIGNIFICANT CHANGE UP (ref 4.8–10.8)
WBC # FLD AUTO: 5.39 K/UL — SIGNIFICANT CHANGE UP (ref 4.8–10.8)

## 2021-01-25 PROCEDURE — 99223 1ST HOSP IP/OBS HIGH 75: CPT

## 2021-01-25 PROCEDURE — 99285 EMERGENCY DEPT VISIT HI MDM: CPT

## 2021-01-25 PROCEDURE — 71045 X-RAY EXAM CHEST 1 VIEW: CPT | Mod: 26

## 2021-01-25 RX ORDER — ASPIRIN/CALCIUM CARB/MAGNESIUM 324 MG
81 TABLET ORAL DAILY
Refills: 0 | Status: DISCONTINUED | OUTPATIENT
Start: 2021-01-25 | End: 2021-02-04

## 2021-01-25 RX ORDER — ATORVASTATIN CALCIUM 80 MG/1
20 TABLET, FILM COATED ORAL AT BEDTIME
Refills: 0 | Status: DISCONTINUED | OUTPATIENT
Start: 2021-01-25 | End: 2021-02-04

## 2021-01-25 RX ORDER — PANTOPRAZOLE SODIUM 20 MG/1
40 TABLET, DELAYED RELEASE ORAL
Refills: 0 | Status: DISCONTINUED | OUTPATIENT
Start: 2021-01-25 | End: 2021-02-04

## 2021-01-25 RX ORDER — PYRIDOSTIGMINE BROMIDE 60 MG/5ML
90 SOLUTION ORAL
Refills: 0 | Status: DISCONTINUED | OUTPATIENT
Start: 2021-01-25 | End: 2021-02-04

## 2021-01-25 RX ORDER — AZATHIOPRINE 100 MG/1
50 TABLET ORAL EVERY 12 HOURS
Refills: 0 | Status: DISCONTINUED | OUTPATIENT
Start: 2021-01-25 | End: 2021-01-25

## 2021-01-25 RX ORDER — PYRIDOSTIGMINE BROMIDE 60 MG/5ML
90 SOLUTION ORAL ONCE
Refills: 0 | Status: COMPLETED | OUTPATIENT
Start: 2021-01-25 | End: 2021-01-25

## 2021-01-25 RX ORDER — BUDESONIDE AND FORMOTEROL FUMARATE DIHYDRATE 160; 4.5 UG/1; UG/1
2 AEROSOL RESPIRATORY (INHALATION)
Refills: 0 | Status: DISCONTINUED | OUTPATIENT
Start: 2021-01-25 | End: 2021-02-04

## 2021-01-25 RX ORDER — AZATHIOPRINE 100 MG/1
50 TABLET ORAL
Refills: 0 | Status: DISCONTINUED | OUTPATIENT
Start: 2021-01-25 | End: 2021-01-26

## 2021-01-25 RX ORDER — METOPROLOL TARTRATE 50 MG
25 TABLET ORAL DAILY
Refills: 0 | Status: DISCONTINUED | OUTPATIENT
Start: 2021-01-25 | End: 2021-02-04

## 2021-01-25 RX ORDER — ALBUTEROL 90 UG/1
2 AEROSOL, METERED ORAL EVERY 6 HOURS
Refills: 0 | Status: DISCONTINUED | OUTPATIENT
Start: 2021-01-25 | End: 2021-02-04

## 2021-01-25 RX ORDER — INFLUENZA VIRUS VACCINE 15; 15; 15; 15 UG/.5ML; UG/.5ML; UG/.5ML; UG/.5ML
0.5 SUSPENSION INTRAMUSCULAR ONCE
Refills: 0 | Status: COMPLETED | OUTPATIENT
Start: 2021-01-25 | End: 2021-01-25

## 2021-01-25 RX ORDER — KETOROLAC TROMETHAMINE 30 MG/ML
15 SYRINGE (ML) INJECTION ONCE
Refills: 0 | Status: DISCONTINUED | OUTPATIENT
Start: 2021-01-25 | End: 2021-01-25

## 2021-01-25 RX ORDER — ENOXAPARIN SODIUM 100 MG/ML
40 INJECTION SUBCUTANEOUS DAILY
Refills: 0 | Status: DISCONTINUED | OUTPATIENT
Start: 2021-01-25 | End: 2021-02-04

## 2021-01-25 RX ORDER — AZATHIOPRINE 100 MG/1
50 TABLET ORAL AT BEDTIME
Refills: 0 | Status: DISCONTINUED | OUTPATIENT
Start: 2021-01-25 | End: 2021-01-25

## 2021-01-25 RX ORDER — LEVOTHYROXINE SODIUM 125 MCG
50 TABLET ORAL DAILY
Refills: 0 | Status: DISCONTINUED | OUTPATIENT
Start: 2021-01-25 | End: 2021-02-04

## 2021-01-25 RX ORDER — ACETAMINOPHEN 500 MG
650 TABLET ORAL ONCE
Refills: 0 | Status: COMPLETED | OUTPATIENT
Start: 2021-01-25 | End: 2021-01-25

## 2021-01-25 RX ORDER — FAMOTIDINE 10 MG/ML
40 INJECTION INTRAVENOUS AT BEDTIME
Refills: 0 | Status: DISCONTINUED | OUTPATIENT
Start: 2021-01-25 | End: 2021-02-04

## 2021-01-25 RX ADMIN — AZATHIOPRINE 50 MILLIGRAM(S): 100 TABLET ORAL at 21:32

## 2021-01-25 RX ADMIN — BUDESONIDE AND FORMOTEROL FUMARATE DIHYDRATE 2 PUFF(S): 160; 4.5 AEROSOL RESPIRATORY (INHALATION) at 21:35

## 2021-01-25 RX ADMIN — PYRIDOSTIGMINE BROMIDE 90 MILLIGRAM(S): 60 SOLUTION ORAL at 15:41

## 2021-01-25 RX ADMIN — Medication 15 MILLIGRAM(S): at 14:14

## 2021-01-25 RX ADMIN — Medication 40 MILLIGRAM(S): at 15:40

## 2021-01-25 RX ADMIN — FAMOTIDINE 40 MILLIGRAM(S): 10 INJECTION INTRAVENOUS at 21:32

## 2021-01-25 RX ADMIN — Medication 650 MILLIGRAM(S): at 14:14

## 2021-01-25 RX ADMIN — ATORVASTATIN CALCIUM 20 MILLIGRAM(S): 80 TABLET, FILM COATED ORAL at 21:32

## 2021-01-25 NOTE — ED PROVIDER NOTE - OBJECTIVE STATEMENT
Pt is a 60y old female with pmhx of High blood cholesterol, Myasthenia gravis ( w/ flares requiring hospitalizations and plasmapheresis , no intubations) , Pancreatitis, postural orthostatic tachycardia syndrome,  COVID infxn ( nov 2020)  presenting to the ED complaining of worsening SOB x 1 day. PT reports SOB is worse w/ exertion  and reports that she had difficulty reading her grandkids a bedtime story. No CP , No fevers, chills or cough Pt is a 60y old female with pmhx of High blood cholesterol, Myasthenia gravis ( w/ flares requiring hospitalizations and plasmapheresis , no intubations) , Pancreatitis, postural orthostatic tachycardia syndrome,  COVID infxn ( nov 2020)  presenting to the ED complaining of worsening SOB x 1 day. Pt reports SOB is worse w/ exertion  and reports that she had difficulty reading her grandkids a bedtime story.  Per pt " this feels like previous flares"  No CP , No fevers, chills or cough.  No syncope . No unilateral leg swelling.

## 2021-01-25 NOTE — ED ADULT NURSE NOTE - NSIMPLEMENTINTERV_GEN_ALL_ED
Implemented All Fall with Harm Risk Interventions:  [place] to call system. Call bell, personal items and telephone within reach. Instruct patient to call for assistance. Room bathroom lighting operational. Non-slip footwear when patient is off stretcher. Physically safe environment: no spills, clutter or unnecessary equipment. Stretcher in lowest position, wheels locked, appropriate side rails in place. Provide visual cue, wrist band, yellow gown, etc. Monitor gait and stability. Monitor for mental status changes and reorient to person, place, and time. Review medications for side effects contributing to fall risk. Reinforce activity limits and safety measures with patient and family. Provide visual clues: red socks.

## 2021-01-25 NOTE — H&P ADULT - NSHPPHYSICALEXAM_GEN_ALL_CORE
PHYSICAL EXAM:  Vital Signs Last 24 Hrs  T(C): 36.7 (25 Jan 2021 14:14), Max: 36.7 (25 Jan 2021 14:14)  T(F): 98 (25 Jan 2021 14:14), Max: 98 (25 Jan 2021 14:14)  HR: 59 (25 Jan 2021 14:14) (59 - 62)  BP: 132/60 (25 Jan 2021 14:14) (132/60 - 150/69)  BP(mean): --  RR: 18 (25 Jan 2021 14:14) (18 - 19)  SpO2: 100% (25 Jan 2021 14:14) (100% - 100%)  GENERAL: NAD, well-groomed, well-developed  HEAD:  Atraumatic, Normocephalic  EYES: EOMI, PERRLA, conjunctiva and sclera clear  ENMT: No tonsillar erythema, exudates, or enlargement; Moist mucous membranes, Good dentition, No lesions  NECK: Supple, No JVD, Normal thyroid  NERVOUS SYSTEM:  Alert & Oriented X3, Good concentration; Motor Strength 5/5 B/L upper and lower extremities; DTRs 2+ intact and symmetric, no eyelid drooping, VC - 50  CHEST/LUNG: Clear to percussion bilaterally; No rales, rhonchi, wheezing, or rubs  HEART: Regular rate and rhythm; No murmurs, rubs, or gallops  ABDOMEN: Soft, Nontender, Nondistended; Bowel sounds present  EXTREMITIES:  2+ Peripheral Pulses, No clubbing, cyanosis, or edema  LYMPH: No lymphadenopathy noted  SKIN: No rashes or lesions

## 2021-01-25 NOTE — ED PROVIDER NOTE - ATTENDING CONTRIBUTION TO CARE
pt with progressive SOB.  pt has a hx of MG.  she is comfortable at rest, but is SOB reading a book to her grandchildren and at minimal exertion.  no cough, fever.  Gets IVIG q 3 weeks.  last tx 1/13.  currently on prednisone 10 mg.  VS noted.  Chest clear.  Heart RR no murmur.  abd NT.  Ext FROM.  =pulses. dx testing reviewed.   initially CTA ordered because mildly abnl d-dinmer, but dr. daniel cautioned agianst this plan unless high suspicion for PE exists.  I do not have high suspicion.  dr. daniel advises unit admission, NIP and VC q 6 hrs.  prednisone to 40 mg.  will require IVIG.  d/w dr. obyce and dr. puente. pt with progressive SOB.  pt has a hx of MG.  she is comfortable at rest, but is SOB reading a book to her grandchildren and at minimal exertion.  no cough, fever.  Gets IVIG q 3 weeks.  last tx 1/13.  currently on prednisone 10 mg.  VS noted.  Chest clear.  Heart RR no murmur.  abd NT.  Ext FROM.  =pulses. dx testing reviewed.   initially CTA ordered because mildly abnl d-dimer, but dr. daniel cautioned against this plan unless high suspicion for PE exists.  I do not have high suspicion.  dr. daniel advises unit admission, NIP and VC q 6 hrs.  prednisone increased to 40 mg.  will require IVIG.  d/w dr. boyce and dr. puente.

## 2021-01-25 NOTE — ED PROVIDER NOTE - PHYSICAL EXAMINATION
CONSTITUTIONAL: Well-developed; well-nourished; in no acute distress.   SKIN: warm, dry  HEAD: Normocephalic; atraumatic.  EYES: PERRL, EOMI, normal sclera and conjunctiva   ENT: No nasal discharge; airway clear.  NECK: Supple; non tender.  CARD:  Regular rate and rhythm.   RESP: No accessory muscle use , some difficulty speaking in full sentences, 100% on RA ,            NIF : -50 ; VItal capacity : 2.8 L   ABD: soft ntnd  EXT: Normal ROM.    LYMPH: No acute cervical adenopathy.  NEURO: Alert, oriented, grossly unremarkable  PSYCH: Cooperative, appropriate.

## 2021-01-25 NOTE — H&P ADULT - NSHPREVIEWOFSYSTEMS_GEN_ALL_CORE
CONSTITUTIONAL: No weakness, fevers or chills  EYES/ENT: No visual changes;  No vertigo or throat pain   NECK: No pain or stiffness  RESPIRATORY:  shortness of breath with minimal exertion   CARDIOVASCULAR: No chest pain or palpitations  GASTROINTESTINAL: No abdominal or epigastric pain. No nausea, vomiting, or hematemesis; No diarrhea or constipation. No melena or hematochezia.  GENITOURINARY: No dysuria, frequency or hematuria  NEUROLOGICAL: No numbness or weakness  SKIN: No itching, rashes

## 2021-01-25 NOTE — H&P ADULT - ASSESSMENT
60y old female with pmhx of High blood cholesterol, Myasthenia gravis ( w/ flares requiring hospitalizations and plasmapheresis , no intubations) , Pancreatitis, postural orthostatic tachycardia syndrome,  COVID infxn ( nov 2020)  presenting to the ED complaining of worsening SOB x 1 day. Pt reports SOB is worse w/ exertion  and reports that she had difficulty reading her grandkids a bedtime story.  Per pt " this feels like previous flares"  No CP , No fevers, chills or cough.  No syncope . No unilateral leg swelling     Myasthenia gravis flare     - prednisone 40mg daily   - monitor in unit   - check VC and MIP q6h   - neuro consult   - may need IVIG   - continue home meds   - DVT prophylaxis

## 2021-01-25 NOTE — H&P ADULT - NSHPLABSRESULTS_GEN_ALL_CORE
LABS  01-25    139  |  102  |  24<H>  ----------------------------<  105<H>  3.7   |  31  |  0.8    Ca    9.3      25 Jan 2021 12:24    TPro  7.8  /  Alb  3.8  /  TBili  1.2  /  DBili  x   /  AST  54<H>  /  ALT  36  /  AlkPhos  217<H>  01-25                          12.8   5.39  )-----------( 353      ( 25 Jan 2021 12:24 )             40.4     < from: Xray Chest 1 View- PORTABLE-Urgent (01.25.21 @ 13:08) >    Impression:    Bilateral lung opacities.    < end of copied text >      CARDIAC ENZYMES    Troponin T, Serum: <0.01 ng/mL (01.25.21 @ 12:24)

## 2021-01-26 ENCOUNTER — NON-APPOINTMENT (OUTPATIENT)
Age: 61
End: 2021-01-26

## 2021-01-26 LAB
ALBUMIN SERPL ELPH-MCNC: 3.6 G/DL — SIGNIFICANT CHANGE UP (ref 3.5–5.2)
ALP SERPL-CCNC: 187 U/L — HIGH (ref 30–115)
ALT FLD-CCNC: 34 U/L — SIGNIFICANT CHANGE UP (ref 0–41)
ANION GAP SERPL CALC-SCNC: 9 MMOL/L — SIGNIFICANT CHANGE UP (ref 7–14)
AST SERPL-CCNC: 45 U/L — HIGH (ref 0–41)
BASE EXCESS BLDA CALC-SCNC: 0.2 MMOL/L — SIGNIFICANT CHANGE UP (ref -2–2)
BILIRUB SERPL-MCNC: 1 MG/DL — SIGNIFICANT CHANGE UP (ref 0.2–1.2)
BUN SERPL-MCNC: 29 MG/DL — HIGH (ref 10–20)
CALCIUM SERPL-MCNC: 9.1 MG/DL — SIGNIFICANT CHANGE UP (ref 8.5–10.1)
CHLORIDE SERPL-SCNC: 106 MMOL/L — SIGNIFICANT CHANGE UP (ref 98–110)
CO2 SERPL-SCNC: 24 MMOL/L — SIGNIFICANT CHANGE UP (ref 17–32)
CREAT SERPL-MCNC: 0.7 MG/DL — SIGNIFICANT CHANGE UP (ref 0.7–1.5)
GLUCOSE SERPL-MCNC: 124 MG/DL — HIGH (ref 70–99)
HCO3 BLDA-SCNC: 24 MMOL/L — SIGNIFICANT CHANGE UP (ref 23–27)
HCT VFR BLD CALC: 38.8 % — SIGNIFICANT CHANGE UP (ref 37–47)
HGB BLD-MCNC: 12.4 G/DL — SIGNIFICANT CHANGE UP (ref 12–16)
HOROWITZ INDEX BLDA+IHG-RTO: 21 — SIGNIFICANT CHANGE UP
MCHC RBC-ENTMCNC: 31.7 PG — HIGH (ref 27–31)
MCHC RBC-ENTMCNC: 32 G/DL — SIGNIFICANT CHANGE UP (ref 32–37)
MCV RBC AUTO: 99.2 FL — HIGH (ref 81–99)
NRBC # BLD: 0 /100 WBCS — SIGNIFICANT CHANGE UP (ref 0–0)
PCO2 BLDA: 36 MMHG — LOW (ref 38–42)
PH BLDA: 7.43 — HIGH (ref 7.38–7.42)
PLATELET # BLD AUTO: 339 K/UL — SIGNIFICANT CHANGE UP (ref 130–400)
PO2 BLDA: 87 MMHG — SIGNIFICANT CHANGE UP (ref 78–95)
POTASSIUM SERPL-MCNC: 4.8 MMOL/L — SIGNIFICANT CHANGE UP (ref 3.5–5)
POTASSIUM SERPL-SCNC: 4.8 MMOL/L — SIGNIFICANT CHANGE UP (ref 3.5–5)
PROT SERPL-MCNC: 7.3 G/DL — SIGNIFICANT CHANGE UP (ref 6–8)
RBC # BLD: 3.91 M/UL — LOW (ref 4.2–5.4)
RBC # FLD: 15.6 % — HIGH (ref 11.5–14.5)
SAO2 % BLDA: 98 % — SIGNIFICANT CHANGE UP (ref 94–98)
SODIUM SERPL-SCNC: 139 MMOL/L — SIGNIFICANT CHANGE UP (ref 135–146)
WBC # BLD: 7.62 K/UL — SIGNIFICANT CHANGE UP (ref 4.8–10.8)
WBC # FLD AUTO: 7.62 K/UL — SIGNIFICANT CHANGE UP (ref 4.8–10.8)

## 2021-01-26 PROCEDURE — 99223 1ST HOSP IP/OBS HIGH 75: CPT

## 2021-01-26 PROCEDURE — 71045 X-RAY EXAM CHEST 1 VIEW: CPT | Mod: 26

## 2021-01-26 PROCEDURE — 99233 SBSQ HOSP IP/OBS HIGH 50: CPT

## 2021-01-26 RX ORDER — NEISSERIA MENINGITIDIS SEROGROUP B NHBA FUSION PROTEIN ANTIGEN, NEISSERIA MENINGITIDIS SEROGROUP B FHBP FUSION PROTEIN ANTIGEN AND NEISSERIA MENINGITIDIS SEROGROUP B NADA PROTEIN ANTIGEN 50; 50; 50; 25 UG/.5ML; UG/.5ML; UG/.5ML; UG/.5ML
0.5 INJECTION, SUSPENSION INTRAMUSCULAR ONCE
Refills: 0 | Status: COMPLETED | OUTPATIENT
Start: 2021-01-26 | End: 2021-01-26

## 2021-01-26 RX ORDER — CHLORHEXIDINE GLUCONATE 213 G/1000ML
1 SOLUTION TOPICAL
Refills: 0 | Status: DISCONTINUED | OUTPATIENT
Start: 2021-01-26 | End: 2021-02-04

## 2021-01-26 RX ORDER — IBUPROFEN 200 MG
400 TABLET ORAL EVERY 6 HOURS
Refills: 0 | Status: DISCONTINUED | OUTPATIENT
Start: 2021-01-26 | End: 2021-02-04

## 2021-01-26 RX ORDER — AZATHIOPRINE 100 MG/1
50 TABLET ORAL ONCE
Refills: 0 | Status: COMPLETED | OUTPATIENT
Start: 2021-01-26 | End: 2021-01-26

## 2021-01-26 RX ORDER — AZATHIOPRINE 100 MG/1
100 TABLET ORAL
Refills: 0 | Status: DISCONTINUED | OUTPATIENT
Start: 2021-01-26 | End: 2021-01-27

## 2021-01-26 RX ORDER — IMMUNE GLOBULIN (HUMAN) 10 G/100ML
70 INJECTION INTRAVENOUS; SUBCUTANEOUS DAILY
Refills: 0 | Status: DISCONTINUED | OUTPATIENT
Start: 2021-01-26 | End: 2021-01-27

## 2021-01-26 RX ORDER — NEISSERIA MENINGITIDIS GROUP A CAPSULAR POLYSACCHARIDE TETANUS TOXOID CONJUGATE ANTIGEN, NEISSERIA MENINGITIDIS GROUP C CAPSULAR POLYSACCHARIDE TETANUS TOXOID CONJUGATE ANTIGEN, NEISSERIA MENINGITIDIS GROUP Y CAPSULAR POLYSACCHARIDE TETANUS TOXOID CONJUGATE ANTIGEN, AND NEISSERIA MENINGITIDIS GROUP W-135 CAPSULAR POLYSACCHARIDE TETANUS TOXOID CONJUGATE ANTIGEN 10; 10; 10; 10 UG/.5ML; UG/.5ML; UG/.5ML; UG/.5ML
0.5 INJECTION, SOLUTION INTRAMUSCULAR ONCE
Refills: 0 | Status: COMPLETED | OUTPATIENT
Start: 2021-01-26 | End: 2021-01-26

## 2021-01-26 RX ORDER — DIPHENHYDRAMINE HCL 50 MG
25 CAPSULE ORAL ONCE
Refills: 0 | Status: COMPLETED | OUTPATIENT
Start: 2021-01-26 | End: 2021-01-26

## 2021-01-26 RX ORDER — ACETAMINOPHEN 500 MG
650 TABLET ORAL ONCE
Refills: 0 | Status: COMPLETED | OUTPATIENT
Start: 2021-01-26 | End: 2021-01-26

## 2021-01-26 RX ORDER — NEISSERIA MENINGITIDIS GROUP A CAPSULAR POLYSACCHARIDE TETANUS TOXOID CONJUGATE ANTIGEN, NEISSERIA MENINGITIDIS GROUP C CAPSULAR POLYSACCHARIDE TETANUS TOXOID CONJUGATE ANTIGEN, NEISSERIA MENINGITIDIS GROUP Y CAPSULAR POLYSACCHARIDE TETANUS TOXOID CONJUGATE ANTIGEN, AND NEISSERIA MENINGITIDIS GROUP W-135 CAPSULAR POLYSACCHARIDE TETANUS TOXOID CONJUGATE ANTIGEN 10; 10; 10; 10 UG/.5ML; UG/.5ML; UG/.5ML; UG/.5ML
0.5 INJECTION, SOLUTION INTRAMUSCULAR ONCE
Refills: 0 | Status: DISCONTINUED | OUTPATIENT
Start: 2021-01-26 | End: 2021-01-26

## 2021-01-26 RX ADMIN — AZATHIOPRINE 50 MILLIGRAM(S): 100 TABLET ORAL at 05:15

## 2021-01-26 RX ADMIN — FAMOTIDINE 40 MILLIGRAM(S): 10 INJECTION INTRAVENOUS at 21:25

## 2021-01-26 RX ADMIN — IMMUNE GLOBULIN (HUMAN) 140 GRAM(S): 10 INJECTION INTRAVENOUS; SUBCUTANEOUS at 15:47

## 2021-01-26 RX ADMIN — NEISSERIA MENINGITIDIS GROUP A CAPSULAR POLYSACCHARIDE TETANUS TOXOID CONJUGATE ANTIGEN, NEISSERIA MENINGITIDIS GROUP C CAPSULAR POLYSACCHARIDE TETANUS TOXOID CONJUGATE ANTIGEN, NEISSERIA MENINGITIDIS GROUP Y CAPSULAR POLYSACCHARIDE TETANUS TOXOID CONJUGATE ANTIGEN, AND NEISSERIA MENINGITIDIS GROUP W-135 CAPSULAR POLYSACCHARIDE TETANUS TOXOID CONJUGATE ANTIGEN 0.5 MILLILITER(S): 10; 10; 10; 10 INJECTION, SOLUTION INTRAMUSCULAR at 18:47

## 2021-01-26 RX ADMIN — Medication 25 MILLIGRAM(S): at 15:03

## 2021-01-26 RX ADMIN — ATORVASTATIN CALCIUM 20 MILLIGRAM(S): 80 TABLET, FILM COATED ORAL at 21:36

## 2021-01-26 RX ADMIN — Medication 50 MICROGRAM(S): at 05:15

## 2021-01-26 RX ADMIN — CHLORHEXIDINE GLUCONATE 1 APPLICATION(S): 213 SOLUTION TOPICAL at 05:14

## 2021-01-26 RX ADMIN — Medication 25 MILLIGRAM(S): at 05:15

## 2021-01-26 RX ADMIN — AZATHIOPRINE 100 MILLIGRAM(S): 100 TABLET ORAL at 19:01

## 2021-01-26 RX ADMIN — AZATHIOPRINE 50 MILLIGRAM(S): 100 TABLET ORAL at 08:09

## 2021-01-26 RX ADMIN — BUDESONIDE AND FORMOTEROL FUMARATE DIHYDRATE 2 PUFF(S): 160; 4.5 AEROSOL RESPIRATORY (INHALATION) at 07:05

## 2021-01-26 RX ADMIN — Medication 40 MILLIGRAM(S): at 05:17

## 2021-01-26 RX ADMIN — Medication 650 MILLIGRAM(S): at 15:03

## 2021-01-26 RX ADMIN — BUDESONIDE AND FORMOTEROL FUMARATE DIHYDRATE 2 PUFF(S): 160; 4.5 AEROSOL RESPIRATORY (INHALATION) at 20:47

## 2021-01-26 RX ADMIN — ENOXAPARIN SODIUM 40 MILLIGRAM(S): 100 INJECTION SUBCUTANEOUS at 13:57

## 2021-01-26 RX ADMIN — PYRIDOSTIGMINE BROMIDE 90 MILLIGRAM(S): 60 SOLUTION ORAL at 05:20

## 2021-01-26 RX ADMIN — Medication 400 MILLIGRAM(S): at 11:57

## 2021-01-26 RX ADMIN — PYRIDOSTIGMINE BROMIDE 90 MILLIGRAM(S): 60 SOLUTION ORAL at 19:01

## 2021-01-26 RX ADMIN — PYRIDOSTIGMINE BROMIDE 90 MILLIGRAM(S): 60 SOLUTION ORAL at 00:00

## 2021-01-26 RX ADMIN — PYRIDOSTIGMINE BROMIDE 90 MILLIGRAM(S): 60 SOLUTION ORAL at 13:56

## 2021-01-26 RX ADMIN — PANTOPRAZOLE SODIUM 40 MILLIGRAM(S): 20 TABLET, DELAYED RELEASE ORAL at 05:15

## 2021-01-26 RX ADMIN — Medication 81 MILLIGRAM(S): at 13:56

## 2021-01-26 RX ADMIN — NEISSERIA MENINGITIDIS SEROGROUP B NHBA FUSION PROTEIN ANTIGEN, NEISSERIA MENINGITIDIS SEROGROUP B FHBP FUSION PROTEIN ANTIGEN AND NEISSERIA MENINGITIDIS SEROGROUP B NADA PROTEIN ANTIGEN 0.5 MILLILITER(S): 50; 50; 50; 25 INJECTION, SUSPENSION INTRAMUSCULAR at 18:55

## 2021-01-26 NOTE — PROGRESS NOTE ADULT - ASSESSMENT
60y old female with pmhx of High blood cholesterol, Myasthenia gravis ( w/ flares requiring hospitalizations and plasmapheresis , no intubations) , Pancreatitis, postural orthostatic tachycardia syndrome,  COVID infxn ( nov 2020)  presenting to the ED complaining of worsening SOB x 1 day. Pt reports SOB is worse w/ exertion  and reports that she had difficulty reading her grandkids a bedtime story.  Per pt " this feels like previous flares"  No CP , No fevers, chills or cough.  No syncope . No unilateral leg swelling     Myasthenia gravis flare     - prednisone 40mg daily   - start IVIG today as per neuro   - monitor in unit   - check VC and MIP q6h   - continue home meds   - DVT prophylaxis

## 2021-01-26 NOTE — CONSULT NOTE ADULT - ATTENDING COMMENTS
I have personally seen and examined this patient.  I have fully participated in the care of this patient.  I have reviewed all pertinent clinical information, including history, physical exam, plan and note.   I have reviewed all pertinent clinical information and reviewed all relevant imaging and diagnostic studies personally.  Pt w/ MG Class 3b currently with worsening symptoms despite Imuran, Prednisone, IVIG and pyridostigmine.  Pt refractory to current treatments.  Recommend IVIG induction course for now and maximize current treatments.  Recommend meningococcal vaccinations for possible eculizumab infusion in near future if needed.   Recommendations as above.  Agree with above assessment except as noted.

## 2021-01-26 NOTE — PROGRESS NOTE ADULT - SUBJECTIVE AND OBJECTIVE BOX
Patient is a 60y old  Female who presents with a chief complaint of SOB (26 Jan 2021 08:53)      T(F): 97.1 (01-26-21 @ 05:00), Max: 98 (01-25-21 @ 14:14)  HR: 76 (01-26-21 @ 07:33)  BP: 126/60 (01-26-21 @ 07:33)  RR: 20  SpO2: 99% (01-26-21 @ 07:33) (96% - 100%)    PHYSICAL EXAM:  GENERAL: NAD  HEAD:  Atraumatic, Normocephalic  NERVOUS SYSTEM:  Alert & Oriented X3, no focal deficit   CHEST/LUNG: Clear to percussion bilaterally; No rales, rhonchi, wheezing, or rubs  HEART: Regular rate and rhythm; No murmurs, rubs, or gallops  ABDOMEN: Soft, Nontender, Nondistended; Bowel sounds present  EXTREMITIES:  2+ Peripheral Pulses, No clubbing, cyanosis, or edema  LYMPH: No lymphadenopathy noted  SKIN: No rashes or lesions    LABS  01-26    139  |  106  |  29<H>  ----------------------------<  124<H>  4.8   |  24  |  0.7    Ca    9.1      26 Jan 2021 06:11    TPro  7.3  /  Alb  3.6  /  TBili  1.0  /  DBili  x   /  AST  45<H>  /  ALT  34  /  AlkPhos  187<H>  01-26                          12.4   7.62  )-----------( 339      ( 26 Jan 2021 06:11 )             38.8         CARDIAC ENZYMES      Troponin T, Serum: <0.01 ng/mL (01-25-21 @ 12:24)      MEDICATIONS  (STANDING):  aspirin  chewable 81 milliGRAM(s) Oral daily  atorvastatin 20 milliGRAM(s) Oral at bedtime  azaTHIOprine 100 milliGRAM(s) Oral two times a day  budesonide  80 MICROgram(s)/formoterol 4.5 MICROgram(s) Inhaler 2 Puff(s) Inhalation two times a day  chlorhexidine 4% Liquid 1 Application(s) Topical two times a day  enoxaparin Injectable 40 milliGRAM(s) SubCutaneous daily  famotidine  Oral Tab/Cap - Peds 40 milliGRAM(s) Oral at bedtime  immune   globulin 10% (GAMMAGARD) IVPB 70 Gram(s) IV Intermittent daily  influenza   Vaccine 0.5 milliLiter(s) IntraMuscular once  levothyroxine 50 MICROGram(s) Oral daily  meningococcal Group B (BEXSERO) Vaccine 0.5 milliLiter(s) IntraMuscular once  meningococcal/diphtheria (MENVEO) Vaccine 0.5 milliLiter(s) IntraMuscular once  metoprolol succinate ER 25 milliGRAM(s) Oral daily  pantoprazole    Tablet 40 milliGRAM(s) Oral before breakfast  predniSONE   Tablet 40 milliGRAM(s) Oral daily  pyridostigmine 90 milliGRAM(s) Oral four times a day    MEDICATIONS  (PRN):  ALBUTerol    90 MICROgram(s) HFA Inhaler 2 Puff(s) Inhalation every 6 hours PRN Shortness of Breath

## 2021-01-26 NOTE — CONSULT NOTE ADULT - ASSESSMENT
IMPRESSION:  myasthenia gravis exacerbation   SOB       PLAN:    CNS: follow neurology consul   on prednisone   ?? IVIG   azathioprine   HEENT: oral care     PULMONARY: keep pox > 92 %   check NIF and VC Q shift   she is comfortable now VC was close to 3 liter     CARDIOVASCULAR:keep is = os   echo     GI: GI prophylaxis.  Feeding     RENAL: ffollow lytes is and os     INFECTIOUS DISEASE:no abx     HEMATOLOGICAL:  DVT prophylaxis.    ENDOCRINE:  Follow up FS.  Insulin protocol if needed.    follow neurology if now IV IG needed can be downgrade to floor         CRITICAL CARE TIME SPENT: ***   IMPRESSION:  myasthenia gravis exacerbation   SOB       PLAN:    CNS: follow neurology consul   on prednisone   ?? IVIG   azathioprine   HEENT: oral care     PULMONARY: keep pox > 92 %   check NIF and VC Q shift   she is comfortable now VC was close to 3 liter yesterday now 2200   do ABG   bipap at night and as needed   CARDIOVASCULAR:keep is = os   echo     GI: GI prophylaxis.  Feeding     RENAL: ffollow lytes is and os     INFECTIOUS DISEASE:no abx     HEMATOLOGICAL:  DVT prophylaxis.    ENDOCRINE:  Follow up FS.  Insulin protocol if needed.    keep tele         CRITICAL CARE TIME SPENT: ***

## 2021-01-26 NOTE — CONSULT NOTE ADULT - ASSESSMENT
This is a 60y year old Female presenting with Exacerbation of Myasthenia Gravis    Plan  1. Continue on Nasal Cannula w/ BiPaP PRN ; PFT's q6hrs  2. Start IVIG Infusion 1 g/kg over 5 hours x 2 days of treatment (2g/kg total)  3. Continue Imuran 100 mg BID  4. Give dose of BEXSERO Vaccine ( MenB) & Dose of MENACTRA/MENVEO (Quadrivalent) meningococcal vaccine in anticipation of starting Eculizumab (Soliris) 2 weeks after vaccinations  5. Continue PO Prednisone 40 daily  6. Continue Mestinon 90 QID   7. Telemetry CCU Monitoring w/ serial neurochecks Q4 hrs      01-26-21 @ 11:55

## 2021-01-26 NOTE — PROGRESS NOTE ADULT - SUBJECTIVE AND OBJECTIVE BOX
SUBJECTIVE:    Patient is a 60y old Female who presents with a chief complaint of SOB (26 Jan 2021 10:15)    Currently admitted to medicine with the primary diagnosis of Respiratory distress       Today is hospital day 1d. This morning she is resting comfortably in bed and reports no new issues or overnight events. She says that she feels slightly better however she still feel some pressure on the lower chest area where the diaphragm is and still have some mild SOB. She said that she had plasmapheresis before but this time she decided to come earlier to avoid that. She was having breakfast, tolerating well    PAST MEDICAL & SURGICAL HISTORY  Myasthenia gravis    High blood cholesterol    Pancreatitis    POTS (postural orthostatic tachycardia syndrome)    History of foot surgery      SOCIAL HISTORY:  Negative for smoking/alcohol/drug use.     ALLERGIES:  sulfa drugs (Unknown)    MEDICATIONS:  STANDING MEDICATIONS  acetaminophen   Tablet .. 650 milliGRAM(s) Oral once  aspirin  chewable 81 milliGRAM(s) Oral daily  atorvastatin 20 milliGRAM(s) Oral at bedtime  azaTHIOprine 100 milliGRAM(s) Oral two times a day  budesonide  80 MICROgram(s)/formoterol 4.5 MICROgram(s) Inhaler 2 Puff(s) Inhalation two times a day  chlorhexidine 4% Liquid 1 Application(s) Topical two times a day  diphenhydrAMINE 25 milliGRAM(s) Oral once  enoxaparin Injectable 40 milliGRAM(s) SubCutaneous daily  famotidine  Oral Tab/Cap - Peds 40 milliGRAM(s) Oral at bedtime  immune   globulin 10% (GAMMAGARD) IVPB 70 Gram(s) IV Intermittent daily  influenza   Vaccine 0.5 milliLiter(s) IntraMuscular once  levothyroxine 50 MICROGram(s) Oral daily  meningococcal Group B (BEXSERO) Vaccine 0.5 milliLiter(s) IntraMuscular once  meningococcal/diphtheria (MENACTRA) Vaccine 0.5 milliLiter(s) IntraMuscular once  metoprolol succinate ER 25 milliGRAM(s) Oral daily  pantoprazole    Tablet 40 milliGRAM(s) Oral before breakfast  predniSONE   Tablet 40 milliGRAM(s) Oral daily  pyridostigmine 90 milliGRAM(s) Oral four times a day    PRN MEDICATIONS  ALBUTerol    90 MICROgram(s) HFA Inhaler 2 Puff(s) Inhalation every 6 hours PRN  ibuprofen  Tablet. 400 milliGRAM(s) Oral every 6 hours PRN    VITALS:   T(F): 97.7  HR: 74  BP: 126/60  RR: 25  SpO2: 99%    LABS:                        12.4   7.62  )-----------( 339      ( 26 Jan 2021 06:11 )             38.8     01-26    139  |  106  |  29<H>  ----------------------------<  124<H>  4.8   |  24  |  0.7    Ca    9.1      26 Jan 2021 06:11    TPro  7.3  /  Alb  3.6  /  TBili  1.0  /  DBili  x   /  AST  45<H>  /  ALT  34  /  AlkPhos  187<H>  01-26          Troponin T, Serum: <0.01 ng/mL (01-25-21 @ 12:24)      CARDIAC MARKERS ( 25 Jan 2021 12:24 )  x     / <0.01 ng/mL / x     / x     / x        PHYSICAL EXAM:  GEN: No acute distress, NC/at, anicteric  LUNGS: Clear to auscultation bilaterally   HEART: S1/S2 present. RRR.   ABD: Soft, non-tender, non-distended. Bowel sounds present  EXT: No LE edema  NEURO: AAOX3    Intravenous access: Peripherals  NG tube: None  [place] Catheter: None

## 2021-01-26 NOTE — CONSULT NOTE ADULT - SUBJECTIVE AND OBJECTIVE BOX
Neurology Consult    Patient is a 60y old  Female who presents with a chief complaint of SOB (26 Jan 2021 10:15)      HPI:  · Pt is a 60y old female with pmhx of High blood cholesterol, Myasthenia gravis ( w/ flares requiring hospitalizations and plasmapheresis , no intubations) , Pancreatitis, postural orthostatic tachycardia syndrome,  COVID infxn ( nov 2020)  presenting to the ED complaining of worsening SOB x 1 day. Pt reports SOB is worse w/ exertion  and reports that she had difficulty reading her grandkids a bedtime story.  Per pt " this feels like previous flares"  No CP , No fevers, chills or cough.  No syncope . No unilateral leg swelling.   (25 Jan 2021 16:25)      Patient at present complaining symptoms of weakness are worse, also complaints of SOB are not improving.  Patient did admit to decreasing home prednisone from 50mg to 10mg rapidly tapered on her own direction without consent or approval of primary neurologist.  Patient stated her long term use of steroids were uncomfortable causing edema.    Patient states at present is agreeable to new course of treatment.  Patient was seen at bedside with Dr Urbina who explained the proposed change of medication to infusions of Eculizumab (Soliris) and  the preparation of vaccination with Menactra and Men B .  Patient agreeable to both.    Discussion regarding BiPaP and infusion of IgG.       PAST MEDICAL & SURGICAL HISTORY:  Myasthenia gravis    High blood cholesterol    Pancreatitis    POTS (postural orthostatic tachycardia syndrome)    History of foot surgery        FAMILY HISTORY:  No pertinent family history in first degree relatives        Social History: (-) x 3    Allergies    sulfa drugs (Unknown)    Intolerances        MEDICATIONS  (STANDING):  acetaminophen   Tablet .. 650 milliGRAM(s) Oral once  aspirin  chewable 81 milliGRAM(s) Oral daily  atorvastatin 20 milliGRAM(s) Oral at bedtime  azaTHIOprine 100 milliGRAM(s) Oral two times a day  budesonide  80 MICROgram(s)/formoterol 4.5 MICROgram(s) Inhaler 2 Puff(s) Inhalation two times a day  chlorhexidine 4% Liquid 1 Application(s) Topical two times a day  diphenhydrAMINE 25 milliGRAM(s) Oral once  enoxaparin Injectable 40 milliGRAM(s) SubCutaneous daily  famotidine  Oral Tab/Cap - Peds 40 milliGRAM(s) Oral at bedtime  immune   globulin 10% (GAMMAGARD) IVPB 70 Gram(s) IV Intermittent daily  influenza   Vaccine 0.5 milliLiter(s) IntraMuscular once  levothyroxine 50 MICROGram(s) Oral daily  meningococcal Group B (BEXSERO) Vaccine 0.5 milliLiter(s) IntraMuscular once  meningococcal/diphtheria (MENACTRA) Vaccine 0.5 milliLiter(s) IntraMuscular once  metoprolol succinate ER 25 milliGRAM(s) Oral daily  pantoprazole    Tablet 40 milliGRAM(s) Oral before breakfast  predniSONE   Tablet 40 milliGRAM(s) Oral daily  pyridostigmine 90 milliGRAM(s) Oral four times a day    MEDICATIONS  (PRN):  ALBUTerol    90 MICROgram(s) HFA Inhaler 2 Puff(s) Inhalation every 6 hours PRN Shortness of Breath  ibuprofen  Tablet. 400 milliGRAM(s) Oral every 6 hours PRN Moderate Pain (4 - 6)      Review of systems:    Constitutional: No fever, weight loss or fatigue    Eyes: No eye pain or discharge  ENMT:  No difficulty hearing; No sinus or throat pain  Neck: No pain or stiffness  Respiratory: No cough, wheezing, chills or hemoptysis  Cardiovascular: No chest pain, palpitations, shortness of breath, dyspnea on exertion  Gastrointestinal: No abdominal pain, nausea, vomiting or hematemesis; No diarrhea or constipation.   Genitourinary: No dysuria, frequency, hematuria or incontinence  Neurological: As per HPI  Skin: No rashes or lesions   Endocrine: No heat or cold intolerance; No hair loss  Musculoskeletal: No joint pain or swelling  Psychiatric: No depression, anxiety, mood swings  Heme/Lymph: No easy bruising or bleeding gums    Vital Signs Last 24 Hrs  T(C): 36.5 (26 Jan 2021 11:14), Max: 36.7 (25 Jan 2021 14:14)  T(F): 97.7 (26 Jan 2021 11:14), Max: 98 (25 Jan 2021 14:14)  HR: 74 (26 Jan 2021 11:14) (57 - 76)  BP: 126/60 (26 Jan 2021 07:33) (118/57 - 173/72)  BP(mean): 87 (26 Jan 2021 07:33) (80 - 109)  RR: 25 (26 Jan 2021 11:14) (12 - 40)  SpO2: 99% (26 Jan 2021 11:14) (96% - 100%)    Neurologic Examination:  Mental status: Awake, alert and oriented x3.  Recent and remote memory intact.  Naming, repetition and comprehension intact.  Attention/concentration intact.  No dysarthria, no aphasia.  Fund of knowledge appropriate.    Cranial nerves: Pupils equally round and reactive to light, visual fields full, extraocular muscles intact, V1 through V3 intact bilaterally and symmetric, face symmetric, sternocleidomastoid/shoulder shrug strength bilaterally 5/5.    Motor: Neck Flexion 4/5 ; Neck Extension 5/5;   Normal bulk and tone, strength 4/5 in bilateral upper and 5/5 in bilateral lower extremities.   strength 4/5.    Sensation: Intact to light touch, proprioception. No neglect.   Reflexes: 2+ in upper and lower extremities, downgoing toes bilaterally  Gait: Unable to assess at present    Labs:   CBC Full  -  ( 26 Jan 2021 06:11 )  WBC Count : 7.62 K/uL  RBC Count : 3.91 M/uL  Hemoglobin : 12.4 g/dL  Hematocrit : 38.8 %  Platelet Count - Automated : 339 K/uL  Mean Cell Volume : 99.2 fL  Mean Cell Hemoglobin : 31.7 pg  Mean Cell Hemoglobin Concentration : 32.0 g/dL      01-26    139  |  106  |  29<H>  ----------------------------<  124<H>  4.8   |  24  |  0.7    Ca    9.1      26 Jan 2021 06:11    TPro  7.3  /  Alb  3.6  /  TBili  1.0  /  DBili  x   /  AST  45<H>  /  ALT  34  /  AlkPhos  187<H>  01-26    LIVER FUNCTIONS - ( 26 Jan 2021 06:11 )  Alb: 3.6 g/dL / Pro: 7.3 g/dL / ALK PHOS: 187 U/L / ALT: 34 U/L / AST: 45 U/L / GGT: x                 Assessment- This is a 60y year old Female presenting with Exacerbation of Myasthenia Gravis    Plan  1. Continue on Nasal Cannula w/ BiPaP PRN ; PFT's q4hr  2. Start IVIG Infusion 1mg/kg over 5 hours x two day treatment  3. Continue Imuran 100 mg BID  4. Give dose of BEXSERO Vaccine & Dose of MENACTRA Vaccine in anticipation of starting Eculizumab (Soliris) outpatient  5. Continue PO Prednisone 40 daily  6. Continue Mestinon 90 QID   7. Telemetry CCU Monitoring     01-26-21 @ 11:55         Neurology Consult    Patient is a 60y old  Female who presents with a chief complaint of SOB (26 Jan 2021 10:15)      HPI:  · Pt is a 60y old female with pmhx of High blood cholesterol, Myasthenia gravis ( w/ flares requiring hospitalizations and plasmapheresis , no intubations) , Pancreatitis, postural orthostatic tachycardia syndrome,  COVID infxn ( nov 2020)  presenting to the ED complaining of worsening SOB x 1 day. Pt reports SOB is worse w/ exertion  and reports that she had difficulty reading her grandkids a bedtime story.  Per pt " this feels like previous flares"  No CP , No fevers, chills or cough.  No syncope . No unilateral leg swelling.   (25 Jan 2021 16:25)      Patient seen at bedside with Dr Urbina, patient at present complaining symptoms of weakness are worse, also complaints of SOB are not improving.  Patient did admit to decreasing home prednisone from 50mg to 10mg rapidly tapered on her own direction without consent or approval of primary neurologist.  Patient stated her long term use of steroids were uncomfortable causing edema.    Patient states at present is agreeable to new course of treatment.  Patient was seen at bedside with Dr Urbina who explained the proposed change of medication to infusions of Eculizumab (Soliris) and  the preparation of vaccination with Menactra and Men B .  Patient agreeable to both.    Discussion regarding BiPaP and infusion of IgG.       PAST MEDICAL & SURGICAL HISTORY:  Myasthenia gravis    High blood cholesterol    Pancreatitis    POTS (postural orthostatic tachycardia syndrome)    History of foot surgery        FAMILY HISTORY:  No pertinent family history in first degree relatives        Social History: (-) x 3    Allergies    sulfa drugs (Unknown)    Intolerances        MEDICATIONS  (STANDING):  acetaminophen   Tablet .. 650 milliGRAM(s) Oral once  aspirin  chewable 81 milliGRAM(s) Oral daily  atorvastatin 20 milliGRAM(s) Oral at bedtime  azaTHIOprine 100 milliGRAM(s) Oral two times a day  budesonide  80 MICROgram(s)/formoterol 4.5 MICROgram(s) Inhaler 2 Puff(s) Inhalation two times a day  chlorhexidine 4% Liquid 1 Application(s) Topical two times a day  diphenhydrAMINE 25 milliGRAM(s) Oral once  enoxaparin Injectable 40 milliGRAM(s) SubCutaneous daily  famotidine  Oral Tab/Cap - Peds 40 milliGRAM(s) Oral at bedtime  immune   globulin 10% (GAMMAGARD) IVPB 70 Gram(s) IV Intermittent daily  influenza   Vaccine 0.5 milliLiter(s) IntraMuscular once  levothyroxine 50 MICROGram(s) Oral daily  meningococcal Group B (BEXSERO) Vaccine 0.5 milliLiter(s) IntraMuscular once  meningococcal/diphtheria (MENACTRA) Vaccine 0.5 milliLiter(s) IntraMuscular once  metoprolol succinate ER 25 milliGRAM(s) Oral daily  pantoprazole    Tablet 40 milliGRAM(s) Oral before breakfast  predniSONE   Tablet 40 milliGRAM(s) Oral daily  pyridostigmine 90 milliGRAM(s) Oral four times a day    MEDICATIONS  (PRN):  ALBUTerol    90 MICROgram(s) HFA Inhaler 2 Puff(s) Inhalation every 6 hours PRN Shortness of Breath  ibuprofen  Tablet. 400 milliGRAM(s) Oral every 6 hours PRN Moderate Pain (4 - 6)      Review of systems:    Constitutional: No fever, weight loss or fatigue    Eyes: No eye pain or discharge  ENMT:  No difficulty hearing; No sinus or throat pain  Neck: No pain or stiffness  Respiratory: No cough, wheezing, chills or hemoptysis  Cardiovascular: No chest pain, palpitations, shortness of breath, dyspnea on exertion  Gastrointestinal: No abdominal pain, nausea, vomiting or hematemesis; No diarrhea or constipation.   Genitourinary: No dysuria, frequency, hematuria or incontinence  Neurological: As per HPI  Skin: No rashes or lesions   Endocrine: No heat or cold intolerance; No hair loss  Musculoskeletal: No joint pain or swelling  Psychiatric: No depression, anxiety, mood swings  Heme/Lymph: No easy bruising or bleeding gums    Vital Signs Last 24 Hrs  T(C): 36.5 (26 Jan 2021 11:14), Max: 36.7 (25 Jan 2021 14:14)  T(F): 97.7 (26 Jan 2021 11:14), Max: 98 (25 Jan 2021 14:14)  HR: 74 (26 Jan 2021 11:14) (57 - 76)  BP: 126/60 (26 Jan 2021 07:33) (118/57 - 173/72)  BP(mean): 87 (26 Jan 2021 07:33) (80 - 109)  RR: 25 (26 Jan 2021 11:14) (12 - 40)  SpO2: 99% (26 Jan 2021 11:14) (96% - 100%)    Neurologic Examination:  Mental status: Awake, alert and oriented x3.  Recent and remote memory intact.  Naming, repetition and comprehension intact.  Attention/concentration intact.  No dysarthria, no aphasia.  Fund of knowledge appropriate.    Cranial nerves: Pupils equally round and reactive to light, visual fields full, extraocular muscles intact, V1 through V3 intact bilaterally and symmetric, face symmetric, sternocleidomastoid/shoulder shrug strength bilaterally 5/5.    Motor: Neck Flexion 4/5 ; Neck Extension 5/5;   Normal bulk and tone, strength 4/5 in bilateral upper and 5/5 in bilateral lower extremities.   strength 4/5.    Sensation: Intact to light touch, proprioception. No neglect.   Reflexes: 2+ in upper and lower extremities, downgoing toes bilaterally  Gait: Unable to assess at present    Labs:   CBC Full  -  ( 26 Jan 2021 06:11 )  WBC Count : 7.62 K/uL  RBC Count : 3.91 M/uL  Hemoglobin : 12.4 g/dL  Hematocrit : 38.8 %  Platelet Count - Automated : 339 K/uL  Mean Cell Volume : 99.2 fL  Mean Cell Hemoglobin : 31.7 pg  Mean Cell Hemoglobin Concentration : 32.0 g/dL      01-26    139  |  106  |  29<H>  ----------------------------<  124<H>  4.8   |  24  |  0.7    Ca    9.1      26 Jan 2021 06:11    TPro  7.3  /  Alb  3.6  /  TBili  1.0  /  DBili  x   /  AST  45<H>  /  ALT  34  /  AlkPhos  187<H>  01-26    LIVER FUNCTIONS - ( 26 Jan 2021 06:11 )  Alb: 3.6 g/dL / Pro: 7.3 g/dL / ALK PHOS: 187 U/L / ALT: 34 U/L / AST: 45 U/L / GGT: x                 Assessment- This is a 60y year old Female presenting with Exacerbation of Myasthenia Gravis    Plan  1. Continue on Nasal Cannula w/ BiPaP PRN ; PFT's q4hr  2. Start IVIG Infusion 1mg/kg over 5 hours x two day treatment  3. Continue Imuran 100 mg BID  4. Give dose of BEXSERO Vaccine & Dose of MENACTRA Vaccine in anticipation of starting Eculizumab (Soliris) outpatient  5. Continue PO Prednisone 40 daily  6. Continue Mestinon 90 QID   7. Telemetry CCU Monitoring     01-26-21 @ 11:55         Neurology Consult    Patient is a 60y old  Female who presents with a chief complaint of SOB (26 Jan 2021 10:15)    HPI:  · Pt is a 60y old female with pmhx of High blood cholesterol, Myasthenia gravis ( w/ flares requiring hospitalizations and plasmapheresis , no intubations) , Pancreatitis, postural orthostatic tachycardia syndrome,  COVID infxn ( nov 2020)  presenting to the ED complaining of worsening SOB x 1 day. Pt reports SOB is worse w/ exertion  and reports that she had difficulty reading her grandkids a bedtime story.  Per pt " this feels like previous flares"  No CP , No fevers, chills or cough.  No syncope . No unilateral leg swelling.   (25 Jan 2021 16:25)    Patient seen at bedside with Dr Urbina, patient at present complaining symptoms of weakness are worse, also complaints of SOB are not improving.  Patient did admit to decreasing home prednisone from 50mg to 10mg rapidly tapered on her own direction without consent or approval of primary neurologist.  Patient stated her long term use of steroids were uncomfortable causing edema.      Patient states at present is agreeable to new course of treatment.  Patient was seen at bedside with Dr Urbina who explained the proposed change of medication to infusions of Eculizumab (Soliris) and  the preparation of vaccination with Menactra and Men B .  Patient agreeable to both.    Discussion regarding BiPaP and infusion of IgG.       PAST MEDICAL & SURGICAL HISTORY:  Myasthenia gravis    High blood cholesterol    Pancreatitis    POTS (postural orthostatic tachycardia syndrome)    History of foot surgery        FAMILY HISTORY:  No pertinent family history in first degree relatives        Social History: (-) x 3    Allergies    sulfa drugs (Unknown)    Intolerances        MEDICATIONS  (STANDING):  acetaminophen   Tablet .. 650 milliGRAM(s) Oral once  aspirin  chewable 81 milliGRAM(s) Oral daily  atorvastatin 20 milliGRAM(s) Oral at bedtime  azaTHIOprine 100 milliGRAM(s) Oral two times a day  budesonide  80 MICROgram(s)/formoterol 4.5 MICROgram(s) Inhaler 2 Puff(s) Inhalation two times a day  chlorhexidine 4% Liquid 1 Application(s) Topical two times a day  diphenhydrAMINE 25 milliGRAM(s) Oral once  enoxaparin Injectable 40 milliGRAM(s) SubCutaneous daily  famotidine  Oral Tab/Cap - Peds 40 milliGRAM(s) Oral at bedtime  immune   globulin 10% (GAMMAGARD) IVPB 70 Gram(s) IV Intermittent daily  influenza   Vaccine 0.5 milliLiter(s) IntraMuscular once  levothyroxine 50 MICROGram(s) Oral daily  meningococcal Group B (BEXSERO) Vaccine 0.5 milliLiter(s) IntraMuscular once  meningococcal/diphtheria (MENACTRA) Vaccine 0.5 milliLiter(s) IntraMuscular once  metoprolol succinate ER 25 milliGRAM(s) Oral daily  pantoprazole    Tablet 40 milliGRAM(s) Oral before breakfast  predniSONE   Tablet 40 milliGRAM(s) Oral daily  pyridostigmine 90 milliGRAM(s) Oral four times a day    MEDICATIONS  (PRN):  ALBUTerol    90 MICROgram(s) HFA Inhaler 2 Puff(s) Inhalation every 6 hours PRN Shortness of Breath  ibuprofen  Tablet. 400 milliGRAM(s) Oral every 6 hours PRN Moderate Pain (4 - 6)      Review of systems:    Constitutional: No fever, weight loss or fatigue    Eyes: No eye pain or discharge  ENMT:  No difficulty hearing; No sinus or throat pain  Neck: No pain or stiffness  Respiratory: No cough, wheezing, chills or hemoptysis  Cardiovascular: No chest pain, palpitations, shortness of breath, dyspnea on exertion  Gastrointestinal: No abdominal pain, nausea, vomiting or hematemesis; No diarrhea or constipation.   Genitourinary: No dysuria, frequency, hematuria or incontinence  Neurological: As per HPI  Skin: No rashes or lesions   Endocrine: No heat or cold intolerance; No hair loss  Musculoskeletal: No joint pain or swelling  Psychiatric: No depression, anxiety, mood swings  Heme/Lymph: No easy bruising or bleeding gums    Vital Signs Last 24 Hrs  T(C): 36.5 (26 Jan 2021 11:14), Max: 36.7 (25 Jan 2021 14:14)  T(F): 97.7 (26 Jan 2021 11:14), Max: 98 (25 Jan 2021 14:14)  HR: 74 (26 Jan 2021 11:14) (57 - 76)  BP: 126/60 (26 Jan 2021 07:33) (118/57 - 173/72)  BP(mean): 87 (26 Jan 2021 07:33) (80 - 109)  RR: 25 (26 Jan 2021 11:14) (12 - 40)  SpO2: 99% (26 Jan 2021 11:14) (96% - 100%)    Neurologic Examination:  Mental status: Awake, alert and oriented x3.  Recent and remote memory intact.  Naming, repetition and comprehension intact.  Attention/concentration intact.  No dysarthria, no aphasia.  Fund of knowledge appropriate.    Cranial nerves: Pupils equally round and reactive to light, visual fields full, extraocular muscles intact, V1 through V3 intact bilaterally and symmetric, face symmetric, sternocleidomastoid/shoulder shrug strength bilaterally 5/5.    Motor: Neck Flexion 4/5 ; Neck Extension 5/5;   Normal bulk and tone, strength 4/5 in bilateral proximal upper extremitis, 5/5 distal b/l upper extremities.  5/5 bilateral lower extremities.   strength 5/5.    Sensation: Intact to light touch, proprioception. No neglect.   Reflexes: 2+ in upper and lower extremities, downgoing toes bilaterally    Labs:   CBC Full  -  ( 26 Jan 2021 06:11 )  WBC Count : 7.62 K/uL  RBC Count : 3.91 M/uL  Hemoglobin : 12.4 g/dL  Hematocrit : 38.8 %  Platelet Count - Automated : 339 K/uL  Mean Cell Volume : 99.2 fL  Mean Cell Hemoglobin : 31.7 pg  Mean Cell Hemoglobin Concentration : 32.0 g/dL    01-26    139  |  106  |  29<H>  ----------------------------<  124<H>  4.8   |  24  |  0.7    Ca    9.1      26 Jan 2021 06:11    TPro  7.3  /  Alb  3.6  /  TBili  1.0  /  DBili  x   /  AST  45<H>  /  ALT  34  /  AlkPhos  187<H>  01-26    LIVER FUNCTIONS - ( 26 Jan 2021 06:11 )  Alb: 3.6 g/dL / Pro: 7.3 g/dL / ALK PHOS: 187 U/L / ALT: 34 U/L / AST: 45 U/L / GGT: x

## 2021-01-26 NOTE — PROGRESS NOTE ADULT - ASSESSMENT
IMPRESSION:  myasthenia gravis exacerbation   SOB     PLAN:    CNS: neuro recs appreciated. Will proceed with IVIG 1g/kg for 2 days to be infused over 5 hours. Also will keep prednisone 40 mg PO qd for now. C/w azathioprine. Will need to be vaccinated with both meningococcal vaccines (Menactra and Bexsero)    HEENT: oral care     PULMONARY: keep pox > 92 %   check NIF and VC Q shift  --> It is improving today  F/up ABG  bipap at night and as needed     CARDIOVASCULAR:keep is = os   echo     GI: GI prophylaxis.  Feeding     RENAL: ffollow lytes is and os     INFECTIOUS DISEASE:no abx     HEMATOLOGICAL:  DVT prophylaxis.    ENDOCRINE:  Follow up FS.  Insulin protocol if needed.    MSK: Ibuprofen q6h PRN for moderate pain    keep tele

## 2021-01-26 NOTE — CONSULT NOTE ADULT - SUBJECTIVE AND OBJECTIVE BOX
Patient is a 60y old  Female who presents with a chief complaint of SOB (25 Jan 2021 16:25)      HPI:  · Pt is a 60y old female with pmhx of High blood cholesterol, Myasthenia gravis ( w/ flares requiring hospitalizations and plasmapheresis , no intubations) , Pancreatitis, postural orthostatic tachycardia syndrome,  COVID infxn ( nov 2020)  presenting to the ED complaining of worsening SOB x 1 day. Pt reports SOB is worse w/ exertion  and reports that she had difficulty reading her grandkids a bedtime story.  Per pt " this feels like previous flares"  No CP , No fevers, chills or cough.  No syncope . No unilateral leg swelling.   (25 Jan 2021 16:25)      PAST MEDICAL & SURGICAL HISTORY:  Myasthenia gravis    High blood cholesterol    Pancreatitis    POTS (postural orthostatic tachycardia syndrome)    History of foot surgery      Allergies    sulfa drugs (Unknown)    Intolerances      Family history : no cardiovscular family history   Home Medications:  albuterol 90 mcg/inh inhalation powder: 2 puff(s) inhaled every 6 hours, As Needed (26 Nov 2020 15:43)  aspirin 81 mg oral tablet, chewable: 1 tab(s) orally once a day (26 Nov 2020 15:43)  atorvastatin 20 mg oral tablet: 1 tab(s) orally once a day (26 Nov 2020 15:43)  doxepin 10 mg oral capsule:  (26 Nov 2020 15:43)  fluticasone 50 mcg/inh nasal spray: 1 spray(s) nasal once a day (26 Nov 2020 15:43)  Imuran 50 mg oral tablet: 1 tab(s) orally 3 times a day (26 Nov 2020 15:43)  Mestinon 60 mg oral tablet: 1.5 tab(s) orally 4 times a day (26 Nov 2020 15:43)  Protonix 40 mg oral delayed release tablet: 1 tab(s) orally once a day (26 Nov 2020 15:43)  Symbicort: inhaled 2 times a day (26 Nov 2020 15:43)  Toprol-XL 25 mg oral tablet, extended release: 1 tab(s) orally once a day (26 Nov 2020 15:43)  Vitamin D2: every friday (26 Nov 2020 15:43)    Occupation:  Alochol: Denied  Smoking: Denied  Drug Use: Denied  Marital Status:         ROS: as in HPI; All other systems reviewed are negative    ICU Vital Signs Last 24 Hrs  T(C): 36.2 (26 Jan 2021 05:00), Max: 36.7 (25 Jan 2021 14:14)  T(F): 97.1 (26 Jan 2021 05:00), Max: 98 (25 Jan 2021 14:14)  HR: 76 (26 Jan 2021 07:33) (57 - 76)  BP: 126/60 (26 Jan 2021 07:33) (118/57 - 173/72)  BP(mean): 87 (26 Jan 2021 07:33) (80 - 109)  ABP: --  ABP(mean): --  RR: 40 (26 Jan 2021 07:33) (12 - 40)  SpO2: 99% (26 Jan 2021 07:33) (96% - 100%)        Physical Examination:    General: No acute distress.  Alert, oriented, interactive, nonfocal    HEENT: Pupils equal, reactive to light.  Symmetric.    PULM: Clear to auscultation bilaterally, no significant sputum production    CVS: Regular rate and rhythm, no murmurs, rubs, or gallops    ABD: Soft, nondistended, nontender, normoactive bowel sounds, no masses    EXT: No edema, nontender, no clubbing     SKIN: Warm and well perfused, no rashes noted.    Neurology : no motor or sensory deficit     Musculoskeletal : move all extremity     Lymphatic system: no Palpable node               I&O's Detail    25 Jan 2021 07:01  -  26 Jan 2021 07:00  --------------------------------------------------------  IN:    Oral Fluid: 360 mL  Total IN: 360 mL    OUT:  Total OUT: 0 mL    Total NET: 360 mL            LABS:                        12.4   7.62  )-----------( 339      ( 26 Jan 2021 06:11 )             38.8     26 Jan 2021 06:11    139    |  106    |  29     ----------------------------<  124    4.8     |  24     |  0.7      Ca    9.1        26 Jan 2021 06:11    TPro  7.3    /  Alb  3.6    /  TBili  1.0    /  DBili  x      /  AST  45     /  ALT  34     /  AlkPhos  187    26 Jan 2021 06:11  Amylase x     lipase x          CARDIAC MARKERS ( 25 Jan 2021 12:24 )  x     / <0.01 ng/mL / x     / x     / x          CAPILLARY BLOOD GLUCOSE            Culture        MEDICATIONS  (STANDING):  aspirin  chewable 81 milliGRAM(s) Oral daily  atorvastatin 20 milliGRAM(s) Oral at bedtime  azaTHIOprine 100 milliGRAM(s) Oral two times a day  budesonide  80 MICROgram(s)/formoterol 4.5 MICROgram(s) Inhaler 2 Puff(s) Inhalation two times a day  chlorhexidine 4% Liquid 1 Application(s) Topical two times a day  enoxaparin Injectable 40 milliGRAM(s) SubCutaneous daily  famotidine  Oral Tab/Cap - Peds 40 milliGRAM(s) Oral at bedtime  influenza   Vaccine 0.5 milliLiter(s) IntraMuscular once  levothyroxine 50 MICROGram(s) Oral daily  metoprolol succinate ER 25 milliGRAM(s) Oral daily  pantoprazole    Tablet 40 milliGRAM(s) Oral before breakfast  predniSONE   Tablet 40 milliGRAM(s) Oral daily  pyridostigmine 90 milliGRAM(s) Oral four times a day    MEDICATIONS  (PRN):  ALBUTerol    90 MICROgram(s) HFA Inhaler 2 Puff(s) Inhalation every 6 hours PRN Shortness of Breath        RADIOLOGY: ***     CXR: no infiltrate   TLC:  OG:  ET tube:        [facility] ICU:  ECHO:         Patient is a 60y old  Female who presents with a chief complaint of SOB (25 Jan 2021 16:25)      HPI:  · Pt is a 60y old female with pmhx of High blood cholesterol, Myasthenia gravis ( w/ flares requiring hospitalizations and plasmapheresis , no intubations) , Pancreatitis, postural orthostatic tachycardia syndrome,  COVID infxn ( nov 2020)  presenting to the ED complaining of worsening SOB x 1 day. Pt reports SOB is worse w/ exertion  and reports that she had difficulty reading her grandkids a bedtime story.  Per pt " this feels like previous flares"  No CP , No fevers, chills or cough.  No syncope . No unilateral leg swelling.   (25 Jan 2021 16:25)  still complain SOB     PAST MEDICAL & SURGICAL HISTORY:  Myasthenia gravis    High blood cholesterol    Pancreatitis    POTS (postural orthostatic tachycardia syndrome)    History of foot surgery      Allergies    sulfa drugs (Unknown)    Intolerances      Family history : no cardiovscular family history   Home Medications:  albuterol 90 mcg/inh inhalation powder: 2 puff(s) inhaled every 6 hours, As Needed (26 Nov 2020 15:43)  aspirin 81 mg oral tablet, chewable: 1 tab(s) orally once a day (26 Nov 2020 15:43)  atorvastatin 20 mg oral tablet: 1 tab(s) orally once a day (26 Nov 2020 15:43)  doxepin 10 mg oral capsule:  (26 Nov 2020 15:43)  fluticasone 50 mcg/inh nasal spray: 1 spray(s) nasal once a day (26 Nov 2020 15:43)  Imuran 50 mg oral tablet: 1 tab(s) orally 3 times a day (26 Nov 2020 15:43)  Mestinon 60 mg oral tablet: 1.5 tab(s) orally 4 times a day (26 Nov 2020 15:43)  Protonix 40 mg oral delayed release tablet: 1 tab(s) orally once a day (26 Nov 2020 15:43)  Symbicort: inhaled 2 times a day (26 Nov 2020 15:43)  Toprol-XL 25 mg oral tablet, extended release: 1 tab(s) orally once a day (26 Nov 2020 15:43)  Vitamin D2: every friday (26 Nov 2020 15:43)    Occupation:  Alochol: Denied  Smoking: Denied  Drug Use: Denied  Marital Status:         ROS: as in HPI; All other systems reviewed are negative    ICU Vital Signs Last 24 Hrs  T(C): 36.2 (26 Jan 2021 05:00), Max: 36.7 (25 Jan 2021 14:14)  T(F): 97.1 (26 Jan 2021 05:00), Max: 98 (25 Jan 2021 14:14)  HR: 76 (26 Jan 2021 07:33) (57 - 76)  BP: 126/60 (26 Jan 2021 07:33) (118/57 - 173/72)  BP(mean): 87 (26 Jan 2021 07:33) (80 - 109)  ABP: --  ABP(mean): --  RR: 40 (26 Jan 2021 07:33) (12 - 40)  SpO2: 99% (26 Jan 2021 07:33) (96% - 100%)        Physical Examination:    General: No acute distress.  Alert, oriented, interactive, nonfocal    HEENT: Pupils equal, reactive to light.  Symmetric.    PULM: Clear to auscultation bilaterally, no significant sputum production    CVS: Regular rate and rhythm, no murmurs, rubs, or gallops    ABD: Soft, nondistended, nontender, normoactive bowel sounds, no masses    EXT: No edema, nontender, no clubbing     SKIN: Warm and well perfused, no rashes noted.    Neurology : no motor or sensory deficit     Musculoskeletal : move all extremity     Lymphatic system: no Palpable node               I&O's Detail    25 Jan 2021 07:01  -  26 Jan 2021 07:00  --------------------------------------------------------  IN:    Oral Fluid: 360 mL  Total IN: 360 mL    OUT:  Total OUT: 0 mL    Total NET: 360 mL            LABS:                        12.4   7.62  )-----------( 339      ( 26 Jan 2021 06:11 )             38.8     26 Jan 2021 06:11    139    |  106    |  29     ----------------------------<  124    4.8     |  24     |  0.7      Ca    9.1        26 Jan 2021 06:11    TPro  7.3    /  Alb  3.6    /  TBili  1.0    /  DBili  x      /  AST  45     /  ALT  34     /  AlkPhos  187    26 Jan 2021 06:11  Amylase x     lipase x          CARDIAC MARKERS ( 25 Jan 2021 12:24 )  x     / <0.01 ng/mL / x     / x     / x          CAPILLARY BLOOD GLUCOSE            Culture        MEDICATIONS  (STANDING):  aspirin  chewable 81 milliGRAM(s) Oral daily  atorvastatin 20 milliGRAM(s) Oral at bedtime  azaTHIOprine 100 milliGRAM(s) Oral two times a day  budesonide  80 MICROgram(s)/formoterol 4.5 MICROgram(s) Inhaler 2 Puff(s) Inhalation two times a day  chlorhexidine 4% Liquid 1 Application(s) Topical two times a day  enoxaparin Injectable 40 milliGRAM(s) SubCutaneous daily  famotidine  Oral Tab/Cap - Peds 40 milliGRAM(s) Oral at bedtime  influenza   Vaccine 0.5 milliLiter(s) IntraMuscular once  levothyroxine 50 MICROGram(s) Oral daily  metoprolol succinate ER 25 milliGRAM(s) Oral daily  pantoprazole    Tablet 40 milliGRAM(s) Oral before breakfast  predniSONE   Tablet 40 milliGRAM(s) Oral daily  pyridostigmine 90 milliGRAM(s) Oral four times a day    MEDICATIONS  (PRN):  ALBUTerol    90 MICROgram(s) HFA Inhaler 2 Puff(s) Inhalation every 6 hours PRN Shortness of Breath        RADIOLOGY: ***     CXR: no infiltrate   TLC:  OG:  ET tube:        CAM ICU:  ECHO:

## 2021-01-27 DIAGNOSIS — H02.88A MEIBOMIAN GLAND DYSFUNCTION RIGHT EYE, UPPER AND LOWER EYELIDS: ICD-10-CM

## 2021-01-27 DIAGNOSIS — H53.022 REFRACTIVE AMBLYOPIA, LEFT EYE: ICD-10-CM

## 2021-01-27 DIAGNOSIS — H04.129 DRY EYE SYNDROME OF UNSPECIFIED LACRIMAL GLAND: ICD-10-CM

## 2021-01-27 DIAGNOSIS — H02.429: ICD-10-CM

## 2021-01-27 DIAGNOSIS — H02.88B MEIBOMIAN GLAND DYSFUNCTION LEFT EYE, UPPER AND LOWER EYELIDS: ICD-10-CM

## 2021-01-27 LAB
ALBUMIN SERPL ELPH-MCNC: 3 G/DL — LOW (ref 3.5–5.2)
ALP SERPL-CCNC: 182 U/L — HIGH (ref 30–115)
ALT FLD-CCNC: 29 U/L — SIGNIFICANT CHANGE UP (ref 0–41)
ANION GAP SERPL CALC-SCNC: 7 MMOL/L — SIGNIFICANT CHANGE UP (ref 7–14)
AST SERPL-CCNC: 44 U/L — HIGH (ref 0–41)
BASOPHILS # BLD AUTO: 0.02 K/UL — SIGNIFICANT CHANGE UP (ref 0–0.2)
BASOPHILS NFR BLD AUTO: 0.4 % — SIGNIFICANT CHANGE UP (ref 0–1)
BILIRUB SERPL-MCNC: 0.8 MG/DL — SIGNIFICANT CHANGE UP (ref 0.2–1.2)
BUN SERPL-MCNC: 28 MG/DL — HIGH (ref 10–20)
CALCIUM SERPL-MCNC: 8.6 MG/DL — SIGNIFICANT CHANGE UP (ref 8.5–10.1)
CHLORIDE SERPL-SCNC: 105 MMOL/L — SIGNIFICANT CHANGE UP (ref 98–110)
CO2 SERPL-SCNC: 26 MMOL/L — SIGNIFICANT CHANGE UP (ref 17–32)
CREAT SERPL-MCNC: 0.7 MG/DL — SIGNIFICANT CHANGE UP (ref 0.7–1.5)
EOSINOPHIL # BLD AUTO: 0.02 K/UL — SIGNIFICANT CHANGE UP (ref 0–0.7)
EOSINOPHIL NFR BLD AUTO: 0.4 % — SIGNIFICANT CHANGE UP (ref 0–8)
GLUCOSE SERPL-MCNC: 121 MG/DL — HIGH (ref 70–99)
HCT VFR BLD CALC: 36 % — LOW (ref 37–47)
HGB BLD-MCNC: 11.4 G/DL — LOW (ref 12–16)
IMM GRANULOCYTES NFR BLD AUTO: 0.2 % — SIGNIFICANT CHANGE UP (ref 0.1–0.3)
LYMPHOCYTES # BLD AUTO: 0.79 K/UL — LOW (ref 1.2–3.4)
LYMPHOCYTES # BLD AUTO: 14.4 % — LOW (ref 20.5–51.1)
MAGNESIUM SERPL-MCNC: 2 MG/DL — SIGNIFICANT CHANGE UP (ref 1.8–2.4)
MCHC RBC-ENTMCNC: 31.6 PG — HIGH (ref 27–31)
MCHC RBC-ENTMCNC: 31.7 G/DL — LOW (ref 32–37)
MCV RBC AUTO: 99.7 FL — HIGH (ref 81–99)
MONOCYTES # BLD AUTO: 0.41 K/UL — SIGNIFICANT CHANGE UP (ref 0.1–0.6)
MONOCYTES NFR BLD AUTO: 7.5 % — SIGNIFICANT CHANGE UP (ref 1.7–9.3)
NEUTROPHILS # BLD AUTO: 4.23 K/UL — SIGNIFICANT CHANGE UP (ref 1.4–6.5)
NEUTROPHILS NFR BLD AUTO: 77.1 % — HIGH (ref 42.2–75.2)
NRBC # BLD: 0 /100 WBCS — SIGNIFICANT CHANGE UP (ref 0–0)
PLATELET # BLD AUTO: 298 K/UL — SIGNIFICANT CHANGE UP (ref 130–400)
POTASSIUM SERPL-MCNC: 4.3 MMOL/L — SIGNIFICANT CHANGE UP (ref 3.5–5)
POTASSIUM SERPL-SCNC: 4.3 MMOL/L — SIGNIFICANT CHANGE UP (ref 3.5–5)
PROT SERPL-MCNC: 8.2 G/DL — HIGH (ref 6–8)
RBC # BLD: 3.61 M/UL — LOW (ref 4.2–5.4)
RBC # FLD: 15.9 % — HIGH (ref 11.5–14.5)
SARS-COV-2 IGG SERPL QL IA: POSITIVE
SARS-COV-2 IGM SERPL IA-ACNC: 3.71 INDEX — HIGH
SODIUM SERPL-SCNC: 138 MMOL/L — SIGNIFICANT CHANGE UP (ref 135–146)
WBC # BLD: 5.48 K/UL — SIGNIFICANT CHANGE UP (ref 4.8–10.8)
WBC # FLD AUTO: 5.48 K/UL — SIGNIFICANT CHANGE UP (ref 4.8–10.8)

## 2021-01-27 PROCEDURE — 99233 SBSQ HOSP IP/OBS HIGH 50: CPT

## 2021-01-27 PROCEDURE — 71045 X-RAY EXAM CHEST 1 VIEW: CPT | Mod: 26

## 2021-01-27 RX ORDER — IMMUNE GLOBULIN (HUMAN) 10 G/100ML
90 INJECTION INTRAVENOUS; SUBCUTANEOUS DAILY
Refills: 0 | Status: COMPLETED | OUTPATIENT
Start: 2021-01-27 | End: 2021-01-27

## 2021-01-27 RX ORDER — ACETAMINOPHEN 500 MG
650 TABLET ORAL ONCE
Refills: 0 | Status: COMPLETED | OUTPATIENT
Start: 2021-01-27 | End: 2021-01-27

## 2021-01-27 RX ORDER — DIPHENHYDRAMINE HCL 50 MG
25 CAPSULE ORAL ONCE
Refills: 0 | Status: COMPLETED | OUTPATIENT
Start: 2021-01-27 | End: 2021-01-27

## 2021-01-27 RX ADMIN — PYRIDOSTIGMINE BROMIDE 90 MILLIGRAM(S): 60 SOLUTION ORAL at 06:19

## 2021-01-27 RX ADMIN — Medication 50 MILLIGRAM(S): at 13:38

## 2021-01-27 RX ADMIN — Medication 25 MILLIGRAM(S): at 14:15

## 2021-01-27 RX ADMIN — AZATHIOPRINE 100 MILLIGRAM(S): 100 TABLET ORAL at 06:17

## 2021-01-27 RX ADMIN — ATORVASTATIN CALCIUM 20 MILLIGRAM(S): 80 TABLET, FILM COATED ORAL at 22:11

## 2021-01-27 RX ADMIN — FAMOTIDINE 40 MILLIGRAM(S): 10 INJECTION INTRAVENOUS at 22:11

## 2021-01-27 RX ADMIN — PYRIDOSTIGMINE BROMIDE 90 MILLIGRAM(S): 60 SOLUTION ORAL at 00:07

## 2021-01-27 RX ADMIN — CHLORHEXIDINE GLUCONATE 1 APPLICATION(S): 213 SOLUTION TOPICAL at 17:34

## 2021-01-27 RX ADMIN — Medication 81 MILLIGRAM(S): at 13:37

## 2021-01-27 RX ADMIN — CHLORHEXIDINE GLUCONATE 1 APPLICATION(S): 213 SOLUTION TOPICAL at 06:17

## 2021-01-27 RX ADMIN — PYRIDOSTIGMINE BROMIDE 90 MILLIGRAM(S): 60 SOLUTION ORAL at 13:37

## 2021-01-27 RX ADMIN — IMMUNE GLOBULIN (HUMAN) 180 GRAM(S): 10 INJECTION INTRAVENOUS; SUBCUTANEOUS at 14:57

## 2021-01-27 RX ADMIN — Medication 50 MICROGRAM(S): at 06:34

## 2021-01-27 RX ADMIN — BUDESONIDE AND FORMOTEROL FUMARATE DIHYDRATE 2 PUFF(S): 160; 4.5 AEROSOL RESPIRATORY (INHALATION) at 20:30

## 2021-01-27 RX ADMIN — PANTOPRAZOLE SODIUM 40 MILLIGRAM(S): 20 TABLET, DELAYED RELEASE ORAL at 06:19

## 2021-01-27 RX ADMIN — BUDESONIDE AND FORMOTEROL FUMARATE DIHYDRATE 2 PUFF(S): 160; 4.5 AEROSOL RESPIRATORY (INHALATION) at 06:20

## 2021-01-27 RX ADMIN — PYRIDOSTIGMINE BROMIDE 90 MILLIGRAM(S): 60 SOLUTION ORAL at 17:33

## 2021-01-27 RX ADMIN — Medication 40 MILLIGRAM(S): at 06:22

## 2021-01-27 RX ADMIN — Medication 25 MILLIGRAM(S): at 06:34

## 2021-01-27 RX ADMIN — ENOXAPARIN SODIUM 40 MILLIGRAM(S): 100 INJECTION SUBCUTANEOUS at 13:38

## 2021-01-27 RX ADMIN — Medication 400 MILLIGRAM(S): at 09:22

## 2021-01-27 RX ADMIN — Medication 650 MILLIGRAM(S): at 14:15

## 2021-01-27 NOTE — PROGRESS NOTE ADULT - ASSESSMENT
This is a 60y year old Female presenting with Exacerbation of Myasthenia Gravis w/ possible drug-induced Iatrogenic Pancreatitis s/p Streptococcal vaccinations    Recommendations:  1. Continue on Nasal Cannula w/ BiPaP PRN ; PFT's q6hrs  2. Second dose IVIG Infusion 1 g/kg (92grams) over 5 hours x 1 days of treatment then 0.5g/kg tomorrow x 1.  3. Discontinue Imuran  4. Monitor LFT's , Amylase, & Lipase trend  5. increase Prednisone to 50 mg QD  6. Continue Mestinon 90 QID   7. Please continue Telemetry CCU Monitoring w/ serial neuro-checks Q4 hrs  8. s/p Menveo/Bexsero vaccinations first dose, will need 2nd dose 1 month from now.  may consider eculizumab induction after 2 wks (will start [place])

## 2021-01-27 NOTE — PROGRESS NOTE ADULT - SUBJECTIVE AND OBJECTIVE BOX
[id number]  Neurology Follow up note    Name  GHADA ROPER    HPI:  · Pt is a 60y old female with pmhx of High blood cholesterol, Myasthenia gravis ( w/ flares requiring hospitalizations and plasmapheresis , no intubations) , Pancreatitis, postural orthostatic tachycardia syndrome,  COVID infxn ( nov 2020)  presenting to the ED complaining of worsening SOB x 1 day. Pt reports SOB is worse w/ exertion  and reports that she had difficulty reading her grandkids a bedtime story.  Per pt " this feels like previous flares"  No CP , No fevers, chills or cough.  No syncope . No unilateral leg swelling.   (25 Jan 2021 16:25)      Interval History -  Patient seen at bedside with Dr Urbina, patient at present complaining symptoms of weakness are the same as yesterday, also complaints of SOB are not improving, however orthopnea improves on BiPap. Mild c/o headache and nausea. Patient also reports abdominal fullness since admission, patient admits to history of pancreatitis prior to starting GM medications.  Patient has been hospitalized for pancreatitis, patient was advised of discontinuance of Imuran at present time due to elevation of pancreatic enzymes.  Patient had first dose of IVIG and was given adjusted amount as per clinical pharmacist, case discussed with clinical pharmacist and advised there should be no ALTERATION of dosing, and to dose according to ACTUAL BODY WEIGHT.        Vital Signs Last 24 Hrs  T(C): 35.9 (27 Jan 2021 07:01), Max: 36.3 (26 Jan 2021 16:14)  T(F): 96.6 (27 Jan 2021 07:01), Max: 97.4 (26 Jan 2021 16:14)  HR: 62 (27 Jan 2021 07:10) (50 - 100)  BP: 124/59 (27 Jan 2021 07:10) (118/65 - 146/101)  BP(mean): 85 (27 Jan 2021 07:10) (81 - 117)  RR: 21 (27 Jan 2021 07:10) (14 - 301)  SpO2: 100% (27 Jan 2021 07:10) (99% - 100%)  ICU Vital Signs Last 24 Hrs  T(C): 35.9 (27 Jan 2021 07:01), Max: 36.3 (26 Jan 2021 16:14)  T(F): 96.6 (27 Jan 2021 07:01), Max: 97.4 (26 Jan 2021 16:14)  HR: 62 (27 Jan 2021 07:10) (50 - 100)  BP: 124/59 (27 Jan 2021 07:10) (118/65 - 146/101)  BP(mean): 85 (27 Jan 2021 07:10) (81 - 117)  RR: 21 (27 Jan 2021 07:10) (14 - 301)  SpO2: 100% (27 Jan 2021 07:10) (99% - 100%)      Neurologic Examination:  Mental status: Awake, alert and oriented x3.  Recent and remote memory intact.  Naming, repetition and comprehension intact.  Attention/concentration intact.  No dysarthria, no aphasia.  Fund of knowledge appropriate.    Cranial nerves: Pupils equally round and reactive to light, visual fields full, extraocular muscles intact, V1 through V3 intact bilaterally and symmetric, face symmetric, sternocleidomastoid/shoulder shrug strength bilaterally 5/5.    Motor: Neck Flexion 4/5 ; Neck Extension 5/5;   Normal bulk and tone, strength 4/5 in bilateral proximal upper extremities 5/5 distal b/l upper extremities.  5/5 bilateral lower extremities.   strength 5/5.    Sensation: Intact to light touch, proprioception. No neglect.   Reflexes: 2+ in upper and lower extremities, downgoing toes bilaterally    Medications  ALBUTerol    90 MICROgram(s) HFA Inhaler 2 Puff(s) Inhalation every 6 hours PRN  aspirin  chewable 81 milliGRAM(s) Oral daily  atorvastatin 20 milliGRAM(s) Oral at bedtime  azaTHIOprine 100 milliGRAM(s) Oral two times a day  budesonide  80 MICROgram(s)/formoterol 4.5 MICROgram(s) Inhaler 2 Puff(s) Inhalation two times a day  chlorhexidine 4% Liquid 1 Application(s) Topical two times a day  enoxaparin Injectable 40 milliGRAM(s) SubCutaneous daily  famotidine  Oral Tab/Cap - Peds 40 milliGRAM(s) Oral at bedtime  ibuprofen  Tablet. 400 milliGRAM(s) Oral every 6 hours PRN  immune   globulin 10% (GAMMAGARD) IVPB 90 Gram(s) IV Intermittent daily  influenza   Vaccine 0.5 milliLiter(s) IntraMuscular once  levothyroxine 50 MICROGram(s) Oral daily  metoprolol succinate ER 25 milliGRAM(s) Oral daily  pantoprazole    Tablet 40 milliGRAM(s) Oral before breakfast  predniSONE   Tablet 40 milliGRAM(s) Oral daily  pyridostigmine 90 milliGRAM(s) Oral four times a day      Lab                        11.4   5.48  )-----------( 298      ( 27 Jan 2021 05:19 )             36.0     01-27    138  |  105  |  28<H>  ----------------------------<  121<H>  4.3   |  26  |  0.7    Ca    8.6      27 Jan 2021 05:19  Mg     2.0     01-27    TPro  8.2<H>  /  Alb  3.0<L>  /  TBili  0.8  /  DBili  x   /  AST  44<H>  /  ALT  29  /  AlkPhos  182<H>  01-27    CAPILLARY BLOOD GLUCOSE        LIVER FUNCTIONS - ( 27 Jan 2021 05:19 )  Alb: 3.0 g/dL / Pro: 8.2 g/dL / ALK PHOS: 182 U/L / ALT: 29 U/L / AST: 44 U/L / GGT: x             Other studies:  PENDING echo from 1/26    Respiratory    Negative Inspiratory Force  Negative Inspiratory Force (enter negative value) (cm H2O): -44   Negative Inspiratory Force Effort: good    Vital Capacity  Vital Capacity: Type: forced  Vital Capacity (mL): 2000   Vital Capacity Effort: good    Assessment and Recommendation:       	  This is a 60y year old Female presenting with Exacerbation of Myasthenia Gravis; ? Iatrogenic Pancreatitis    Plan  1. Continue on Nasal Cannula w/ BiPaP PRN ; PFT's q6hrs  2. Second dose IVIG Infusion 1 g/kg (92grams) over 5 hours x 1 days of treatment then 0.5g/kg tomorrow x 1.  3. Discontinue Imuran  4. Monitor LFT's , Amylase, & Lipase trend  5. Continue PO Prednisone 40 daily considering increasing dosing.  6. Continue Mestinon 90 QID   7. Please continue Telemetry CCU Monitoring w/ serial neuro-checks Q4 hrs [id number]  Neurology Follow up note    Name  GHADA ROPER    HPI:  · Pt is a 60y old female with pmhx of High blood cholesterol, Myasthenia gravis ( w/ flares requiring hospitalizations and plasmapheresis , no intubations) , Pancreatitis, postural orthostatic tachycardia syndrome,  COVID infxn ( nov 2020)  presenting to the ED complaining of worsening SOB x 1 day. Pt reports SOB is worse w/ exertion  and reports that she had difficulty reading her grandkids a bedtime story.  Per pt " this feels like previous flares"  No CP , No fevers, chills or cough.  No syncope . No unilateral leg swelling.   (25 Jan 2021 16:25)      Interval History -  Patient seen at bedside with Dr Urbina, patient at present complaining symptoms of weakness are the same as yesterday, also complaints of SOB are not improving, however orthopnea improves on BiPap. Mild c/o headache and nausea. Patient also reports abdominal fullness since admission, patient admits to history of pancreatitis prior to starting GM medications.  Patient has been hospitalized for pancreatitis, patient was advised of discontinuance of Imuran at present time due to elevation of pancreatic enzymes.  Patient had first dose of IVIG and was given adjusted amount as per clinical pharmacist, case discussed with clinical pharmacist and advised there should be no ALTERATION of dosing, and to dose according to ACTUAL BODY WEIGHT.    Vital Signs Last 24 Hrs  T(C): 35.9 (27 Jan 2021 07:01), Max: 36.3 (26 Jan 2021 16:14)  T(F): 96.6 (27 Jan 2021 07:01), Max: 97.4 (26 Jan 2021 16:14)  HR: 62 (27 Jan 2021 07:10) (50 - 100)  BP: 124/59 (27 Jan 2021 07:10) (118/65 - 146/101)  BP(mean): 85 (27 Jan 2021 07:10) (81 - 117)  RR: 21 (27 Jan 2021 07:10) (14 - 301)  SpO2: 100% (27 Jan 2021 07:10) (99% - 100%)  ICU Vital Signs Last 24 Hrs  T(C): 35.9 (27 Jan 2021 07:01), Max: 36.3 (26 Jan 2021 16:14)  T(F): 96.6 (27 Jan 2021 07:01), Max: 97.4 (26 Jan 2021 16:14)  HR: 62 (27 Jan 2021 07:10) (50 - 100)  BP: 124/59 (27 Jan 2021 07:10) (118/65 - 146/101)  BP(mean): 85 (27 Jan 2021 07:10) (81 - 117)  RR: 21 (27 Jan 2021 07:10) (14 - 301)  SpO2: 100% (27 Jan 2021 07:10) (99% - 100%)    Neurologic Examination:  Mental status: Awake, alert and oriented x3.  Recent and remote memory intact.  Naming, repetition and comprehension intact.  Attention/concentration intact.  No dysarthria, no aphasia.  Fund of knowledge appropriate.    Cranial nerves: Pupils equally round and reactive to light, visual fields full, extraocular muscles intact, V1 through V3 intact bilaterally and symmetric, face symmetric, sternocleidomastoid/shoulder shrug strength bilaterally 5/5.    Motor: Neck Flexion 4/5 ; Neck Extension 5/5;   Normal bulk and tone, strength 4/5 in bilateral proximal upper extremities 5/5 distal b/l upper extremities.  5/5 bilateral lower extremities.   strength 5/5.    Sensation: Intact to light touch, proprioception. No neglect.   Reflexes: 2+ in upper and lower extremities, downgoing toes bilaterally    Medications  ALBUTerol    90 MICROgram(s) HFA Inhaler 2 Puff(s) Inhalation every 6 hours PRN  aspirin  chewable 81 milliGRAM(s) Oral daily  atorvastatin 20 milliGRAM(s) Oral at bedtime  azaTHIOprine 100 milliGRAM(s) Oral two times a day  budesonide  80 MICROgram(s)/formoterol 4.5 MICROgram(s) Inhaler 2 Puff(s) Inhalation two times a day  chlorhexidine 4% Liquid 1 Application(s) Topical two times a day  enoxaparin Injectable 40 milliGRAM(s) SubCutaneous daily  famotidine  Oral Tab/Cap - Peds 40 milliGRAM(s) Oral at bedtime  ibuprofen  Tablet. 400 milliGRAM(s) Oral every 6 hours PRN  immune   globulin 10% (GAMMAGARD) IVPB 90 Gram(s) IV Intermittent daily  influenza   Vaccine 0.5 milliLiter(s) IntraMuscular once  levothyroxine 50 MICROGram(s) Oral daily  metoprolol succinate ER 25 milliGRAM(s) Oral daily  pantoprazole    Tablet 40 milliGRAM(s) Oral before breakfast  predniSONE   Tablet 40 milliGRAM(s) Oral daily  pyridostigmine 90 milliGRAM(s) Oral four times a day    Lab                        11.4   5.48  )-----------( 298      ( 27 Jan 2021 05:19 )             36.0     01-27    138  |  105  |  28<H>  ----------------------------<  121<H>  4.3   |  26  |  0.7    Ca    8.6      27 Jan 2021 05:19  Mg     2.0     01-27    TPro  8.2<H>  /  Alb  3.0<L>  /  TBili  0.8  /  DBili  x   /  AST  44<H>  /  ALT  29  /  AlkPhos  182<H>  01-27    CAPILLARY BLOOD GLUCOSE    LIVER FUNCTIONS - ( 27 Jan 2021 05:19 )  Alb: 3.0 g/dL / Pro: 8.2 g/dL / ALK PHOS: 182 U/L / ALT: 29 U/L / AST: 44 U/L / GGT: x           Other studies:  PENDING echo from 1/26    Respiratory    Negative Inspiratory Force  Negative Inspiratory Force (enter negative value) (cm H2O): -44   Negative Inspiratory Force Effort: good    Vital Capacity  Vital Capacity: Type: forced  Vital Capacity (mL): 2000   Vital Capacity Effort: good

## 2021-01-27 NOTE — PROGRESS NOTE ADULT - SUBJECTIVE AND OBJECTIVE BOX
Patient appears comfortable, reports no change in SOB       T(F): 96.6 (01-27-21 @ 07:01), Max: 97.7 (01-26-21 @ 11:14)  HR: 62 (01-27-21 @ 07:10)  BP: 124/59 (01-27-21 @ 07:10)  RR: 20  SpO2: 100% (01-27-21 @ 07:10) (99% - 100%)    PHYSICAL EXAM:  GENERAL: NAD  HEAD:  Atraumatic, Normocephalic  NERVOUS SYSTEM:  Alert & Oriented X3, no focal deficits   CHEST/LUNG: Clear to percussion bilaterally; No rales, rhonchi, wheezing, or rubs  HEART: Regular rate and rhythm; No murmurs, rubs, or gallops  ABDOMEN: Soft, Nontender, Nondistended; Bowel sounds present  EXTREMITIES:  2+ Peripheral Pulses, No clubbing, cyanosis, or edema  LYMPH: No lymphadenopathy noted  SKIN: No rashes or lesions    LABS  01-27    138  |  105  |  28<H>  ----------------------------<  121<H>  4.3   |  26  |  0.7    Ca    8.6      27 Jan 2021 05:19  Mg     2.0     01-27    TPro  8.2<H>  /  Alb  3.0<L>  /  TBili  0.8  /  DBili  x   /  AST  44<H>  /  ALT  29  /  AlkPhos  182<H>  01-27                          11.4   5.48  )-----------( 298      ( 27 Jan 2021 05:19 )             36.0       CARDIAC ENZYMES    Troponin T, Serum: <0.01 ng/mL (01-25-21 @ 12:24)    RADIOLOGY  < from: Xray Chest 1 View- PORTABLE-Routine (Xray Chest 1 View- PORTABLE-Routine in AM.) (01.26.21 @ 06:14) >  Impression:    No focal consolidation, pneumothorax or pleural effusion.      < end of copied text >    MEDICATIONS  (STANDING):  aspirin  chewable 81 milliGRAM(s) Oral daily  atorvastatin 20 milliGRAM(s) Oral at bedtime  azaTHIOprine 100 milliGRAM(s) Oral two times a day  budesonide  80 MICROgram(s)/formoterol 4.5 MICROgram(s) Inhaler 2 Puff(s) Inhalation two times a day  chlorhexidine 4% Liquid 1 Application(s) Topical two times a day  enoxaparin Injectable 40 milliGRAM(s) SubCutaneous daily  famotidine  Oral Tab/Cap - Peds 40 milliGRAM(s) Oral at bedtime  immune   globulin 10% (GAMMAGARD) IVPB 90 Gram(s) IV Intermittent daily  influenza   Vaccine 0.5 milliLiter(s) IntraMuscular once  levothyroxine 50 MICROGram(s) Oral daily  metoprolol succinate ER 25 milliGRAM(s) Oral daily  pantoprazole    Tablet 40 milliGRAM(s) Oral before breakfast  predniSONE   Tablet 40 milliGRAM(s) Oral daily  pyridostigmine 90 milliGRAM(s) Oral four times a day    MEDICATIONS  (PRN):  ALBUTerol    90 MICROgram(s) HFA Inhaler 2 Puff(s) Inhalation every 6 hours PRN Shortness of Breath  ibuprofen  Tablet. 400 milliGRAM(s) Oral every 6 hours PRN Moderate Pain (4 - 6)

## 2021-01-27 NOTE — PROGRESS NOTE ADULT - ASSESSMENT
IMPRESSION:    Myasthenia gravis exacerbation   Medical noncompliance    PLAN:    CNS: c/w pyridostigmine, imuran and Prednisone, fup neuro, second IVIG today    PULMONARY: keep pox > 92 %   check NIF and VC Q shift   bipap at night and as needed     CARDIOVASCULAR:keep is = os   echo     GI: GI prophylaxis.  Feeding     RENAL: follow lytes is and os     INFECTIOUS DISEASE: no abx     HEMATOLOGICAL:  DVT prophylaxis.    ENDOCRINE:  Follow up FS.  Insulin protocol if needed.    MSK: OOB to chair    Transfer to medical floor   IMPRESSION:    Myasthenia gravis exacerbation   Medical noncompliance    PLAN:    CNS: c/w pyridostigmine, imuran and Prednisone, fup neuro, second IVIG today,possible PLEX if no improvement    PULMONARY: keep pox > 92 %   check NIF and VC 8 shift   bipap at night and as needed     CARDIOVASCULAR:keep is = os   echo     GI: GI prophylaxis.  Feeding     RENAL: follow lytes is and os     INFECTIOUS DISEASE: no abx     HEMATOLOGICAL:  DVT prophylaxis.    ENDOCRINE:  Follow up FS.  Insulin protocol if needed.    MSK: OOB to chair    Keep in MICU

## 2021-01-27 NOTE — PROGRESS NOTE ADULT - ASSESSMENT
60y old female with pmhx of High blood cholesterol, Myasthenia gravis ( w/ flares requiring hospitalizations and plasmapheresis , no intubations) , Pancreatitis, postural orthostatic tachycardia syndrome,  COVID infxn ( nov 2020)  presenting to the ED complaining of worsening SOB x 1 day. Pt reports SOB is worse w/ exertion  and reports that she had difficulty reading her grandkids a bedtime story.  Per pt " this feels like previous flares"  No CP , No fevers, chills or cough.  No syncope . No unilateral leg swelling     Myasthenia gravis flare     - prednisone 40mg daily   -  IVIG again today    - azathioprine, pyridostigmine   - monitor in unit   - check VC and MIP q6h   - continue home meds   - DVT prophylaxis

## 2021-01-27 NOTE — PROGRESS NOTE ADULT - SUBJECTIVE AND OBJECTIVE BOX
Patient is a 60y old  Female who presents with a chief complaint of SOB (27 Jan 2021 11:07)      HPI:  · Pt is a 60y old female with pmhx of High blood cholesterol, Myasthenia gravis ( w/ flares requiring hospitalizations and plasmapheresis , no intubations) , Pancreatitis, postural orthostatic tachycardia syndrome,  COVID infxn ( nov 2020)  presenting to the ED complaining of worsening SOB x 1 day. Pt reports SOB is worse w/ exertion  and reports that she had difficulty reading her grandkids a bedtime story.  Per pt " this feels like previous flares"  No CP , No fevers, chills or cough.  No syncope . No unilateral leg swelling.   (25 Jan 2021 16:25)      PAST MEDICAL & SURGICAL HISTORY:  Myasthenia gravis    High blood cholesterol    Pancreatitis    POTS (postural orthostatic tachycardia syndrome)    History of foot surgery      Allergies    sulfa drugs (Unknown)    Intolerances      FAMILY HISTORY:  No pertinent family history in first degree relatives      Home Medications:  albuterol 90 mcg/inh inhalation powder: 2 puff(s) inhaled every 6 hours, As Needed (26 Nov 2020 15:43)  aspirin 81 mg oral tablet, chewable: 1 tab(s) orally once a day (26 Nov 2020 15:43)  atorvastatin 20 mg oral tablet: 1 tab(s) orally once a day (26 Nov 2020 15:43)  doxepin 10 mg oral capsule:  (26 Nov 2020 15:43)  fluticasone 50 mcg/inh nasal spray: 1 spray(s) nasal once a day (26 Nov 2020 15:43)  Imuran 50 mg oral tablet: 1 tab(s) orally 3 times a day (26 Nov 2020 15:43)  Mestinon 60 mg oral tablet: 1.5 tab(s) orally 4 times a day (26 Nov 2020 15:43)  Protonix 40 mg oral delayed release tablet: 1 tab(s) orally once a day (26 Nov 2020 15:43)  Symbicort: inhaled 2 times a day (26 Nov 2020 15:43)  Toprol-XL 25 mg oral tablet, extended release: 1 tab(s) orally once a day (26 Nov 2020 15:43)  Vitamin D2: every friday (26 Nov 2020 15:43)    Occupation:  Alochol: Denied  Smoking: Denied  Drug Use: Denied  Marital Status:         ROS: as in HPI; All other systems reviewed are negative    ICU Vital Signs Last 24 Hrs  T(C): 35.9 (27 Jan 2021 07:01), Max: 36.3 (26 Jan 2021 16:14)  T(F): 96.6 (27 Jan 2021 07:01), Max: 97.4 (26 Jan 2021 16:14)  HR: 62 (27 Jan 2021 07:10) (50 - 100)  BP: 124/59 (27 Jan 2021 07:10) (118/65 - 146/101)  BP(mean): 85 (27 Jan 2021 07:10) (81 - 117)  ABP: --  ABP(mean): --  RR: 21 (27 Jan 2021 07:10) (14 - 301)  SpO2: 100% (27 Jan 2021 07:10) (99% - 100%)        Physical Examination:    General: No acute distress.  Alert, oriented, interactive, nonfocal    HEENT: Pupils equal, reactive to light.  Symmetric.    PULM: Clear to auscultation bilaterally, no significant sputum production    CVS: Regular rate and rhythm, no murmurs, rubs, or gallops    ABD: Soft, nondistended, nontender, normoactive bowel sounds, no masses    EXT: No edema, nontender    SKIN: Warm and well perfused, no rashes noted.    CNS: NIF: -44, VC 2200, mild generalized weakness+      ABG - ( 26 Jan 2021 13:06 )  pH, Arterial: 7.43  pH, Blood: x     /  pCO2: 36    /  pO2: 87    / HCO3: 24    / Base Excess: 0.2   /  SaO2: 98                  I&O's Detail    26 Jan 2021 07:01  -  27 Jan 2021 07:00  --------------------------------------------------------  IN:    Modular Fluid: 875 mL  Total IN: 875 mL    OUT:    Voided (mL): 300 mL  Total OUT: 300 mL    Total NET: 575 mL      27 Jan 2021 07:01  -  27 Jan 2021 11:24  --------------------------------------------------------  IN:    Oral Fluid: 120 mL  Total IN: 120 mL    OUT:  Total OUT: 0 mL    Total NET: 120 mL          LABS:                        11.4   5.48  )-----------( 298      ( 27 Jan 2021 05:19 )             36.0     27 Jan 2021 05:19    138    |  105    |  28     ----------------------------<  121    4.3     |  26     |  0.7      Ca    8.6        27 Jan 2021 05:19  Mg     2.0       27 Jan 2021 05:19    TPro  8.2    /  Alb  3.0    /  TBili  0.8    /  DBili  x      /  AST  44     /  ALT  29     /  AlkPhos  182    27 Jan 2021 05:19  Amylase x     lipase x          CARDIAC MARKERS ( 25 Jan 2021 12:24 )  x     / <0.01 ng/mL / x     / x     / x          MEDICATIONS  (STANDING):  aspirin  chewable 81 milliGRAM(s) Oral daily  atorvastatin 20 milliGRAM(s) Oral at bedtime  budesonide  80 MICROgram(s)/formoterol 4.5 MICROgram(s) Inhaler 2 Puff(s) Inhalation two times a day  chlorhexidine 4% Liquid 1 Application(s) Topical two times a day  enoxaparin Injectable 40 milliGRAM(s) SubCutaneous daily  famotidine  Oral Tab/Cap - Peds 40 milliGRAM(s) Oral at bedtime  immune   globulin 10% (GAMMAGARD) IVPB 90 Gram(s) IV Intermittent daily  influenza   Vaccine 0.5 milliLiter(s) IntraMuscular once  levothyroxine 50 MICROGram(s) Oral daily  metoprolol succinate ER 25 milliGRAM(s) Oral daily  pantoprazole    Tablet 40 milliGRAM(s) Oral before breakfast  predniSONE   Tablet 50 milliGRAM(s) Oral daily  pyridostigmine 90 milliGRAM(s) Oral four times a day    MEDICATIONS  (PRN):  ALBUTerol    90 MICROgram(s) HFA Inhaler 2 Puff(s) Inhalation every 6 hours PRN Shortness of Breath  ibuprofen  Tablet. 400 milliGRAM(s) Oral every 6 hours PRN Moderate Pain (4 - 6)

## 2021-01-27 NOTE — PROGRESS NOTE ADULT - SUBJECTIVE AND OBJECTIVE BOX
Patient is a 60y old  Female who presents with a chief complaint of SOB (26 Jan 2021 11:51)      Over Night Events: still feels weak. s/p IVIG yesterday. used NIV overnight.   NIF  VC     I&O's Detail    26 Jan 2021 07:01  -  27 Jan 2021 07:00  --------------------------------------------------------  IN:    Modular Fluid: 875 mL  Total IN: 875 mL    OUT:    Voided (mL): 300 mL  Total OUT: 300 mL    Total NET: 575 mL      27 Jan 2021 07:01  -  27 Jan 2021 08:38  --------------------------------------------------------  IN:    Oral Fluid: 120 mL  Total IN: 120 mL    OUT:  Total OUT: 0 mL    Total NET: 120 mL          PHYSICAL EXAM    ICU Vital Signs Last 24 Hrs  T(C): 35.9 (27 Jan 2021 07:01), Max: 36.5 (26 Jan 2021 11:14)  T(F): 96.6 (27 Jan 2021 07:01), Max: 97.7 (26 Jan 2021 11:14)  HR: 62 (27 Jan 2021 07:10) (50 - 100)  BP: 124/59 (27 Jan 2021 07:10) (118/65 - 146/101)  BP(mean): 85 (27 Jan 2021 07:10) (81 - 117)  ABP: --  ABP(mean): --  RR: 21 (27 Jan 2021 07:10) (14 - 301)  SpO2: 100% (27 Jan 2021 07:10) (98% - 100%)      CONSTITUTIONAL:   Ill appearing.  Well nourished.  NAD    ENT:   Airway patent,   Mouth with normal mucosa.   No thrush    EYES:   Pupils equal,   Round and reactive to light.    CARDIAC:   Normal rate,   Regular rhythm.    No edema    Vascular:  Normal systolic impulse  No Carotid bruits    RESPIRATORY:   No wheezing  Bilateral BS  Normal chest expansion  Not tachypneic,  No use of accessory muscles    GASTROINTESTINAL:  Abdomen soft,   Non-tender,   No guarding,   + BS    GENITOURINARY  normal genitalia for sex  no edema    MUSCULOSKELETAL:   Range of motion is not limited,  No clubbing, cyanosis    NEUROLOGICAL:   Alert and oriented   No motor  deficits.  pertinent DTRs normal    SKIN:   Skin normal color for race,   Warm and dry  No evidence of rash.    PSYCHIATRIC:   Normal mood and affect.   No apparent risk to self or others.    HEMATOLOGICAL:  No cervical  lymphadenopathy.  no inguinal lymphadenopathy      LABS:                          11.4   5.48  )-----------( 298      ( 27 Jan 2021 05:19 )             36.0                                               01-27    138  |  105  |  28<H>  ----------------------------<  121<H>  4.3   |  26  |  0.7    Ca    8.6      27 Jan 2021 05:19  Mg     2.0     01-27    TPro  8.2<H>  /  Alb  3.0<L>  /  TBili  0.8  /  DBili  x   /  AST  44<H>  /  ALT  29  /  AlkPhos  182<H>  01-27                                                 CARDIAC MARKERS ( 25 Jan 2021 12:24 )  x     / <0.01 ng/mL / x     / x     / x                                                LIVER FUNCTIONS - ( 27 Jan 2021 05:19 )  Alb: 3.0 g/dL / Pro: 8.2 g/dL / ALK PHOS: 182 U/L / ALT: 29 U/L / AST: 44 U/L / GGT: x                                                  D-Dimer Assay, Quantitative: 258 ng/mL DDU (01-25-21 @ 12:26)      Serum Pro-Brain Natriuretic Peptide: 43 pg/mL (01-25-21 @ 12:24)                                                                                ABG - ( 26 Jan 2021 13:06 )  pH, Arterial: 7.43  pH, Blood: x     /  pCO2: 36    /  pO2: 87    / HCO3: 24    / Base Excess: 0.2   /  SaO2: 98                  MEDICATIONS  (STANDING):  aspirin  chewable 81 milliGRAM(s) Oral daily  atorvastatin 20 milliGRAM(s) Oral at bedtime  azaTHIOprine 100 milliGRAM(s) Oral two times a day  budesonide  80 MICROgram(s)/formoterol 4.5 MICROgram(s) Inhaler 2 Puff(s) Inhalation two times a day  chlorhexidine 4% Liquid 1 Application(s) Topical two times a day  enoxaparin Injectable 40 milliGRAM(s) SubCutaneous daily  famotidine  Oral Tab/Cap - Peds 40 milliGRAM(s) Oral at bedtime  immune   globulin 10% (GAMMAGARD) IVPB 70 Gram(s) IV Intermittent daily  influenza   Vaccine 0.5 milliLiter(s) IntraMuscular once  levothyroxine 50 MICROGram(s) Oral daily  metoprolol succinate ER 25 milliGRAM(s) Oral daily  pantoprazole    Tablet 40 milliGRAM(s) Oral before breakfast  predniSONE   Tablet 40 milliGRAM(s) Oral daily  pyridostigmine 90 milliGRAM(s) Oral four times a day    MEDICATIONS  (PRN):  ALBUTerol    90 MICROgram(s) HFA Inhaler 2 Puff(s) Inhalation every 6 hours PRN Shortness of Breath  ibuprofen  Tablet. 400 milliGRAM(s) Oral every 6 hours PRN Moderate Pain (4 - 6)      CXR interpreted by me:  no infiltrates     Patient is a 60y old  Female who presents with a chief complaint of SOB (26 Jan 2021 11:51)      Over Night Events: still feels weak. s/p IVIG yesterday. used NIV overnight.   NIF -44  VC 2000     I&O's Detail    26 Jan 2021 07:01  -  27 Jan 2021 07:00  --------------------------------------------------------  IN:    Modular Fluid: 875 mL  Total IN: 875 mL    OUT:    Voided (mL): 300 mL  Total OUT: 300 mL    Total NET: 575 mL      27 Jan 2021 07:01  -  27 Jan 2021 08:38  --------------------------------------------------------  IN:    Oral Fluid: 120 mL  Total IN: 120 mL    OUT:  Total OUT: 0 mL    Total NET: 120 mL          PHYSICAL EXAM    ICU Vital Signs Last 24 Hrs  T(C): 35.9 (27 Jan 2021 07:01), Max: 36.5 (26 Jan 2021 11:14)  T(F): 96.6 (27 Jan 2021 07:01), Max: 97.7 (26 Jan 2021 11:14)  HR: 62 (27 Jan 2021 07:10) (50 - 100)  BP: 124/59 (27 Jan 2021 07:10) (118/65 - 146/101)  BP(mean): 85 (27 Jan 2021 07:10) (81 - 117)  ABP: --  ABP(mean): --  RR: 21 (27 Jan 2021 07:10) (14 - 301)  SpO2: 100% (27 Jan 2021 07:10) (98% - 100%)      CONSTITUTIONAL:   Ill appearing.  Well nourished.  NAD    ENT:   Airway patent,   Mouth with normal mucosa.   No thrush    EYES:   Pupils equal,   Round and reactive to light.    CARDIAC:   Normal rate,   Regular rhythm.    No edema    Vascular:  Normal systolic impulse  No Carotid bruits    RESPIRATORY:   No wheezing  Bilateral BS  Normal chest expansion  Not tachypneic,  No use of accessory muscles    GASTROINTESTINAL:  Abdomen soft,   Non-tender,   No guarding,   + BS    GENITOURINARY  normal genitalia for sex  no edema    MUSCULOSKELETAL:   Range of motion is not limited,  No clubbing, cyanosis    NEUROLOGICAL:   Alert and oriented   No motor  deficits.  pertinent DTRs normal    SKIN:   Skin normal color for race,   Warm and dry  No evidence of rash.    PSYCHIATRIC:   Normal mood and affect.   No apparent risk to self or others.    HEMATOLOGICAL:  No cervical  lymphadenopathy.  no inguinal lymphadenopathy      LABS:                          11.4   5.48  )-----------( 298      ( 27 Jan 2021 05:19 )             36.0                                               01-27    138  |  105  |  28<H>  ----------------------------<  121<H>  4.3   |  26  |  0.7    Ca    8.6      27 Jan 2021 05:19  Mg     2.0     01-27    TPro  8.2<H>  /  Alb  3.0<L>  /  TBili  0.8  /  DBili  x   /  AST  44<H>  /  ALT  29  /  AlkPhos  182<H>  01-27                                                 CARDIAC MARKERS ( 25 Jan 2021 12:24 )  x     / <0.01 ng/mL / x     / x     / x                                                LIVER FUNCTIONS - ( 27 Jan 2021 05:19 )  Alb: 3.0 g/dL / Pro: 8.2 g/dL / ALK PHOS: 182 U/L / ALT: 29 U/L / AST: 44 U/L / GGT: x                                                  D-Dimer Assay, Quantitative: 258 ng/mL DDU (01-25-21 @ 12:26)      Serum Pro-Brain Natriuretic Peptide: 43 pg/mL (01-25-21 @ 12:24)                                                                                ABG - ( 26 Jan 2021 13:06 )  pH, Arterial: 7.43  pH, Blood: x     /  pCO2: 36    /  pO2: 87    / HCO3: 24    / Base Excess: 0.2   /  SaO2: 98                  MEDICATIONS  (STANDING):  aspirin  chewable 81 milliGRAM(s) Oral daily  atorvastatin 20 milliGRAM(s) Oral at bedtime  azaTHIOprine 100 milliGRAM(s) Oral two times a day  budesonide  80 MICROgram(s)/formoterol 4.5 MICROgram(s) Inhaler 2 Puff(s) Inhalation two times a day  chlorhexidine 4% Liquid 1 Application(s) Topical two times a day  enoxaparin Injectable 40 milliGRAM(s) SubCutaneous daily  famotidine  Oral Tab/Cap - Peds 40 milliGRAM(s) Oral at bedtime  immune   globulin 10% (GAMMAGARD) IVPB 70 Gram(s) IV Intermittent daily  influenza   Vaccine 0.5 milliLiter(s) IntraMuscular once  levothyroxine 50 MICROGram(s) Oral daily  metoprolol succinate ER 25 milliGRAM(s) Oral daily  pantoprazole    Tablet 40 milliGRAM(s) Oral before breakfast  predniSONE   Tablet 40 milliGRAM(s) Oral daily  pyridostigmine 90 milliGRAM(s) Oral four times a day    MEDICATIONS  (PRN):  ALBUTerol    90 MICROgram(s) HFA Inhaler 2 Puff(s) Inhalation every 6 hours PRN Shortness of Breath  ibuprofen  Tablet. 400 milliGRAM(s) Oral every 6 hours PRN Moderate Pain (4 - 6)      CXR interpreted by me:  no infiltrates

## 2021-01-27 NOTE — PROGRESS NOTE ADULT - ASSESSMENT
IMPRESSION:    Myasthenia gravis exacerbation   Medical noncompliance    PLAN:    CNS: c/w pyridostigmine, imuran and Prednisone, IVIG 1gm/kg today, 0.5 gm/kg tomorrow end, stap azathioprine (inc amylase), may require plasmapheresis, f/u neuro    PULMONARY: keep pox > 92 %   check NIF and VC q shift   bipap at night and as needed     CARDIOVASCULAR:keep is = os     GI: GI prophylaxis.  Feeding     RENAL: follow lytes is and os     INFECTIOUS DISEASE: no abx     HEMATOLOGICAL:  DVT prophylaxis.    ENDOCRINE:  Follow up FS.  Insulin protocol if needed.    MSK: OOB to chair, pt consult    Keep in MICU

## 2021-01-27 NOTE — PHARMACOTHERAPY INTERVENTION NOTE - INTERVENTION TYPE RECOOMEND
Therapy Recommended - Formulary adherence
Timing/Frequency of Administration Recommended
Dose Optimization/Non-renal Dose Adjustment
Therapy Recommended - Med Rec related

## 2021-01-28 LAB
ALBUMIN SERPL ELPH-MCNC: 3 G/DL — LOW (ref 3.5–5.2)
ALP SERPL-CCNC: 176 U/L — HIGH (ref 30–115)
ALT FLD-CCNC: 27 U/L — SIGNIFICANT CHANGE UP (ref 0–41)
AMYLASE P1 CFR SERPL: 127 U/L — HIGH (ref 25–115)
ANION GAP SERPL CALC-SCNC: 7 MMOL/L — SIGNIFICANT CHANGE UP (ref 7–14)
AST SERPL-CCNC: 31 U/L — SIGNIFICANT CHANGE UP (ref 0–41)
BASOPHILS # BLD AUTO: 0.01 K/UL — SIGNIFICANT CHANGE UP (ref 0–0.2)
BASOPHILS NFR BLD AUTO: 0.1 % — SIGNIFICANT CHANGE UP (ref 0–1)
BILIRUB SERPL-MCNC: 1 MG/DL — SIGNIFICANT CHANGE UP (ref 0.2–1.2)
BUN SERPL-MCNC: 24 MG/DL — HIGH (ref 10–20)
CALCIUM SERPL-MCNC: 8.9 MG/DL — SIGNIFICANT CHANGE UP (ref 8.5–10.1)
CHLORIDE SERPL-SCNC: 104 MMOL/L — SIGNIFICANT CHANGE UP (ref 98–110)
CO2 SERPL-SCNC: 24 MMOL/L — SIGNIFICANT CHANGE UP (ref 17–32)
CREAT SERPL-MCNC: 0.6 MG/DL — LOW (ref 0.7–1.5)
EOSINOPHIL # BLD AUTO: 0 K/UL — SIGNIFICANT CHANGE UP (ref 0–0.7)
EOSINOPHIL NFR BLD AUTO: 0 % — SIGNIFICANT CHANGE UP (ref 0–8)
GGT SERPL-CCNC: 598 U/L — HIGH (ref 1–40)
GLUCOSE SERPL-MCNC: 102 MG/DL — HIGH (ref 70–99)
HCT VFR BLD CALC: 33.9 % — LOW (ref 37–47)
HGB BLD-MCNC: 10.9 G/DL — LOW (ref 12–16)
IMM GRANULOCYTES NFR BLD AUTO: 0.4 % — HIGH (ref 0.1–0.3)
LIDOCAIN IGE QN: 44 U/L — SIGNIFICANT CHANGE UP (ref 7–60)
LYMPHOCYTES # BLD AUTO: 0.37 K/UL — LOW (ref 1.2–3.4)
LYMPHOCYTES # BLD AUTO: 4.5 % — LOW (ref 20.5–51.1)
MCHC RBC-ENTMCNC: 31.8 PG — HIGH (ref 27–31)
MCHC RBC-ENTMCNC: 32.2 G/DL — SIGNIFICANT CHANGE UP (ref 32–37)
MCV RBC AUTO: 98.8 FL — SIGNIFICANT CHANGE UP (ref 81–99)
MONOCYTES # BLD AUTO: 0.7 K/UL — HIGH (ref 0.1–0.6)
MONOCYTES NFR BLD AUTO: 8.4 % — SIGNIFICANT CHANGE UP (ref 1.7–9.3)
NEUTROPHILS # BLD AUTO: 7.18 K/UL — HIGH (ref 1.4–6.5)
NEUTROPHILS NFR BLD AUTO: 86.6 % — HIGH (ref 42.2–75.2)
NRBC # BLD: 0 /100 WBCS — SIGNIFICANT CHANGE UP (ref 0–0)
PLATELET # BLD AUTO: 305 K/UL — SIGNIFICANT CHANGE UP (ref 130–400)
POTASSIUM SERPL-MCNC: 4.4 MMOL/L — SIGNIFICANT CHANGE UP (ref 3.5–5)
POTASSIUM SERPL-SCNC: 4.4 MMOL/L — SIGNIFICANT CHANGE UP (ref 3.5–5)
PROT SERPL-MCNC: 10.2 G/DL — HIGH (ref 6–8)
RBC # BLD: 3.43 M/UL — LOW (ref 4.2–5.4)
RBC # FLD: 15.3 % — HIGH (ref 11.5–14.5)
SODIUM SERPL-SCNC: 135 MMOL/L — SIGNIFICANT CHANGE UP (ref 135–146)
WBC # BLD: 8.29 K/UL — SIGNIFICANT CHANGE UP (ref 4.8–10.8)
WBC # FLD AUTO: 8.29 K/UL — SIGNIFICANT CHANGE UP (ref 4.8–10.8)

## 2021-01-28 PROCEDURE — 71045 X-RAY EXAM CHEST 1 VIEW: CPT | Mod: 26

## 2021-01-28 PROCEDURE — 99233 SBSQ HOSP IP/OBS HIGH 50: CPT

## 2021-01-28 RX ORDER — ACETAMINOPHEN 500 MG
650 TABLET ORAL ONCE
Refills: 0 | Status: COMPLETED | OUTPATIENT
Start: 2021-01-28 | End: 2021-01-28

## 2021-01-28 RX ORDER — DIPHENHYDRAMINE HCL 50 MG
25 CAPSULE ORAL ONCE
Refills: 0 | Status: COMPLETED | OUTPATIENT
Start: 2021-01-28 | End: 2021-01-28

## 2021-01-28 RX ORDER — IMMUNE GLOBULIN (HUMAN) 10 G/100ML
45 INJECTION INTRAVENOUS; SUBCUTANEOUS ONCE
Refills: 0 | Status: COMPLETED | OUTPATIENT
Start: 2021-01-28 | End: 2021-01-28

## 2021-01-28 RX ADMIN — BUDESONIDE AND FORMOTEROL FUMARATE DIHYDRATE 2 PUFF(S): 160; 4.5 AEROSOL RESPIRATORY (INHALATION) at 23:19

## 2021-01-28 RX ADMIN — Medication 400 MILLIGRAM(S): at 08:26

## 2021-01-28 RX ADMIN — PYRIDOSTIGMINE BROMIDE 90 MILLIGRAM(S): 60 SOLUTION ORAL at 06:05

## 2021-01-28 RX ADMIN — Medication 400 MILLIGRAM(S): at 18:52

## 2021-01-28 RX ADMIN — PYRIDOSTIGMINE BROMIDE 90 MILLIGRAM(S): 60 SOLUTION ORAL at 23:19

## 2021-01-28 RX ADMIN — Medication 50 MILLIGRAM(S): at 06:03

## 2021-01-28 RX ADMIN — Medication 25 MILLIGRAM(S): at 06:02

## 2021-01-28 RX ADMIN — Medication 25 MILLIGRAM(S): at 12:07

## 2021-01-28 RX ADMIN — IMMUNE GLOBULIN (HUMAN) 180 GRAM(S): 10 INJECTION INTRAVENOUS; SUBCUTANEOUS at 13:35

## 2021-01-28 RX ADMIN — PYRIDOSTIGMINE BROMIDE 90 MILLIGRAM(S): 60 SOLUTION ORAL at 12:08

## 2021-01-28 RX ADMIN — PYRIDOSTIGMINE BROMIDE 90 MILLIGRAM(S): 60 SOLUTION ORAL at 17:28

## 2021-01-28 RX ADMIN — PANTOPRAZOLE SODIUM 40 MILLIGRAM(S): 20 TABLET, DELAYED RELEASE ORAL at 06:02

## 2021-01-28 RX ADMIN — CHLORHEXIDINE GLUCONATE 1 APPLICATION(S): 213 SOLUTION TOPICAL at 12:14

## 2021-01-28 RX ADMIN — Medication 81 MILLIGRAM(S): at 12:07

## 2021-01-28 RX ADMIN — ATORVASTATIN CALCIUM 20 MILLIGRAM(S): 80 TABLET, FILM COATED ORAL at 23:18

## 2021-01-28 RX ADMIN — CHLORHEXIDINE GLUCONATE 1 APPLICATION(S): 213 SOLUTION TOPICAL at 17:27

## 2021-01-28 RX ADMIN — FAMOTIDINE 40 MILLIGRAM(S): 10 INJECTION INTRAVENOUS at 23:18

## 2021-01-28 RX ADMIN — Medication 650 MILLIGRAM(S): at 12:07

## 2021-01-28 RX ADMIN — BUDESONIDE AND FORMOTEROL FUMARATE DIHYDRATE 2 PUFF(S): 160; 4.5 AEROSOL RESPIRATORY (INHALATION) at 08:56

## 2021-01-28 RX ADMIN — ENOXAPARIN SODIUM 40 MILLIGRAM(S): 100 INJECTION SUBCUTANEOUS at 12:08

## 2021-01-28 RX ADMIN — Medication 50 MICROGRAM(S): at 06:02

## 2021-01-28 RX ADMIN — Medication 400 MILLIGRAM(S): at 23:19

## 2021-01-28 NOTE — PROGRESS NOTE ADULT - SUBJECTIVE AND OBJECTIVE BOX
Patient is a 60y old  Female who presents with a chief complaint of SOB (27 Jan 2021 11:24)      Over Night Events:  Patient seen and examined.   s/p IVIG x2 feel a little better   stable VC and NIF  ROS:  See HPI    PHYSICAL EXAM    ICU Vital Signs Last 24 Hrs  T(C): 36.6 (28 Jan 2021 07:01), Max: 36.8 (27 Jan 2021 18:28)  T(F): 97.8 (28 Jan 2021 07:01), Max: 98.2 (27 Jan 2021 18:28)  HR: 62 (28 Jan 2021 05:57) (58 - 80)  BP: 142/65 (28 Jan 2021 05:57) (117/54 - 155/106)  BP(mean): 94 (28 Jan 2021 05:57) (78 - 123)  ABP: --  ABP(mean): --  RR: 24 (28 Jan 2021 07:01) (15 - 25)  SpO2: 99% (28 Jan 2021 05:57) (97% - 100%)      General: Aox3  HEENT:         [place]       Lymph Nodes: NO cervical LN   Lungs: Bilateral BS  Cardiovascular: Regular   Abdomen: Soft, Positive BS  Extremities: No clubbing   Skin: warm   Neurological: no focal   Musculoskeletal: move all ext     I&O's Detail    27 Jan 2021 07:01  -  28 Jan 2021 07:00  --------------------------------------------------------  IN:    Oral Fluid: 600 mL  Total IN: 600 mL    OUT:    Estimated Blood Loss (mL): 900 mL  Total OUT: 900 mL    Total NET: -300 mL          LABS:                          10.9   8.29  )-----------( 305      ( 28 Jan 2021 05:31 )             33.9         28 Jan 2021 05:31    135    |  104    |  24     ----------------------------<  102    4.4     |  24     |  0.6      Ca    8.9        28 Jan 2021 05:31  Mg     2.0       27 Jan 2021 05:19    TPro  10.2   /  Alb  3.0    /  TBili  1.0    /  DBili  x      /  AST  31     /  ALT  27     /  AlkPhos  176    28 Jan 2021 05:31  Amylase 127   lipase 44                                                                                                                                                                                                                                    ABG - ( 26 Jan 2021 13:06 )  pH, Arterial: 7.43  pH, Blood: x     /  pCO2: 36    /  pO2: 87    / HCO3: 24    / Base Excess: 0.2   /  SaO2: 98                  MEDICATIONS  (STANDING):  aspirin  chewable 81 milliGRAM(s) Oral daily  atorvastatin 20 milliGRAM(s) Oral at bedtime  budesonide  80 MICROgram(s)/formoterol 4.5 MICROgram(s) Inhaler 2 Puff(s) Inhalation two times a day  chlorhexidine 4% Liquid 1 Application(s) Topical two times a day  enoxaparin Injectable 40 milliGRAM(s) SubCutaneous daily  famotidine  Oral Tab/Cap - Peds 40 milliGRAM(s) Oral at bedtime  influenza   Vaccine 0.5 milliLiter(s) IntraMuscular once  levothyroxine 50 MICROGram(s) Oral daily  metoprolol succinate ER 25 milliGRAM(s) Oral daily  pantoprazole    Tablet 40 milliGRAM(s) Oral before breakfast  predniSONE   Tablet 50 milliGRAM(s) Oral daily  pyridostigmine 90 milliGRAM(s) Oral four times a day    MEDICATIONS  (PRN):  ALBUTerol    90 MICROgram(s) HFA Inhaler 2 Puff(s) Inhalation every 6 hours PRN Shortness of Breath  ibuprofen  Tablet. 400 milliGRAM(s) Oral every 6 hours PRN Moderate Pain (4 - 6)          Xrays:  TLC:  OG:  ET tube:                                                                                       ECHO:  [facility] ICU:

## 2021-01-28 NOTE — PROGRESS NOTE ADULT - ASSESSMENT
60y old female with pmhx of High blood cholesterol, Myasthenia gravis ( w/ flares requiring hospitalizations and plasmapheresis , no intubations) , Pancreatitis, postural orthostatic tachycardia syndrome,  COVID infxn ( nov 2020)  presenting to the ED complaining of worsening SOB x 1 day. Pt reports SOB is worse w/ exertion  and reports that she had difficulty reading her grandkids a bedtime story.  Per pt " this feels like previous flares"  No CP , No fevers, chills or cough.  No syncope . No unilateral leg swelling     Myasthenia gravis flare     - prednisone 40mg daily   -  IVIG again today    - azathioprine, pyridostigmine   - monitor in unit   - check VC and MIP q6h   - continue home meds   - neuro following   - DVT prophylaxis

## 2021-01-28 NOTE — PROGRESS NOTE ADULT - ASSESSMENT
IMPRESSION:    Myasthenia gravis exacerbation   Medical noncompliance    PLAN:    CNS: c/w pyridostigmine, imuran and Prednisone, fup neuro,  3rd IVIG today,possible PLEX if no improvement    PULMONARY: keep pox > 92 %   check NIF and VC 8 shift   bipap at night and as needed     CARDIOVASCULAR:keep is = os   echo     GI: GI prophylaxis.  Feeding     RENAL: follow lytes is and os     INFECTIOUS DISEASE: no abx     HEMATOLOGICAL:  DVT prophylaxis.    ENDOCRINE:  Follow up FS.  Insulin protocol if needed.    MSK: OOB to chair    Keep in MICU

## 2021-01-28 NOTE — PROGRESS NOTE ADULT - ASSESSMENT
This is a 60y year old Female presenting with Exacerbation of Myasthenia Gravis w/ possible drug-induced Iatrogenic Pancreatitis s/p Streptococcal vaccinations    Recommendations:    - check PFT's q shifts   - IVIG Infusion  0.5g/kg today 1/28/2021  - Monitor and trend  LFT's , Amylase, Lipase and GGT  - continue Prednisone to 50 mg QD  - Continue Pyridostigmine 90 Q 6 hours   -BIPAP as per pulmonary   -PT/OT and out of bed to chair   -neurology to follow up  This is a 60y year old Female presenting with Exacerbation of Myasthenia Gravis w/ possible drug-induced Iatrogenic Pancreatitis s/p Streptococcal vaccinations    Recommendations:    - check PFT's q shifts   - IVIG Infusion  0.5g/kg today 1/28/2021  - Monitor and trend  LFT's , Amylase, Lipase and GGT  - continue Prednisone to 50 mg QD  - Continue Pyridostigmine 90 Q 6 hours   - BIPAP as per pulmonary   - PT/OT and out of bed to chair   - will start PA for eculizumab to begin as outpt

## 2021-01-28 NOTE — PHARMACOTHERAPY INTERVENTION NOTE - COMMENTS
Mentioned to team that patient home dose of prednisone is 50mg daily. Currently ordered for 40mg daily.
Menveo not currently available at this time; pharmacy carries Menactra. Recommended Menactra over Menveo. Order placed on behalf of provider as it is non-formulary.
Spoke with KIKE Holcomb regarding IVIG administration. Will pre-medicate with Tylenol and Benadryl. Provided IVIG administration protocol. Follow adjusted dosing weight of 70kg for administration. Start at 0.5 mL/kg/hr and titrate every 30 minutes as tolerated.     Intolerances: Flushing, itching, hypotension, changes in HR
Per Dr. Urbina (neurology), would like to give additional dose of IVIG since the first day of IVIG dose was 70 g instead of 90 g.   Dr. Urbina would like to dose based on actual body weight. The IVIG dose was manually changed from 35 g (0.5g/kg of adjusted body weight) to 45g (0.5g/kg of actual body weight)    Recommendation to dose based on adjusted body weight was not accepted.
Received a call from neurology SABINA Pierre requesting a change in IVIG dose, which is against the [facility] protocol. Neurologist Dr. Urbina is requesting IVIG dose based on the actual body weight    Pt's actual body weight: 95kg. Ideal body weight is 55kg, adjusted body weight is 71kg    Based on [facility] protocol- IVIG should be dosed based on IDEAL BODY WEIGHT (adjusted body weight can be considered in patients who are obese >30% of their actual body weight) for cost saving opportunities and its pharmacokinetic characteristics. IVIG has a small volume of distribution, predominantly accumulating in the plasma with minimal distribution in adipose tissue. Obese patients are at additional risk for adverse reactions of IVIG due to excess plasma concentration.     Per Dr. Urbina request, the IVIG order was changed from 70g to 90g manually.     Protocol Link: [email or web address]%20or%20IVIG%20Use%20for%20Inpatient%20and%20Outpatient.pdf    Reference:   1. [email or web address]-in-Managing-the-IVIG-Short  2. Jimy VALENCIA, Annie JA. Correlation weight-based iv immune globulin doses with changes in serum immunoglobulin G levels. Am J Health-System Pharm. 2015; 72: 285-9   3. Kaye LAYTON. Immunoglobulins and obesity. Pharm Pract News. 2010; 37:8-9
Went over IVIG rate with KIKE Maki MD would like to infuse 90g/900mL over at least 5 hours. If following [facility] protocol, the bag would be infused in 3 hours. Rec to go up on rate of max 2mL/kg/hr to ensure the bag would be infused over 5 hours.     (per adam protocol, can go up to 5mL/kg/hr which will result in total infusion to be only 3 hours)

## 2021-01-28 NOTE — PHYSICAL THERAPY INITIAL EVALUATION ADULT - ADDITIONAL COMMENTS
Per patient, has 3 steps with bilateral handrails to enter home then one flight with (L) handrail to bedroom but can stay on first floor if needed

## 2021-01-28 NOTE — PHYSICAL THERAPY INITIAL EVALUATION ADULT - GAIT DEVIATIONS NOTED, PT EVAL
stooped posture, dec heel strike/pushoff , dec SOLEDAD/decreased rodney/decreased step length/decreased weight-shifting ability

## 2021-01-28 NOTE — PHYSICAL THERAPY INITIAL EVALUATION ADULT - CRITERIA FOR SKILLED THERAPEUTIC INTERVENTIONS
functional limitations in following categories/rehab potential/therapy frequency/predicted duration of therapy intervention

## 2021-01-28 NOTE — PROGRESS NOTE ADULT - SUBJECTIVE AND OBJECTIVE BOX
Patient is a 60y old  Female who presents with a chief complaint of SOB (28 Jan 2021 08:20)    no event overnight, s/p 2 doses of ivig, last dose today  feels little better but not back to baseline      HPI:  · Pt is a 60y old female with pmhx of High blood cholesterol, Myasthenia gravis ( w/ flares requiring hospitalizations and plasmapheresis , no intubations) , Pancreatitis, postural orthostatic tachycardia syndrome,  COVID infxn ( nov 2020)  presenting to the ED complaining of worsening SOB x 1 day. Pt reports SOB is worse w/ exertion  and reports that she had difficulty reading her grandkids a bedtime story.  Per pt " this feels like previous flares"  No CP , No fevers, chills or cough.  No syncope . No unilateral leg swelling.   (25 Jan 2021 16:25)      PAST MEDICAL & SURGICAL HISTORY:  Myasthenia gravis    High blood cholesterol    Pancreatitis    POTS (postural orthostatic tachycardia syndrome)    History of foot surgery      Allergies    sulfa drugs (Unknown)    Intolerances      FAMILY HISTORY:  No pertinent family history in first degree relatives      Home Medications:  albuterol 90 mcg/inh inhalation powder: 2 puff(s) inhaled every 6 hours, As Needed (26 Nov 2020 15:43)  aspirin 81 mg oral tablet, chewable: 1 tab(s) orally once a day (26 Nov 2020 15:43)  atorvastatin 20 mg oral tablet: 1 tab(s) orally once a day (26 Nov 2020 15:43)  doxepin 10 mg oral capsule:  (26 Nov 2020 15:43)  fluticasone 50 mcg/inh nasal spray: 1 spray(s) nasal once a day (26 Nov 2020 15:43)  Imuran 50 mg oral tablet: 1 tab(s) orally 3 times a day (26 Nov 2020 15:43)  Mestinon 60 mg oral tablet: 1.5 tab(s) orally 4 times a day (26 Nov 2020 15:43)  Protonix 40 mg oral delayed release tablet: 1 tab(s) orally once a day (26 Nov 2020 15:43)  Symbicort: inhaled 2 times a day (26 Nov 2020 15:43)  Toprol-XL 25 mg oral tablet, extended release: 1 tab(s) orally once a day (26 Nov 2020 15:43)  Vitamin D2: every friday (26 Nov 2020 15:43)    Occupation:  Alochol: Denied  Smoking: Denied  Drug Use: Denied  Marital Status:         ROS: as in HPI; All other systems reviewed are negative    ICU Vital Signs Last 24 Hrs  T(C): 36.6 (28 Jan 2021 07:01), Max: 36.8 (27 Jan 2021 18:28)  T(F): 97.8 (28 Jan 2021 07:01), Max: 98.2 (27 Jan 2021 18:28)  HR: 68 (28 Jan 2021 07:08) (58 - 80)  BP: 121/60 (28 Jan 2021 07:08) (117/54 - 155/106)  BP(mean): 86 (28 Jan 2021 07:08) (78 - 123)  ABP: --  ABP(mean): --  RR: 20 (28 Jan 2021 07:08) (15 - 25)  SpO2: 98% (28 Jan 2021 07:08) (97% - 100%)        Physical Examination:    General: No acute distress.  Alert, oriented, interactive, nonfocal    HEENT: Pupils equal, reactive to light.  Symmetric.    PULM: Clear to auscultation bilaterally, no significant sputum production    CVS: Regular rate and rhythm, no murmurs, rubs, or gallops    ABD: Soft, nondistended, nontender, normoactive bowel sounds, no masses    EXT: No edema, nontender    SKIN: Warm and well perfused, no rashes noted.          ABG - ( 26 Jan 2021 13:06 )  pH, Arterial: 7.43  pH, Blood: x     /  pCO2: 36    /  pO2: 87    / HCO3: 24    / Base Excess: 0.2   /  SaO2: 98                  I&O's Detail    27 Jan 2021 07:01  -  28 Jan 2021 07:00  --------------------------------------------------------  IN:    Oral Fluid: 600 mL  Total IN: 600 mL    OUT:    Estimated Blood Loss (mL): 900 mL  Total OUT: 900 mL    Total NET: -300 mL            LABS:                        10.9   8.29  )-----------( 305      ( 28 Jan 2021 05:31 )             33.9     28 Jan 2021 05:31    135    |  104    |  24     ----------------------------<  102    4.4     |  24     |  0.6      Ca    8.9        28 Jan 2021 05:31  Mg     2.0       27 Jan 2021 05:19    TPro  10.2   /  Alb  3.0    /  TBili  1.0    /  DBili  x      /  AST  31     /  ALT  27     /  AlkPhos  176    28 Jan 2021 05:31  Amylase 127   lipase 44             CAPILLARY BLOOD GLUCOSE            Culture        MEDICATIONS  (STANDING):  aspirin  chewable 81 milliGRAM(s) Oral daily  atorvastatin 20 milliGRAM(s) Oral at bedtime  budesonide  80 MICROgram(s)/formoterol 4.5 MICROgram(s) Inhaler 2 Puff(s) Inhalation two times a day  chlorhexidine 4% Liquid 1 Application(s) Topical two times a day  enoxaparin Injectable 40 milliGRAM(s) SubCutaneous daily  famotidine  Oral Tab/Cap - Peds 40 milliGRAM(s) Oral at bedtime  influenza   Vaccine 0.5 milliLiter(s) IntraMuscular once  levothyroxine 50 MICROGram(s) Oral daily  metoprolol succinate ER 25 milliGRAM(s) Oral daily  pantoprazole    Tablet 40 milliGRAM(s) Oral before breakfast  predniSONE   Tablet 50 milliGRAM(s) Oral daily  pyridostigmine 90 milliGRAM(s) Oral four times a day    MEDICATIONS  (PRN):  ALBUTerol    90 MICROgram(s) HFA Inhaler 2 Puff(s) Inhalation every 6 hours PRN Shortness of Breath  ibuprofen  Tablet. 400 milliGRAM(s) Oral every 6 hours PRN Moderate Pain (4 - 6)

## 2021-01-28 NOTE — PHYSICAL THERAPY INITIAL EVALUATION ADULT - GENERAL OBSERVATIONS, REHAB EVAL
13:05-13:28 Chart reviewed. Pt encountered semireclined in bed, may be seen by Physical Therapist as confirmed with Nurse. Patient denied pain  at rest but :gets out of breath" ; +tele

## 2021-01-28 NOTE — PROGRESS NOTE ADULT - ASSESSMENT
IMPRESSION:  Myasthenia gravis exacerbation     PLAN:    CNS: c/w pyridostigmine, imuran and Prednisone, f/p neuro,  last day of IVIG, possible plasma exchange, VC and NIF q shift    PULMONARY: keep pox > 92 %   check NIF and VC 8 shift   bipap at night and as needed     CARDIOVASCULAR: keep even balance    GI: GI prophylaxis.  Feeding     RENAL: follow lytes is and os     INFECTIOUS DISEASE: no abx     HEMATOLOGICAL:  DVT prophylaxis.    ENDOCRINE:  Follow up FS.  Insulin protocol if needed.    MSK: OOB to chair    Keep in MICU

## 2021-01-28 NOTE — PROGRESS NOTE ADULT - SUBJECTIVE AND OBJECTIVE BOX
Neurology Follow up note    Name  GHADA ROPER    HPI:  · Pt is a 60y old female with pmhx of High blood cholesterol, Myasthenia gravis ( w/ flares requiring hospitalizations and plasmapheresis , no intubations) , Pancreatitis, postural orthostatic tachycardia syndrome,  COVID infxn ( nov 2020)  presenting to the ED complaining of worsening SOB x 1 day. Pt reports SOB is worse w/ exertion  and reports that she had difficulty reading her grandkids a bedtime story.  Per pt " this feels like previous flares"  No CP , No fevers, chills or cough.  No syncope . No unilateral leg swelling.   (25 Jan 2021 16:25)      Interval History -      Patient seen at bedside with Dr Urbina. Pt resting comfortably on bed in no acute distress saturating 98 in room air. Pt reports feels better after receiving IVIG but has not gotten out of bed yet. Pt reports feels flushed on her face and had an episode of abdomen cramping/movent yesterday, denies pain. Lipase  wnl, amylase trending down.  Increased neck ROM strength on exam.       Vital Signs Last 24 Hrs  T(C): 36.6 (28 Jan 2021 07:01), Max: 36.8 (27 Jan 2021 18:28)  T(F): 97.8 (28 Jan 2021 07:01), Max: 98.2 (27 Jan 2021 18:28)  HR: 68 (28 Jan 2021 07:08) (58 - 80)  BP: 121/60 (28 Jan 2021 07:08) (117/54 - 155/106)  BP(mean): 86 (28 Jan 2021 07:08) (78 - 123)  RR: 20 (28 Jan 2021 07:08) (15 - 25)  SpO2: 98% (28 Jan 2021 07:08) (97% - 100%)  ICU Vital Signs Last 24 Hrs  T(C): 36.6 (28 Jan 2021 07:01), Max: 36.8 (27 Jan 2021 18:28)  T(F): 97.8 (28 Jan 2021 07:01), Max: 98.2 (27 Jan 2021 18:28)  HR: 68 (28 Jan 2021 07:08) (58 - 80)  BP: 121/60 (28 Jan 2021 07:08) (117/54 - 155/106)  BP(mean): 86 (28 Jan 2021 07:08) (78 - 123)  ABP: --  ABP(mean): --  RR: 20 (28 Jan 2021 07:08) (15 - 25)  SpO2: 98% (28 Jan 2021 07:08) (97% - 100%)      Neurologic Examination:  Mental status: Awake, alert and oriented x3.  Recent and remote memory intact.  Naming, repetition and comprehension intact.  Attention/concentration intact.  No dysarthria, no aphasia.  Fund of knowledge appropriate.    Cranial nerves: Pupils equally round and reactive to light, visual fields full, extraocular muscles intact, V1 through V3 intact bilaterally and symmetric, face symmetric, sternocleidomastoid/shoulder shrug strength bilaterally 5/5.    Motor: Neck Flexion 4+ ; Neck Extension 5/5;   Normal bulk and tone, strength 4/5 in bilateral proximal upper extremities 5/5 distal b/l upper extremities.  5/5 bilateral lower extremities.   strength 5/5.    Sensation: Intact to light touch, proprioception. No neglect.   Reflexes: 2+ in upper and lower extremities, downgoing toes bilaterally          Medications  ALBUTerol    90 MICROgram(s) HFA Inhaler 2 Puff(s) Inhalation every 6 hours PRN  aspirin  chewable 81 milliGRAM(s) Oral daily  atorvastatin 20 milliGRAM(s) Oral at bedtime  budesonide  80 MICROgram(s)/formoterol 4.5 MICROgram(s) Inhaler 2 Puff(s) Inhalation two times a day  chlorhexidine 4% Liquid 1 Application(s) Topical two times a day  enoxaparin Injectable 40 milliGRAM(s) SubCutaneous daily  famotidine  Oral Tab/Cap - Peds 40 milliGRAM(s) Oral at bedtime  ibuprofen  Tablet. 400 milliGRAM(s) Oral every 6 hours PRN  immune   globulin 10% (GAMMAGARD) IVPB 45 Gram(s) IV Intermittent once  influenza   Vaccine 0.5 milliLiter(s) IntraMuscular once  levothyroxine 50 MICROGram(s) Oral daily  metoprolol succinate ER 25 milliGRAM(s) Oral daily  pantoprazole    Tablet 40 milliGRAM(s) Oral before breakfast  predniSONE   Tablet 50 milliGRAM(s) Oral daily  pyridostigmine 90 milliGRAM(s) Oral four times a day      Lab                        10.9   8.29  )-----------( 305      ( 28 Jan 2021 05:31 )             33.9     01-28    135  |  104  |  24<H>  ----------------------------<  102<H>  4.4   |  24  |  0.6<L>    Ca    8.9      28 Jan 2021 05:31  Mg     2.0     01-27    TPro  10.2<H>  /  Alb  3.0<L>  /  TBili  1.0  /  DBili  x   /  AST  31  /  ALT  27  /  AlkPhos  176<H>  01-28    CAPILLARY BLOOD GLUCOSE        LIVER FUNCTIONS - ( 28 Jan 2021 05:31 )  Alb: 3.0 g/dL / Pro: 10.2 g/dL / ALK PHOS: 176 U/L / ALT: 27 U/L / AST: 31 U/L / GGT: 598 U/L         < from: Xray Chest 1 View- PORTABLE-Routine (Xray Chest 1 View- PORTABLE-Routine in AM.) (01.27.21 @ 05:36) >  Impression:    No radiographic evidence of acute cardiopulmonary disease.        SELVIN ALVARES MD; Attending Radiologist  This document has been electronically signed. Jan 27 2021  2:14PM    < end of copied text >     Neurology Follow up note    Name  GHADA ROPER    HPI:  · Pt is a 60y old female with pmhx of High blood cholesterol, Myasthenia gravis ( w/ flares requiring hospitalizations and plasmapheresis , no intubations) , Pancreatitis, postural orthostatic tachycardia syndrome,  COVID infxn ( nov 2020)  presenting to the ED complaining of worsening SOB x 1 day. Pt reports SOB is worse w/ exertion  and reports that she had difficulty reading her grandkids a bedtime story.  Per pt " this feels like previous flares"  No CP , No fevers, chills or cough.  No syncope . No unilateral leg swelling.   (25 Jan 2021 16:25)      Interval History -  Patient seen at bedside with Dr Urbina. Pt resting comfortably on bed in no acute distress saturating 98 in room air. Pt reports feels better after receiving IVIG but has not gotten out of bed yet. Pt reports feels flushed on her face and had an episode of abdomen cramping/movent yesterday, denies pain. Lipase  wnl, amylase trending down.  Increased neck ROM strength on exam.       Vital Signs Last 24 Hrs  T(C): 36.6 (28 Jan 2021 07:01), Max: 36.8 (27 Jan 2021 18:28)  T(F): 97.8 (28 Jan 2021 07:01), Max: 98.2 (27 Jan 2021 18:28)  HR: 68 (28 Jan 2021 07:08) (58 - 80)  BP: 121/60 (28 Jan 2021 07:08) (117/54 - 155/106)  BP(mean): 86 (28 Jan 2021 07:08) (78 - 123)  RR: 20 (28 Jan 2021 07:08) (15 - 25)  SpO2: 98% (28 Jan 2021 07:08) (97% - 100%)  ICU Vital Signs Last 24 Hrs  T(C): 36.6 (28 Jan 2021 07:01), Max: 36.8 (27 Jan 2021 18:28)  T(F): 97.8 (28 Jan 2021 07:01), Max: 98.2 (27 Jan 2021 18:28)  HR: 68 (28 Jan 2021 07:08) (58 - 80)  BP: 121/60 (28 Jan 2021 07:08) (117/54 - 155/106)  BP(mean): 86 (28 Jan 2021 07:08) (78 - 123)  ABP: --  ABP(mean): --  RR: 20 (28 Jan 2021 07:08) (15 - 25)  SpO2: 98% (28 Jan 2021 07:08) (97% - 100%)      Neurologic Examination:  Mental status: Awake, alert and oriented x3.  Recent and remote memory intact.  Naming, repetition and comprehension intact.  Attention/concentration intact.  No dysarthria, no aphasia.  Fund of knowledge appropriate.    Cranial nerves: Pupils equally round and reactive to light, visual fields full, extraocular muscles intact, V1 through V3 intact bilaterally and symmetric, face symmetric, sternocleidomastoid/shoulder shrug strength bilaterally 5/5.    Motor: Neck Flexion 4+ ; Neck Extension 5/5;   Normal bulk and tone, strength 4+/5 bilateral proximal upper extremities 5/5 distal b/l upper extremities.  5/5 bilateral lower extremities.   strength 5/5.    Sensation: Intact to light touch, proprioception. No neglect.   Reflexes: 2+ in upper and lower extremities, downgoing toes bilaterally    Medications  ALBUTerol    90 MICROgram(s) HFA Inhaler 2 Puff(s) Inhalation every 6 hours PRN  aspirin  chewable 81 milliGRAM(s) Oral daily  atorvastatin 20 milliGRAM(s) Oral at bedtime  budesonide  80 MICROgram(s)/formoterol 4.5 MICROgram(s) Inhaler 2 Puff(s) Inhalation two times a day  chlorhexidine 4% Liquid 1 Application(s) Topical two times a day  enoxaparin Injectable 40 milliGRAM(s) SubCutaneous daily  famotidine  Oral Tab/Cap - Peds 40 milliGRAM(s) Oral at bedtime  ibuprofen  Tablet. 400 milliGRAM(s) Oral every 6 hours PRN  immune   globulin 10% (GAMMAGARD) IVPB 45 Gram(s) IV Intermittent once  influenza   Vaccine 0.5 milliLiter(s) IntraMuscular once  levothyroxine 50 MICROGram(s) Oral daily  metoprolol succinate ER 25 milliGRAM(s) Oral daily  pantoprazole    Tablet 40 milliGRAM(s) Oral before breakfast  predniSONE   Tablet 50 milliGRAM(s) Oral daily  pyridostigmine 90 milliGRAM(s) Oral four times a day      Lab                        10.9   8.29  )-----------( 305      ( 28 Jan 2021 05:31 )             33.9     01-28    135  |  104  |  24<H>  ----------------------------<  102<H>  4.4   |  24  |  0.6<L>    Ca    8.9      28 Jan 2021 05:31  Mg     2.0     01-27    TPro  10.2<H>  /  Alb  3.0<L>  /  TBili  1.0  /  DBili  x   /  AST  31  /  ALT  27  /  AlkPhos  176<H>  01-28    CAPILLARY BLOOD GLUCOSE    LIVER FUNCTIONS - ( 28 Jan 2021 05:31 )  Alb: 3.0 g/dL / Pro: 10.2 g/dL / ALK PHOS: 176 U/L / ALT: 27 U/L / AST: 31 U/L / GGT: 598 U/L       < from: Xray Chest 1 View- PORTABLE-Routine (Xray Chest 1 View- PORTABLE-Routine in AM.) (01.27.21 @ 05:36) >  Impression:    No radiographic evidence of acute cardiopulmonary disease.    SELVIN ALVARES MD; Attending Radiologist  This document has been electronically signed. Jan 27 2021  2:14PM    < end of copied text >

## 2021-01-28 NOTE — PROGRESS NOTE ADULT - SUBJECTIVE AND OBJECTIVE BOX
Patient reports feeling a little improved today       T(F): 97.6 (01-28-21 @ 15:44), Max: 98.2 (01-27-21 @ 18:28)  HR: 67 (01-28-21 @ 15:44)  BP: 143/67 (01-28-21 @ 15:44)  RR: 18 (01-28-21 @ 15:44)  SpO2: 98% (01-28-21 @ 15:44) (97% - 100%)    PHYSICAL EXAM:  GENERAL: NAD  HEAD:  Atraumatic, Normocephalic  NERVOUS SYSTEM:  Alert & Oriented X3, no focal deficits   CHEST/LUNG: Clear to percussion bilaterally; No rales, rhonchi, wheezing, or rubs  HEART: Regular rate and rhythm; No murmurs, rubs, or gallops  ABDOMEN: Soft, Nontender, Nondistended; Bowel sounds present  EXTREMITIES:  2+ Peripheral Pulses, No clubbing, cyanosis, or edema  LYMPH: No lymphadenopathy noted  SKIN: No rashes or lesions    LABS  01-28    135  |  104  |  24<H>  ----------------------------<  102<H>  4.4   |  24  |  0.6<L>    Ca    8.9      28 Jan 2021 05:31  Mg     2.0     01-27    TPro  10.2<H>  /  Alb  3.0<L>  /  TBili  1.0  /  DBili  x   /  AST  31  /  ALT  27  /  AlkPhos  176<H>  01-28                          10.9   8.29  )-----------( 305      ( 28 Jan 2021 05:31 )             33.9     RADIOLOGY  < from: Xray Chest 1 View- PORTABLE-Routine (Xray Chest 1 View- PORTABLE-Routine in AM.) (01.28.21 @ 07:02) >    Impression:    No radiographic evidence of acute cardiopulmonary disease.    < end of copied text >    MEDICATIONS  (STANDING):  aspirin  chewable 81 milliGRAM(s) Oral daily  atorvastatin 20 milliGRAM(s) Oral at bedtime  budesonide  80 MICROgram(s)/formoterol 4.5 MICROgram(s) Inhaler 2 Puff(s) Inhalation two times a day  chlorhexidine 4% Liquid 1 Application(s) Topical two times a day  enoxaparin Injectable 40 milliGRAM(s) SubCutaneous daily  famotidine  Oral Tab/Cap - Peds 40 milliGRAM(s) Oral at bedtime  influenza   Vaccine 0.5 milliLiter(s) IntraMuscular once  levothyroxine 50 MICROGram(s) Oral daily  metoprolol succinate ER 25 milliGRAM(s) Oral daily  pantoprazole    Tablet 40 milliGRAM(s) Oral before breakfast  predniSONE   Tablet 50 milliGRAM(s) Oral daily  pyridostigmine 90 milliGRAM(s) Oral four times a day    MEDICATIONS  (PRN):  ALBUTerol    90 MICROgram(s) HFA Inhaler 2 Puff(s) Inhalation every 6 hours PRN Shortness of Breath  ibuprofen  Tablet. 400 milliGRAM(s) Oral every 6 hours PRN Moderate Pain (4 - 6)

## 2021-01-29 LAB
ALBUMIN SERPL ELPH-MCNC: 2.9 G/DL — LOW (ref 3.5–5.2)
ALP SERPL-CCNC: 186 U/L — HIGH (ref 30–115)
ALT FLD-CCNC: 30 U/L — SIGNIFICANT CHANGE UP (ref 0–41)
ANION GAP SERPL CALC-SCNC: 3 MMOL/L — LOW (ref 7–14)
APTT BLD: 33.2 SEC — SIGNIFICANT CHANGE UP (ref 27–39.2)
AST SERPL-CCNC: 43 U/L — HIGH (ref 0–41)
BASOPHILS # BLD AUTO: 0.03 K/UL — SIGNIFICANT CHANGE UP (ref 0–0.2)
BASOPHILS NFR BLD AUTO: 0.6 % — SIGNIFICANT CHANGE UP (ref 0–1)
BILIRUB SERPL-MCNC: 1.1 MG/DL — SIGNIFICANT CHANGE UP (ref 0.2–1.2)
BUN SERPL-MCNC: 34 MG/DL — HIGH (ref 10–20)
CALCIUM SERPL-MCNC: 8.9 MG/DL — SIGNIFICANT CHANGE UP (ref 8.5–10.1)
CHLORIDE SERPL-SCNC: 103 MMOL/L — SIGNIFICANT CHANGE UP (ref 98–110)
CO2 SERPL-SCNC: 30 MMOL/L — SIGNIFICANT CHANGE UP (ref 17–32)
CREAT SERPL-MCNC: 0.7 MG/DL — SIGNIFICANT CHANGE UP (ref 0.7–1.5)
EOSINOPHIL # BLD AUTO: 0.04 K/UL — SIGNIFICANT CHANGE UP (ref 0–0.7)
EOSINOPHIL NFR BLD AUTO: 0.8 % — SIGNIFICANT CHANGE UP (ref 0–8)
FIBRINOGEN PPP-MCNC: 332 MG/DL — SIGNIFICANT CHANGE UP (ref 204.4–570.6)
GLUCOSE SERPL-MCNC: 86 MG/DL — SIGNIFICANT CHANGE UP (ref 70–99)
HCT VFR BLD CALC: 33.5 % — LOW (ref 37–47)
HGB BLD-MCNC: 10.9 G/DL — LOW (ref 12–16)
IMM GRANULOCYTES NFR BLD AUTO: 0.2 % — SIGNIFICANT CHANGE UP (ref 0.1–0.3)
INR BLD: 0.97 RATIO — SIGNIFICANT CHANGE UP (ref 0.65–1.3)
LYMPHOCYTES # BLD AUTO: 1.05 K/UL — LOW (ref 1.2–3.4)
LYMPHOCYTES # BLD AUTO: 21.9 % — SIGNIFICANT CHANGE UP (ref 20.5–51.1)
MCHC RBC-ENTMCNC: 32.3 PG — HIGH (ref 27–31)
MCHC RBC-ENTMCNC: 32.5 G/DL — SIGNIFICANT CHANGE UP (ref 32–37)
MCV RBC AUTO: 99.4 FL — HIGH (ref 81–99)
MONOCYTES # BLD AUTO: 0.5 K/UL — SIGNIFICANT CHANGE UP (ref 0.1–0.6)
MONOCYTES NFR BLD AUTO: 10.4 % — HIGH (ref 1.7–9.3)
NEUTROPHILS # BLD AUTO: 3.17 K/UL — SIGNIFICANT CHANGE UP (ref 1.4–6.5)
NEUTROPHILS NFR BLD AUTO: 66.1 % — SIGNIFICANT CHANGE UP (ref 42.2–75.2)
NRBC # BLD: 0 /100 WBCS — SIGNIFICANT CHANGE UP (ref 0–0)
PLATELET # BLD AUTO: 313 K/UL — SIGNIFICANT CHANGE UP (ref 130–400)
POTASSIUM SERPL-MCNC: 4.6 MMOL/L — SIGNIFICANT CHANGE UP (ref 3.5–5)
POTASSIUM SERPL-SCNC: 4.6 MMOL/L — SIGNIFICANT CHANGE UP (ref 3.5–5)
PROT SERPL-MCNC: 9.6 G/DL — HIGH (ref 6–8)
PROTHROM AB SERPL-ACNC: 11.2 SEC — SIGNIFICANT CHANGE UP (ref 9.95–12.87)
RBC # BLD: 3.37 M/UL — LOW (ref 4.2–5.4)
RBC # FLD: 15.7 % — HIGH (ref 11.5–14.5)
SODIUM SERPL-SCNC: 136 MMOL/L — SIGNIFICANT CHANGE UP (ref 135–146)
WBC # BLD: 4.8 K/UL — SIGNIFICANT CHANGE UP (ref 4.8–10.8)
WBC # FLD AUTO: 4.8 K/UL — SIGNIFICANT CHANGE UP (ref 4.8–10.8)

## 2021-01-29 PROCEDURE — 99291 CRITICAL CARE FIRST HOUR: CPT

## 2021-01-29 PROCEDURE — 99233 SBSQ HOSP IP/OBS HIGH 50: CPT | Mod: 25

## 2021-01-29 PROCEDURE — 36556 INSERT NON-TUNNEL CV CATH: CPT

## 2021-01-29 PROCEDURE — 71045 X-RAY EXAM CHEST 1 VIEW: CPT | Mod: 26

## 2021-01-29 RX ORDER — ONDANSETRON 8 MG/1
4 TABLET, FILM COATED ORAL ONCE
Refills: 0 | Status: COMPLETED | OUTPATIENT
Start: 2021-01-29 | End: 2021-01-29

## 2021-01-29 RX ORDER — CALCIUM GLUCONATE 100 MG/ML
2 VIAL (ML) INTRAVENOUS ONCE
Refills: 0 | Status: COMPLETED | OUTPATIENT
Start: 2021-01-29 | End: 2021-01-29

## 2021-01-29 RX ORDER — ALBUMIN HUMAN 25 %
4000 VIAL (ML) INTRAVENOUS ONCE
Refills: 0 | Status: DISCONTINUED | OUTPATIENT
Start: 2021-01-29 | End: 2021-01-29

## 2021-01-29 RX ORDER — MORPHINE SULFATE 50 MG/1
2 CAPSULE, EXTENDED RELEASE ORAL ONCE
Refills: 0 | Status: DISCONTINUED | OUTPATIENT
Start: 2021-01-29 | End: 2021-01-29

## 2021-01-29 RX ORDER — ALBUMIN HUMAN 25 %
3000 VIAL (ML) INTRAVENOUS ONCE
Refills: 0 | Status: DISCONTINUED | OUTPATIENT
Start: 2021-01-29 | End: 2021-01-30

## 2021-01-29 RX ADMIN — CHLORHEXIDINE GLUCONATE 1 APPLICATION(S): 213 SOLUTION TOPICAL at 18:19

## 2021-01-29 RX ADMIN — ONDANSETRON 4 MILLIGRAM(S): 8 TABLET, FILM COATED ORAL at 18:18

## 2021-01-29 RX ADMIN — PANTOPRAZOLE SODIUM 40 MILLIGRAM(S): 20 TABLET, DELAYED RELEASE ORAL at 05:54

## 2021-01-29 RX ADMIN — BUDESONIDE AND FORMOTEROL FUMARATE DIHYDRATE 2 PUFF(S): 160; 4.5 AEROSOL RESPIRATORY (INHALATION) at 07:22

## 2021-01-29 RX ADMIN — MORPHINE SULFATE 2 MILLIGRAM(S): 50 CAPSULE, EXTENDED RELEASE ORAL at 22:44

## 2021-01-29 RX ADMIN — Medication 50 MILLIGRAM(S): at 05:54

## 2021-01-29 RX ADMIN — PYRIDOSTIGMINE BROMIDE 60 MILLIGRAM(S): 60 SOLUTION ORAL at 11:53

## 2021-01-29 RX ADMIN — Medication 50 MICROGRAM(S): at 05:54

## 2021-01-29 RX ADMIN — PYRIDOSTIGMINE BROMIDE 90 MILLIGRAM(S): 60 SOLUTION ORAL at 22:41

## 2021-01-29 RX ADMIN — ENOXAPARIN SODIUM 40 MILLIGRAM(S): 100 INJECTION SUBCUTANEOUS at 11:56

## 2021-01-29 RX ADMIN — Medication 400 MILLIGRAM(S): at 07:21

## 2021-01-29 RX ADMIN — Medication 400 MILLIGRAM(S): at 18:17

## 2021-01-29 RX ADMIN — PYRIDOSTIGMINE BROMIDE 60 MILLIGRAM(S): 60 SOLUTION ORAL at 18:18

## 2021-01-29 RX ADMIN — Medication 200 GRAM(S): at 18:00

## 2021-01-29 RX ADMIN — Medication 81 MILLIGRAM(S): at 11:52

## 2021-01-29 RX ADMIN — ATORVASTATIN CALCIUM 20 MILLIGRAM(S): 80 TABLET, FILM COATED ORAL at 22:41

## 2021-01-29 RX ADMIN — Medication 25 MILLIGRAM(S): at 05:54

## 2021-01-29 RX ADMIN — FAMOTIDINE 40 MILLIGRAM(S): 10 INJECTION INTRAVENOUS at 22:42

## 2021-01-29 RX ADMIN — PYRIDOSTIGMINE BROMIDE 90 MILLIGRAM(S): 60 SOLUTION ORAL at 05:55

## 2021-01-29 RX ADMIN — CHLORHEXIDINE GLUCONATE 1 APPLICATION(S): 213 SOLUTION TOPICAL at 05:55

## 2021-01-29 NOTE — PROGRESS NOTE ADULT - SUBJECTIVE AND OBJECTIVE BOX
Patient is a 60y old  Female who presents with a chief complaint of SOB (29 Jan 2021 09:44)    no overnight events  still borderline VC and NIF      HPI:  Pt is a 60y old female with pmhx of High blood cholesterol, Myasthenia gravis ( w/ flares requiring hospitalizations and plasmapheresis , no intubations) , Pancreatitis, postural orthostatic tachycardia syndrome,  COVID infxn ( nov 2020)  presenting to the ED complaining of worsening SOB x 1 day. Pt reports SOB is worse w/ exertion  and reports that she had difficulty reading her grandkids a bedtime story.  Per pt " this feels like previous flares"  No CP , No fevers, chills or cough.  No syncope . No unilateral leg swelling.   (25 Jan 2021 16:25)      PAST MEDICAL & SURGICAL HISTORY:  Myasthenia gravis    High blood cholesterol    Pancreatitis    POTS (postural orthostatic tachycardia syndrome)    History of foot surgery      Allergies    sulfa drugs (Unknown)    Intolerances      FAMILY HISTORY:  No pertinent family history in first degree relatives      Home Medications:  albuterol 90 mcg/inh inhalation powder: 2 puff(s) inhaled every 6 hours, As Needed (26 Nov 2020 15:43)  aspirin 81 mg oral tablet, chewable: 1 tab(s) orally once a day (26 Nov 2020 15:43)  atorvastatin 20 mg oral tablet: 1 tab(s) orally once a day (26 Nov 2020 15:43)  doxepin 10 mg oral capsule:  (26 Nov 2020 15:43)  fluticasone 50 mcg/inh nasal spray: 1 spray(s) nasal once a day (26 Nov 2020 15:43)  Imuran 50 mg oral tablet: 1 tab(s) orally 3 times a day (26 Nov 2020 15:43)  Mestinon 60 mg oral tablet: 1.5 tab(s) orally 4 times a day (26 Nov 2020 15:43)  Protonix 40 mg oral delayed release tablet: 1 tab(s) orally once a day (26 Nov 2020 15:43)  Symbicort: inhaled 2 times a day (26 Nov 2020 15:43)  Toprol-XL 25 mg oral tablet, extended release: 1 tab(s) orally once a day (26 Nov 2020 15:43)  Vitamin D2: every friday (26 Nov 2020 15:43)    Occupation:  Alochol: Denied  Smoking: Denied  Drug Use: Denied  Marital Status:         ROS: as in HPI; All other systems reviewed are negative    ICU Vital Signs Last 24 Hrs  T(C): 36.1 (29 Jan 2021 07:01), Max: 36.5 (28 Jan 2021 13:35)  T(F): 97 (29 Jan 2021 07:01), Max: 97.7 (28 Jan 2021 13:35)  HR: 56 (29 Jan 2021 07:05) (54 - 73)  BP: 150/73 (29 Jan 2021 07:05) (128/60 - 159/72)  BP(mean): 105 (29 Jan 2021 07:05) (89 - 105)  ABP: --  ABP(mean): --  RR: 11 (29 Jan 2021 07:05) (11 - 18)  SpO2: 99% (29 Jan 2021 07:05) (97% - 100%)        Physical Examination:    General: No acute distress.  Alert, oriented, interactive, nonfocal    HEENT: Pupils equal, reactive to light.  Symmetric.    PULM: Clear to auscultation bilaterally, no significant sputum production    CVS: Regular rate and rhythm, no murmurs, rubs, or gallops    ABD: Soft, nondistended, nontender, normoactive bowel sounds, no masses    EXT: No edema, nontender    SKIN: Warm and well perfused, no rashes noted.              I&O's Detail    28 Jan 2021 07:01  -  29 Jan 2021 07:00  --------------------------------------------------------  IN:    IV PiggyBack: 450 mL    Oral Fluid: 220 mL  Total IN: 670 mL    OUT:  Total OUT: 0 mL    Total NET: 670 mL            LABS:                        10.9   4.80  )-----------( 313      ( 29 Jan 2021 05:34 )             33.5     29 Jan 2021 05:34    136    |  103    |  34     ----------------------------<  86     4.6     |  30     |  0.7      Ca    8.9        29 Jan 2021 05:34    TPro  9.6    /  Alb  2.9    /  TBili  1.1    /  DBili  x      /  AST  43     /  ALT  30     /  AlkPhos  186    29 Jan 2021 05:34  Amylase x     lipase x            MEDICATIONS  (STANDING):  aspirin  chewable 81 milliGRAM(s) Oral daily  atorvastatin 20 milliGRAM(s) Oral at bedtime  budesonide  80 MICROgram(s)/formoterol 4.5 MICROgram(s) Inhaler 2 Puff(s) Inhalation two times a day  chlorhexidine 4% Liquid 1 Application(s) Topical two times a day  enoxaparin Injectable 40 milliGRAM(s) SubCutaneous daily  famotidine  Oral Tab/Cap - Peds 40 milliGRAM(s) Oral at bedtime  influenza   Vaccine 0.5 milliLiter(s) IntraMuscular once  levothyroxine 50 MICROGram(s) Oral daily  metoprolol succinate ER 25 milliGRAM(s) Oral daily  pantoprazole    Tablet 40 milliGRAM(s) Oral before breakfast  predniSONE   Tablet 50 milliGRAM(s) Oral daily  pyridostigmine 90 milliGRAM(s) Oral four times a day    MEDICATIONS  (PRN):  ALBUTerol    90 MICROgram(s) HFA Inhaler 2 Puff(s) Inhalation every 6 hours PRN Shortness of Breath  ibuprofen  Tablet. 400 milliGRAM(s) Oral every 6 hours PRN Moderate Pain (4 - 6)

## 2021-01-29 NOTE — PROGRESS NOTE ADULT - ASSESSMENT
IMPRESSION:  Myasthenia gravis exacerbation     PLAN:    CNS: c/w pyridostigmine, imuran and Prednisone, s/p 3 days IVIG, place UDALL, Plasmapheresis today    PULMONARY: BIPAP at Night,,,,VC,,,NIF,every shift    CARDIOVASCULAR:keep is = os     GI: GI prophylaxis.  Feeding     RENAL: follow lytes is and os     INFECTIOUS DISEASE: no abx     HEMATOLOGICAL:  DVT prophylaxis.    ENDOCRINE:  Follow up FS.  Insulin protocol if needed.    MSK: OOB to chair    Keep in MICU

## 2021-01-29 NOTE — PROGRESS NOTE ADULT - ASSESSMENT
IMPRESSION:    Myasthenia gravis exacerbation   Medical noncompliance    PLAN:    CNS: c/w pyridostigmine, imuran and Prednisone, fup neuro,  3rd IVIG today,possible PLEX if no improvement    PULMONARY: keep pox > 92 %   check NIF and VC AQ6 hrs   bipap at night and as needed     CARDIOVASCULAR:keep is = os   echo     GI: GI prophylaxis.  Feeding     RENAL: follow lytes is and os     INFECTIOUS DISEASE: no abx     HEMATOLOGICAL:  DVT prophylaxis.    ENDOCRINE:  Follow up FS.  Insulin protocol if needed.    MSK: OOB to chair    Keep in MICU

## 2021-01-29 NOTE — PROGRESS NOTE ADULT - SUBJECTIVE AND OBJECTIVE BOX
Patient is a 60y old  Female who presents with a chief complaint of SOB (28 Jan 2021 16:10)      Over Night Events:  Patient seen and examined.       ROS:  See HPI    PHYSICAL EXAM    ICU Vital Signs Last 24 Hrs  T(C): 36.1 (29 Jan 2021 07:01), Max: 36.5 (28 Jan 2021 13:35)  T(F): 97 (29 Jan 2021 07:01), Max: 97.7 (28 Jan 2021 13:35)  HR: 61 (28 Jan 2021 23:00) (54 - 73)  BP: 128/60 (28 Jan 2021 23:00) (128/60 - 159/72)  BP(mean): 103 (28 Jan 2021 16:40) (89 - 103)  ABP: --  ABP(mean): --  RR: 16 (29 Jan 2021 07:01) (16 - 18)  SpO2: 97% (28 Jan 2021 23:00) (97% - 100%)      General:  HEENT:                Lymph Nodes: NO cervical LN   Lungs: Bilateral BS  Cardiovascular: Regular   Abdomen: Soft, Positive BS  Extremities: No clubbing   Skin: warm   Neurological:   Musculoskeletal: move all ext     I&O's Detail    28 Jan 2021 07:01  -  29 Jan 2021 07:00  --------------------------------------------------------  IN:    IV PiggyBack: 450 mL    Oral Fluid: 220 mL  Total IN: 670 mL    OUT:  Total OUT: 0 mL    Total NET: 670 mL          LABS:                          10.9   4.80  )-----------( 313      ( 29 Jan 2021 05:34 )             33.5         29 Jan 2021 05:34    136    |  103    |  34     ----------------------------<  86     4.6     |  30     |  0.7      Ca    8.9        29 Jan 2021 05:34    TPro  9.6    /  Alb  2.9    /  TBili  1.1    /  DBili  x      /  AST  43     /  ALT  30     /  AlkPhos  186    29 Jan 2021 05:34  Amylase x     lipase x                                                                                                                                                                                                                                         MEDICATIONS  (STANDING):  aspirin  chewable 81 milliGRAM(s) Oral daily  atorvastatin 20 milliGRAM(s) Oral at bedtime  budesonide  80 MICROgram(s)/formoterol 4.5 MICROgram(s) Inhaler 2 Puff(s) Inhalation two times a day  chlorhexidine 4% Liquid 1 Application(s) Topical two times a day  enoxaparin Injectable 40 milliGRAM(s) SubCutaneous daily  famotidine  Oral Tab/Cap - Peds 40 milliGRAM(s) Oral at bedtime  influenza   Vaccine 0.5 milliLiter(s) IntraMuscular once  levothyroxine 50 MICROGram(s) Oral daily  metoprolol succinate ER 25 milliGRAM(s) Oral daily  pantoprazole    Tablet 40 milliGRAM(s) Oral before breakfast  predniSONE   Tablet 50 milliGRAM(s) Oral daily  pyridostigmine 90 milliGRAM(s) Oral four times a day    MEDICATIONS  (PRN):  ALBUTerol    90 MICROgram(s) HFA Inhaler 2 Puff(s) Inhalation every 6 hours PRN Shortness of Breath  ibuprofen  Tablet. 400 milliGRAM(s) Oral every 6 hours PRN Moderate Pain (4 - 6)          Xrays:  TLC:  OG:  ET tube:                                                                                       ECHO:  [facility] ICU:

## 2021-01-29 NOTE — PROGRESS NOTE ADULT - SUBJECTIVE AND OBJECTIVE BOX
Patient is still feeling SOB at rest       T(F): 97 (01-29-21 @ 07:01), Max: 97.7 (01-28-21 @ 13:35)  HR: 56 (01-29-21 @ 07:05)  BP: 150/73 (01-29-21 @ 07:05)  RR: 11 (01-29-21 @ 07:05)  SpO2: 99% (01-29-21 @ 07:05) (97% - 100%)    PHYSICAL EXAM:  GENERAL: NAD  HEAD:  Atraumatic, Normocephalic  NERVOUS SYSTEM:  Alert & Oriented X3, nonfocal  CHEST/LUNG: Clear to percussion bilaterally; No rales, rhonchi, wheezing, or rubs  HEART: Regular rate and rhythm; No murmurs, rubs, or gallops  ABDOMEN: Soft, Nontender, Nondistended; Bowel sounds present  EXTREMITIES:  2+ Peripheral Pulses, No clubbing, cyanosis, or edema  LYMPH: No lymphadenopathy noted  SKIN: No rashes or lesions    LABS  01-29    136  |  103  |  34<H>  ----------------------------<  86  4.6   |  30  |  0.7    Ca    8.9      29 Jan 2021 05:34    TPro  9.6<H>  /  Alb  2.9<L>  /  TBili  1.1  /  DBili  x   /  AST  43<H>  /  ALT  30  /  AlkPhos  186<H>  01-29                          10.9   4.80  )-----------( 313      ( 29 Jan 2021 05:34 )             33.5     RADIOLOGY  < from: Xray Chest 1 View- PORTABLE-Routine (Xray Chest 1 View- PORTABLE-Routine in AM.) (01.28.21 @ 07:02) >  Impression:    No radiographic evidence of acute cardiopulmonary disease.    < end of copied text >    MEDICATIONS  (STANDING):  aspirin  chewable 81 milliGRAM(s) Oral daily  atorvastatin 20 milliGRAM(s) Oral at bedtime  budesonide  80 MICROgram(s)/formoterol 4.5 MICROgram(s) Inhaler 2 Puff(s) Inhalation two times a day  chlorhexidine 4% Liquid 1 Application(s) Topical two times a day  enoxaparin Injectable 40 milliGRAM(s) SubCutaneous daily  famotidine  Oral Tab/Cap - Peds 40 milliGRAM(s) Oral at bedtime  influenza   Vaccine 0.5 milliLiter(s) IntraMuscular once  levothyroxine 50 MICROGram(s) Oral daily  metoprolol succinate ER 25 milliGRAM(s) Oral daily  pantoprazole    Tablet 40 milliGRAM(s) Oral before breakfast  predniSONE   Tablet 50 milliGRAM(s) Oral daily  pyridostigmine 90 milliGRAM(s) Oral four times a day    MEDICATIONS  (PRN):  ALBUTerol    90 MICROgram(s) HFA Inhaler 2 Puff(s) Inhalation every 6 hours PRN Shortness of Breath  ibuprofen  Tablet. 400 milliGRAM(s) Oral every 6 hours PRN Moderate Pain (4 - 6)

## 2021-01-29 NOTE — PROGRESS NOTE ADULT - SUBJECTIVE AND OBJECTIVE BOX
Neurology Follow up note    Name  GHADA ROPER    HPI:  · Pt is a 60y old female with pmhx of High blood cholesterol, Myasthenia gravis ( w/ flares requiring hospitalizations and plasmapheresis , no intubations) , Pancreatitis, postural orthostatic tachycardia syndrome,  COVID infxn ( nov 2020)  presenting to the ED complaining of worsening SOB x 1 day. Pt reports SOB is worse w/ exertion  and reports that she had difficulty reading her grandkids a bedtime story.  Per pt " this feels like previous flares"  No CP , No fevers, chills or cough.  No syncope . No unilateral leg swelling.   (25 Jan 2021 16:25)      Interval History -  Patient seen at bedside with Dr. Sumit Bee.  Pt sitting in bed looks anxious, saturating 98 in room air, RR 30  Pt reports feels better in terms of weakness but her breathing has not improved. Pt less  flushed on her face. As pt  kept communicating she became more calm and her breathing improved, RR decreased to 20. Pt s/p jazmyne merritt , plan for plasmapheresis.        Vital Signs Last 24 Hrs  T(C): 36.1 (29 Jan 2021 07:01), Max: 36.5 (28 Jan 2021 13:35)  T(F): 97 (29 Jan 2021 07:01), Max: 97.7 (28 Jan 2021 13:35)  HR: 56 (29 Jan 2021 07:05) (54 - 73)  BP: 150/73 (29 Jan 2021 07:05) (128/60 - 159/72)  BP(mean): 105 (29 Jan 2021 07:05) (89 - 105)  RR: 11 (29 Jan 2021 07:05) (11 - 18)  SpO2: 99% (29 Jan 2021 07:05) (97% - 100%)  ICU Vital Signs Last 24 Hrs  T(C): 36.1 (29 Jan 2021 07:01), Max: 36.5 (28 Jan 2021 13:35)  T(F): 97 (29 Jan 2021 07:01), Max: 97.7 (28 Jan 2021 13:35)  HR: 56 (29 Jan 2021 07:05) (54 - 73)  BP: 150/73 (29 Jan 2021 07:05) (128/60 - 159/72)  BP(mean): 105 (29 Jan 2021 07:05) (89 - 105)  RR: 11 (29 Jan 2021 07:05) (11 - 18)  SpO2: 99% (29 Jan 2021 07:05) (97% - 100%)      Neurologic Examination:  Mental status: Awake, alert and oriented x3.  Recent and remote memory intact.  Naming, repetition and comprehension intact.  Attention/concentration intact.  No dysarthria, no aphasia.  Fund of knowledge appropriate.    Cranial nerves: Pupils equally round and reactive to light, visual fields full, extraocular muscles intact, V1 through V3 intact bilaterally and symmetric, face symmetric, sternocleidomastoid/shoulder shrug strength bilaterally 5/5.    Motor: Neck Flexion 4+ ; Neck Extension 5/5; no changes from yesterday   Normal bulk and tone, strength 4+/5 bilateral proximal upper extremities 5/5 distal b/l upper extremities.  5/5 Right lower extremities and 4/5 let LE  strength 5/5.    Sensation: Intact to light touch, proprioception. No neglect.   Reflexes: 2+ in upper and lower extremities, downgoing toes bilaterally.    Medications  ALBUTerol    90 MICROgram(s) HFA Inhaler 2 Puff(s) Inhalation every 6 hours PRN  aspirin  chewable 81 milliGRAM(s) Oral daily  atorvastatin 20 milliGRAM(s) Oral at bedtime  budesonide  80 MICROgram(s)/formoterol 4.5 MICROgram(s) Inhaler 2 Puff(s) Inhalation two times a day  chlorhexidine 4% Liquid 1 Application(s) Topical two times a day  enoxaparin Injectable 40 milliGRAM(s) SubCutaneous daily  famotidine  Oral Tab/Cap - Peds 40 milliGRAM(s) Oral at bedtime  ibuprofen  Tablet. 400 milliGRAM(s) Oral every 6 hours PRN  influenza   Vaccine 0.5 milliLiter(s) IntraMuscular once  levothyroxine 50 MICROGram(s) Oral daily  metoprolol succinate ER 25 milliGRAM(s) Oral daily  pantoprazole    Tablet 40 milliGRAM(s) Oral before breakfast  predniSONE   Tablet 50 milliGRAM(s) Oral daily  pyridostigmine 90 milliGRAM(s) Oral four times a day      Lab                        10.9   4.80  )-----------( 313      ( 29 Jan 2021 05:34 )             33.5     01-29    136  |  103  |  34<H>  ----------------------------<  86  4.6   |  30  |  0.7    Ca    8.9      29 Jan 2021 05:34    TPro  9.6<H>  /  Alb  2.9<L>  /  TBili  1.1  /  DBili  x   /  AST  43<H>  /  ALT  30  /  AlkPhos  186<H>  01-29    CAPILLARY BLOOD GLUCOSE        LIVER FUNCTIONS - ( 29 Jan 2021 05:34 )  Alb: 2.9 g/dL / Pro: 9.6 g/dL / ALK PHOS: 186 U/L / ALT: 30 U/L / AST: 43 U/L / GGT: x

## 2021-01-29 NOTE — PROGRESS NOTE ADULT - ASSESSMENT
This is a 60y year old Female presenting with Exacerbation of Myasthenia Gravis w/ possible drug-induced Iatrogenic Pancreatitis s/p Streptococcal vaccinations     Recommendations:    -s/p udall today, continue with plasmapheresis today   - check PFT's q shifts   - Monitor and trend  LFT's , Amylase, Lipase and GGT  - continue Prednisone to 50 mg QD  - Continue Pyridostigmine 90 Q 6 hours   - BIPAP as per pulmonary   - PT/OT and out of bed to chair   - will start PA for eculizumab to begin as outpt  -neurology to follow up

## 2021-01-29 NOTE — PROGRESS NOTE ADULT - ASSESSMENT
60y old female with pmhx of High blood cholesterol, Myasthenia gravis ( w/ flares requiring hospitalizations and plasmapheresis , no intubations) , Pancreatitis, postural orthostatic tachycardia syndrome,  COVID infxn ( nov 2020)  presenting to the ED complaining of worsening SOB x 1 day. Pt reports SOB is worse w/ exertion  and reports that she had difficulty reading her grandkids a bedtime story.  Per pt " this feels like previous flares"  No CP , No fevers, chills or cough.  No syncope . No unilateral leg swelling     Myasthenia gravis flare     - still with symptoms after IVIG - I spoke with Neuro will initiate plasmapheresis today    -  IVIG again today    -  pyridostigmine   - monitor in unit   - check VC and MIP q6h   - continue home meds   - neuro following   - DVT prophylaxis 60y old female with pmhx of High blood cholesterol, Myasthenia gravis ( w/ flares requiring hospitalizations and plasmapheresis , no intubations) , Pancreatitis, postural orthostatic tachycardia syndrome,  COVID infxn ( nov 2020)  presenting to the ED complaining of worsening SOB x 1 day. Pt reports SOB is worse w/ exertion  and reports that she had difficulty reading her grandkids a bedtime story.  Per pt " this feels like previous flares"  No CP , No fevers, chills or cough.  No syncope . No unilateral leg swelling     Myasthenia gravis flare     - still with symptoms after IVIG - I spoke with Neuro will initiate plasmapheresis today     -  pyridostigmine, prednisone    - monitor in unit   - continue to monitor  VC and MIP q6h   - continue home meds   - neuro following   - DVT prophylaxis 60y old female with pmhx of High blood cholesterol, Myasthenia gravis ( w/ flares requiring hospitalizations and plasmapheresis , no intubations) , Pancreatitis, postural orthostatic tachycardia syndrome,  COVID infxn ( nov 2020)  presenting to the ED complaining of worsening SOB x 1 day. Pt reports SOB is worse w/ exertion  and reports that she had difficulty reading her grandkids a bedtime story.  Per pt " this feels like previous flares"  No CP , No fevers, chills or cough.  No syncope . No unilateral leg swelling     Myasthenia gravis flare     - still with symptoms after IVIG - I spoke with Neuro will initiate plasmapheresis today     -  pyridostigmine, prednisone    - monitor in unit   - continue to monitor  VC and MIP   - continue home meds   - neuro following   - DVT prophylaxis

## 2021-01-30 LAB
ALBUMIN SERPL ELPH-MCNC: 4.1 G/DL — SIGNIFICANT CHANGE UP (ref 3.5–5.2)
ALP SERPL-CCNC: 98 U/L — SIGNIFICANT CHANGE UP (ref 30–115)
ALT FLD-CCNC: 21 U/L — SIGNIFICANT CHANGE UP (ref 0–41)
ANION GAP SERPL CALC-SCNC: 7 MMOL/L — SIGNIFICANT CHANGE UP (ref 7–14)
AST SERPL-CCNC: 36 U/L — SIGNIFICANT CHANGE UP (ref 0–41)
BASOPHILS # BLD AUTO: 0.04 K/UL — SIGNIFICANT CHANGE UP (ref 0–0.2)
BASOPHILS NFR BLD AUTO: 0.6 % — SIGNIFICANT CHANGE UP (ref 0–1)
BILIRUB SERPL-MCNC: 1.1 MG/DL — SIGNIFICANT CHANGE UP (ref 0.2–1.2)
BLD GP AB SCN SERPL QL: SIGNIFICANT CHANGE UP
BUN SERPL-MCNC: 28 MG/DL — HIGH (ref 10–20)
CALCIUM SERPL-MCNC: 8.6 MG/DL — SIGNIFICANT CHANGE UP (ref 8.5–10.1)
CHLORIDE SERPL-SCNC: 105 MMOL/L — SIGNIFICANT CHANGE UP (ref 98–110)
CO2 SERPL-SCNC: 26 MMOL/L — SIGNIFICANT CHANGE UP (ref 17–32)
CREAT SERPL-MCNC: 0.7 MG/DL — SIGNIFICANT CHANGE UP (ref 0.7–1.5)
EOSINOPHIL # BLD AUTO: 0.06 K/UL — SIGNIFICANT CHANGE UP (ref 0–0.7)
EOSINOPHIL NFR BLD AUTO: 0.9 % — SIGNIFICANT CHANGE UP (ref 0–8)
FIBRINOGEN PPP-MCNC: 199 MG/DL — LOW (ref 204.4–570.6)
GLUCOSE BLDC GLUCOMTR-MCNC: 128 MG/DL — HIGH (ref 70–99)
GLUCOSE SERPL-MCNC: 110 MG/DL — HIGH (ref 70–99)
HCT VFR BLD CALC: 36.6 % — LOW (ref 37–47)
HGB BLD-MCNC: 12 G/DL — SIGNIFICANT CHANGE UP (ref 12–16)
IMM GRANULOCYTES NFR BLD AUTO: 0.6 % — HIGH (ref 0.1–0.3)
LYMPHOCYTES # BLD AUTO: 2.07 K/UL — SIGNIFICANT CHANGE UP (ref 1.2–3.4)
LYMPHOCYTES # BLD AUTO: 30.9 % — SIGNIFICANT CHANGE UP (ref 20.5–51.1)
MCHC RBC-ENTMCNC: 32 PG — HIGH (ref 27–31)
MCHC RBC-ENTMCNC: 32.8 G/DL — SIGNIFICANT CHANGE UP (ref 32–37)
MCV RBC AUTO: 97.6 FL — SIGNIFICANT CHANGE UP (ref 81–99)
MONOCYTES # BLD AUTO: 0.6 K/UL — SIGNIFICANT CHANGE UP (ref 0.1–0.6)
MONOCYTES NFR BLD AUTO: 9 % — SIGNIFICANT CHANGE UP (ref 1.7–9.3)
NEUTROPHILS # BLD AUTO: 3.89 K/UL — SIGNIFICANT CHANGE UP (ref 1.4–6.5)
NEUTROPHILS NFR BLD AUTO: 58 % — SIGNIFICANT CHANGE UP (ref 42.2–75.2)
NRBC # BLD: 0 /100 WBCS — SIGNIFICANT CHANGE UP (ref 0–0)
PLATELET # BLD AUTO: 292 K/UL — SIGNIFICANT CHANGE UP (ref 130–400)
POTASSIUM SERPL-MCNC: 4.9 MMOL/L — SIGNIFICANT CHANGE UP (ref 3.5–5)
POTASSIUM SERPL-SCNC: 4.9 MMOL/L — SIGNIFICANT CHANGE UP (ref 3.5–5)
PROT SERPL-MCNC: 6.7 G/DL — SIGNIFICANT CHANGE UP (ref 6–8)
RBC # BLD: 3.75 M/UL — LOW (ref 4.2–5.4)
RBC # FLD: 15.9 % — HIGH (ref 11.5–14.5)
SODIUM SERPL-SCNC: 138 MMOL/L — SIGNIFICANT CHANGE UP (ref 135–146)
WBC # BLD: 6.7 K/UL — SIGNIFICANT CHANGE UP (ref 4.8–10.8)
WBC # FLD AUTO: 6.7 K/UL — SIGNIFICANT CHANGE UP (ref 4.8–10.8)

## 2021-01-30 PROCEDURE — 99291 CRITICAL CARE FIRST HOUR: CPT

## 2021-01-30 PROCEDURE — 99233 SBSQ HOSP IP/OBS HIGH 50: CPT

## 2021-01-30 PROCEDURE — 71045 X-RAY EXAM CHEST 1 VIEW: CPT | Mod: 26

## 2021-01-30 RX ORDER — CALCIUM GLUCONATE 100 MG/ML
2 VIAL (ML) INTRAVENOUS ONCE
Refills: 0 | Status: COMPLETED | OUTPATIENT
Start: 2021-01-30 | End: 2021-01-30

## 2021-01-30 RX ORDER — IMMUNE GLOBULIN (HUMAN) 10 G/100ML
35 INJECTION INTRAVENOUS; SUBCUTANEOUS ONCE
Refills: 0 | Status: DISCONTINUED | OUTPATIENT
Start: 2021-01-30 | End: 2021-01-30

## 2021-01-30 RX ORDER — ALBUMIN HUMAN 25 %
3000 VIAL (ML) INTRAVENOUS ONCE
Refills: 0 | Status: COMPLETED | OUTPATIENT
Start: 2021-01-30 | End: 2021-01-30

## 2021-01-30 RX ORDER — ONDANSETRON 8 MG/1
4 TABLET, FILM COATED ORAL ONCE
Refills: 0 | Status: COMPLETED | OUTPATIENT
Start: 2021-01-30 | End: 2021-01-30

## 2021-01-30 RX ORDER — ONDANSETRON 8 MG/1
4 TABLET, FILM COATED ORAL ONCE
Refills: 0 | Status: COMPLETED | OUTPATIENT
Start: 2021-01-30 | End: 2021-01-31

## 2021-01-30 RX ORDER — DIPHENHYDRAMINE HCL 50 MG
25 CAPSULE ORAL ONCE
Refills: 0 | Status: COMPLETED | OUTPATIENT
Start: 2021-01-30 | End: 2021-01-30

## 2021-01-30 RX ADMIN — Medication 400 MILLIGRAM(S): at 08:28

## 2021-01-30 RX ADMIN — FAMOTIDINE 40 MILLIGRAM(S): 10 INJECTION INTRAVENOUS at 21:24

## 2021-01-30 RX ADMIN — PYRIDOSTIGMINE BROMIDE 90 MILLIGRAM(S): 60 SOLUTION ORAL at 05:38

## 2021-01-30 RX ADMIN — Medication 81 MILLIGRAM(S): at 11:31

## 2021-01-30 RX ADMIN — CHLORHEXIDINE GLUCONATE 1 APPLICATION(S): 213 SOLUTION TOPICAL at 17:51

## 2021-01-30 RX ADMIN — PYRIDOSTIGMINE BROMIDE 90 MILLIGRAM(S): 60 SOLUTION ORAL at 23:16

## 2021-01-30 RX ADMIN — BUDESONIDE AND FORMOTEROL FUMARATE DIHYDRATE 2 PUFF(S): 160; 4.5 AEROSOL RESPIRATORY (INHALATION) at 20:23

## 2021-01-30 RX ADMIN — Medication 50 MICROGRAM(S): at 05:37

## 2021-01-30 RX ADMIN — BUDESONIDE AND FORMOTEROL FUMARATE DIHYDRATE 2 PUFF(S): 160; 4.5 AEROSOL RESPIRATORY (INHALATION) at 07:50

## 2021-01-30 RX ADMIN — CHLORHEXIDINE GLUCONATE 1 APPLICATION(S): 213 SOLUTION TOPICAL at 05:37

## 2021-01-30 RX ADMIN — Medication 200 GRAM(S): at 14:34

## 2021-01-30 RX ADMIN — Medication 25 MILLIGRAM(S): at 05:37

## 2021-01-30 RX ADMIN — PYRIDOSTIGMINE BROMIDE 90 MILLIGRAM(S): 60 SOLUTION ORAL at 11:38

## 2021-01-30 RX ADMIN — Medication 400 MILLIGRAM(S): at 01:51

## 2021-01-30 RX ADMIN — Medication 400 MILLIGRAM(S): at 17:50

## 2021-01-30 RX ADMIN — ONDANSETRON 4 MILLIGRAM(S): 8 TABLET, FILM COATED ORAL at 07:54

## 2021-01-30 RX ADMIN — ENOXAPARIN SODIUM 40 MILLIGRAM(S): 100 INJECTION SUBCUTANEOUS at 11:38

## 2021-01-30 RX ADMIN — PYRIDOSTIGMINE BROMIDE 90 MILLIGRAM(S): 60 SOLUTION ORAL at 17:51

## 2021-01-30 RX ADMIN — PANTOPRAZOLE SODIUM 40 MILLIGRAM(S): 20 TABLET, DELAYED RELEASE ORAL at 05:38

## 2021-01-30 RX ADMIN — ATORVASTATIN CALCIUM 20 MILLIGRAM(S): 80 TABLET, FILM COATED ORAL at 21:24

## 2021-01-30 RX ADMIN — Medication 600 MILLILITER(S): at 14:35

## 2021-01-30 RX ADMIN — Medication 50 MILLIGRAM(S): at 05:39

## 2021-01-30 NOTE — PROGRESS NOTE ADULT - ASSESSMENT
60 yr old female with pmhx of High blood cholesterol, Myasthenia gravis ( w/ flares requiring hospitalizations and plasmapheresis , no intubations) , Pancreatitis, postural orthostatic tachycardia syndrome, COVID ( nov 2020) presenting to the ED complaining of worsening SOB x 1 day.    # Myasthenia gravis flare   - s/p 3 days IVIG  - c/w plasmapheresis  - c/w pyridostigmine, imuran and Prednisone  - continue to monitor VC and MIP  - continue home meds  - neuro following  - DVT prophylaxis    # Full Code 60 yr old female with pmhx of High blood cholesterol, Myasthenia gravis ( w/ flares requiring hospitalizations and plasmapheresis , no intubations) , Pancreatitis, postural orthostatic tachycardia syndrome, COVID ( nov 2020) presenting to the ED complaining of worsening SOB x 1 day.    # Myasthenia gravis flare   - s/p 3 days IVIG  - c/w plasmapheresis (1/5 session)  - c/w pyridostigmine, imuran and Prednisone  - continue to monitor VC and MIP  - continue home meds  - neuro following  - DVT prophylaxis    # Full Code

## 2021-01-30 NOTE — PROGRESS NOTE ADULT - SUBJECTIVE AND OBJECTIVE BOX
SUBJECTIVE:    Patient is a 60y old Female who presents with a chief complaint of SOB (29 Jan 2021 13:16)    Currently admitted to medicine with the primary diagnosis of Respiratory distress     Today is hospital day 5d. This morning she is resting comfortably in bed and reports no new issues or overnight events.     PAST MEDICAL & SURGICAL HISTORY  Myasthenia gravis    High blood cholesterol    Pancreatitis    POTS (postural orthostatic tachycardia syndrome)    History of foot surgery      SOCIAL HISTORY:  Negative for smoking/alcohol/drug use.     ALLERGIES:  sulfa drugs (Unknown)    MEDICATIONS:  STANDING MEDICATIONS  albumin human  5% IVPB 3000 milliLiter(s) IV Intermittent once  aspirin  chewable 81 milliGRAM(s) Oral daily  atorvastatin 20 milliGRAM(s) Oral at bedtime  budesonide  80 MICROgram(s)/formoterol 4.5 MICROgram(s) Inhaler 2 Puff(s) Inhalation two times a day  chlorhexidine 4% Liquid 1 Application(s) Topical two times a day  enoxaparin Injectable 40 milliGRAM(s) SubCutaneous daily  famotidine  Oral Tab/Cap - Peds 40 milliGRAM(s) Oral at bedtime  influenza   Vaccine 0.5 milliLiter(s) IntraMuscular once  levothyroxine 50 MICROGram(s) Oral daily  metoprolol succinate ER 25 milliGRAM(s) Oral daily  ondansetron Injectable 4 milliGRAM(s) IV Push once  pantoprazole    Tablet 40 milliGRAM(s) Oral before breakfast  predniSONE   Tablet 50 milliGRAM(s) Oral daily  pyridostigmine 90 milliGRAM(s) Oral four times a day    PRN MEDICATIONS  ALBUTerol    90 MICROgram(s) HFA Inhaler 2 Puff(s) Inhalation every 6 hours PRN  ibuprofen  Tablet. 400 milliGRAM(s) Oral every 6 hours PRN    VITALS:   T(F): 96.3  HR: 66  BP: 118/55  RR: 16  SpO2: 99%    LABS:                        12.0   6.70  )-----------( 292      ( 30 Jan 2021 05:15 )             36.6     01-29    136  |  103  |  34<H>  ----------------------------<  86  4.6   |  30  |  0.7    Ca    8.9      29 Jan 2021 05:34    TPro  9.6<H>  /  Alb  2.9<L>  /  TBili  1.1  /  DBili  x   /  AST  43<H>  /  ALT  30  /  AlkPhos  186<H>  01-29    PT/INR - ( 29 Jan 2021 16:34 )   PT: 11.20 sec;   INR: 0.97 ratio         PTT - ( 29 Jan 2021 16:34 )  PTT:33.2 sec      RADIOLOGY:  < from: Xray Chest 1 View-PORTABLE IMMEDIATE (Xray Chest 1 View-PORTABLE IMMEDIATE .) (01.29.21 @ 12:14) >  Interstitial edema, increased.    < end of copied text >      PHYSICAL EXAM:  GENERAL: NAD  HEAD:  Atraumatic, Normocephalic  NERVOUS SYSTEM:  Alert & Oriented X3, nonfocal  CHEST/LUNG: Clear to percussion bilaterally; No rales, rhonchi, wheezing, or rubs  HEART: Regular rate and rhythm; No murmurs, rubs, or gallops  ABDOMEN: Soft, Nontender, Nondistended; Bowel sounds present  EXTREMITIES:  2+ Peripheral Pulses, No clubbing, cyanosis, or edema  LYMPH: No lymphadenopathy noted  SKIN: No rashes or lesions SUBJECTIVE:    Patient is a 60y old Female who presents with a chief complaint of SOB (29 Jan 2021 13:16)    Currently admitted to medicine with the primary diagnosis of Respiratory distress     Today is hospital day 5d. This morning she is resting comfortably in bed and reports no new issues or overnight events. Patient states she feels little better.     PAST MEDICAL & SURGICAL HISTORY  Myasthenia gravis    High blood cholesterol    Pancreatitis    POTS (postural orthostatic tachycardia syndrome)    History of foot surgery      SOCIAL HISTORY:  Negative for smoking/alcohol/drug use.     ALLERGIES:  sulfa drugs (Unknown)    MEDICATIONS:  STANDING MEDICATIONS  albumin human  5% IVPB 3000 milliLiter(s) IV Intermittent once  aspirin  chewable 81 milliGRAM(s) Oral daily  atorvastatin 20 milliGRAM(s) Oral at bedtime  budesonide  80 MICROgram(s)/formoterol 4.5 MICROgram(s) Inhaler 2 Puff(s) Inhalation two times a day  chlorhexidine 4% Liquid 1 Application(s) Topical two times a day  enoxaparin Injectable 40 milliGRAM(s) SubCutaneous daily  famotidine  Oral Tab/Cap - Peds 40 milliGRAM(s) Oral at bedtime  influenza   Vaccine 0.5 milliLiter(s) IntraMuscular once  levothyroxine 50 MICROGram(s) Oral daily  metoprolol succinate ER 25 milliGRAM(s) Oral daily  ondansetron Injectable 4 milliGRAM(s) IV Push once  pantoprazole    Tablet 40 milliGRAM(s) Oral before breakfast  predniSONE   Tablet 50 milliGRAM(s) Oral daily  pyridostigmine 90 milliGRAM(s) Oral four times a day    PRN MEDICATIONS  ALBUTerol    90 MICROgram(s) HFA Inhaler 2 Puff(s) Inhalation every 6 hours PRN  ibuprofen  Tablet. 400 milliGRAM(s) Oral every 6 hours PRN    VITALS:   T(F): 96.3  HR: 66  BP: 118/55  RR: 16  SpO2: 99%    LABS:                        12.0   6.70  )-----------( 292      ( 30 Jan 2021 05:15 )             36.6     01-29    136  |  103  |  34<H>  ----------------------------<  86  4.6   |  30  |  0.7    Ca    8.9      29 Jan 2021 05:34    TPro  9.6<H>  /  Alb  2.9<L>  /  TBili  1.1  /  DBili  x   /  AST  43<H>  /  ALT  30  /  AlkPhos  186<H>  01-29    PT/INR - ( 29 Jan 2021 16:34 )   PT: 11.20 sec;   INR: 0.97 ratio         PTT - ( 29 Jan 2021 16:34 )  PTT:33.2 sec      RADIOLOGY:  < from: Xray Chest 1 View-PORTABLE IMMEDIATE (Xray Chest 1 View-PORTABLE IMMEDIATE .) (01.29.21 @ 12:14) >  Interstitial edema, increased.    < end of copied text >      PHYSICAL EXAM:  GENERAL: NAD  HEAD:  Atraumatic, Normocephalic  NERVOUS SYSTEM:  Alert & Oriented X3, nonfocal  CHEST/LUNG: Clear to percussion bilaterally; No rales, rhonchi, wheezing, or rubs  HEART: Regular rate and rhythm; No murmurs, rubs, or gallops  ABDOMEN: Soft, Nontender, Nondistended; Bowel sounds present  EXTREMITIES:  2+ Peripheral Pulses, No clubbing, cyanosis, or edema  LYMPH: No lymphadenopathy noted  SKIN: No rashes or lesions

## 2021-01-30 NOTE — PROGRESS NOTE ADULT - SUBJECTIVE AND OBJECTIVE BOX
Neurology Follow up note    Name  GHADA ROPER    HPI:  · Pt is a 60y old female with pmhx of High blood cholesterol, Myasthenia gravis ( w/ flares requiring hospitalizations and plasmapheresis , no intubations) , Pancreatitis, postural orthostatic tachycardia syndrome,  COVID infxn ( nov 2020)  presenting to the ED complaining of worsening SOB x 1 day. Pt reports SOB is worse w/ exertion  and reports that she had difficulty reading her grandkids a bedtime story.  Per pt " this feels like previous flares"  No CP , No fevers, chills or cough.  No syncope . No unilateral leg swelling.   (25 Jan 2021 16:25)      Interval History -  Patient received one cession of PLEX last night. [place] better after the cession but morning reports SOB on walking.     Vital Signs Last 24 Hrs  T(C): 35.7 (30 Jan 2021 07:01), Max: 36.2 (29 Jan 2021 15:21)  T(F): 96.3 (30 Jan 2021 07:01), Max: 97.2 (29 Jan 2021 15:21)  HR: 61 (30 Jan 2021 07:01) (58 - 66)  BP: 111/54 (30 Jan 2021 07:01) (111/54 - 160/75)  BP(mean): 78 (30 Jan 2021 07:01) (77 - 108)  RR: 16 (30 Jan 2021 07:01) (16 - 24)  SpO2: 99% (30 Jan 2021 08:57) (97% - 100%)  ICU Vital Signs Last 24 Hrs  T(C): 35.7 (30 Jan 2021 07:01), Max: 36.2 (29 Jan 2021 15:21)  T(F): 96.3 (30 Jan 2021 07:01), Max: 97.2 (29 Jan 2021 15:21)  HR: 61 (30 Jan 2021 07:01) (58 - 66)  BP: 111/54 (30 Jan 2021 07:01) (111/54 - 160/75)  BP(mean): 78 (30 Jan 2021 07:01) (77 - 108)  ABP: --  ABP(mean): --  RR: 16 (30 Jan 2021 07:01) (16 - 24)  SpO2: 99% (30 Jan 2021 08:57) (97% - 100%)          Neurological Exam:   Mental status: Awake, alert and oriented x3.  Recent and remote memory intact.  Naming, repetition and comprehension intact.  Attention/concentration intact.  No dysarthria, no aphasia.  Fund of knowledge appropriate.    Cranial nerves: Pupils equally round and reactive to light, visual fields full, extraocular muscles intact, V1 through V3 intact bilaterally and symmetric, face symmetric, sternocleidomastoid/shoulder shrug strength bilaterally 5/5.    Motor: Neck Flexion 4+ ; Neck Extension 5/5; no changes from yesterday   Normal bulk and tone, strength 4+/5 bilateral proximal upper extremities 5/5 distal b/l upper extremities.  5/5 Right lower extremities and 4/5 let LE  strength 5/5.    Sensation: Intact to light touch, proprioception. No neglect.   Reflexes: 2+ in upper and lower extremities, downgoing toes bilaterally.    Medications  albumin human  5% IVPB 3000 milliLiter(s) IV Intermittent once  ALBUTerol    90 MICROgram(s) HFA Inhaler 2 Puff(s) Inhalation every 6 hours PRN  aspirin  chewable 81 milliGRAM(s) Oral daily  atorvastatin 20 milliGRAM(s) Oral at bedtime  budesonide  80 MICROgram(s)/formoterol 4.5 MICROgram(s) Inhaler 2 Puff(s) Inhalation two times a day  chlorhexidine 4% Liquid 1 Application(s) Topical two times a day  enoxaparin Injectable 40 milliGRAM(s) SubCutaneous daily  famotidine  Oral Tab/Cap - Peds 40 milliGRAM(s) Oral at bedtime  ibuprofen  Tablet. 400 milliGRAM(s) Oral every 6 hours PRN  levothyroxine 50 MICROGram(s) Oral daily  metoprolol succinate ER 25 milliGRAM(s) Oral daily  pantoprazole    Tablet 40 milliGRAM(s) Oral before breakfast  predniSONE   Tablet 50 milliGRAM(s) Oral daily  pyridostigmine 90 milliGRAM(s) Oral four times a day      Lab                        12.0   6.70  )-----------( 292      ( 30 Jan 2021 05:15 )             36.6     01-30    138  |  105  |  28<H>  ----------------------------<  110<H>  4.9   |  26  |  0.7    Ca    8.6      30 Jan 2021 05:15    TPro  6.7  /  Alb  4.1  /  TBili  1.1  /  DBili  x   /  AST  36  /  ALT  21  /  AlkPhos  98  01-30    CAPILLARY BLOOD GLUCOSE      POCT Blood Glucose.: 128 mg/dL (30 Jan 2021 06:39)    LIVER FUNCTIONS - ( 30 Jan 2021 05:15 )  Alb: 4.1 g/dL / Pro: 6.7 g/dL / ALK PHOS: 98 U/L / ALT: 21 U/L / AST: 36 U/L / GGT: x           PT/INR - ( 29 Jan 2021 16:34 )   PT: 11.20 sec;   INR: 0.97 ratio         PTT - ( 29 Jan 2021 16:34 )  PTT:33.2 sec

## 2021-01-30 NOTE — PROGRESS NOTE ADULT - SUBJECTIVE AND OBJECTIVE BOX
LENGTH OF HOSPITAL STAY: 5d    CHIEF COMPLAINT:   Patient is a 60y old  Female who presents with a chief complaint of SOB (30 Jan 2021 07:18)      HISTORY OF PRESENTING ILLNESS:    HPI:  · Pt is a 60y old female with pmhx of High blood cholesterol, Myasthenia gravis ( w/ flares requiring hospitalizations and plasmapheresis , no intubations) , Pancreatitis, postural orthostatic tachycardia syndrome,  COVID infxn ( nov 2020)  presenting to the ED complaining of worsening SOB x 1 day. Pt reports SOB is worse w/ exertion  and reports that she had difficulty reading her grandkids a bedtime story.  Per pt " this feels like previous flares"  No CP , No fevers, chills or cough.  No syncope . No unilateral leg swelling.   (25 Jan 2021 16:25)    Pt in bed no distress, no events overnight.    PAST MEDICAL & SURGICAL HISTORY  PAST MEDICAL & SURGICAL HISTORY:  Myasthenia gravis    High blood cholesterol    Pancreatitis    POTS (postural orthostatic tachycardia syndrome)    History of foot surgery      SOCIAL HISTORY:    ALLERGIES:  sulfa drugs (Unknown)    MEDICATIONS:  STANDING MEDICATIONS  albumin human  5% IVPB 3000 milliLiter(s) IV Intermittent once  aspirin  chewable 81 milliGRAM(s) Oral daily  atorvastatin 20 milliGRAM(s) Oral at bedtime  budesonide  80 MICROgram(s)/formoterol 4.5 MICROgram(s) Inhaler 2 Puff(s) Inhalation two times a day  chlorhexidine 4% Liquid 1 Application(s) Topical two times a day  enoxaparin Injectable 40 milliGRAM(s) SubCutaneous daily  famotidine  Oral Tab/Cap - Peds 40 milliGRAM(s) Oral at bedtime  influenza   Vaccine 0.5 milliLiter(s) IntraMuscular once  levothyroxine 50 MICROGram(s) Oral daily  metoprolol succinate ER 25 milliGRAM(s) Oral daily  pantoprazole    Tablet 40 milliGRAM(s) Oral before breakfast  predniSONE   Tablet 50 milliGRAM(s) Oral daily  pyridostigmine 90 milliGRAM(s) Oral four times a day    PRN MEDICATIONS  ALBUTerol    90 MICROgram(s) HFA Inhaler 2 Puff(s) Inhalation every 6 hours PRN  ibuprofen  Tablet. 400 milliGRAM(s) Oral every 6 hours PRN    VITALS:   T(F): 96.3  HR: 66  BP: 118/55  RR: 16  SpO2: 99%    LABS:                        12.0   6.70  )-----------( 292      ( 30 Jan 2021 05:15 )             36.6     01-30    138  |  105  |  28<H>  ----------------------------<  110<H>  4.9   |  26  |  0.7    Ca    8.6      30 Jan 2021 05:15    TPro  6.7  /  Alb  4.1  /  TBili  1.1  /  DBili  x   /  AST  36  /  ALT  21  /  AlkPhos  98  01-30    PT/INR - ( 29 Jan 2021 16:34 )   PT: 11.20 sec;   INR: 0.97 ratio         PTT - ( 29 Jan 2021 16:34 )  PTT:33.2 sec              RADIOLOGY:  cxr: clear lungs, r IJ central cath, -ptx, awaiting official report    PHYSICAL EXAM:  GEN: No acute distress  HEENT: R  IJ udall  LUNGS: Clear to auscultation bilaterally   HEART: S1/S2 present. RRR.   ABD: Soft, non-tender, non-distended. Bowel sounds present  EXT:-c/c/e

## 2021-01-30 NOTE — PROGRESS NOTE ADULT - ASSESSMENT
myasthenia Gravis flare  pancreatitis      Pt to continue prednisone, pyridostigmine and plasma exchange. Neuro to follow.  Monitor pulmonary function. Control pain from pancreatitis. Diet as tolerated. Continue ICU monitoring.

## 2021-01-30 NOTE — PROGRESS NOTE ADULT - ASSESSMENT
This is a 60y year old Female presenting with Exacerbation of Myasthenia Gravis w/ possible drug-induced Iatrogenic Pancreatitis s/p Streptococcal vaccinations     Recommendations:    - Continue PLEX 2/5 today   - check PFT's q shifts   - Monitor and trend  LFT's , Amylase, Lipase and GGT  - continue Prednisone to 50 mg QD  - Continue Pyridostigmine 90 Q 6 hours   - BIPAP as per pulmonary   - PT/OT and out of bed to chair   - will start eculizumab as outpt

## 2021-01-31 LAB
ALBUMIN SERPL ELPH-MCNC: 4.4 G/DL — SIGNIFICANT CHANGE UP (ref 3.5–5.2)
ALP SERPL-CCNC: 115 U/L — SIGNIFICANT CHANGE UP (ref 30–115)
ALT FLD-CCNC: 19 U/L — SIGNIFICANT CHANGE UP (ref 0–41)
AMYLASE P1 CFR SERPL: 99 U/L — SIGNIFICANT CHANGE UP (ref 25–115)
ANION GAP SERPL CALC-SCNC: 8 MMOL/L — SIGNIFICANT CHANGE UP (ref 7–14)
AST SERPL-CCNC: 34 U/L — SIGNIFICANT CHANGE UP (ref 0–41)
BASOPHILS # BLD AUTO: 0.03 K/UL — SIGNIFICANT CHANGE UP (ref 0–0.2)
BASOPHILS NFR BLD AUTO: 0.4 % — SIGNIFICANT CHANGE UP (ref 0–1)
BILIRUB SERPL-MCNC: 0.8 MG/DL — SIGNIFICANT CHANGE UP (ref 0.2–1.2)
BUN SERPL-MCNC: 26 MG/DL — HIGH (ref 10–20)
CALCIUM SERPL-MCNC: 9.1 MG/DL — SIGNIFICANT CHANGE UP (ref 8.5–10.1)
CHLORIDE SERPL-SCNC: 105 MMOL/L — SIGNIFICANT CHANGE UP (ref 98–110)
CO2 SERPL-SCNC: 26 MMOL/L — SIGNIFICANT CHANGE UP (ref 17–32)
CREAT SERPL-MCNC: 0.7 MG/DL — SIGNIFICANT CHANGE UP (ref 0.7–1.5)
EOSINOPHIL # BLD AUTO: 0.08 K/UL — SIGNIFICANT CHANGE UP (ref 0–0.7)
EOSINOPHIL NFR BLD AUTO: 1 % — SIGNIFICANT CHANGE UP (ref 0–8)
GGT SERPL-CCNC: 317 U/L — HIGH (ref 1–40)
GLUCOSE SERPL-MCNC: 144 MG/DL — HIGH (ref 70–99)
HCT VFR BLD CALC: 37.5 % — SIGNIFICANT CHANGE UP (ref 37–47)
HGB BLD-MCNC: 12.5 G/DL — SIGNIFICANT CHANGE UP (ref 12–16)
IMM GRANULOCYTES NFR BLD AUTO: 0.5 % — HIGH (ref 0.1–0.3)
LIDOCAIN IGE QN: 56 U/L — SIGNIFICANT CHANGE UP (ref 7–60)
LYMPHOCYTES # BLD AUTO: 2.25 K/UL — SIGNIFICANT CHANGE UP (ref 1.2–3.4)
LYMPHOCYTES # BLD AUTO: 27.6 % — SIGNIFICANT CHANGE UP (ref 20.5–51.1)
MAGNESIUM SERPL-MCNC: 1.9 MG/DL — SIGNIFICANT CHANGE UP (ref 1.8–2.4)
MCHC RBC-ENTMCNC: 32.1 PG — HIGH (ref 27–31)
MCHC RBC-ENTMCNC: 33.3 G/DL — SIGNIFICANT CHANGE UP (ref 32–37)
MCV RBC AUTO: 96.4 FL — SIGNIFICANT CHANGE UP (ref 81–99)
MONOCYTES # BLD AUTO: 0.65 K/UL — HIGH (ref 0.1–0.6)
MONOCYTES NFR BLD AUTO: 8 % — SIGNIFICANT CHANGE UP (ref 1.7–9.3)
NEUTROPHILS # BLD AUTO: 5.1 K/UL — SIGNIFICANT CHANGE UP (ref 1.4–6.5)
NEUTROPHILS NFR BLD AUTO: 62.5 % — SIGNIFICANT CHANGE UP (ref 42.2–75.2)
NRBC # BLD: 0 /100 WBCS — SIGNIFICANT CHANGE UP (ref 0–0)
PLATELET # BLD AUTO: 274 K/UL — SIGNIFICANT CHANGE UP (ref 130–400)
POTASSIUM SERPL-MCNC: 4.5 MMOL/L — SIGNIFICANT CHANGE UP (ref 3.5–5)
POTASSIUM SERPL-SCNC: 4.5 MMOL/L — SIGNIFICANT CHANGE UP (ref 3.5–5)
PROT SERPL-MCNC: 5.7 G/DL — LOW (ref 6–8)
RBC # BLD: 3.89 M/UL — LOW (ref 4.2–5.4)
RBC # FLD: 15.4 % — HIGH (ref 11.5–14.5)
SODIUM SERPL-SCNC: 139 MMOL/L — SIGNIFICANT CHANGE UP (ref 135–146)
WBC # BLD: 8.15 K/UL — SIGNIFICANT CHANGE UP (ref 4.8–10.8)
WBC # FLD AUTO: 8.15 K/UL — SIGNIFICANT CHANGE UP (ref 4.8–10.8)

## 2021-01-31 PROCEDURE — 99232 SBSQ HOSP IP/OBS MODERATE 35: CPT

## 2021-01-31 PROCEDURE — 71045 X-RAY EXAM CHEST 1 VIEW: CPT | Mod: 26

## 2021-01-31 PROCEDURE — 99291 CRITICAL CARE FIRST HOUR: CPT

## 2021-01-31 PROCEDURE — 99233 SBSQ HOSP IP/OBS HIGH 50: CPT

## 2021-01-31 RX ORDER — ALBUMIN HUMAN 25 %
1000 VIAL (ML) INTRAVENOUS ONCE
Refills: 0 | Status: COMPLETED | OUTPATIENT
Start: 2021-01-31 | End: 2021-01-31

## 2021-01-31 RX ORDER — HEPARIN SODIUM 5000 [USP'U]/ML
5000 INJECTION INTRAVENOUS; SUBCUTANEOUS ONCE
Refills: 0 | Status: COMPLETED | OUTPATIENT
Start: 2021-01-31 | End: 2021-01-31

## 2021-01-31 RX ORDER — CALCIUM GLUCONATE 100 MG/ML
2 VIAL (ML) INTRAVENOUS ONCE
Refills: 0 | Status: COMPLETED | OUTPATIENT
Start: 2021-01-31 | End: 2021-01-31

## 2021-01-31 RX ORDER — ALBUMIN HUMAN 25 %
3000 VIAL (ML) INTRAVENOUS ONCE
Refills: 0 | Status: COMPLETED | OUTPATIENT
Start: 2021-01-31 | End: 2021-01-31

## 2021-01-31 RX ORDER — ONDANSETRON 8 MG/1
4 TABLET, FILM COATED ORAL EVERY 6 HOURS
Refills: 0 | Status: DISCONTINUED | OUTPATIENT
Start: 2021-01-31 | End: 2021-02-04

## 2021-01-31 RX ADMIN — ATORVASTATIN CALCIUM 20 MILLIGRAM(S): 80 TABLET, FILM COATED ORAL at 21:24

## 2021-01-31 RX ADMIN — CHLORHEXIDINE GLUCONATE 1 APPLICATION(S): 213 SOLUTION TOPICAL at 06:23

## 2021-01-31 RX ADMIN — PYRIDOSTIGMINE BROMIDE 90 MILLIGRAM(S): 60 SOLUTION ORAL at 06:23

## 2021-01-31 RX ADMIN — Medication 25 MILLIGRAM(S): at 06:23

## 2021-01-31 RX ADMIN — PYRIDOSTIGMINE BROMIDE 90 MILLIGRAM(S): 60 SOLUTION ORAL at 22:26

## 2021-01-31 RX ADMIN — BUDESONIDE AND FORMOTEROL FUMARATE DIHYDRATE 2 PUFF(S): 160; 4.5 AEROSOL RESPIRATORY (INHALATION) at 21:25

## 2021-01-31 RX ADMIN — ONDANSETRON 4 MILLIGRAM(S): 8 TABLET, FILM COATED ORAL at 19:32

## 2021-01-31 RX ADMIN — PYRIDOSTIGMINE BROMIDE 90 MILLIGRAM(S): 60 SOLUTION ORAL at 11:50

## 2021-01-31 RX ADMIN — Medication 600 MILLILITER(S): at 16:00

## 2021-01-31 RX ADMIN — PANTOPRAZOLE SODIUM 40 MILLIGRAM(S): 20 TABLET, DELAYED RELEASE ORAL at 06:23

## 2021-01-31 RX ADMIN — ENOXAPARIN SODIUM 40 MILLIGRAM(S): 100 INJECTION SUBCUTANEOUS at 11:51

## 2021-01-31 RX ADMIN — HEPARIN SODIUM 5000 UNIT(S): 5000 INJECTION INTRAVENOUS; SUBCUTANEOUS at 17:00

## 2021-01-31 RX ADMIN — Medication 81 MILLIGRAM(S): at 11:51

## 2021-01-31 RX ADMIN — Medication 50 MICROGRAM(S): at 06:23

## 2021-01-31 RX ADMIN — BUDESONIDE AND FORMOTEROL FUMARATE DIHYDRATE 2 PUFF(S): 160; 4.5 AEROSOL RESPIRATORY (INHALATION) at 08:14

## 2021-01-31 RX ADMIN — Medication 50 MILLIGRAM(S): at 06:24

## 2021-01-31 RX ADMIN — Medication 200 GRAM(S): at 16:00

## 2021-01-31 RX ADMIN — PYRIDOSTIGMINE BROMIDE 90 MILLIGRAM(S): 60 SOLUTION ORAL at 17:39

## 2021-01-31 RX ADMIN — ONDANSETRON 4 MILLIGRAM(S): 8 TABLET, FILM COATED ORAL at 17:58

## 2021-01-31 RX ADMIN — FAMOTIDINE 40 MILLIGRAM(S): 10 INJECTION INTRAVENOUS at 21:24

## 2021-01-31 NOTE — PROGRESS NOTE ADULT - SUBJECTIVE AND OBJECTIVE BOX
Neurology Follow up note    Name  GHADA ROPER    HPI:  · Pt is a 60y old female with pmhx of High blood cholesterol, Myasthenia gravis ( w/ flares requiring hospitalizations and plasmapheresis , no intubations) , Pancreatitis, postural orthostatic tachycardia syndrome,  COVID infxn ( nov 2020)  presenting to the ED complaining of worsening SOB x 1 day. Pt reports SOB is worse w/ exertion  and reports that she had difficulty reading her grandkids a bedtime story.  Per pt " this feels like previous flares"  No CP , No fevers, chills or cough.  No syncope . No unilateral leg swelling.   (25 Jan 2021 16:25)      Interval History -  Received two doses of PLEX         Vital Signs Last 24 Hrs  T(C): 36.4 (31 Jan 2021 07:01), Max: 36.4 (31 Jan 2021 07:01)  T(F): 97.5 (31 Jan 2021 07:01), Max: 97.5 (31 Jan 2021 07:01)  HR: 67 (31 Jan 2021 07:05) (54 - 77)  BP: 123/57 (31 Jan 2021 07:05) (106/55 - 128/57)  BP(mean): 82 (31 Jan 2021 07:05) (76 - 83)  RR: 16 (31 Jan 2021 07:01) (16 - 28)  SpO2: 98% (31 Jan 2021 07:05) (98% - 100%)  ICU Vital Signs Last 24 Hrs  T(C): 36.4 (31 Jan 2021 07:01), Max: 36.4 (31 Jan 2021 07:01)  T(F): 97.5 (31 Jan 2021 07:01), Max: 97.5 (31 Jan 2021 07:01)  HR: 67 (31 Jan 2021 07:05) (54 - 77)  BP: 123/57 (31 Jan 2021 07:05) (106/55 - 128/57)  BP(mean): 82 (31 Jan 2021 07:05) (76 - 83)  ABP: --  ABP(mean): --  RR: 16 (31 Jan 2021 07:01) (16 - 28)  SpO2: 98% (31 Jan 2021 07:05) (98% - 100%)          Neurological Exam:   Mental status: Awake, alert and oriented x3.  Recent and remote memory intact.  Naming, repetition and comprehension intact.  Attention/concentration intact.  No dysarthria, no aphasia.  Fund of knowledge appropriate.    Cranial nerves: Pupils equally round and reactive to light, visual fields full, extraocular muscles intact, V1 through V3 intact bilaterally and symmetric, face symmetric, sternocleidomastoid/shoulder shrug strength bilaterally 5/5.  Neck flexion: 4+/5   Motor: Normal bulk and tone, strength 5/5 bilateral proximal upper extremities 5/5 distal b/l upper extremities.  5/5 Right lower extremities and 4/5 left LE   Sensation: Intact to light touch, proprioception. No neglect.   Reflexes: 2+ in upper and lower extremities, downgoing toes bilaterally.      Medications  albumin human  5% IVPB 3000 milliLiter(s) IV Intermittent once  ALBUTerol    90 MICROgram(s) HFA Inhaler 2 Puff(s) Inhalation every 6 hours PRN  aspirin  chewable 81 milliGRAM(s) Oral daily  atorvastatin 20 milliGRAM(s) Oral at bedtime  budesonide  80 MICROgram(s)/formoterol 4.5 MICROgram(s) Inhaler 2 Puff(s) Inhalation two times a day  calcium gluconate IVPB 2 Gram(s) IV Intermittent once  chlorhexidine 4% Liquid 1 Application(s) Topical two times a day  enoxaparin Injectable 40 milliGRAM(s) SubCutaneous daily  famotidine  Oral Tab/Cap - Peds 40 milliGRAM(s) Oral at bedtime  ibuprofen  Tablet. 400 milliGRAM(s) Oral every 6 hours PRN  levothyroxine 50 MICROGram(s) Oral daily  metoprolol succinate ER 25 milliGRAM(s) Oral daily  ondansetron Injectable 4 milliGRAM(s) IV Push once PRN  pantoprazole    Tablet 40 milliGRAM(s) Oral before breakfast  predniSONE   Tablet 50 milliGRAM(s) Oral daily  pyridostigmine 90 milliGRAM(s) Oral four times a day      Lab                        12.5   8.15  )-----------( 274      ( 31 Jan 2021 05:34 )             37.5     01-31    139  |  105  |  26<H>  ----------------------------<  144<H>  4.5   |  26  |  0.7    Ca    9.1      31 Jan 2021 05:34  Mg     1.9     01-31    TPro  5.7<L>  /  Alb  4.4  /  TBili  0.8  /  DBili  x   /  AST  34  /  ALT  19  /  AlkPhos  115  01-31    CAPILLARY BLOOD GLUCOSE        LIVER FUNCTIONS - ( 31 Jan 2021 05:34 )  Alb: 4.4 g/dL / Pro: 5.7 g/dL / ALK PHOS: 115 U/L / ALT: 19 U/L / AST: 34 U/L / GGT: 317 U/L       PT/INR - ( 29 Jan 2021 16:34 )   PT: 11.20 sec;   INR: 0.97 ratio         PTT - ( 29 Jan 2021 16:34 )  PTT:33.2 sec

## 2021-01-31 NOTE — PROGRESS NOTE ADULT - SUBJECTIVE AND OBJECTIVE BOX
Patient reports some improvement after plasmapheresis       T(F): 97.5 (01-31-21 @ 07:01), Max: 97.5 (01-31-21 @ 07:01)  HR: 67 (01-31-21 @ 07:05)  BP: 123/57 (01-31-21 @ 07:05)  RR: 16 (01-31-21 @ 07:01)  SpO2: 98% (01-31-21 @ 07:05) (98% - 100%)    PHYSICAL EXAM:  GENERAL: NAD  HEAD:  Atraumatic, Normocephalic  NERVOUS SYSTEM:  Alert & Oriented X3, no focal deficits   CHEST/LUNG: Clear to percussion bilaterally; No rales, rhonchi, wheezing, or rubs  HEART: Regular rate and rhythm; No murmurs, rubs, or gallops  ABDOMEN: Soft, Nontender, Nondistended; Bowel sounds present  EXTREMITIES:  2+ Peripheral Pulses, No clubbing, cyanosis, or edema  LYMPH: No lymphadenopathy noted  SKIN: No rashes or lesions    LABS  01-31    139  |  105  |  26<H>  ----------------------------<  144<H>  4.5   |  26  |  0.7    Ca    9.1      31 Jan 2021 05:34  Mg     1.9     01-31    TPro  5.7<L>  /  Alb  4.4  /  TBili  0.8  /  DBili  x   /  AST  34  /  ALT  19  /  AlkPhos  115  01-31                          12.5   8.15  )-----------( 274      ( 31 Jan 2021 05:34 )             37.5     PT/INR - ( 29 Jan 2021 16:34 )   PT: 11.20 sec;   INR: 0.97 ratio         PTT - ( 29 Jan 2021 16:34 )  PTT:33.2 sec      MEDICATIONS  (STANDING):  albumin human  5% IVPB 3000 milliLiter(s) IV Intermittent once  aspirin  chewable 81 milliGRAM(s) Oral daily  atorvastatin 20 milliGRAM(s) Oral at bedtime  budesonide  80 MICROgram(s)/formoterol 4.5 MICROgram(s) Inhaler 2 Puff(s) Inhalation two times a day  calcium gluconate IVPB 2 Gram(s) IV Intermittent once  chlorhexidine 4% Liquid 1 Application(s) Topical two times a day  enoxaparin Injectable 40 milliGRAM(s) SubCutaneous daily  famotidine  Oral Tab/Cap - Peds 40 milliGRAM(s) Oral at bedtime  levothyroxine 50 MICROGram(s) Oral daily  metoprolol succinate ER 25 milliGRAM(s) Oral daily  pantoprazole    Tablet 40 milliGRAM(s) Oral before breakfast  predniSONE   Tablet 50 milliGRAM(s) Oral daily  pyridostigmine 90 milliGRAM(s) Oral four times a day    MEDICATIONS  (PRN):  ALBUTerol    90 MICROgram(s) HFA Inhaler 2 Puff(s) Inhalation every 6 hours PRN Shortness of Breath  ibuprofen  Tablet. 400 milliGRAM(s) Oral every 6 hours PRN Moderate Pain (4 - 6)  ondansetron Injectable 4 milliGRAM(s) IV Push once PRN Nausea and/or Vomiting

## 2021-01-31 NOTE — PROGRESS NOTE ADULT - SUBJECTIVE AND OBJECTIVE BOX
· Subjective and Objective:    Patient reports some improvement after plasmapheresis       T(F): 97.5 (01-31-21 @ 07:01), Max: 97.5 (01-31-21 @ 07:01)  HR: 67 (01-31-21 @ 07:05)  BP: 123/57 (01-31-21 @ 07:05)  RR: 16 (01-31-21 @ 07:01)  SpO2: 98% (01-31-21 @ 07:05) (98% - 100%)    PHYSICAL EXAM:  GENERAL: NAD  HEAD:  Atraumatic, Normocephalic  NERVOUS SYSTEM:  Alert & Oriented X3, no focal deficits   CHEST/LUNG: Clear to percussion bilaterally; No rales, rhonchi, wheezing, or rubs  HEART: Regular rate and rhythm; No murmurs, rubs, or gallops  ABDOMEN: Soft, Nontender, Nondistended; Bowel sounds present  EXTREMITIES:  2+ Peripheral Pulses, No clubbing, cyanosis, or edema  LYMPH: No lymphadenopathy noted  SKIN: No rashes or lesions    LABS  01-31    139  |  105  |  26<H>  ----------------------------<  144<H>  4.5   |  26  |  0.7    Ca    9.1      31 Jan 2021 05:34  Mg     1.9     01-31    TPro  5.7<L>  /  Alb  4.4  /  TBili  0.8  /  DBili  x   /  AST  34  /  ALT  19  /  AlkPhos  115  01-31                          12.5   8.15  )-----------( 274      ( 31 Jan 2021 05:34 )             37.5     PT/INR - ( 29 Jan 2021 16:34 )   PT: 11.20 sec;   INR: 0.97 ratio         PTT - ( 29 Jan 2021 16:34 )  PTT:33.2 sec      MEDICATIONS  (STANDING):  albumin human  5% IVPB 3000 milliLiter(s) IV Intermittent once  aspirin  chewable 81 milliGRAM(s) Oral daily  atorvastatin 20 milliGRAM(s) Oral at bedtime  budesonide  80 MICROgram(s)/formoterol 4.5 MICROgram(s) Inhaler 2 Puff(s) Inhalation two times a day  calcium gluconate IVPB 2 Gram(s) IV Intermittent once  chlorhexidine 4% Liquid 1 Application(s) Topical two times a day  enoxaparin Injectable 40 milliGRAM(s) SubCutaneous daily  famotidine  Oral Tab/Cap - Peds 40 milliGRAM(s) Oral at bedtime  levothyroxine 50 MICROGram(s) Oral daily  metoprolol succinate ER 25 milliGRAM(s) Oral daily  pantoprazole    Tablet 40 milliGRAM(s) Oral before breakfast  predniSONE   Tablet 50 milliGRAM(s) Oral daily  pyridostigmine 90 milliGRAM(s) Oral four times a day    MEDICATIONS  (PRN):  ALBUTerol    90 MICROgram(s) HFA Inhaler 2 Puff(s) Inhalation every 6 hours PRN Shortness of Breath  ibuprofen  Tablet. 400 milliGRAM(s) Oral every 6 hours PRN Moderate Pain (4 - 6)  ondansetron Injectable 4 milliGRAM(s) IV Push once PRN Nausea and/or Vomiting        Assessment and Plan:   · Assessment	  60y old female with pmhx of High blood cholesterol, Myasthenia gravis ( w/ flares requiring hospitalizations and plasmapheresis , no intubations) , Pancreatitis, postural orthostatic tachycardia syndrome,  COVID infxn ( nov 2020)  presenting to the ED complaining of worsening SOB x 1 day. Pt reports SOB is worse w/ exertion  and reports that she had difficulty reading her grandkids a bedtime story.  Per pt " this feels like previous flares"  No CP , No fevers, chills or cough.  No syncope . No unilateral leg swelling     Myasthenia gravis flare     - s/p IVIG -> PLEX - complete course of 5    -  pyridostigmine, prednisone    - monitor in unit   - continue to monitor  VC and MIP   - continue home meds   - neuro following   - DVT prophylaxis

## 2021-01-31 NOTE — PROGRESS NOTE ADULT - ASSESSMENT
60y old female with pmhx of High blood cholesterol, Myasthenia gravis ( w/ flares requiring hospitalizations and plasmapheresis , no intubations) , Pancreatitis, postural orthostatic tachycardia syndrome,  COVID infxn ( nov 2020)  presenting to the ED complaining of worsening SOB x 1 day. Pt reports SOB is worse w/ exertion  and reports that she had difficulty reading her grandkids a bedtime story.  Per pt " this feels like previous flares"  No CP , No fevers, chills or cough.  No syncope . No unilateral leg swelling     Myasthenia gravis flare     - s/p IVIG -> PLEX - complete course of 5    -  pyridostigmine, prednisone    - monitor in unit   - continue to monitor  VC and MIP   - continue home meds   - neuro following   - DVT prophylaxis

## 2021-01-31 NOTE — PROGRESS NOTE ADULT - ASSESSMENT
This is a 60y year old Female presenting with Exacerbation of Myasthenia Gravis w/ possible drug-induced Iatrogenic Pancreatitis s/p Streptococcal vaccinations     Recommendations:    - Continue PLEX 3/5 today   - check PFT's q shifts   - continue Prednisone to 50 mg QD  - Continue Pyridostigmine 90 Q 6 hours   - BIPAP as per pulmonary   - PT/OT and out of bed to chair   - will start eculizumab as outpatient

## 2021-02-01 ENCOUNTER — APPOINTMENT (OUTPATIENT)
Dept: NEUROLOGY | Facility: CLINIC | Age: 61
End: 2021-02-01

## 2021-02-01 LAB
ALBUMIN SERPL ELPH-MCNC: 4.8 G/DL — SIGNIFICANT CHANGE UP (ref 3.5–5.2)
ALP SERPL-CCNC: 91 U/L — SIGNIFICANT CHANGE UP (ref 30–115)
ALT FLD-CCNC: 21 U/L — SIGNIFICANT CHANGE UP (ref 0–41)
ANION GAP SERPL CALC-SCNC: 8 MMOL/L — SIGNIFICANT CHANGE UP (ref 7–14)
AST SERPL-CCNC: 35 U/L — SIGNIFICANT CHANGE UP (ref 0–41)
BASOPHILS # BLD AUTO: 0.04 K/UL — SIGNIFICANT CHANGE UP (ref 0–0.2)
BASOPHILS NFR BLD AUTO: 0.4 % — SIGNIFICANT CHANGE UP (ref 0–1)
BILIRUB SERPL-MCNC: 0.7 MG/DL — SIGNIFICANT CHANGE UP (ref 0.2–1.2)
BUN SERPL-MCNC: 19 MG/DL — SIGNIFICANT CHANGE UP (ref 10–20)
CALCIUM SERPL-MCNC: 9.1 MG/DL — SIGNIFICANT CHANGE UP (ref 8.5–10.1)
CHLORIDE SERPL-SCNC: 104 MMOL/L — SIGNIFICANT CHANGE UP (ref 98–110)
CO2 SERPL-SCNC: 27 MMOL/L — SIGNIFICANT CHANGE UP (ref 17–32)
CREAT SERPL-MCNC: 0.6 MG/DL — LOW (ref 0.7–1.5)
EOSINOPHIL # BLD AUTO: 0.07 K/UL — SIGNIFICANT CHANGE UP (ref 0–0.7)
EOSINOPHIL NFR BLD AUTO: 0.7 % — SIGNIFICANT CHANGE UP (ref 0–8)
GLUCOSE SERPL-MCNC: 89 MG/DL — SIGNIFICANT CHANGE UP (ref 70–99)
HCT VFR BLD CALC: 40.1 % — SIGNIFICANT CHANGE UP (ref 37–47)
HGB BLD-MCNC: 13.5 G/DL — SIGNIFICANT CHANGE UP (ref 12–16)
IMM GRANULOCYTES NFR BLD AUTO: 0.6 % — HIGH (ref 0.1–0.3)
LYMPHOCYTES # BLD AUTO: 2.63 K/UL — SIGNIFICANT CHANGE UP (ref 1.2–3.4)
LYMPHOCYTES # BLD AUTO: 25 % — SIGNIFICANT CHANGE UP (ref 20.5–51.1)
MCHC RBC-ENTMCNC: 31.7 PG — HIGH (ref 27–31)
MCHC RBC-ENTMCNC: 33.7 G/DL — SIGNIFICANT CHANGE UP (ref 32–37)
MCV RBC AUTO: 94.1 FL — SIGNIFICANT CHANGE UP (ref 81–99)
MONOCYTES # BLD AUTO: 0.94 K/UL — HIGH (ref 0.1–0.6)
MONOCYTES NFR BLD AUTO: 9 % — SIGNIFICANT CHANGE UP (ref 1.7–9.3)
NEUTROPHILS # BLD AUTO: 6.76 K/UL — HIGH (ref 1.4–6.5)
NEUTROPHILS NFR BLD AUTO: 64.3 % — SIGNIFICANT CHANGE UP (ref 42.2–75.2)
NRBC # BLD: 0 /100 WBCS — SIGNIFICANT CHANGE UP (ref 0–0)
PLATELET # BLD AUTO: 265 K/UL — SIGNIFICANT CHANGE UP (ref 130–400)
POTASSIUM SERPL-MCNC: 4.9 MMOL/L — SIGNIFICANT CHANGE UP (ref 3.5–5)
POTASSIUM SERPL-SCNC: 4.9 MMOL/L — SIGNIFICANT CHANGE UP (ref 3.5–5)
PROT SERPL-MCNC: 5.3 G/DL — LOW (ref 6–8)
RBC # BLD: 4.26 M/UL — SIGNIFICANT CHANGE UP (ref 4.2–5.4)
RBC # FLD: 15.4 % — HIGH (ref 11.5–14.5)
SODIUM SERPL-SCNC: 139 MMOL/L — SIGNIFICANT CHANGE UP (ref 135–146)
WBC # BLD: 10.5 K/UL — SIGNIFICANT CHANGE UP (ref 4.8–10.8)
WBC # FLD AUTO: 10.5 K/UL — SIGNIFICANT CHANGE UP (ref 4.8–10.8)

## 2021-02-01 PROCEDURE — 99291 CRITICAL CARE FIRST HOUR: CPT

## 2021-02-01 PROCEDURE — 71045 X-RAY EXAM CHEST 1 VIEW: CPT | Mod: 26

## 2021-02-01 PROCEDURE — 99233 SBSQ HOSP IP/OBS HIGH 50: CPT

## 2021-02-01 RX ADMIN — BUDESONIDE AND FORMOTEROL FUMARATE DIHYDRATE 2 PUFF(S): 160; 4.5 AEROSOL RESPIRATORY (INHALATION) at 08:52

## 2021-02-01 RX ADMIN — PYRIDOSTIGMINE BROMIDE 90 MILLIGRAM(S): 60 SOLUTION ORAL at 06:42

## 2021-02-01 RX ADMIN — Medication 25 MILLIGRAM(S): at 06:42

## 2021-02-01 RX ADMIN — Medication 81 MILLIGRAM(S): at 11:19

## 2021-02-01 RX ADMIN — Medication 50 MICROGRAM(S): at 06:42

## 2021-02-01 RX ADMIN — BUDESONIDE AND FORMOTEROL FUMARATE DIHYDRATE 2 PUFF(S): 160; 4.5 AEROSOL RESPIRATORY (INHALATION) at 20:40

## 2021-02-01 RX ADMIN — ATORVASTATIN CALCIUM 20 MILLIGRAM(S): 80 TABLET, FILM COATED ORAL at 21:42

## 2021-02-01 RX ADMIN — PYRIDOSTIGMINE BROMIDE 90 MILLIGRAM(S): 60 SOLUTION ORAL at 23:20

## 2021-02-01 RX ADMIN — PANTOPRAZOLE SODIUM 40 MILLIGRAM(S): 20 TABLET, DELAYED RELEASE ORAL at 06:42

## 2021-02-01 RX ADMIN — CHLORHEXIDINE GLUCONATE 1 APPLICATION(S): 213 SOLUTION TOPICAL at 06:42

## 2021-02-01 RX ADMIN — FAMOTIDINE 40 MILLIGRAM(S): 10 INJECTION INTRAVENOUS at 21:42

## 2021-02-01 RX ADMIN — PYRIDOSTIGMINE BROMIDE 90 MILLIGRAM(S): 60 SOLUTION ORAL at 17:33

## 2021-02-01 RX ADMIN — ENOXAPARIN SODIUM 40 MILLIGRAM(S): 100 INJECTION SUBCUTANEOUS at 11:18

## 2021-02-01 RX ADMIN — PYRIDOSTIGMINE BROMIDE 90 MILLIGRAM(S): 60 SOLUTION ORAL at 11:19

## 2021-02-01 RX ADMIN — Medication 50 MILLIGRAM(S): at 06:42

## 2021-02-01 RX ADMIN — Medication 400 MILLIGRAM(S): at 23:21

## 2021-02-01 NOTE — DIETITIAN INITIAL EVALUATION ADULT. - PERSON TAUGHT/METHOD
Patient sleeping at time of RD visit, will provide education at f/u/verbal instruction/patient instructed

## 2021-02-01 NOTE — DIETITIAN INITIAL EVALUATION ADULT. - CONTINUE CURRENT NUTRITION CARE PLAN
Goal: Patient to continue  to consume and tolerate ~100% of meals in the next 7 days. Intervention:  meals and snacks

## 2021-02-01 NOTE — DIETITIAN INITIAL EVALUATION ADULT. - PHYSCIAL ASSESSMENT
skin: intact per RN flow sheets  edema: edema non pitting generalized  Unable to conduct Nutrition focused physical exam at this time, pt. covered in sheets, not indicated obese

## 2021-02-01 NOTE — PROGRESS NOTE ADULT - SUBJECTIVE AND OBJECTIVE BOX
[id number]  Neurology Follow up note    Name  GHADA ROPER    HPI:  · Pt is a 60y old female with pmhx of High blood cholesterol, Myasthenia gravis ( w/ flares requiring hospitalizations and plasmapheresis , no intubations) , Pancreatitis, postural orthostatic tachycardia syndrome,  COVID infxn ( nov 2020)  presenting to the ED complaining of worsening SOB x 1 day. Pt reports SOB is worse w/ exertion  and reports that she had difficulty reading her grandkids a bedtime story.  Per pt " this feels like previous flares"  No CP , No fevers, chills or cough.  No syncope . No unilateral leg swelling.   (25 Jan 2021 16:25)      Interval History - Patient seen at bedside with Dr Velazquez.  Patient has continued complaints of weakness, but states has had significant improvement over the course of her stay.  Patient reports difficulty chewing and managing liquids intermittently, and states this is a chronic condition that occurs at home also secondary to illness; does not cause coughing, choking or aspiration.  Patient denies any difficulty breathing, and denies any new complaints over night.  Questions about treatment plans answered at bedside by Dr Velazquez.          Vital Signs Last 24 Hrs  T(C): 36.4 (01 Feb 2021 07:01), Max: 36.5 (31 Jan 2021 15:53)  T(F): 97.5 (01 Feb 2021 07:01), Max: 97.7 (31 Jan 2021 15:53)  HR: 63 (01 Feb 2021 07:08) (59 - 65)  BP: 118/62 (01 Feb 2021 07:08) (118/62 - 134/61)  BP(mean): 84 (01 Feb 2021 07:08) (84 - 88)  RR: 16 (01 Feb 2021 07:01) (16 - 19)  SpO2: 99% (01 Feb 2021 07:08) (98% - 99%)  ICU Vital Signs Last 24 Hrs  T(C): 36.4 (01 Feb 2021 07:01), Max: 36.5 (31 Jan 2021 15:53)  T(F): 97.5 (01 Feb 2021 07:01), Max: 97.7 (31 Jan 2021 15:53)  HR: 63 (01 Feb 2021 07:08) (59 - 65)  BP: 118/62 (01 Feb 2021 07:08) (118/62 - 134/61)  BP(mean): 84 (01 Feb 2021 07:08) (84 - 88)  RR: 16 (01 Feb 2021 07:01) (16 - 19)  SpO2: 99% (01 Feb 2021 07:08) (98% - 99%)        Neurological Exam:   Mental status: Awake, alert and oriented x3.  Memory intact.  Comprehension intact.  Attention/concentration intact.  No dysarthria, no aphasia.  Fund of knowledge appropriate.    Cranial nerves: No nystagmus, extraocular muscles intact, V1 through V3 intact bilaterally and symmetric, face symmetric, sternocleidomastoid/shoulder shrug strength bilaterally 5/5.    Motor:  Normal bulk and tone, strength 5/5 in bilateral upper and lower extremities.   strength 5/5.    Sensation: Intact to light touch.  No neglect.   Motor: Neck Flexion 4/5 ; Neck Extension 5/5;     Medications  ALBUTerol    90 MICROgram(s) HFA Inhaler 2 Puff(s) Inhalation every 6 hours PRN  aspirin  chewable 81 milliGRAM(s) Oral daily  atorvastatin 20 milliGRAM(s) Oral at bedtime  budesonide  80 MICROgram(s)/formoterol 4.5 MICROgram(s) Inhaler 2 Puff(s) Inhalation two times a day  chlorhexidine 4% Liquid 1 Application(s) Topical two times a day  enoxaparin Injectable 40 milliGRAM(s) SubCutaneous daily  famotidine  Oral Tab/Cap - Peds 40 milliGRAM(s) Oral at bedtime  ibuprofen  Tablet. 400 milliGRAM(s) Oral every 6 hours PRN  levothyroxine 50 MICROGram(s) Oral daily  metoprolol succinate ER 25 milliGRAM(s) Oral daily  ondansetron Injectable 4 milliGRAM(s) IV Push every 6 hours PRN  pantoprazole    Tablet 40 milliGRAM(s) Oral before breakfast  predniSONE   Tablet 50 milliGRAM(s) Oral daily  pyridostigmine 90 milliGRAM(s) Oral four times a day      Lab                        13.5   10.50 )-----------( 265      ( 01 Feb 2021 05:23 )             40.1     02-01    139  |  104  |  19  ----------------------------<  89  4.9   |  27  |  0.6<L>    Ca    9.1      01 Feb 2021 05:23  Mg     1.9     01-31    TPro  5.3<L>  /  Alb  4.8  /  TBili  0.7  /  DBili  x   /  AST  35  /  ALT  21  /  AlkPhos  91  02-01    CAPILLARY BLOOD GLUCOSE        LIVER FUNCTIONS - ( 01 Feb 2021 05:23 )  Alb: 4.8 g/dL / Pro: 5.3 g/dL / ALK PHOS: 91 U/L / ALT: 21 U/L / AST: 35 U/L / GGT: x                 Assessment / Plan    This is a 60y year old Female presenting with;    1-Exacerbation of Myasthenia Gravis - Improving  2-Possible drug-induced Iatrogenic Pancreatitis - resolved  3- s/p Streptococcal vaccinations - Pending Second dose outpatient    Recommendations:    - Continue ICU monitoring  - Continue with plasmapheresis tomorrow  - check PFT's q shifts   - Monitor and trend  LFT's , Amylase, Lipase and GGT  - continue Prednisone to 50 mg QD  - Continue Pyridostigmine 90 Q 6 hours   - BIPAP as per pulmonary   - PT/OT and out of bed to chair   - Eculizumab to begin as outpatient treatment  - Neurology following  [id number]  Neurology Follow up note    Name  GHADA ROPER    HPI:  Pt is a 60y old female with pmhx of High blood cholesterol, Myasthenia gravis ( w/ flares requiring hospitalizations and plasmapheresis , no intubations) , Pancreatitis, postural orthostatic tachycardia syndrome,  COVID infxn ( nov 2020)  presenting to the ED complaining of worsening SOB x 1 day. Pt reports SOB is worse w/ exertion  and reports that she had difficulty reading her grandkids a bedtime story.  Per pt " this feels like previous flares"  No CP , No fevers, chills or cough.  No syncope . No unilateral leg swelling.   (25 Jan 2021 16:25)      Interval History - Patient seen at bedside with Dr Velazquez.  Patient has continued complaints of weakness, but states has had significant improvement over the course of her stay.  Patient reports difficulty chewing and managing liquids intermittently, and states this is a chronic condition that occurs at home also secondary to illness; does not cause coughing, choking or aspiration.  Patient denies any difficulty breathing currently, and denies any new complaints over night.  Questions about treatment plans answered at bedside by Dr Velazquez.          Vital Signs Last 24 Hrs  T(C): 36.4 (01 Feb 2021 07:01), Max: 36.5 (31 Jan 2021 15:53)  T(F): 97.5 (01 Feb 2021 07:01), Max: 97.7 (31 Jan 2021 15:53)  HR: 63 (01 Feb 2021 07:08) (59 - 65)  BP: 118/62 (01 Feb 2021 07:08) (118/62 - 134/61)  BP(mean): 84 (01 Feb 2021 07:08) (84 - 88)  RR: 16 (01 Feb 2021 07:01) (16 - 19)  SpO2: 99% (01 Feb 2021 07:08) (98% - 99%)  ICU Vital Signs Last 24 Hrs  T(C): 36.4 (01 Feb 2021 07:01), Max: 36.5 (31 Jan 2021 15:53)  T(F): 97.5 (01 Feb 2021 07:01), Max: 97.7 (31 Jan 2021 15:53)  HR: 63 (01 Feb 2021 07:08) (59 - 65)  BP: 118/62 (01 Feb 2021 07:08) (118/62 - 134/61)  BP(mean): 84 (01 Feb 2021 07:08) (84 - 88)  RR: 16 (01 Feb 2021 07:01) (16 - 19)  SpO2: 99% (01 Feb 2021 07:08) (98% - 99%)        Neurological Exam:   Mental status: Awake, alert and oriented x3.  Memory intact.  Comprehension intact.  Attention/concentration intact.  No dysarthria, no aphasia.  Fund of knowledge appropriate.    Cranial nerves: No nystagmus, extraocular muscles intact, V1 through V3 intact bilaterally and symmetric, face symmetric, sternocleidomastoid/shoulder shrug strength bilaterally 5/5.    Motor:  Normal bulk and tone, strength 5/5 in bilateral upper and lower extremities.   strength 5/5.    Sensation: Intact to light touch.  No neglect.   Motor: Neck Flexion 5/5 ; Neck Extension 5/5;     Medications  ALBUTerol    90 MICROgram(s) HFA Inhaler 2 Puff(s) Inhalation every 6 hours PRN  aspirin  chewable 81 milliGRAM(s) Oral daily  atorvastatin 20 milliGRAM(s) Oral at bedtime  budesonide  80 MICROgram(s)/formoterol 4.5 MICROgram(s) Inhaler 2 Puff(s) Inhalation two times a day  chlorhexidine 4% Liquid 1 Application(s) Topical two times a day  enoxaparin Injectable 40 milliGRAM(s) SubCutaneous daily  famotidine  Oral Tab/Cap - Peds 40 milliGRAM(s) Oral at bedtime  ibuprofen  Tablet. 400 milliGRAM(s) Oral every 6 hours PRN  levothyroxine 50 MICROGram(s) Oral daily  metoprolol succinate ER 25 milliGRAM(s) Oral daily  ondansetron Injectable 4 milliGRAM(s) IV Push every 6 hours PRN  pantoprazole    Tablet 40 milliGRAM(s) Oral before breakfast  predniSONE   Tablet 50 milliGRAM(s) Oral daily  pyridostigmine 90 milliGRAM(s) Oral four times a day      Lab                        13.5   10.50 )-----------( 265      ( 01 Feb 2021 05:23 )             40.1     02-01    139  |  104  |  19  ----------------------------<  89  4.9   |  27  |  0.6<L>    Ca    9.1      01 Feb 2021 05:23  Mg     1.9     01-31    TPro  5.3<L>  /  Alb  4.8  /  TBili  0.7  /  DBili  x   /  AST  35  /  ALT  21  /  AlkPhos  91  02-01    CAPILLARY BLOOD GLUCOSE        LIVER FUNCTIONS - ( 01 Feb 2021 05:23 )  Alb: 4.8 g/dL / Pro: 5.3 g/dL / ALK PHOS: 91 U/L / ALT: 21 U/L / AST: 35 U/L / GGT: x                 Assessment / Plan    This is a 60y year old Female presenting with;    1-Exacerbation of Myasthenia Gravis - Improving  2-Possible drug-induced Iatrogenic Pancreatitis - resolved  3- s/p Meningiocaccal vaccinations - Pending Second dose outpatient    Recommendations:    - Continue ICU monitoring  - Continue with plasmapheresis tomorrow  - check PFT's q shifts   - Monitor and trend  LFT's , Amylase, Lipase and GGT  - continue Prednisone to 50 mg QD  - Continue Pyridostigmine 90 Q 6 hours   - BIPAP as per pulmonary   - PT/OT and out of bed to chair   - Eculizumab to begin as outpatient treatment  - Neurology following

## 2021-02-01 NOTE — DIETITIAN INITIAL EVALUATION ADULT. - OTHER INFO
Pertinent medical information:  Pt with PMH of DLD, MG admitted for SOB. MG Exacerbation ; neurology following.     Pertinent nutrition information: Patient is eating ~100% of meals per RN. No factors affecting appetite at this time. No complaints of N/V/C/D at this time, per RN pt. was experiencing nausea and received Zofran. LBM: 1/25 per RN flow sheets. No chewing/swallowing difficulties reported by staff.

## 2021-02-01 NOTE — PROGRESS NOTE ADULT - ASSESSMENT
Myasthenia gravis flare     - s/p IVIG -> PLEX - complete course of 5    -  pyridostigmine, prednisone    - monitor in unit   - continue to monitor  VC and MIP   - continue home meds   - neuro following  -PF NIF Q shuft    monotor in ICU  Myasthenia gravis flare     - s/p IVIG -> PLEX - complete course of 5    -  pyridostigmine, prednisone    - monitor in unit   - continue to monitor  VC and MIP   - continue home meds   - neuro following  -PF NIF Q shuft  NIPPV as needed  NPO on NIPPV    monotor in ICU

## 2021-02-01 NOTE — DIETITIAN INITIAL EVALUATION ADULT. - ORAL INTAKE PTA/DIET HISTORY
0 Patient was sleeping at time of RD visit, attempted to call emergency contact number; no answer. Called multiples time and went to voice mail. Will obtain nutrition hx at f/u

## 2021-02-01 NOTE — DIETITIAN INITIAL EVALUATION ADULT. - PERTINENT MEDS FT
MEDICATIONS  (STANDING):  aspirin  chewable 81 milliGRAM(s) Oral daily  atorvastatin 20 milliGRAM(s) Oral at bedtime  famotidine  Oral Tab/Cap - Peds 40 milliGRAM(s) Oral at bedtime  levothyroxine 50 MICROGram(s) Oral daily  metoprolol succinate ER 25 milliGRAM(s) Oral daily  pantoprazole    Tablet 40 milliGRAM(s) Oral before breakfast  predniSONE   Tablet 50 milliGRAM(s) Oral daily      MEDICATIONS  (PRN):    ondansetron Injectable 4 milliGRAM(s) IV Push every 6 hours PRN Nausea and/or Vomiting

## 2021-02-01 NOTE — PROGRESS NOTE ADULT - SUBJECTIVE AND OBJECTIVE BOX
GHADA ROPER 60y Female  MRN#: [id number]     SUBJECTIVE  Patient is a 60y old Female who presents with a chief complaint of SOB (01 Feb 2021 10:47)  Currently admitted to medicine with the primary diagnosis of Respiratory distress      OBJECTIVE  PAST MEDICAL & SURGICAL HISTORY  Myasthenia gravis    High blood cholesterol    Pancreatitis    POTS (postural orthostatic tachycardia syndrome)    History of foot surgery      ALLERGIES:  sulfa drugs (Unknown)    MEDICATIONS:  STANDING MEDICATIONS  aspirin  chewable 81 milliGRAM(s) Oral daily  atorvastatin 20 milliGRAM(s) Oral at bedtime  budesonide  80 MICROgram(s)/formoterol 4.5 MICROgram(s) Inhaler 2 Puff(s) Inhalation two times a day  chlorhexidine 4% Liquid 1 Application(s) Topical two times a day  enoxaparin Injectable 40 milliGRAM(s) SubCutaneous daily  famotidine  Oral Tab/Cap - Peds 40 milliGRAM(s) Oral at bedtime  levothyroxine 50 MICROGram(s) Oral daily  metoprolol succinate ER 25 milliGRAM(s) Oral daily  pantoprazole    Tablet 40 milliGRAM(s) Oral before breakfast  predniSONE   Tablet 50 milliGRAM(s) Oral daily  pyridostigmine 90 milliGRAM(s) Oral four times a day    PRN MEDICATIONS  ALBUTerol    90 MICROgram(s) HFA Inhaler 2 Puff(s) Inhalation every 6 hours PRN  ibuprofen  Tablet. 400 milliGRAM(s) Oral every 6 hours PRN  ondansetron Injectable 4 milliGRAM(s) IV Push every 6 hours PRN      VITAL SIGNS: Last 24 Hours  T(C): 36.4 (01 Feb 2021 07:01), Max: 36.5 (31 Jan 2021 15:53)  T(F): 97.5 (01 Feb 2021 07:01), Max: 97.7 (31 Jan 2021 15:53)  HR: 63 (01 Feb 2021 07:08) (59 - 65)  BP: 118/62 (01 Feb 2021 07:08) (118/62 - 134/61)  BP(mean): 84 (01 Feb 2021 07:08) (84 - 88)  RR: 16 (01 Feb 2021 07:01) (16 - 19)  SpO2: 99% (01 Feb 2021 07:08) (98% - 99%)    LABS:                        13.5   10.50 )-----------( 265      ( 01 Feb 2021 05:23 )             40.1     02-01    139  |  104  |  19  ----------------------------<  89  4.9   |  27  |  0.6<L>    Ca    9.1      01 Feb 2021 05:23  Mg     1.9     01-31    TPro  5.3<L>  /  Alb  4.8  /  TBili  0.7  /  DBili  x   /  AST  35  /  ALT  21  /  AlkPhos  91  02-01      PHYSICAL EXAM:  CONSTITUTIONAL: Well-developed; well-nourished; in no acute distress.  SKIN: Skin exam is warm and dry, no acute rash.  HEAD: Normocephalic; atraumatic.  EYES: PERRL, EOM intact; conjunctiva and sclera clear.  ENT: No nasal discharge; airway clear. Throat clear.  NECK: Supple; non tender.  No lymphadenopathy.  CARD: S1, S2 normal; no murmurs, gallops, or rubs. Regular rate and rhythm.  RESP: No wheezes, rales or rhonchi.  ABD: Normal bowel sounds; soft; non-distended; non-tender; no hepatosplenomegaly.  EXT: Normal ROM. No clubbing, cyanosis or edema.  NEURO: Alert, oriented. Grossly unremarkable. No focal deficits.  PSYCH: Cooperative, appropriate.

## 2021-02-01 NOTE — DIETITIAN INITIAL EVALUATION ADULT. - NAME AND PHONE
RD to monitor: diet order, body composition, energy intake, nutrition focused physical finding: not at risk will f/u in 7 days . Discussed with LIP

## 2021-02-01 NOTE — PROGRESS NOTE ADULT - SUBJECTIVE AND OBJECTIVE BOX
Patient is a 60y old  Female who presents with a chief complaint of SOB (01 Feb 2021 08:16)        HPI:  · Pt is a 60y old female with pmhx of High blood cholesterol, Myasthenia gravis ( w/ flares requiring hospitalizations and plasmapheresis , no intubations) , Pancreatitis, postural orthostatic tachycardia syndrome,  COVID infxn ( nov 2020)  presenting to the ED complaining of worsening SOB x 1 day. Pt reports SOB is worse w/ exertion  and reports that she had difficulty reading her grandkids a bedtime story.  Per pt " this feels like previous flares"  No CP , No fevers, chills or cough.  No syncope . No unilateral leg swelling.   (25 Jan 2021 16:25)    Pt evaluated on AM rounds.  Interval Events: No overnight events.    REVIEW OF SYSTEMS:   see HPI      OBJECTIVE:  ICU Vital Signs Last 24 Hrs  T(C): 36.4 (01 Feb 2021 07:01), Max: 36.5 (31 Jan 2021 15:53)  T(F): 97.5 (01 Feb 2021 07:01), Max: 97.7 (31 Jan 2021 15:53)  HR: 63 (01 Feb 2021 07:08) (59 - 65)  BP: 118/62 (01 Feb 2021 07:08) (118/62 - 134/61)  BP(mean): 84 (01 Feb 2021 07:08) (84 - 88)  ABP: --  ABP(mean): --  RR: 16 (01 Feb 2021 07:01) (16 - 19)  SpO2: 99% (01 Feb 2021 07:08) (98% - 99%)        01-31 @ 07:01  -  02-01 @ 07:00  --------------------------------------------------------  IN: 330 mL / OUT: 0 mL / NET: 330 mL      CAPILLARY BLOOD GLUCOSE            PHYSICAL EXAM:     · CONSTITUTIONAL:   not septic appearing,   well nourished,   NAD    · ENMT:   Airway patent,   Nasal mucosa clear.  Mouth with normal mucosa.   No thrush    · EYES:   Clear bilaterally,   pupils equal,   round and reactive to light.    · CARDIAC:   Normal rate,   regular rhythm.    Heart sounds S1, S2.   No murmurs, no rubs or gallops on auscultation  no edema        CAROTID:   normal systolic impulse  no bruits    · RESPIRATORY:   rales  normal chest expansion  tachypnea,  no retractions or use of accessory muscles  palpation of chest is normal with no fremitus  percussion of chest demonstrates no hyperresonance or dullness    · GASTROINTESTINAL:  Abdomen soft,   non-tender,   + BS  liver/spleen not palpable    · MUSCULOSKELETAL:   no clubbing, cyanosis      · NEUROLOGICAL:   awake alert oriented  no obvious cranial nerve abnormalities      · SKIN:   Skin normal color for race,   warm, dry   No evidence of rash.    · PSYCHIATRIC:   stable  no threat to self or others      · HEME LYMPH:   no splenomegaly.  No cervical  lymphadenopathy.  no inguinal lymphadenopathy    HOSPITAL MEDICATIONS:  MEDICATIONS  (STANDING):  aspirin  chewable 81 milliGRAM(s) Oral daily  atorvastatin 20 milliGRAM(s) Oral at bedtime  budesonide  80 MICROgram(s)/formoterol 4.5 MICROgram(s) Inhaler 2 Puff(s) Inhalation two times a day  chlorhexidine 4% Liquid 1 Application(s) Topical two times a day  enoxaparin Injectable 40 milliGRAM(s) SubCutaneous daily  famotidine  Oral Tab/Cap - Peds 40 milliGRAM(s) Oral at bedtime  levothyroxine 50 MICROGram(s) Oral daily  metoprolol succinate ER 25 milliGRAM(s) Oral daily  pantoprazole    Tablet 40 milliGRAM(s) Oral before breakfast  predniSONE   Tablet 50 milliGRAM(s) Oral daily  pyridostigmine 90 milliGRAM(s) Oral four times a day    MEDICATIONS  (PRN):  ALBUTerol    90 MICROgram(s) HFA Inhaler 2 Puff(s) Inhalation every 6 hours PRN Shortness of Breath  ibuprofen  Tablet. 400 milliGRAM(s) Oral every 6 hours PRN Moderate Pain (4 - 6)  ondansetron Injectable 4 milliGRAM(s) IV Push every 6 hours PRN Nausea and/or Vomiting        LABS:                        13.5   10.50 )-----------( 265      ( 01 Feb 2021 05:23 )             40.1     02-01    139  |  104  |  19  ----------------------------<  89  4.9   |  27  |  0.6<L>    Ca    9.1      01 Feb 2021 05:23  Mg     1.9     01-31    TPro  5.3<L>  /  Alb  4.8  /  TBili  0.7  /  DBili  x   /  AST  35  /  ALT  21  /  AlkPhos  91  02-01                          RADIOLOGY: I personally reviewed latest CXR and other pertinent films.

## 2021-02-01 NOTE — CDI QUERY NOTE - NSCDIOTHERTXTBX_GEN_ALL_CORE_HH
Patient Name: GHADA ROPER        MRN: [id number] / [id number]        Age: 60y (1960)         Sex: Female      CLINICAL INDICATORS:  •	1/27 Neurology Note…w/possible drug-induced iatrogenic pancreatitis s/p streptoccal vaccinations  •	1/28 Neurology Note…w/possible drug-induced iatrogenic pancreatitis s/p streptoccal vaccinations  •	LABS: 1/25 Amylase 152, Lipase 73; 1/28 Amylase 127, Lipase 44; 1/31 Amylase 99, Lipase 56    QUERY:  Based on your professional judgment and the clinical indicators, please clarify if possible drug-induced iatrogenic pancreatitis can be further specified as:    •	Drug-induced iatrogenic acute pancreatitis is ruled in  •	Drug-induced iatrogenic chronic pancreatitis is ruled in  •	Drug-induced iatrogenic pancreatitis is ruled out  •	Other (please clarify):  •	Clinically unable to determine

## 2021-02-01 NOTE — DIETITIAN INITIAL EVALUATION ADULT. - REASON INDICATOR FOR ASSESSMENT
Duration Of Freeze Thaw-Cycle (Seconds): 0 Post-Care Instructions: I reviewed with the patient in detail post-care instructions. Patient is to wear sunprotection, and avoid picking at any of the treated lesions. Pt may apply Vaseline to crusted or scabbing areas. Render Post-Care Instructions In Note?: no Detail Level: Detailed Consent: The patient's consent was obtained including but not limited to risks of crusting, scabbing, blistering, scarring, darker or lighter pigmentary change, recurrence, incomplete removal and infection. Number Of Freeze-Thaw Cycles: 1 freeze-thaw cycle LOS

## 2021-02-01 NOTE — PROGRESS NOTE ADULT - ASSESSMENT
59 Y/O F with PMH of DLD, MG is here with SOB    MG Exacerbation  -Xray Negative  -Neurology on board  -s/p 3 rounds of plasmapheresis  -2 more doses to be given  -one due tomorrow  -Continue prednisone and pyridostigmine  -NIF and Vital Capacity qShift    DLD  -On statin    Diet - DASH  Activity - OOB to chair  DVT - Lovenox  FULL CODE

## 2021-02-01 NOTE — PROGRESS NOTE ADULT - SUBJECTIVE AND OBJECTIVE BOX
Patient is feeling better today       T(F): 97.5 (02-01-21 @ 07:01), Max: 97.7 (01-31-21 @ 15:53)  HR: 63 (02-01-21 @ 07:08)  BP: 118/62 (02-01-21 @ 07:08)  RR: 16 (02-01-21 @ 07:01)  SpO2: 99% (02-01-21 @ 07:08) (98% - 99%)    PHYSICAL EXAM:  GENERAL: NAD  HEAD:  Atraumatic, Normocephalic  NERVOUS SYSTEM:  Alert & Oriented X3, no focal deficits   CHEST/LUNG: Clear to percussion bilaterally; No rales, rhonchi, wheezing, or rubs  HEART: Regular rate and rhythm; No murmurs, rubs, or gallops  ABDOMEN: Soft, Nontender, Nondistended; Bowel sounds present  EXTREMITIES:  2+ Peripheral Pulses, No clubbing, cyanosis, or edema  LYMPH: No lymphadenopathy noted  SKIN: No rashes or lesions    LABS  02-01    139  |  104  |  19  ----------------------------<  89  4.9   |  27  |  0.6<L>    Ca    9.1      01 Feb 2021 05:23  Mg     1.9     01-31    TPro  5.3<L>  /  Alb  4.8  /  TBili  0.7  /  DBili  x   /  AST  35  /  ALT  21  /  AlkPhos  91  02-01                          13.5   10.50 )-----------( 265      ( 01 Feb 2021 05:23 )             40.1       RADIOLOGY  < from: Xray Chest 1 View- PORTABLE-Routine (Xray Chest 1 View- PORTABLE-Routine in AM.) (01.31.21 @ 07:00) >    Lung parenchyma/Pleura: Interstitial edema appears improved.  No focal pulmonary opacity, pleural effusion or pneumothorax is seen.    < end of copied text >    MEDICATIONS  (STANDING):  aspirin  chewable 81 milliGRAM(s) Oral daily  atorvastatin 20 milliGRAM(s) Oral at bedtime  budesonide  80 MICROgram(s)/formoterol 4.5 MICROgram(s) Inhaler 2 Puff(s) Inhalation two times a day  chlorhexidine 4% Liquid 1 Application(s) Topical two times a day  enoxaparin Injectable 40 milliGRAM(s) SubCutaneous daily  famotidine  Oral Tab/Cap - Peds 40 milliGRAM(s) Oral at bedtime  levothyroxine 50 MICROGram(s) Oral daily  metoprolol succinate ER 25 milliGRAM(s) Oral daily  pantoprazole    Tablet 40 milliGRAM(s) Oral before breakfast  predniSONE   Tablet 50 milliGRAM(s) Oral daily  pyridostigmine 90 milliGRAM(s) Oral four times a day    MEDICATIONS  (PRN):  ALBUTerol    90 MICROgram(s) HFA Inhaler 2 Puff(s) Inhalation every 6 hours PRN Shortness of Breath  ibuprofen  Tablet. 400 milliGRAM(s) Oral every 6 hours PRN Moderate Pain (4 - 6)  ondansetron Injectable 4 milliGRAM(s) IV Push every 6 hours PRN Nausea and/or Vomiting

## 2021-02-02 LAB
ALBUMIN SERPL ELPH-MCNC: 4.4 G/DL — SIGNIFICANT CHANGE UP (ref 3.5–5.2)
ALP SERPL-CCNC: 150 U/L — HIGH (ref 30–115)
ALT FLD-CCNC: 44 U/L — HIGH (ref 0–41)
ANION GAP SERPL CALC-SCNC: 9 MMOL/L — SIGNIFICANT CHANGE UP (ref 7–14)
AST SERPL-CCNC: 54 U/L — HIGH (ref 0–41)
BILIRUB SERPL-MCNC: 0.6 MG/DL — SIGNIFICANT CHANGE UP (ref 0.2–1.2)
BUN SERPL-MCNC: 29 MG/DL — HIGH (ref 10–20)
CALCIUM SERPL-MCNC: 9.1 MG/DL — SIGNIFICANT CHANGE UP (ref 8.5–10.1)
CHLORIDE SERPL-SCNC: 103 MMOL/L — SIGNIFICANT CHANGE UP (ref 98–110)
CO2 SERPL-SCNC: 27 MMOL/L — SIGNIFICANT CHANGE UP (ref 17–32)
CREAT SERPL-MCNC: 0.6 MG/DL — LOW (ref 0.7–1.5)
GLUCOSE SERPL-MCNC: 90 MG/DL — SIGNIFICANT CHANGE UP (ref 70–99)
HCT VFR BLD CALC: 37.3 % — SIGNIFICANT CHANGE UP (ref 37–47)
HGB BLD-MCNC: 12.8 G/DL — SIGNIFICANT CHANGE UP (ref 12–16)
MCHC RBC-ENTMCNC: 32.5 PG — HIGH (ref 27–31)
MCHC RBC-ENTMCNC: 34.3 G/DL — SIGNIFICANT CHANGE UP (ref 32–37)
MCV RBC AUTO: 94.7 FL — SIGNIFICANT CHANGE UP (ref 81–99)
NRBC # BLD: 0 /100 WBCS — SIGNIFICANT CHANGE UP (ref 0–0)
PLATELET # BLD AUTO: 258 K/UL — SIGNIFICANT CHANGE UP (ref 130–400)
POTASSIUM SERPL-MCNC: 4.5 MMOL/L — SIGNIFICANT CHANGE UP (ref 3.5–5)
POTASSIUM SERPL-SCNC: 4.5 MMOL/L — SIGNIFICANT CHANGE UP (ref 3.5–5)
PROT SERPL-MCNC: 5.6 G/DL — LOW (ref 6–8)
RBC # BLD: 3.94 M/UL — LOW (ref 4.2–5.4)
RBC # FLD: 15.2 % — HIGH (ref 11.5–14.5)
SODIUM SERPL-SCNC: 139 MMOL/L — SIGNIFICANT CHANGE UP (ref 135–146)
WBC # BLD: 11.03 K/UL — HIGH (ref 4.8–10.8)
WBC # FLD AUTO: 11.03 K/UL — HIGH (ref 4.8–10.8)

## 2021-02-02 PROCEDURE — 99232 SBSQ HOSP IP/OBS MODERATE 35: CPT

## 2021-02-02 RX ORDER — CALCIUM GLUCONATE 100 MG/ML
1 VIAL (ML) INTRAVENOUS ONCE
Refills: 0 | Status: COMPLETED | OUTPATIENT
Start: 2021-02-02 | End: 2021-02-02

## 2021-02-02 RX ORDER — CALCIUM GLUCONATE 100 MG/ML
2 VIAL (ML) INTRAVENOUS ONCE
Refills: 0 | Status: COMPLETED | OUTPATIENT
Start: 2021-02-02 | End: 2021-02-02

## 2021-02-02 RX ORDER — ALBUMIN HUMAN 25 %
3500 VIAL (ML) INTRAVENOUS ONCE
Refills: 0 | Status: COMPLETED | OUTPATIENT
Start: 2021-02-02 | End: 2021-02-02

## 2021-02-02 RX ADMIN — Medication 81 MILLIGRAM(S): at 10:40

## 2021-02-02 RX ADMIN — Medication 25 MILLIGRAM(S): at 06:21

## 2021-02-02 RX ADMIN — Medication 50 MICROGRAM(S): at 06:21

## 2021-02-02 RX ADMIN — PANTOPRAZOLE SODIUM 40 MILLIGRAM(S): 20 TABLET, DELAYED RELEASE ORAL at 06:21

## 2021-02-02 RX ADMIN — BUDESONIDE AND FORMOTEROL FUMARATE DIHYDRATE 2 PUFF(S): 160; 4.5 AEROSOL RESPIRATORY (INHALATION) at 20:43

## 2021-02-02 RX ADMIN — Medication 700 MILLILITER(S): at 12:55

## 2021-02-02 RX ADMIN — PYRIDOSTIGMINE BROMIDE 90 MILLIGRAM(S): 60 SOLUTION ORAL at 17:32

## 2021-02-02 RX ADMIN — CHLORHEXIDINE GLUCONATE 1 APPLICATION(S): 213 SOLUTION TOPICAL at 17:29

## 2021-02-02 RX ADMIN — Medication 50 MILLIGRAM(S): at 06:21

## 2021-02-02 RX ADMIN — CHLORHEXIDINE GLUCONATE 1 APPLICATION(S): 213 SOLUTION TOPICAL at 06:21

## 2021-02-02 RX ADMIN — Medication 400 MILLIGRAM(S): at 14:40

## 2021-02-02 RX ADMIN — PYRIDOSTIGMINE BROMIDE 90 MILLIGRAM(S): 60 SOLUTION ORAL at 06:21

## 2021-02-02 RX ADMIN — ONDANSETRON 4 MILLIGRAM(S): 8 TABLET, FILM COATED ORAL at 12:12

## 2021-02-02 RX ADMIN — ATORVASTATIN CALCIUM 20 MILLIGRAM(S): 80 TABLET, FILM COATED ORAL at 22:44

## 2021-02-02 RX ADMIN — BUDESONIDE AND FORMOTEROL FUMARATE DIHYDRATE 2 PUFF(S): 160; 4.5 AEROSOL RESPIRATORY (INHALATION) at 09:15

## 2021-02-02 RX ADMIN — PYRIDOSTIGMINE BROMIDE 90 MILLIGRAM(S): 60 SOLUTION ORAL at 22:46

## 2021-02-02 RX ADMIN — Medication 200 GRAM(S): at 13:15

## 2021-02-02 RX ADMIN — ENOXAPARIN SODIUM 40 MILLIGRAM(S): 100 INJECTION SUBCUTANEOUS at 10:40

## 2021-02-02 RX ADMIN — PYRIDOSTIGMINE BROMIDE 90 MILLIGRAM(S): 60 SOLUTION ORAL at 10:40

## 2021-02-02 RX ADMIN — FAMOTIDINE 40 MILLIGRAM(S): 10 INJECTION INTRAVENOUS at 22:44

## 2021-02-02 NOTE — PROGRESS NOTE ADULT - SUBJECTIVE AND OBJECTIVE BOX
[facility] [id number]  Neurology Follow up note    Name  GHADA ROPER    HPI:  · Pt is a 60y old female with pmhx of High blood cholesterol, Myasthenia gravis ( w/ flares requiring hospitalizations and plasmapheresis , no intubations) , Pancreatitis, postural orthostatic tachycardia syndrome,  COVID infxn ( nov 2020)  presenting to the ED complaining of worsening SOB x 1 day. Pt reports SOB is worse w/ exertion  and reports that she had difficulty reading her grandkids a bedtime story.  Per pt " this feels like previous flares"  No CP , No fevers, chills or cough.  No syncope . No unilateral leg swelling.   (25 Jan 2021 16:25)      Interval History - Patient seen at bedside with Dr Velazquez.  Patient has continued complaints of weakness, but states has had significant improvement over the course of her stay.  Patient reports difficulty chewing and managing liquids intermittently, and states this is a chronic condition that occurs at home also secondary to illness; does not cause coughing, choking or aspiration.  Patient denies any difficulty breathing currently, and denies any new complaints over night.   PT received PLEX 3/5 two days prior and is due for #4 today and 5th plasma exchange on Thursday.            Vital Signs Last 24 Hrs  T(C): 36.2 (02 Feb 2021 07:05), Max: 36.9 (01 Feb 2021 15:00)  T(F): 97.2 (02 Feb 2021 07:05), Max: 98.4 (01 Feb 2021 15:00)  HR: 59 (02 Feb 2021 07:13) (59 - 75)  BP: 120/62 (02 Feb 2021 07:13) (115/58 - 153/73)  BP(mean): 86 (02 Feb 2021 07:13) (78 - 105)  RR: 22 (02 Feb 2021 07:13) (18 - 24)  SpO2: 99% (02 Feb 2021 07:13) (98% - 100%)  ICU Vital Signs Last 24 Hrs  T(C): 36.2 (02 Feb 2021 07:05), Max: 36.9 (01 Feb 2021 15:00)  T(F): 97.2 (02 Feb 2021 07:05), Max: 98.4 (01 Feb 2021 15:00)  HR: 59 (02 Feb 2021 07:13) (59 - 75)  BP: 120/62 (02 Feb 2021 07:13) (115/58 - 153/73)  BP(mean): 86 (02 Feb 2021 07:13) (78 - 105)  ABP: --  ABP(mean): --  RR: 22 (02 Feb 2021 07:13) (18 - 24)  SpO2: 99% (02 Feb 2021 07:13) (98% - 100%)          Neurological Exam:   Mental status: Awake, alert and oriented x3.  Memory intact.  Comprehension intact.  Attention/concentration intact.  No dysarthria, no aphasia.  Fund of knowledge appropriate.    Cranial nerves:  No nystagmus, was able to count to 24 with a single breath.  She had no demonstrable weakness and was able to hold upgaze for a minute w/o diplopia or eyelid droop. Extraocular muscles intact, V1 through V3 intact bilaterally and symmetric, face symmetric, sternocleidomastoid/shoulder shrug strength bilaterally 5/5.    Motor:  Normal bulk and tone, strength 5/5 in bilateral upper and lower extremities.   strength 5/5.    Sensation: Intact to light touch.  No neglect.   Motor: Neck Flexion 4/5 ; Neck Extension 5/5;     Medications  albumin human  5% IVPB 3500 milliLiter(s) IV Intermittent once  ALBUTerol    90 MICROgram(s) HFA Inhaler 2 Puff(s) Inhalation every 6 hours PRN  aspirin  chewable 81 milliGRAM(s) Oral daily  atorvastatin 20 milliGRAM(s) Oral at bedtime  budesonide  80 MICROgram(s)/formoterol 4.5 MICROgram(s) Inhaler 2 Puff(s) Inhalation two times a day  calcium gluconate IVPB 2 Gram(s) IV Intermittent once  chlorhexidine 4% Liquid 1 Application(s) Topical two times a day  enoxaparin Injectable 40 milliGRAM(s) SubCutaneous daily  famotidine  Oral Tab/Cap - Peds 40 milliGRAM(s) Oral at bedtime  ibuprofen  Tablet. 400 milliGRAM(s) Oral every 6 hours PRN  levothyroxine 50 MICROGram(s) Oral daily  metoprolol succinate ER 25 milliGRAM(s) Oral daily  ondansetron Injectable 4 milliGRAM(s) IV Push every 6 hours PRN  pantoprazole    Tablet 40 milliGRAM(s) Oral before breakfast  predniSONE   Tablet 50 milliGRAM(s) Oral daily  pyridostigmine 90 milliGRAM(s) Oral four times a day      Lab                        12.8   11.03 )-----------( 258      ( 02 Feb 2021 05:32 )             37.3     02-02    139  |  103  |  29<H>  ----------------------------<  90  4.5   |  27  |  0.6<L>    Ca    9.1      02 Feb 2021 05:32    TPro  5.6<L>  /  Alb  4.4  /  TBili  0.6  /  DBili  x   /  AST  54<H>  /  ALT  44<H>  /  AlkPhos  150<H>  02-02    CAPILLARY BLOOD GLUCOSE        LIVER FUNCTIONS - ( 02 Feb 2021 05:32 )  Alb: 4.4 g/dL / Pro: 5.6 g/dL / ALK PHOS: 150 U/L / ALT: 44 U/L / AST: 54 U/L / GGT: x                 This is a 60y year old Female presenting with;    1-Exacerbation of Myasthenia Gravis - Improving  2-Possible drug-induced Iatrogenic Pancreatitis - resolved  3- s/p Meningiocaccal vaccinations - Pending Second dose outpatient    Recommendations:    - Continue ICU monitoring until completion of plasma exchange  - PLEX 3/5 two days prior and is due for #4 today and 5th plasma exchange on Thursday.   - check PFT's q shifts   - Monitor and trend  LFT's , Amylase, Lipase and GGT  - continue Prednisone to 50 mg QD  - Continue Pyridostigmine 90 Q 6 hours   - BIPAP as per pulmonary   - PT/OT and out of bed to chair   - Eculizumab to begin as outpatient treatment  - May consider thymectomy during outpatient f/u  - patient to follow up outpatient PCP for secondary meningococcal vaccinations  - Neurology following .  S[facility] LUCIANO [id number]  Neurology Follow up note    Name  GHADA ROPER    HPI:  · Pt is a 60y old female with pmhx of High blood cholesterol, Myasthenia gravis ( w/ flares requiring hospitalizations and plasmapheresis , no intubations) , Pancreatitis, postural orthostatic tachycardia syndrome,  COVID infxn ( nov 2020)  presenting to the ED complaining of worsening SOB x 1 day. Pt reports SOB is worse w/ exertion  and reports that she had difficulty reading her grandkids a bedtime story.  Per pt " this feels like previous flares"  No CP , No fevers, chills or cough.  No syncope . No unilateral leg swelling.   (25 Jan 2021 16:25)      Interval History - Patient seen at bedside with Dr Velazquez.  Patient has continued complaints of weakness, but states has had significant improvement over the course of her stay.  Patient reports difficulty chewing and managing liquids intermittently, and states this is a chronic condition that occurs at home also secondary to illness; does not cause coughing, choking or aspiration.  Patient denies any difficulty breathing currently, and denies any new complaints over night.   PT received PLEX 3/5 two days prior and is due for #4 today and 5th plasma exchange on Thursday.            Vital Signs Last 24 Hrs  T(C): 36.2 (02 Feb 2021 07:05), Max: 36.9 (01 Feb 2021 15:00)  T(F): 97.2 (02 Feb 2021 07:05), Max: 98.4 (01 Feb 2021 15:00)  HR: 59 (02 Feb 2021 07:13) (59 - 75)  BP: 120/62 (02 Feb 2021 07:13) (115/58 - 153/73)  BP(mean): 86 (02 Feb 2021 07:13) (78 - 105)  RR: 22 (02 Feb 2021 07:13) (18 - 24)  SpO2: 99% (02 Feb 2021 07:13) (98% - 100%)  ICU Vital Signs Last 24 Hrs  T(C): 36.2 (02 Feb 2021 07:05), Max: 36.9 (01 Feb 2021 15:00)  T(F): 97.2 (02 Feb 2021 07:05), Max: 98.4 (01 Feb 2021 15:00)  HR: 59 (02 Feb 2021 07:13) (59 - 75)  BP: 120/62 (02 Feb 2021 07:13) (115/58 - 153/73)  BP(mean): 86 (02 Feb 2021 07:13) (78 - 105)  ABP: --  ABP(mean): --  RR: 22 (02 Feb 2021 07:13) (18 - 24)  SpO2: 99% (02 Feb 2021 07:13) (98% - 100%)          Neurological Exam:   Mental status: Awake, alert and oriented x3.  Memory intact.  Comprehension intact.  Attention/concentration intact.  No dysarthria, no aphasia.  Fund of knowledge appropriate.    Cranial nerves:  No nystagmus, was able to count to 24 with a single breath.  She had no demonstrable weakness and was able to hold upgaze for a minute w/o diplopia or eyelid droop. Extraocular muscles intact, V1 through V3 intact bilaterally and symmetric, face symmetric, sternocleidomastoid/shoulder shrug strength bilaterally 5/5.    Motor:  Normal bulk and tone, strength 5/5 in bilateral upper and lower extremities.   strength 5/5.    Sensation: Intact to light touch.  No neglect.   Motor: Neck Flexion 4+/5 ; Neck Extension 5/5;     Medications  albumin human  5% IVPB 3500 milliLiter(s) IV Intermittent once  ALBUTerol    90 MICROgram(s) HFA Inhaler 2 Puff(s) Inhalation every 6 hours PRN  aspirin  chewable 81 milliGRAM(s) Oral daily  atorvastatin 20 milliGRAM(s) Oral at bedtime  budesonide  80 MICROgram(s)/formoterol 4.5 MICROgram(s) Inhaler 2 Puff(s) Inhalation two times a day  calcium gluconate IVPB 2 Gram(s) IV Intermittent once  chlorhexidine 4% Liquid 1 Application(s) Topical two times a day  enoxaparin Injectable 40 milliGRAM(s) SubCutaneous daily  famotidine  Oral Tab/Cap - Peds 40 milliGRAM(s) Oral at bedtime  ibuprofen  Tablet. 400 milliGRAM(s) Oral every 6 hours PRN  levothyroxine 50 MICROGram(s) Oral daily  metoprolol succinate ER 25 milliGRAM(s) Oral daily  ondansetron Injectable 4 milliGRAM(s) IV Push every 6 hours PRN  pantoprazole    Tablet 40 milliGRAM(s) Oral before breakfast  predniSONE   Tablet 50 milliGRAM(s) Oral daily  pyridostigmine 90 milliGRAM(s) Oral four times a day      Lab                        12.8   11.03 )-----------( 258      ( 02 Feb 2021 05:32 )             37.3     02-02    139  |  103  |  29<H>  ----------------------------<  90  4.5   |  27  |  0.6<L>    Ca    9.1      02 Feb 2021 05:32    TPro  5.6<L>  /  Alb  4.4  /  TBili  0.6  /  DBili  x   /  AST  54<H>  /  ALT  44<H>  /  AlkPhos  150<H>  02-02    CAPILLARY BLOOD GLUCOSE        LIVER FUNCTIONS - ( 02 Feb 2021 05:32 )  Alb: 4.4 g/dL / Pro: 5.6 g/dL / ALK PHOS: 150 U/L / ALT: 44 U/L / AST: 54 U/L / GGT: x               This is a 60y year old Female presenting with;    1-Exacerbation of Myasthenia Gravis - Improving  2-Possible drug-induced Iatrogenic Pancreatitis - resolved  3- s/p Meningiocaccal vaccinations - Pending Second dose outpatient    Recommendations:    - Continue ICU monitoring until completion of plasma exchange  - PLEX 3/5 two days prior and is due for #4 today and 5th plasma exchange on Thursday.   - check PFT's q shifts   - Monitor and trend  LFT's , Amylase, Lipase and GGT  - continue Prednisone to 50 mg QD  - Continue Pyridostigmine 90 Q 6 hours   - BIPAP as per pulmonary   - PT/OT and out of bed to chair   - Eculizumab to begin as outpatient treatment vs. alternative immunosupprant medication e.g. Rituxan.  - May benefit from thymectomy in future, though would need to time after PLEX preferable not shortly after significant flare up  - patient to follow up outpatient PCP for secondary meningococcal vaccinations to keep the Soliris option viable.

## 2021-02-02 NOTE — PROGRESS NOTE ADULT - SUBJECTIVE AND OBJECTIVE BOX
Patient is a 60y old  Female who presents with a chief complaint of SOB (01 Feb 2021 13:46)        HPI:  · Pt is a 60y old female with pmhx of High blood cholesterol, Myasthenia gravis ( w/ flares requiring hospitalizations and plasmapheresis , no intubations) , Pancreatitis, postural orthostatic tachycardia syndrome,  COVID infxn ( nov 2020)  presenting to the ED complaining of worsening SOB x 1 day. Pt reports SOB is worse w/ exertion  and reports that she had difficulty reading her grandkids a bedtime story.  Per pt " this feels like previous flares"  No CP , No fevers, chills or cough.  No syncope . No unilateral leg swelling.   (25 Jan 2021 16:25)    Pt evaluated on AM rounds.  Interval Events: No overnight events.    REVIEW OF SYSTEMS:   see HPI      OBJECTIVE:  ICU Vital Signs Last 24 Hrs  T(C): 36.4 (01 Feb 2021 23:23), Max: 36.9 (01 Feb 2021 15:00)  T(F): 97.5 (01 Feb 2021 23:23), Max: 98.4 (01 Feb 2021 15:00)  HR: 62 (02 Feb 2021 06:18) (62 - 75)  BP: 115/58 (02 Feb 2021 06:18) (115/58 - 153/73)  BP(mean): 78 (02 Feb 2021 06:18) (78 - 105)  ABP: --  ABP(mean): --  RR: 20 (02 Feb 2021 06:18) (18 - 24)  SpO2: 99% (02 Feb 2021 06:18) (98% - 100%)        CAPILLARY BLOOD GLUCOSE            PHYSICAL EXAM:     · CONSTITUTIONAL:   not septic appearing,   well nourished,   NAD    · ENMT:   Airway patent,   Nasal mucosa clear.  Mouth with normal mucosa.   No thrush    · EYES:   Clear bilaterally,   pupils equal,   round and reactive to light.    · CARDIAC:   Normal rate,   regular rhythm.    Heart sounds S1, S2.   No murmurs, no rubs or gallops on auscultation  no edema        CAROTID:   normal systolic impulse  no bruits    · RESPIRATORY:   rales  normal chest expansion  tachypnea,  no retractions or use of accessory muscles  palpation of chest is normal with no fremitus  percussion of chest demonstrates no hyperresonance or dullness    · GASTROINTESTINAL:  Abdomen soft,   non-tender,   + BS  liver/spleen not palpable    · MUSCULOSKELETAL:   no clubbing, cyanosis      · NEUROLOGICAL:   awake alert oriented  no obvious cranial nerve abnormalities      · SKIN:   Skin normal color for race,   warm, dry   No evidence of rash.    · PSYCHIATRIC:   stable  no threat to self or others      · HEME LYMPH:   no splenomegaly.  No cervical  lymphadenopathy.  no inguinal lymphadenopathy    HOSPITAL MEDICATIONS:  MEDICATIONS  (STANDING):  aspirin  chewable 81 milliGRAM(s) Oral daily  atorvastatin 20 milliGRAM(s) Oral at bedtime  budesonide  80 MICROgram(s)/formoterol 4.5 MICROgram(s) Inhaler 2 Puff(s) Inhalation two times a day  chlorhexidine 4% Liquid 1 Application(s) Topical two times a day  enoxaparin Injectable 40 milliGRAM(s) SubCutaneous daily  famotidine  Oral Tab/Cap - Peds 40 milliGRAM(s) Oral at bedtime  levothyroxine 50 MICROGram(s) Oral daily  metoprolol succinate ER 25 milliGRAM(s) Oral daily  pantoprazole    Tablet 40 milliGRAM(s) Oral before breakfast  predniSONE   Tablet 50 milliGRAM(s) Oral daily  pyridostigmine 90 milliGRAM(s) Oral four times a day    MEDICATIONS  (PRN):  ALBUTerol    90 MICROgram(s) HFA Inhaler 2 Puff(s) Inhalation every 6 hours PRN Shortness of Breath  ibuprofen  Tablet. 400 milliGRAM(s) Oral every 6 hours PRN Moderate Pain (4 - 6)  ondansetron Injectable 4 milliGRAM(s) IV Push every 6 hours PRN Nausea and/or Vomiting        LABS:                        12.8   11.03 )-----------( 258      ( 02 Feb 2021 05:32 )             37.3     02-01    139  |  104  |  19  ----------------------------<  89  4.9   |  27  |  0.6<L>    Ca    9.1      01 Feb 2021 05:23    TPro  5.3<L>  /  Alb  4.8  /  TBili  0.7  /  DBili  x   /  AST  35  /  ALT  21  /  AlkPhos  91  02-01                          RADIOLOGY: I personally reviewed latest CXR and other pertinent films.           Patient is a 60y old  Female who presents with a chief complaint of SOB (01 Feb 2021 13:46)        HPI:  · Pt is a 60y old female with pmhx of High blood cholesterol, Myasthenia gravis ( w/ flares requiring hospitalizations and plasmapheresis , no intubations) , Pancreatitis, postural orthostatic tachycardia syndrome,  COVID infxn ( nov 2020)  presenting to the ED complaining of worsening SOB x 1 day. Pt reports SOB is worse w/ exertion  and reports that she had difficulty reading her grandkids a bedtime story.  Per pt " this feels like previous flares"  No CP , No fevers, chills or cough.  No syncope . No unilateral leg swelling.   (25 Jan 2021 16:25)    Pt evaluated on AM rounds.  Interval Events: No overnight events.    REVIEW OF SYSTEMS:   see HPI      OBJECTIVE:  ICU Vital Signs Last 24 Hrs  T(C): 36.4 (01 Feb 2021 23:23), Max: 36.9 (01 Feb 2021 15:00)  T(F): 97.5 (01 Feb 2021 23:23), Max: 98.4 (01 Feb 2021 15:00)  HR: 62 (02 Feb 2021 06:18) (62 - 75)  BP: 115/58 (02 Feb 2021 06:18) (115/58 - 153/73)  BP(mean): 78 (02 Feb 2021 06:18) (78 - 105)  ABP: --  ABP(mean): --  RR: 20 (02 Feb 2021 06:18) (18 - 24)  SpO2: 99% (02 Feb 2021 06:18) (98% - 100%)    NIF -50  Vt 2900cc        CAPILLARY BLOOD GLUCOSE            PHYSICAL EXAM:     · CONSTITUTIONAL:   not septic appearing,   well nourished,   NAD    · ENMT:   Airway patent,   Nasal mucosa clear.  Mouth with normal mucosa.   No thrush    · EYES:   Clear bilaterally,   pupils equal,   round and reactive to light.    · CARDIAC:   Normal rate,   regular rhythm.    Heart sounds S1, S2.   No murmurs, no rubs or gallops on auscultation  no edema        CAROTID:   normal systolic impulse  no bruits    · RESPIRATORY:   rales  normal chest expansion  tachypnea,  no retractions or use of accessory muscles  palpation of chest is normal with no fremitus  percussion of chest demonstrates no hyperresonance or dullness    · GASTROINTESTINAL:  Abdomen soft,   non-tender,   + BS  liver/spleen not palpable    · MUSCULOSKELETAL:   no clubbing, cyanosis      · NEUROLOGICAL:   awake alert oriented  no obvious cranial nerve abnormalities      · SKIN:   Skin normal color for race,   warm, dry   No evidence of rash.    · PSYCHIATRIC:   stable  no threat to self or others      · HEME LYMPH:   no splenomegaly.  No cervical  lymphadenopathy.  no inguinal lymphadenopathy    HOSPITAL MEDICATIONS:  MEDICATIONS  (STANDING):  aspirin  chewable 81 milliGRAM(s) Oral daily  atorvastatin 20 milliGRAM(s) Oral at bedtime  budesonide  80 MICROgram(s)/formoterol 4.5 MICROgram(s) Inhaler 2 Puff(s) Inhalation two times a day  chlorhexidine 4% Liquid 1 Application(s) Topical two times a day  enoxaparin Injectable 40 milliGRAM(s) SubCutaneous daily  famotidine  Oral Tab/Cap - Peds 40 milliGRAM(s) Oral at bedtime  levothyroxine 50 MICROGram(s) Oral daily  metoprolol succinate ER 25 milliGRAM(s) Oral daily  pantoprazole    Tablet 40 milliGRAM(s) Oral before breakfast  predniSONE   Tablet 50 milliGRAM(s) Oral daily  pyridostigmine 90 milliGRAM(s) Oral four times a day    MEDICATIONS  (PRN):  ALBUTerol    90 MICROgram(s) HFA Inhaler 2 Puff(s) Inhalation every 6 hours PRN Shortness of Breath  ibuprofen  Tablet. 400 milliGRAM(s) Oral every 6 hours PRN Moderate Pain (4 - 6)  ondansetron Injectable 4 milliGRAM(s) IV Push every 6 hours PRN Nausea and/or Vomiting        LABS:                        12.8   11.03 )-----------( 258      ( 02 Feb 2021 05:32 )             37.3     02-01    139  |  104  |  19  ----------------------------<  89  4.9   |  27  |  0.6<L>    Ca    9.1      01 Feb 2021 05:23    TPro  5.3<L>  /  Alb  4.8  /  TBili  0.7  /  DBili  x   /  AST  35  /  ALT  21  /  AlkPhos  91  02-01                          RADIOLOGY: I personally reviewed latest CXR and other pertinent films.

## 2021-02-02 NOTE — PROGRESS NOTE ADULT - SUBJECTIVE AND OBJECTIVE BOX
GHADA ROPER 60y Female  MRN#: [id number]     SUBJECTIVE  Patient is a 60y old Female who presents with a chief complaint of SOB (02 Feb 2021 07:24)  Currently admitted to medicine with the primary diagnosis of Respiratory distress      OBJECTIVE  PAST MEDICAL & SURGICAL HISTORY  Myasthenia gravis    High blood cholesterol    Pancreatitis    POTS (postural orthostatic tachycardia syndrome)    History of foot surgery      ALLERGIES:  sulfa drugs (Unknown)    MEDICATIONS:  STANDING MEDICATIONS  albumin human  5% IVPB 3500 milliLiter(s) IV Intermittent once  aspirin  chewable 81 milliGRAM(s) Oral daily  atorvastatin 20 milliGRAM(s) Oral at bedtime  budesonide  80 MICROgram(s)/formoterol 4.5 MICROgram(s) Inhaler 2 Puff(s) Inhalation two times a day  calcium gluconate IVPB 2 Gram(s) IV Intermittent once  chlorhexidine 4% Liquid 1 Application(s) Topical two times a day  enoxaparin Injectable 40 milliGRAM(s) SubCutaneous daily  famotidine  Oral Tab/Cap - Peds 40 milliGRAM(s) Oral at bedtime  levothyroxine 50 MICROGram(s) Oral daily  metoprolol succinate ER 25 milliGRAM(s) Oral daily  pantoprazole    Tablet 40 milliGRAM(s) Oral before breakfast  predniSONE   Tablet 50 milliGRAM(s) Oral daily  pyridostigmine 90 milliGRAM(s) Oral four times a day    PRN MEDICATIONS  ALBUTerol    90 MICROgram(s) HFA Inhaler 2 Puff(s) Inhalation every 6 hours PRN  ibuprofen  Tablet. 400 milliGRAM(s) Oral every 6 hours PRN  ondansetron Injectable 4 milliGRAM(s) IV Push every 6 hours PRN      VITAL SIGNS: Last 24 Hours  T(C): 36.2 (02 Feb 2021 07:05), Max: 36.9 (01 Feb 2021 15:00)  T(F): 97.2 (02 Feb 2021 07:05), Max: 98.4 (01 Feb 2021 15:00)  HR: 59 (02 Feb 2021 07:13) (59 - 75)  BP: 120/62 (02 Feb 2021 07:13) (115/58 - 153/73)  BP(mean): 86 (02 Feb 2021 07:13) (78 - 105)  RR: 22 (02 Feb 2021 07:13) (18 - 24)  SpO2: 99% (02 Feb 2021 07:13) (98% - 100%)    LABS:                        12.8   11.03 )-----------( 258      ( 02 Feb 2021 05:32 )             37.3     02-02    139  |  103  |  29<H>  ----------------------------<  90  4.5   |  27  |  0.6<L>    Ca    9.1      02 Feb 2021 05:32    TPro  5.6<L>  /  Alb  4.4  /  TBili  0.6  /  DBili  x   /  AST  54<H>  /  ALT  44<H>  /  AlkPhos  150<H>  02-02      PHYSICAL EXAM:  CONSTITUTIONAL: Well-developed; well-nourished; in no acute distress.  SKIN: Skin exam is warm and dry, no acute rash.  HEAD: Normocephalic; atraumatic.  EYES: PERRL, EOM intact; conjunctiva and sclera clear.  ENT: No nasal discharge; airway clear. Throat clear.  NECK: Supple; non tender.  No lymphadenopathy.  CARD: S1, S2 normal; no murmurs, gallops, or rubs. Regular rate and rhythm.  RESP: No wheezes, rales or rhonchi.  ABD: Normal bowel sounds; soft; non-distended; non-tender; no hepatosplenomegaly.  EXT: Normal ROM. No clubbing, cyanosis or edema.  NEURO: Alert, oriented. Grossly unremarkable. No focal deficits.  PSYCH: Cooperative, appropriate.

## 2021-02-02 NOTE — PROGRESS NOTE ADULT - ASSESSMENT
61 Y/O F with PMH of DLD, MG is here with SOB    MG Exacerbation  -Xray Negative  -Neurology on board  -s/p 3 rounds of plasmapheresis  -4th PLASMAPHERESIS TODAY  -Continue prednisone and pyridostigmine  -NIF and Vital Capacity qShift    DLD  -On statin    Diet - DASH  Activity - OOB to chair  DVT - Lovenox  FULL CODE    micu monitoring

## 2021-02-02 NOTE — PROGRESS NOTE ADULT - ASSESSMENT
Myasthenia gravis flare     - s/p IVIG -> PLEX - complete course of 5    -  pyridostigmine, prednisone    - monitor in unit   - continue to monitor  VC and MIP   - continue home meds   - neuro following  -PF NIF Q shuft  NIPPV as needed  NPO on NIPPV    monotor in ICU  Myasthenia gravis flare     - s/p IVIG -> PLEX - complete course of 5 #4 today   -  pyridostigmine, prednisone    - monitor in unit   - continue to monitor  VC and MIP   - continue home meds   - neuro following  -PF NIF Q shift  NIPPV as needed  NPO on NIPPV    monotor in ICU

## 2021-02-02 NOTE — PROGRESS NOTE ADULT - SUBJECTIVE AND OBJECTIVE BOX
Patient is comfortable in bed, no complaints       T(F): 97.2 (02-02-21 @ 07:05), Max: 98.4 (02-01-21 @ 15:00)  HR: 59 (02-02-21 @ 07:13)  BP: 120/62 (02-02-21 @ 07:13)  RR: 18  SpO2: 99% (02-02-21 @ 07:13) (98% - 100%)    PHYSICAL EXAM:  GENERAL: NAD  HEAD:  Atraumatic, Normocephalic  NERVOUS SYSTEM:  Alert & Oriented X3, no focal deficits   CHEST/LUNG: Clear to percussion bilaterally; No rales, rhonchi, wheezing, or rubs  HEART: Regular rate and rhythm; No murmurs, rubs, or gallops  ABDOMEN: Soft, Nontender, Nondistended; Bowel sounds present  EXTREMITIES:  2+ Peripheral Pulses, No clubbing, cyanosis, or edema  LYMPH: No lymphadenopathy noted  SKIN: No rashes or lesions    LABS  02-02    139  |  103  |  29<H>  ----------------------------<  90  4.5   |  27  |  0.6<L>    Ca    9.1      02 Feb 2021 05:32    TPro  5.6<L>  /  Alb  4.4  /  TBili  0.6  /  DBili  x   /  AST  54<H>  /  ALT  44<H>  /  AlkPhos  150<H>  02-02                          12.8   11.03 )-----------( 258      ( 02 Feb 2021 05:32 )             37.3     MEDICATIONS  (STANDING):  aspirin  chewable 81 milliGRAM(s) Oral daily  atorvastatin 20 milliGRAM(s) Oral at bedtime  budesonide  80 MICROgram(s)/formoterol 4.5 MICROgram(s) Inhaler 2 Puff(s) Inhalation two times a day  calcium gluconate IVPB 1 Gram(s) IV Intermittent once  chlorhexidine 4% Liquid 1 Application(s) Topical two times a day  enoxaparin Injectable 40 milliGRAM(s) SubCutaneous daily  famotidine  Oral Tab/Cap - Peds 40 milliGRAM(s) Oral at bedtime  levothyroxine 50 MICROGram(s) Oral daily  metoprolol succinate ER 25 milliGRAM(s) Oral daily  pantoprazole    Tablet 40 milliGRAM(s) Oral before breakfast  predniSONE   Tablet 50 milliGRAM(s) Oral daily  pyridostigmine 90 milliGRAM(s) Oral four times a day    MEDICATIONS  (PRN):  ALBUTerol    90 MICROgram(s) HFA Inhaler 2 Puff(s) Inhalation every 6 hours PRN Shortness of Breath  ibuprofen  Tablet. 400 milliGRAM(s) Oral every 6 hours PRN Moderate Pain (4 - 6)  ondansetron Injectable 4 milliGRAM(s) IV Push every 6 hours PRN Nausea and/or Vomiting

## 2021-02-03 ENCOUNTER — APPOINTMENT (OUTPATIENT)
Dept: OPHTHALMOLOGY | Facility: CLINIC | Age: 61
End: 2021-02-03

## 2021-02-03 LAB
ALBUMIN SERPL ELPH-MCNC: 4.5 G/DL — SIGNIFICANT CHANGE UP (ref 3.5–5.2)
ALP SERPL-CCNC: 78 U/L — SIGNIFICANT CHANGE UP (ref 30–115)
ALT FLD-CCNC: 35 U/L — SIGNIFICANT CHANGE UP (ref 0–41)
ANION GAP SERPL CALC-SCNC: 5 MMOL/L — LOW (ref 7–14)
AST SERPL-CCNC: 40 U/L — SIGNIFICANT CHANGE UP (ref 0–41)
BILIRUB SERPL-MCNC: 0.4 MG/DL — SIGNIFICANT CHANGE UP (ref 0.2–1.2)
BUN SERPL-MCNC: 22 MG/DL — HIGH (ref 10–20)
CALCIUM SERPL-MCNC: 9.2 MG/DL — SIGNIFICANT CHANGE UP (ref 8.5–10.1)
CHLORIDE SERPL-SCNC: 107 MMOL/L — SIGNIFICANT CHANGE UP (ref 98–110)
CO2 SERPL-SCNC: 29 MMOL/L — SIGNIFICANT CHANGE UP (ref 17–32)
CREAT SERPL-MCNC: 0.6 MG/DL — LOW (ref 0.7–1.5)
GLUCOSE SERPL-MCNC: 93 MG/DL — SIGNIFICANT CHANGE UP (ref 70–99)
HCT VFR BLD CALC: 37.2 % — SIGNIFICANT CHANGE UP (ref 37–47)
HGB BLD-MCNC: 12.4 G/DL — SIGNIFICANT CHANGE UP (ref 12–16)
MCHC RBC-ENTMCNC: 31.8 PG — HIGH (ref 27–31)
MCHC RBC-ENTMCNC: 33.3 G/DL — SIGNIFICANT CHANGE UP (ref 32–37)
MCV RBC AUTO: 95.4 FL — SIGNIFICANT CHANGE UP (ref 81–99)
NRBC # BLD: 0 /100 WBCS — SIGNIFICANT CHANGE UP (ref 0–0)
PLATELET # BLD AUTO: 247 K/UL — SIGNIFICANT CHANGE UP (ref 130–400)
POTASSIUM SERPL-MCNC: 5.1 MMOL/L — HIGH (ref 3.5–5)
POTASSIUM SERPL-SCNC: 5.1 MMOL/L — HIGH (ref 3.5–5)
PROT SERPL-MCNC: 5.2 G/DL — LOW (ref 6–8)
RBC # BLD: 3.9 M/UL — LOW (ref 4.2–5.4)
RBC # FLD: 15.9 % — HIGH (ref 11.5–14.5)
SODIUM SERPL-SCNC: 141 MMOL/L — SIGNIFICANT CHANGE UP (ref 135–146)
WBC # BLD: 9.57 K/UL — SIGNIFICANT CHANGE UP (ref 4.8–10.8)
WBC # FLD AUTO: 9.57 K/UL — SIGNIFICANT CHANGE UP (ref 4.8–10.8)

## 2021-02-03 PROCEDURE — 99233 SBSQ HOSP IP/OBS HIGH 50: CPT

## 2021-02-03 PROCEDURE — 99232 SBSQ HOSP IP/OBS MODERATE 35: CPT

## 2021-02-03 RX ADMIN — ATORVASTATIN CALCIUM 20 MILLIGRAM(S): 80 TABLET, FILM COATED ORAL at 22:01

## 2021-02-03 RX ADMIN — Medication 81 MILLIGRAM(S): at 11:30

## 2021-02-03 RX ADMIN — Medication 400 MILLIGRAM(S): at 18:59

## 2021-02-03 RX ADMIN — BUDESONIDE AND FORMOTEROL FUMARATE DIHYDRATE 2 PUFF(S): 160; 4.5 AEROSOL RESPIRATORY (INHALATION) at 22:07

## 2021-02-03 RX ADMIN — PANTOPRAZOLE SODIUM 40 MILLIGRAM(S): 20 TABLET, DELAYED RELEASE ORAL at 05:11

## 2021-02-03 RX ADMIN — PYRIDOSTIGMINE BROMIDE 90 MILLIGRAM(S): 60 SOLUTION ORAL at 11:30

## 2021-02-03 RX ADMIN — PYRIDOSTIGMINE BROMIDE 90 MILLIGRAM(S): 60 SOLUTION ORAL at 05:09

## 2021-02-03 RX ADMIN — PYRIDOSTIGMINE BROMIDE 90 MILLIGRAM(S): 60 SOLUTION ORAL at 22:06

## 2021-02-03 RX ADMIN — FAMOTIDINE 40 MILLIGRAM(S): 10 INJECTION INTRAVENOUS at 22:01

## 2021-02-03 RX ADMIN — Medication 400 MILLIGRAM(S): at 09:07

## 2021-02-03 RX ADMIN — PYRIDOSTIGMINE BROMIDE 90 MILLIGRAM(S): 60 SOLUTION ORAL at 17:31

## 2021-02-03 RX ADMIN — ENOXAPARIN SODIUM 40 MILLIGRAM(S): 100 INJECTION SUBCUTANEOUS at 11:30

## 2021-02-03 RX ADMIN — Medication 50 MICROGRAM(S): at 05:10

## 2021-02-03 RX ADMIN — Medication 25 MILLIGRAM(S): at 05:09

## 2021-02-03 RX ADMIN — Medication 50 MILLIGRAM(S): at 05:08

## 2021-02-03 RX ADMIN — BUDESONIDE AND FORMOTEROL FUMARATE DIHYDRATE 2 PUFF(S): 160; 4.5 AEROSOL RESPIRATORY (INHALATION) at 08:56

## 2021-02-03 NOTE — PROGRESS NOTE ADULT - SUBJECTIVE AND OBJECTIVE BOX
Patient is a 60y old  Female who presents with a chief complaint of SOB (02 Feb 2021 14:47)        HPI:  · Pt is a 60y old female with pmhx of High blood cholesterol, Myasthenia gravis ( w/ flares requiring hospitalizations and plasmapheresis , no intubations) , Pancreatitis, postural orthostatic tachycardia syndrome,  COVID infxn ( nov 2020)  presenting to the ED complaining of worsening SOB x 1 day. Pt reports SOB is worse w/ exertion  and reports that she had difficulty reading her grandkids a bedtime story.  Per pt " this feels like previous flares"  No CP , No fevers, chills or cough.  No syncope . No unilateral leg swelling.   (25 Jan 2021 16:25)    Pt evaluated on AM rounds.  Interval Events: No overnight events.    REVIEW OF SYSTEMS:   see HPI      OBJECTIVE:  ICU Vital Signs Last 24 Hrs  T(C): 36.1 (03 Feb 2021 07:05), Max: 36.1 (03 Feb 2021 07:05)  T(F): 97 (03 Feb 2021 07:05), Max: 97 (03 Feb 2021 07:05)  HR: 79 (03 Feb 2021 05:00) (51 - 79)  BP: 128/58 (03 Feb 2021 05:00) (106/57 - 130/68)  BP(mean): 84 (03 Feb 2021 05:00) (74 - 89)  RR: 20 (03 Feb 2021 07:05) (18 - 24)  SpO2: 100% (03 Feb 2021 05:00) (98% - 100%)        CAPILLARY BLOOD GLUCOSE            PHYSICAL EXAM:     · CONSTITUTIONAL:   not septic appearing,   well nourished,   NAD    · ENMT:   Airway patent,   Nasal mucosa clear.  Mouth with normal mucosa.   No thrush    · EYES:   Clear bilaterally,   pupils equal,   round and reactive to light.    · CARDIAC:   Normal rate,   regular rhythm.    Heart sounds S1, S2.   No murmurs, no rubs or gallops on auscultation  no edema        CAROTID:   normal systolic impulse  no bruits    · RESPIRATORY:   rales  normal chest expansion  no retractions or use of accessory muscles  palpation of chest is normal with no fremitus  percussion of chest demonstrates no hyperresonance or dullness    · GASTROINTESTINAL:  Abdomen soft,   non-tender,   + BS  liver/spleen not palpable    · MUSCULOSKELETAL:   no clubbing, cyanosis      · NEUROLOGICAL:   awake alert oriented  no obvious cranial nerve abnormalities      · SKIN:   Skin normal color for race,   warm, dry   No evidence of rash.    · PSYCHIATRIC:   stable  no threat to self or others      · HEME LYMPH:   no splenomegaly.  No cervical  lymphadenopathy.  no inguinal lymphadenopathy    HOSPITAL MEDICATIONS:  MEDICATIONS  (STANDING):  aspirin  chewable 81 milliGRAM(s) Oral daily  atorvastatin 20 milliGRAM(s) Oral at bedtime  budesonide  80 MICROgram(s)/formoterol 4.5 MICROgram(s) Inhaler 2 Puff(s) Inhalation two times a day  chlorhexidine 4% Liquid 1 Application(s) Topical two times a day  enoxaparin Injectable 40 milliGRAM(s) SubCutaneous daily  famotidine  Oral Tab/Cap - Peds 40 milliGRAM(s) Oral at bedtime  levothyroxine 50 MICROGram(s) Oral daily  metoprolol succinate ER 25 milliGRAM(s) Oral daily  pantoprazole    Tablet 40 milliGRAM(s) Oral before breakfast  predniSONE   Tablet 50 milliGRAM(s) Oral daily  pyridostigmine 90 milliGRAM(s) Oral four times a day    MEDICATIONS  (PRN):  ALBUTerol    90 MICROgram(s) HFA Inhaler 2 Puff(s) Inhalation every 6 hours PRN Shortness of Breath  ibuprofen  Tablet. 400 milliGRAM(s) Oral every 6 hours PRN Moderate Pain (4 - 6)  ondansetron Injectable 4 milliGRAM(s) IV Push every 6 hours PRN Nausea and/or Vomiting        LABS:                        12.4   9.57  )-----------( 247      ( 03 Feb 2021 06:24 )             37.2     02-02    139  |  103  |  29<H>  ----------------------------<  90  4.5   |  27  |  0.6<L>    Ca    9.1      02 Feb 2021 05:32    TPro  5.6<L>  /  Alb  4.4  /  TBili  0.6  /  DBili  x   /  AST  54<H>  /  ALT  44<H>  /  AlkPhos  150<H>  02-02                          RADIOLOGY: I personally reviewed latest CXR and other pertinent films.

## 2021-02-03 NOTE — PROGRESS NOTE ADULT - SUBJECTIVE AND OBJECTIVE BOX
GHADA ROPER 60y Female  MRN#: [id number]     SUBJECTIVE  Patient is a 60y old Female who presents with a chief complaint of SOB (03 Feb 2021 07:35)  Currently admitted to medicine with the primary diagnosis of Respiratory distress    OBJECTIVE  PAST MEDICAL & SURGICAL HISTORY  Myasthenia gravis    High blood cholesterol    Pancreatitis    POTS (postural orthostatic tachycardia syndrome)    History of foot surgery      ALLERGIES:  sulfa drugs (Unknown)    MEDICATIONS:  STANDING MEDICATIONS  aspirin  chewable 81 milliGRAM(s) Oral daily  atorvastatin 20 milliGRAM(s) Oral at bedtime  budesonide  80 MICROgram(s)/formoterol 4.5 MICROgram(s) Inhaler 2 Puff(s) Inhalation two times a day  chlorhexidine 4% Liquid 1 Application(s) Topical two times a day  enoxaparin Injectable 40 milliGRAM(s) SubCutaneous daily  famotidine  Oral Tab/Cap - Peds 40 milliGRAM(s) Oral at bedtime  levothyroxine 50 MICROGram(s) Oral daily  metoprolol succinate ER 25 milliGRAM(s) Oral daily  pantoprazole    Tablet 40 milliGRAM(s) Oral before breakfast  predniSONE   Tablet 50 milliGRAM(s) Oral daily  pyridostigmine 90 milliGRAM(s) Oral four times a day    PRN MEDICATIONS  ALBUTerol    90 MICROgram(s) HFA Inhaler 2 Puff(s) Inhalation every 6 hours PRN  ibuprofen  Tablet. 400 milliGRAM(s) Oral every 6 hours PRN  ondansetron Injectable 4 milliGRAM(s) IV Push every 6 hours PRN      VITAL SIGNS: Last 24 Hours  T(C): 36.1 (03 Feb 2021 07:05), Max: 36.1 (03 Feb 2021 07:05)  T(F): 97 (03 Feb 2021 07:05), Max: 97 (03 Feb 2021 07:05)  HR: 62 (03 Feb 2021 07:12) (51 - 79)  BP: 119/58 (03 Feb 2021 07:12) (106/57 - 130/68)  BP(mean): 80 (03 Feb 2021 07:12) (74 - 89)  RR: 20 (03 Feb 2021 07:05) (18 - 24)  SpO2: 100% (03 Feb 2021 07:12) (98% - 100%)    LABS:                        12.4   9.57  )-----------( 247      ( 03 Feb 2021 06:24 )             37.2     02-03    141  |  107  |  22<H>  ----------------------------<  93  5.1<H>   |  29  |  0.6<L>    Ca    9.2      03 Feb 2021 06:24    TPro  5.2<L>  /  Alb  4.5  /  TBili  0.4  /  DBili  x   /  AST  40  /  ALT  35  /  AlkPhos  78  02-03      PHYSICAL EXAM:  CONSTITUTIONAL: Well-developed; well-nourished; in no acute distress.  SKIN: Skin exam is warm and dry, no acute rash.  HEAD: Normocephalic; atraumatic.  EYES: PERRL, EOM intact; conjunctiva and sclera clear.  ENT: No nasal discharge; airway clear. Throat clear.  NECK: Supple; non tender.  No lymphadenopathy.  CARD: S1, S2 normal; no murmurs, gallops, or rubs. Regular rate and rhythm.  RESP: No wheezes, rales or rhonchi.  ABD: Normal bowel sounds; soft; non-distended; non-tender; no hepatosplenomegaly.  EXT: Normal ROM. No clubbing, cyanosis or edema.  NEURO: Alert, oriented. Grossly unremarkable. No focal deficits.  PSYCH: Cooperative, appropriate.

## 2021-02-03 NOTE — PROGRESS NOTE ADULT - ASSESSMENT
Myasthenia gravis flare     - s/p IVIG -> PLEX - complete course of 5   -  pyridostigmine, prednisone    - monitor in unit until plasma phoresis completed   - continue to monitor  VC and MIP   - continue home meds   - neuro following  -PF NIF Q shift  NIPPV as needed  NPO on NIPPV    monitor in ICU

## 2021-02-03 NOTE — PROGRESS NOTE ADULT - SUBJECTIVE AND OBJECTIVE BOX
Neurology Progress Note    Interval History:      Pt seen today in f/u for MG exacerbation.  She has received 4/5 plasma exchange treatments.  No significant events overnight.  Heidi SOB or significant swallow difficulty today.    Vital Signs Last 24 Hrs  T(C): 36.1 (03 Feb 2021 19:34), Max: 37 (03 Feb 2021 15:00)  T(F): 97 (03 Feb 2021 19:34), Max: 98.6 (03 Feb 2021 15:00)  HR: 63 (03 Feb 2021 19:34) (51 - 79)  BP: 109/55 (03 Feb 2021 19:34) (109/55 - 128/58)  BP(mean): 75 (03 Feb 2021 19:34) (75 - 84)  RR: 18 (03 Feb 2021 15:00) (18 - 24)  SpO2: 100% (03 Feb 2021 19:34) (98% - 100%)    Neurological Exam:   Mental status: Awake, alert and oriented x3.    Cranial nerves:  No eyelid droop on upgaze for a minute.   EOM's full.  No dysarthria.  Motor:  MRC grading 5/5 b/l UE/LE.  Neck 5/5 flexion and extension.  Sensation: Intact to light touch upper and lower extremities.      MEDICATIONS  (STANDING):  aspirin  chewable 81 milliGRAM(s) Oral daily  atorvastatin 20 milliGRAM(s) Oral at bedtime  budesonide  80 MICROgram(s)/formoterol 4.5 MICROgram(s) Inhaler 2 Puff(s) Inhalation two times a day  chlorhexidine 4% Liquid 1 Application(s) Topical two times a day  enoxaparin Injectable 40 milliGRAM(s) SubCutaneous daily  famotidine  Oral Tab/Cap - Peds 40 milliGRAM(s) Oral at bedtime  levothyroxine 50 MICROGram(s) Oral daily  metoprolol succinate ER 25 milliGRAM(s) Oral daily  pantoprazole    Tablet 40 milliGRAM(s) Oral before breakfast  predniSONE   Tablet 50 milliGRAM(s) Oral daily  pyridostigmine 90 milliGRAM(s) Oral four times a day      Labs:  CBC Full  -  ( 03 Feb 2021 06:24 )  WBC Count : 9.57 K/uL  RBC Count : 3.90 M/uL  Hemoglobin : 12.4 g/dL  Hematocrit : 37.2 %  Platelet Count - Automated : 247 K/uL  Mean Cell Volume : 95.4 fL  Mean Cell Hemoglobin : 31.8 pg  Mean Cell Hemoglobin Concentration : 33.3 g/dL  Auto Neutrophil # : x  Auto Lymphocyte # : x  Auto Monocyte # : x  Auto Eosinophil # : x  Auto Basophil # : x  Auto Neutrophil % : x  Auto Lymphocyte % : x  Auto Monocyte % : x  Auto Eosinophil % : x  Auto Basophil % : x    02-03    141  |  107  |  22<H>  ----------------------------<  93  5.1<H>   |  29  |  0.6<L>    Ca    9.2      03 Feb 2021 06:24    TPro  5.2<L>  /  Alb  4.5  /  TBili  0.4  /  DBili  x   /  AST  40  /  ALT  35  /  AlkPhos  78  02-03    LIVER FUNCTIONS - ( 03 Feb 2021 06:24 )  Alb: 4.5 g/dL / Pro: 5.2 g/dL / ALK PHOS: 78 U/L / ALT: 35 U/L / AST: 40 U/L / GGT: x                 Assessment:  This is a 60y Female w/ h/o myasthenia gravis, antibody positive, thymoma negative. Now on plasma exchange 4/5, pending 5/5 tomorrow.  She had pancreatitis on azathioprine, now resolved off it.  Will need institution of alternative immunosuppressant therapy long term with candidates including Rituxan and Soliris (for which first two meningiococcal vaccinations given in house).    Plan:  1.  Complete 5th PLEX tomorrow and then likely will be able to discharge home afterwards once triple lumen is removed.   2.  Continue 50 mg/day of prednisone and GI prophylaxis while on prednisone.  3.  Continue pyridostigmine 90 mg four times daily.  4.  Outpatient neurologic f/u in 2 weeks to discuss thymectomy and alternate immunosuppression treatment.  5.  Complete second vaccinations for meningicoccal infections through PCP in next few weeks to keep Soliris a viable option.

## 2021-02-03 NOTE — PROGRESS NOTE ADULT - ASSESSMENT
59 Y/O F with PMH of DLD, MG is here with SOB    MG Exacerbation  -Xray Negative  -Neurology on board  -s/p 4 rounds of plasmapheresis  -5TH PLASMAPHERESIS TOMORROW  -Continue prednisone and pyridostigmine  -NIF and Vital Capacity qShift    DLD  -On statin    Diet - DASH  Activity - OOB to chair  DVT - Lovenox  FULL CODE    micu monitoring

## 2021-02-04 ENCOUNTER — TRANSCRIPTION ENCOUNTER (OUTPATIENT)
Age: 61
End: 2021-02-04

## 2021-02-04 VITALS
RESPIRATION RATE: 18 BRPM | OXYGEN SATURATION: 98 % | SYSTOLIC BLOOD PRESSURE: 124 MMHG | HEART RATE: 81 BPM | DIASTOLIC BLOOD PRESSURE: 59 MMHG

## 2021-02-04 LAB
ALBUMIN SERPL ELPH-MCNC: 4.2 G/DL — SIGNIFICANT CHANGE UP (ref 3.5–5.2)
ALP SERPL-CCNC: 123 U/L — HIGH (ref 30–115)
ALT FLD-CCNC: 51 U/L — HIGH (ref 0–41)
ANION GAP SERPL CALC-SCNC: 8 MMOL/L — SIGNIFICANT CHANGE UP (ref 7–14)
AST SERPL-CCNC: 51 U/L — HIGH (ref 0–41)
BILIRUB SERPL-MCNC: 0.4 MG/DL — SIGNIFICANT CHANGE UP (ref 0.2–1.2)
BUN SERPL-MCNC: 27 MG/DL — HIGH (ref 10–20)
CALCIUM SERPL-MCNC: 8.9 MG/DL — SIGNIFICANT CHANGE UP (ref 8.5–10.1)
CHLORIDE SERPL-SCNC: 106 MMOL/L — SIGNIFICANT CHANGE UP (ref 98–110)
CO2 SERPL-SCNC: 28 MMOL/L — SIGNIFICANT CHANGE UP (ref 17–32)
CREAT SERPL-MCNC: 0.6 MG/DL — LOW (ref 0.7–1.5)
GLUCOSE SERPL-MCNC: 76 MG/DL — SIGNIFICANT CHANGE UP (ref 70–99)
HCT VFR BLD CALC: 35.5 % — LOW (ref 37–47)
HGB BLD-MCNC: 11.7 G/DL — LOW (ref 12–16)
MCHC RBC-ENTMCNC: 31.8 PG — HIGH (ref 27–31)
MCHC RBC-ENTMCNC: 33 G/DL — SIGNIFICANT CHANGE UP (ref 32–37)
MCV RBC AUTO: 96.5 FL — SIGNIFICANT CHANGE UP (ref 81–99)
NRBC # BLD: 0 /100 WBCS — SIGNIFICANT CHANGE UP (ref 0–0)
PLATELET # BLD AUTO: 218 K/UL — SIGNIFICANT CHANGE UP (ref 130–400)
POTASSIUM SERPL-MCNC: 4.5 MMOL/L — SIGNIFICANT CHANGE UP (ref 3.5–5)
POTASSIUM SERPL-SCNC: 4.5 MMOL/L — SIGNIFICANT CHANGE UP (ref 3.5–5)
PROT SERPL-MCNC: 5.1 G/DL — LOW (ref 6–8)
RBC # BLD: 3.68 M/UL — LOW (ref 4.2–5.4)
RBC # FLD: 15.9 % — HIGH (ref 11.5–14.5)
SODIUM SERPL-SCNC: 142 MMOL/L — SIGNIFICANT CHANGE UP (ref 135–146)
WBC # BLD: 8.45 K/UL — SIGNIFICANT CHANGE UP (ref 4.8–10.8)
WBC # FLD AUTO: 8.45 K/UL — SIGNIFICANT CHANGE UP (ref 4.8–10.8)

## 2021-02-04 PROCEDURE — 99239 HOSP IP/OBS DSCHRG MGMT >30: CPT

## 2021-02-04 PROCEDURE — 99232 SBSQ HOSP IP/OBS MODERATE 35: CPT

## 2021-02-04 RX ORDER — CALCIUM GLUCONATE 100 MG/ML
2 VIAL (ML) INTRAVENOUS ONCE
Refills: 0 | Status: COMPLETED | OUTPATIENT
Start: 2021-02-04 | End: 2021-02-04

## 2021-02-04 RX ORDER — ALBUMIN HUMAN 25 %
3500 VIAL (ML) INTRAVENOUS ONCE
Refills: 0 | Status: DISCONTINUED | OUTPATIENT
Start: 2021-02-04 | End: 2021-02-04

## 2021-02-04 RX ORDER — ALBUMIN HUMAN 25 %
3000 VIAL (ML) INTRAVENOUS ONCE
Refills: 0 | Status: COMPLETED | OUTPATIENT
Start: 2021-02-04 | End: 2021-02-04

## 2021-02-04 RX ORDER — AZATHIOPRINE 100 MG/1
1 TABLET ORAL
Qty: 0 | Refills: 0 | DISCHARGE

## 2021-02-04 RX ADMIN — PYRIDOSTIGMINE BROMIDE 90 MILLIGRAM(S): 60 SOLUTION ORAL at 05:42

## 2021-02-04 RX ADMIN — Medication 600 MILLILITER(S): at 09:14

## 2021-02-04 RX ADMIN — Medication 81 MILLIGRAM(S): at 11:52

## 2021-02-04 RX ADMIN — Medication 50 MICROGRAM(S): at 05:42

## 2021-02-04 RX ADMIN — Medication 200 GRAM(S): at 09:15

## 2021-02-04 RX ADMIN — ONDANSETRON 4 MILLIGRAM(S): 8 TABLET, FILM COATED ORAL at 09:13

## 2021-02-04 RX ADMIN — PYRIDOSTIGMINE BROMIDE 90 MILLIGRAM(S): 60 SOLUTION ORAL at 11:52

## 2021-02-04 RX ADMIN — Medication 400 MILLIGRAM(S): at 12:29

## 2021-02-04 RX ADMIN — BUDESONIDE AND FORMOTEROL FUMARATE DIHYDRATE 2 PUFF(S): 160; 4.5 AEROSOL RESPIRATORY (INHALATION) at 08:45

## 2021-02-04 RX ADMIN — Medication 25 MILLIGRAM(S): at 05:42

## 2021-02-04 RX ADMIN — Medication 50 MILLIGRAM(S): at 05:42

## 2021-02-04 RX ADMIN — PANTOPRAZOLE SODIUM 40 MILLIGRAM(S): 20 TABLET, DELAYED RELEASE ORAL at 05:42

## 2021-02-04 NOTE — DISCHARGE NOTE PROVIDER - CARE PROVIDER_API CALL
Brian Velazquez)  Neurology  [address] [place] [address], [address]  [place] [address]  Phone: [phone]  Fax: [phone]  Follow Up Time: 1 week    Johnathan Washington  INTERNAL MEDICINE  [address] [place] [address]  Phone: [phone]  Fax: [phone]  Follow Up Time: 1 week

## 2021-02-04 NOTE — PROGRESS NOTE ADULT - SUBJECTIVE AND OBJECTIVE BOX
This is 60 F (  1960 )  was  admitted for  Myasthenia Gravis exacerbation . Patient had 5 rounds of Therapeutic plasmapheresis ,  4L of plasma was removed with 5% Albumin as a re[placement fluid . Patient tolerated procedure well without complications . Patient condition improved significantly .

## 2021-02-04 NOTE — DISCHARGE NOTE PROVIDER - NSDCCPCAREPLAN_GEN_ALL_CORE_FT
PRINCIPAL DISCHARGE DIAGNOSIS  Diagnosis: Respiratory distress  Assessment and Plan of Treatment: You came here with the Shortness of Breath. Your chest xray was negative. You were diagnosed with Myasthenia gravis exacerbation.  5 rounds of plasmapheresis were given and after that your shortness of breath has improved. Will discharge you home today with outpatient neurology follow up

## 2021-02-04 NOTE — PROGRESS NOTE ADULT - REASON FOR ADMISSION
SOB

## 2021-02-04 NOTE — DISCHARGE NOTE NURSING/CASE MANAGEMENT/SOCIAL WORK - PATIENT PORTAL LINK FT
You can access the FollowMyHealth Patient Portal offered by [facility] by registering at the following website: [email or web address]. By joining [employer]’s FollowMyHealth portal, you will also be able to view your health information using other applications (apps) compatible with our system.

## 2021-02-04 NOTE — DISCHARGE NOTE PROVIDER - HOSPITAL COURSE
59 Y/O F with PMH of DLD, MG is here with SOB and CXR was negative, she was diagnosed with MG Exacerbation  IVIG was given but SOB did not improve so she was given 5 rounds of plasmapheresis. her SOB has improved and will discharge  her  home on oral steroids

## 2021-02-04 NOTE — PROGRESS NOTE ADULT - ATTENDING COMMENTS
Attending Statement: I have personally performed a face to face diagnostic evaluation on this patient and have arrived at the suggestions for care. I have written all aspects of the above note.
I have personally seen and examined this patient.  I have fully participated in the care of this patient.  I have reviewed all pertinent clinical information, including history, physical exam, plan and note.   I have reviewed all pertinent clinical information and reviewed all relevant imaging and diagnostic studies personally.  Recommendations as above.  Agree with above assessment except as noted.
Patient examined this morning at bedside and was able to count to 24 with a single breath.  She had no demonstrable weakness and was able to hold upgaze for a minute w/o diplopia or eyelid droop.  She reports intermittant dysphagia.  Azathioprine stopped due to pancreatitis.  She remains on prednisone 50 mg/day.  SHe received PLEX 3/5 yesterday and is due to #4 tomorrow and 5th plasma exchange on Thursday.  She should continue ICU monitoring until completes 5th exchange.  She received two meningiococcal vaccines as inpatient and in 2 weeks will complete her 2nd dose of these vaccines through her PCP.  She will follow up with neurology for initiate of treatment with Soliris though she had questions today about treatment alternatives including continuing the PLEX alone for long term treatment.  This is generally not preferred due to risk for losing venous access, thrombosis, infection of indwelling catheter.  She has had imaging of the thymus gland previously that was negative for thymoma.  She may still benefit from thymectomy and will discuss that further with patient.
Patient examined this morning.  Additionally she was able to repetitively squeeze examiners fingers bilaterally with her hands for 50 repetitions.  Some fatigue toward the end.  Overall improving with PLEX.  Will have 4th exchange today and 5th on Thursday.    She will likely need PLEX monthly until next therapy established.  She wanted PLEX indefinitely but I let her know it's primary role was as rescue and bridge therapy due to potential for thombosis and line infections and complications from PLEX itself.
Patient seen and examined and agree with above except as noted.  Patients history, notes, labs, imaging, vitals and meds reviewed by me personally.  Patient says she feels more difficulty breathing today compared with yesterday.  Her exam shows poor effort able to count to 15 in one breath  L>R weakness    Plan as above    Discussed case with Dr. Dias of Transfusion medicine to approve PLEX and was approved earlier this morning.  Sid placed and awaiting blood bank to send nurse and equiptment for PLEX
I have performed a history and physical exam of this patient and discussed the management with the resident, Dr Mackey . I have reviewed the resident note and agree with the documented findings and plan of care.
Patient examined this morning and was receiving final PLEX. She feels improved.  She was able to count to 34 with one breath.  NIF -40, VC = 2500 ml  previous NIF was -55  Strength was full in all extremities, no eyelid droop or diplopia.  Dysphagia improved.    Myasthenia gravis exacerbation, improved s/p 5 plasma exchanges.  Patient may be discharged home after catheter is pulled and site is stable.    Outpatient neurologic f/u in 2 weeks.
I have personally seen and examined this patient.  I have fully participated in the care of this patient.  I have reviewed all pertinent clinical information, including history, physical exam, plan and note.   I have reviewed all pertinent clinical information and reviewed all relevant imaging and diagnostic studies personally.  Recommendations as above.  Agree with above assessment except as noted.
patient seen and examined agree above note   on IV IG follow neurology

## 2021-02-04 NOTE — DISCHARGE NOTE PROVIDER - NSDCMRMEDTOKEN_GEN_ALL_CORE_FT
albuterol 90 mcg/inh inhalation powder: 2 puff(s) inhaled every 6 hours, As Needed  aspirin 81 mg oral tablet, chewable: 1 tab(s) orally once a day  atorvastatin 20 mg oral tablet: 1 tab(s) orally once a day  doxepin 10 mg oral capsule:   fluticasone 50 mcg/inh nasal spray: 1 spray(s) nasal once a day  Imuran 50 mg oral tablet: 1 tab(s) orally 3 times a day  Mestinon 60 mg oral tablet: 1.5 tab(s) orally 4 times a day  Pepcid 40 mg oral tablet: 1 tab(s) orally once a day (at bedtime)  predniSONE 50 mg oral tablet: 1 tab(s) orally once a day  Protonix 40 mg oral delayed release tablet: 1 tab(s) orally once a day  Symbicort: inhaled 2 times a day  Synthroid 50 mcg (0.05 mg) oral tablet: 1 tab(s) orally once a day   Toprol-XL 25 mg oral tablet, extended release: 1 tab(s) orally once a day  Vitamin D2: every friday   albuterol 90 mcg/inh inhalation powder: 2 puff(s) inhaled every 6 hours, As Needed  aspirin 81 mg oral tablet, chewable: 1 tab(s) orally once a day  atorvastatin 20 mg oral tablet: 1 tab(s) orally once a day  doxepin 10 mg oral capsule:   fluticasone 50 mcg/inh nasal spray: 1 spray(s) nasal once a day  Mestinon 60 mg oral tablet: 1.5 tab(s) orally 4 times a day  Pepcid 40 mg oral tablet: 1 tab(s) orally once a day (at bedtime)  predniSONE 50 mg oral tablet: 1 tab(s) orally once a day  Protonix 40 mg oral delayed release tablet: 1 tab(s) orally once a day  Symbicort: inhaled 2 times a day  Synthroid 50 mcg (0.05 mg) oral tablet: 1 tab(s) orally once a day   Toprol-XL 25 mg oral tablet, extended release: 1 tab(s) orally once a day  Vitamin D2: every friday

## 2021-02-04 NOTE — CHART NOTE - NSCHARTNOTEFT_GEN_A_CORE
checked pt  after code blue in Er which lasted from 9:30-11:40.Pt asleep on R/A with sat of 98% at midnight.

## 2021-02-04 NOTE — PROGRESS NOTE ADULT - SUBJECTIVE AND OBJECTIVE BOX
Patient is a 60y old  Female who presents with a chief complaint of SOB (03 Feb 2021 23:23)        HPI:  · Pt is a 60y old female with pmhx of High blood cholesterol, Myasthenia gravis ( w/ flares requiring hospitalizations and plasmapheresis , no intubations) , Pancreatitis, postural orthostatic tachycardia syndrome,  COVID infxn ( nov 2020)  presenting to the ED complaining of worsening SOB x 1 day. Pt reports SOB is worse w/ exertion  and reports that she had difficulty reading her grandkids a bedtime story.  Per pt " this feels like previous flares"  No CP , No fevers, chills or cough.  No syncope . No unilateral leg swelling.   (25 Jan 2021 16:25)    Pt evaluated on AM rounds.  Interval Events: No overnight events.    REVIEW OF SYSTEMS:   see HPI      OBJECTIVE:  ICU Vital Signs Last 24 Hrs  T(C): 36.6 (04 Feb 2021 07:01), Max: 37 (03 Feb 2021 15:00)  T(F): 97.9 (04 Feb 2021 07:01), Max: 98.6 (03 Feb 2021 15:00)  HR: 70 (04 Feb 2021 05:35) (63 - 70)  BP: 122/59 (04 Feb 2021 05:35) (109/55 - 122/59)  BP(mean): 85 (04 Feb 2021 05:35) (75 - 85)  RR: 18 (04 Feb 2021 07:01) (18 - 20)  SpO2: 100% (04 Feb 2021 05:45) (98% - 100%)        CAPILLARY BLOOD GLUCOSE            PHYSICAL EXAM:     · CONSTITUTIONAL:   not septic appearing,   well nourished,   NAD    · ENMT:   Airway patent,   Nasal mucosa clear.  Mouth with normal mucosa.   No thrush    · EYES:   Clear bilaterally,   pupils equal,   round and reactive to light.    · CARDIAC:   Normal rate,   regular rhythm.    Heart sounds S1, S2.   No murmurs, no rubs or gallops on auscultation  no edema        CAROTID:   normal systolic impulse  no bruits    · RESPIRATORY:   rales  normal chest expansion  no retractions or use of accessory muscles  palpation of chest is normal with no fremitus  percussion of chest demonstrates no hyperresonance or dullness    · GASTROINTESTINAL:  Abdomen soft,   non-tender,   + BS  liver/spleen not palpable    · MUSCULOSKELETAL:   no clubbing, cyanosis      · NEUROLOGICAL:   awake alert oriented  no obvious cranial nerve abnormalities      · SKIN:   Skin normal color for race,   warm, dry   No evidence of rash.    · PSYCHIATRIC:   stable  no threat to self or others      · HEME LYMPH:   no splenomegaly.  No cervical  lymphadenopathy.  no inguinal lymphadenopathy    HOSPITAL MEDICATIONS:  MEDICATIONS  (STANDING):  albumin human  5% IVPB 3000 milliLiter(s) IV Intermittent once  aspirin  chewable 81 milliGRAM(s) Oral daily  atorvastatin 20 milliGRAM(s) Oral at bedtime  budesonide  80 MICROgram(s)/formoterol 4.5 MICROgram(s) Inhaler 2 Puff(s) Inhalation two times a day  calcium gluconate IVPB 2 Gram(s) IV Intermittent once  chlorhexidine 4% Liquid 1 Application(s) Topical two times a day  enoxaparin Injectable 40 milliGRAM(s) SubCutaneous daily  famotidine  Oral Tab/Cap - Peds 40 milliGRAM(s) Oral at bedtime  levothyroxine 50 MICROGram(s) Oral daily  metoprolol succinate ER 25 milliGRAM(s) Oral daily  pantoprazole    Tablet 40 milliGRAM(s) Oral before breakfast  predniSONE   Tablet 50 milliGRAM(s) Oral daily  pyridostigmine 90 milliGRAM(s) Oral four times a day    MEDICATIONS  (PRN):  ALBUTerol    90 MICROgram(s) HFA Inhaler 2 Puff(s) Inhalation every 6 hours PRN Shortness of Breath  ibuprofen  Tablet. 400 milliGRAM(s) Oral every 6 hours PRN Moderate Pain (4 - 6)  ondansetron Injectable 4 milliGRAM(s) IV Push every 6 hours PRN Nausea and/or Vomiting        LABS:                        11.7   8.45  )-----------( 218      ( 04 Feb 2021 05:31 )             35.5     02-03    141  |  107  |  22<H>  ----------------------------<  93  5.1<H>   |  29  |  0.6<L>    Ca    9.2      03 Feb 2021 06:24    TPro  5.2<L>  /  Alb  4.5  /  TBili  0.4  /  DBili  x   /  AST  40  /  ALT  35  /  AlkPhos  78  02-03                          RADIOLOGY: I personally reviewed latest CXR and other pertinent films.

## 2021-02-04 NOTE — PROGRESS NOTE ADULT - ASSESSMENT
Assessment:  This is a 60y Female w/ h/o myasthenia gravis, antibody positive, thymoma negative. Now on plasma exchange  5/5 today.   She had pancreatitis on azathioprine, now resolved off it.  Will need institution of alternative immunosuppressant therapy long term with candidates including Rituxan and Soliris (for which first two meningiococcal vaccinations given in house).    Plan:    1.  Complete 5th PLEX today then can be discharged from neurology prospective   2.  Continue 50 mg/day of prednisone and GI prophylaxis while on prednisone.  3.  Continue pyridostigmine 90 mg four times daily.  4.  Outpatient neurologic f/u in 2 weeks to discuss thymectomy and alternate immunosuppression treatment.  5.  Complete second vaccinations for meningicoccal infections through PCP in next few weeks to keep Soliris a viable option.

## 2021-02-04 NOTE — DISCHARGE NOTE PROVIDER - PROVIDER TOKENS
PROVIDER:[TOKEN:[[id number]],FOLLOWUP:[1 week]],PROVIDER:[TOKEN:[[id number]],FOLLOWUP:[1 week]]

## 2021-02-04 NOTE — DISCHARGE NOTE PROVIDER - CARE PROVIDERS DIRECT ADDRESSES
[email or web address]

## 2021-02-04 NOTE — PROGRESS NOTE ADULT - SUBJECTIVE AND OBJECTIVE BOX
Patient seen and evaluated this am, feels good, no SOB, receiving PLEX #5      T(F): 97.9 (02-04-21 @ 07:01), Max: 98.6 (02-03-21 @ 15:00)  HR: 81 (02-04-21 @ 09:25)  BP: 124/59 (02-04-21 @ 09:25)  RR: 18 (02-04-21 @ 09:25)  SpO2: 98% (02-04-21 @ 09:25) (98% - 100%)    PHYSICAL EXAM:  GENERAL: NAD  HEAD:  Atraumatic, Normocephalic  NERVOUS SYSTEM:  Alert & Oriented X3, no focal deficits   CHEST/LUNG: Clear to percussion bilaterally; No rales, rhonchi, wheezing, or rubs  HEART: Regular rate and rhythm; No murmurs, rubs, or gallops  ABDOMEN: Soft, Nontender, Nondistended; Bowel sounds present  EXTREMITIES:  2+ Peripheral Pulses, No clubbing, cyanosis, or edema  LYMPH: No lymphadenopathy noted  SKIN: No rashes or lesions    LABS  02-04    142  |  106  |  27<H>  ----------------------------<  76  4.5   |  28  |  0.6<L>    Ca    8.9      04 Feb 2021 05:31    TPro  5.1<L>  /  Alb  4.2  /  TBili  0.4  /  DBili  x   /  AST  51<H>  /  ALT  51<H>  /  AlkPhos  123<H>  02-04                          11.7   8.45  )-----------( 218      ( 04 Feb 2021 05:31 )             35.5       RADIOLOGY  < from: Xray Chest 1 View- PORTABLE-Routine (Xray Chest 1 View- PORTABLE-Routine in AM.) (02.01.21 @ 09:15) >    Impression:    No radiographic evidence of acute pulmonary process.    < end of copied text >    MEDICATIONS  (STANDING):  aspirin  chewable 81 milliGRAM(s) Oral daily  atorvastatin 20 milliGRAM(s) Oral at bedtime  budesonide  80 MICROgram(s)/formoterol 4.5 MICROgram(s) Inhaler 2 Puff(s) Inhalation two times a day  chlorhexidine 4% Liquid 1 Application(s) Topical two times a day  enoxaparin Injectable 40 milliGRAM(s) SubCutaneous daily  famotidine  Oral Tab/Cap - Peds 40 milliGRAM(s) Oral at bedtime  levothyroxine 50 MICROGram(s) Oral daily  metoprolol succinate ER 25 milliGRAM(s) Oral daily  pantoprazole    Tablet 40 milliGRAM(s) Oral before breakfast  predniSONE   Tablet 50 milliGRAM(s) Oral daily  pyridostigmine 90 milliGRAM(s) Oral four times a day    MEDICATIONS  (PRN):  ALBUTerol    90 MICROgram(s) HFA Inhaler 2 Puff(s) Inhalation every 6 hours PRN Shortness of Breath  ibuprofen  Tablet. 400 milliGRAM(s) Oral every 6 hours PRN Moderate Pain (4 - 6)  ondansetron Injectable 4 milliGRAM(s) IV Push every 6 hours PRN Nausea and/or Vomiting

## 2021-02-04 NOTE — PROGRESS NOTE ADULT - ASSESSMENT
60y old female with pmhx of High blood cholesterol, Myasthenia gravis ( w/ flares requiring hospitalizations and plasmapheresis , no intubations) , Pancreatitis, postural orthostatic tachycardia syndrome,  COVID infxn ( nov 2020)  presenting to the ED complaining of worsening SOB x 1 day. Pt reports SOB is worse w/ exertion  and reports that she had difficulty reading her grandkids a bedtime story.  Per pt " this feels like previous flares"  No CP , No fevers, chills or cough.  No syncope . No unilateral leg swelling     Myasthenia gravis flare     - s/p IVIG -> PLEX - complete course of 5 today   -  pyridostigmine, prednisone    - may DC home later today after removal of IJ central line    - She  pancreatitis possibly secondary to  azathioprine (which was DC'D) , now resolved off it.  Will need institution of alternative immunosuppressant as outpt as per neurology    - continue home meds   - 36min spent on DC

## 2021-02-04 NOTE — PROGRESS NOTE ADULT - SUBJECTIVE AND OBJECTIVE BOX
Neurology Follow up note    Name  GHADA ROPER    HPI:  · Pt is a 60y old female with pmhx of High blood cholesterol, Myasthenia gravis ( w/ flares requiring hospitalizations and plasmapheresis , no intubations) , Pancreatitis, postural orthostatic tachycardia syndrome,  COVID infxn ( nov 2020)  presenting to the ED complaining of worsening SOB x 1 day. Pt reports SOB is worse w/ exertion  and reports that she had difficulty reading her grandkids a bedtime story.  Per pt " this feels like previous flares"  No CP , No fevers, chills or cough.  No syncope . No unilateral leg swelling.   (25 Jan 2021 16:25)      Interval History -  Pt seen today in f/u for MG exacerbation.  She receiving 5/5 plasma exchange treatments.  No significant events overnight.  Heidi SOB or significant swallow difficulty today. Pt reports feels better today. PT able to count to 37 without getting SOB.           Vital Signs Last 24 Hrs  T(C): 36.6 (04 Feb 2021 07:01), Max: 37 (03 Feb 2021 15:00)  T(F): 97.9 (04 Feb 2021 07:01), Max: 98.6 (03 Feb 2021 15:00)  HR: 81 (04 Feb 2021 09:25) (59 - 81)  BP: 124/59 (04 Feb 2021 09:25) (109/55 - 124/59)  BP(mean): 85 (04 Feb 2021 09:25) (75 - 85)  RR: 18 (04 Feb 2021 09:25) (18 - 20)  SpO2: 98% (04 Feb 2021 09:25) (98% - 100%)  ICU Vital Signs Last 24 Hrs  T(C): 36.6 (04 Feb 2021 07:01), Max: 37 (03 Feb 2021 15:00)  T(F): 97.9 (04 Feb 2021 07:01), Max: 98.6 (03 Feb 2021 15:00)  HR: 81 (04 Feb 2021 09:25) (59 - 81)  BP: 124/59 (04 Feb 2021 09:25) (109/55 - 124/59)  BP(mean): 85 (04 Feb 2021 09:25) (75 - 85)  ABP: --  ABP(mean): --  RR: 18 (04 Feb 2021 09:25) (18 - 20)  SpO2: 98% (04 Feb 2021 09:25) (98% - 100%)        Neurological Exam:   Mental status: Awake, alert and oriented x3 in no acute distress   Cranial nerves:  No eyelid droop on upgaze for a minute.   EOM's full.  No dysarthria.  chest: breath sounds present bilaterally, no wheezing, pt able to count to 37 without getting SOB.   Motor:  MRC grading 5/5 b/l UE/LE.  Neck 5/5 flexion and extension.  Sensation: Intact to light touch upper and lower extremities.          Medications  ALBUTerol    90 MICROgram(s) HFA Inhaler 2 Puff(s) Inhalation every 6 hours PRN  aspirin  chewable 81 milliGRAM(s) Oral daily  atorvastatin 20 milliGRAM(s) Oral at bedtime  budesonide  80 MICROgram(s)/formoterol 4.5 MICROgram(s) Inhaler 2 Puff(s) Inhalation two times a day  chlorhexidine 4% Liquid 1 Application(s) Topical two times a day  enoxaparin Injectable 40 milliGRAM(s) SubCutaneous daily  famotidine  Oral Tab/Cap - Peds 40 milliGRAM(s) Oral at bedtime  ibuprofen  Tablet. 400 milliGRAM(s) Oral every 6 hours PRN  levothyroxine 50 MICROGram(s) Oral daily  metoprolol succinate ER 25 milliGRAM(s) Oral daily  ondansetron Injectable 4 milliGRAM(s) IV Push every 6 hours PRN  pantoprazole    Tablet 40 milliGRAM(s) Oral before breakfast  predniSONE   Tablet 50 milliGRAM(s) Oral daily  pyridostigmine 90 milliGRAM(s) Oral four times a day      Lab                        11.7   8.45  )-----------( 218      ( 04 Feb 2021 05:31 )             35.5     02-04    142  |  106  |  27<H>  ----------------------------<  76  4.5   |  28  |  0.6<L>    Ca    8.9      04 Feb 2021 05:31    TPro  5.1<L>  /  Alb  4.2  /  TBili  0.4  /  DBili  x   /  AST  51<H>  /  ALT  51<H>  /  AlkPhos  123<H>  02-04    CAPILLARY BLOOD GLUCOSE        LIVER FUNCTIONS - ( 04 Feb 2021 05:31 )  Alb: 4.2 g/dL / Pro: 5.1 g/dL / ALK PHOS: 123 U/L / ALT: 51 U/L / AST: 51 U/L / GGT: x             < from: Xray Chest 1 View- PORTABLE-Routine (Xray Chest 1 View- PORTABLE-Routine in AM.) (02.01.21 @ 09:15) >  Impression:    No radiographic evidence of acute pulmonary process.    Right IJ central venous catheter tip at SVC/right atrial junction.          EVAN ANDERSON MD; Attending Radiologist  This document has been electronically signed. Feb 1 2021  2:14PM    < end of copied text >

## 2021-02-04 NOTE — PROGRESS NOTE ADULT - PROVIDER SPECIALTY LIST ADULT
Critical Care
Hospitalist
Internal Medicine
Neurology
Pulmonology
Critical Care
Hospitalist
Hospitalist
Internal Medicine
Internal Medicine
Neurology
Critical Care
Hospitalist
Internal Medicine
Neurology
Pulmonology
Transfusion Medicine
Critical Care
Hospitalist

## 2021-02-04 NOTE — PROGRESS NOTE ADULT - ASSESSMENT
Myasthenia gravis flare     - s/p IVIG -> PLEX - complete course of 5   -  pyridostigmine, prednisone    - monitor in unit until plasma phoresis completed   - continue to monitor  VC and MIP   - continue home meds   - neuro following  -PF NIF Q shift  NIPPV as needed  NPO if on NIPPV    monitor in ICU until plasma finished

## 2021-02-08 DIAGNOSIS — R06.02 SHORTNESS OF BREATH: ICD-10-CM

## 2021-02-08 DIAGNOSIS — E78.00 PURE HYPERCHOLESTEROLEMIA, UNSPECIFIED: ICD-10-CM

## 2021-02-08 DIAGNOSIS — I10 ESSENTIAL (PRIMARY) HYPERTENSION: ICD-10-CM

## 2021-02-08 DIAGNOSIS — Z86.16 PERSONAL HISTORY OF COVID-19: ICD-10-CM

## 2021-02-08 DIAGNOSIS — Z79.890 HORMONE REPLACEMENT THERAPY: ICD-10-CM

## 2021-02-08 DIAGNOSIS — Z88.2 ALLERGY STATUS TO SULFONAMIDES: ICD-10-CM

## 2021-02-08 DIAGNOSIS — E03.9 HYPOTHYROIDISM, UNSPECIFIED: ICD-10-CM

## 2021-02-08 DIAGNOSIS — Z79.82 LONG TERM (CURRENT) USE OF ASPIRIN: ICD-10-CM

## 2021-02-08 DIAGNOSIS — R06.03 ACUTE RESPIRATORY DISTRESS: ICD-10-CM

## 2021-02-08 DIAGNOSIS — K85.30 DRUG INDUCED ACUTE PANCREATITIS WITHOUT NECROSIS OR INFECTION: ICD-10-CM

## 2021-02-08 DIAGNOSIS — B94.8 SEQUELAE OF OTHER SPECIFIED INFECTIOUS AND PARASITIC DISEASES: ICD-10-CM

## 2021-02-08 DIAGNOSIS — R13.19 OTHER DYSPHAGIA: ICD-10-CM

## 2021-02-08 DIAGNOSIS — R26.89 OTHER ABNORMALITIES OF GAIT AND MOBILITY: ICD-10-CM

## 2021-02-08 DIAGNOSIS — T45.1X5A ADVERSE EFFECT OF ANTINEOPLASTIC AND IMMUNOSUPPRESSIVE DRUGS, INITIAL ENCOUNTER: ICD-10-CM

## 2021-02-08 DIAGNOSIS — Z91.14 PATIENT'S OTHER NONCOMPLIANCE WITH MEDICATION REGIMEN: ICD-10-CM

## 2021-02-08 DIAGNOSIS — K21.9 GASTRO-ESOPHAGEAL REFLUX DISEASE WITHOUT ESOPHAGITIS: ICD-10-CM

## 2021-02-08 DIAGNOSIS — I49.8 OTHER SPECIFIED CARDIAC ARRHYTHMIAS: ICD-10-CM

## 2021-02-08 DIAGNOSIS — G70.01 MYASTHENIA GRAVIS WITH (ACUTE) EXACERBATION: ICD-10-CM

## 2021-02-19 ENCOUNTER — APPOINTMENT (OUTPATIENT)
Dept: NEUROLOGY | Facility: CLINIC | Age: 61
End: 2021-02-19
Payer: MEDICAID

## 2021-02-19 VITALS
HEART RATE: 76 BPM | OXYGEN SATURATION: 98 % | WEIGHT: 210 LBS | TEMPERATURE: 97.8 F | BODY MASS INDEX: 35.85 KG/M2 | HEIGHT: 64 IN | SYSTOLIC BLOOD PRESSURE: 140 MMHG | DIASTOLIC BLOOD PRESSURE: 85 MMHG

## 2021-02-19 DIAGNOSIS — R79.89 OTHER SPECIFIED ABNORMAL FINDINGS OF BLOOD CHEMISTRY: ICD-10-CM

## 2021-02-19 PROCEDURE — 99072 ADDL SUPL MATRL&STAF TM PHE: CPT

## 2021-02-19 PROCEDURE — 99214 OFFICE O/P EST MOD 30 MIN: CPT

## 2021-02-19 NOTE — HISTORY OF PRESENT ILLNESS
[FreeTextEntry1] : Pt seen today in f/u for myasthenia gravis.  She had PLEX treatment in hospital completed on or about February 4th and feel better though still has fluctuating symptoms of swallow difficulty and vision disturbance.  Can climb full flights sometimes having stop sometimes not.  Breathing short of breath with activity still present.\par \par States her father had lymphoma so she is hesitant regarding immunosuppresant use that increases her risk for it.\par \par Antibodies - \par LRP negative\par Binding, Blocking, Modulating ab's negative.\par \par On IVIG she was doing well on q3 week regimen.\par \par Was getting 94 grams  essentially 1 mg/kg every 3 weeks.\par \par She notes some swelling in legs after IVIG.  Has lasix at home to use it prn for LE edema.\par \par MRI thymus negative for thymoma.\par \par Hx of positive single fiber EMG, however I performed routine rep stim EMG that was negative.\par \par \par \par \par

## 2021-02-19 NOTE — ASSESSMENT
[FreeTextEntry1] : Generalized myasthenia gravis.  Ab's negative except that MUSK ab testing not seen in inpatient or outpatient chart.  No evidence of thymoma on neuroimaging.\par \par Plan:\par 1.  Continue IVIG 1 gram/kg but shorten interval to every 2 weeks.\par 2.  Stay off azathiprine due to history of pancreatitis.\par 3.  Discussed role of thymectomy but she is reluctant to pursue it at this time.\par 4.  Discussed alternative immunosuppresant treatment to azathioprine including eculizumab, cellcept, rituxan.  She did not want to purse any of these at this time.\par 5.  Check MUSK ab, hepatitis panel B and C.\par 6.  Advised to complete the meningiococcal vaccines.\par 7.  Return in 4-6 weeks.

## 2021-02-19 NOTE — PHYSICAL EXAM
[FreeTextEntry1] : EOM's full, no ptosis no diplopia.\par No dysarthria, no nasal speech.\par Motor: 5/5 upper and lower extremities.\par Gait: normal.

## 2021-02-28 LAB
ALBUMIN SERPL ELPH-MCNC: 4.8 G/DL
ALP BLD-CCNC: 80 U/L
ALT SERPL-CCNC: 37 U/L
AST SERPL-CCNC: 28 U/L
BILIRUB DIRECT SERPL-MCNC: <0.2 MG/DL
BILIRUB INDIRECT SERPL-MCNC: >0.4 MG/DL
BILIRUB SERPL-MCNC: 0.6 MG/DL
HBV CORE IGG+IGM SER QL: NONREACTIVE
HBV SURFACE AB SER QL: REACTIVE
HBV SURFACE AG SER QL: NONREACTIVE
HCV AB SER QL: NONREACTIVE
HCV S/CO RATIO: 0.14 S/CO
MUSK ANTIBODY TEST: <1 U/ML
PROT SERPL-MCNC: 7 G/DL

## 2021-03-25 ENCOUNTER — APPOINTMENT (OUTPATIENT)
Dept: NEUROLOGY | Facility: CLINIC | Age: 61
End: 2021-03-25
Payer: MEDICAID

## 2021-03-25 VITALS
DIASTOLIC BLOOD PRESSURE: 84 MMHG | SYSTOLIC BLOOD PRESSURE: 150 MMHG | OXYGEN SATURATION: 98 % | TEMPERATURE: 98 F | HEART RATE: 73 BPM | WEIGHT: 208 LBS | HEIGHT: 64 IN | BODY MASS INDEX: 35.51 KG/M2

## 2021-03-25 PROCEDURE — 99072 ADDL SUPL MATRL&STAF TM PHE: CPT

## 2021-03-25 PROCEDURE — 99213 OFFICE O/P EST LOW 20 MIN: CPT

## 2021-03-25 RX ORDER — AZATHIOPRINE 50 1/1
50 TABLET ORAL TWICE DAILY
Qty: 360 | Refills: 3 | Status: DISCONTINUED | COMMUNITY
Start: 2020-02-03 | End: 2021-03-25

## 2021-03-25 NOTE — HISTORY OF PRESENT ILLNESS
[FreeTextEntry1] : Pt presents for f/u of MG.  Tapering prednisone went from 30 mg to 20 mg for 2 weeks then just went to 15 mg.\par Is getting gammaguard 92 grams every 2 weeks.  \par \par Has good and bad days, worse at night.  Sometimes voice is very week.  She has not gotten second round of meningiococcal vaccines yet. \par \par Rituximab discussed with patient about steroid sparing.\par \par Has had 2-3 occassionas where she experienced double vision for a few moments, when shifted eyes, went away.  3 times in past month.\par \par States always has trouble chewing and swallowing.  Since decreased steroids feels chewing/swallowing a little worse. \par \par Imaging negative for thymoma.\par \par \par Feels weaker when she is carrying things.\par \par States had Covid twice - once in November and once in January.\par Had Covid pneumonia.  PCP told her not to get it until end of April.

## 2021-03-25 NOTE — PHYSICAL EXAM
[FreeTextEntry1] : Counts to 30 with one breath though sounded like she was going to only count to 10 she made it to 30 (stethoscope over lungs to detect breath).\par TTP left hip and gluteal region.\par She walks with limp.\par Giveway weakness in left hip flexion.\par LE's otherwise 5-/5 bilaterally.\par UE's 5-5/ bilaterally.\par

## 2021-03-25 NOTE — ASSESSMENT
[FreeTextEntry1] : Mysathenia gravis, ab negative.  Stable on IVIG q2 week dosing.\par She doesn't want to start Soliris ( still hasn't received second meningiococcal vaccine) and is also hesitant to start Rituximab (though hepatitis panel negative).\par \par Plan:\par 1.  Encouraged patient to take steroid sparing agent.  We discuss Soliris and Rituximab. She states she is not ready to begin them (and would still need the second meningococcal vaccine for Soliris).\par 2.  Continue pyridostigmine 90 mg four times daily (refill was provided)>\par 3.  Continue steroid taper now on 15 mg/day for 2 weeks then 12.5 mg x 2 weeks.\par 4.  Patient to return in 1 month prior to decreasing prednisone below 12.5 mg.\par 5.  Continue IVIG q2 weeks.\par

## 2021-04-19 ENCOUNTER — OUTPATIENT (OUTPATIENT)
Dept: OUTPATIENT SERVICES | Facility: HOSPITAL | Age: 61
LOS: 1 days | Discharge: HOME | End: 2021-04-19
Payer: MEDICAID

## 2021-04-19 ENCOUNTER — APPOINTMENT (OUTPATIENT)
Dept: OPHTHALMOLOGY | Facility: CLINIC | Age: 61
End: 2021-04-19

## 2021-04-19 DIAGNOSIS — Z98.890 OTHER SPECIFIED POSTPROCEDURAL STATES: Chronic | ICD-10-CM

## 2021-04-19 PROCEDURE — 92014 COMPRE OPH EXAM EST PT 1/>: CPT

## 2021-04-19 PROCEDURE — 92133 CPTRZD OPH DX IMG PST SGM ON: CPT | Mod: 26

## 2021-04-20 ENCOUNTER — OUTPATIENT (OUTPATIENT)
Dept: OUTPATIENT SERVICES | Facility: HOSPITAL | Age: 61
LOS: 1 days | Discharge: HOME | End: 2021-04-20

## 2021-04-20 DIAGNOSIS — Z01.21 ENCOUNTER FOR DENTAL EXAMINATION AND CLEANING WITH ABNORMAL FINDINGS: ICD-10-CM

## 2021-04-20 DIAGNOSIS — Z98.890 OTHER SPECIFIED POSTPROCEDURAL STATES: Chronic | ICD-10-CM

## 2021-04-21 DIAGNOSIS — H04.129 DRY EYE SYNDROME OF UNSPECIFIED LACRIMAL GLAND: ICD-10-CM

## 2021-04-21 DIAGNOSIS — H25.041 POSTERIOR SUBCAPSULAR POLAR AGE-RELATED CATARACT, RIGHT EYE: ICD-10-CM

## 2021-04-21 DIAGNOSIS — H02.88A MEIBOMIAN GLAND DYSFUNCTION RIGHT EYE, UPPER AND LOWER EYELIDS: ICD-10-CM

## 2021-04-21 DIAGNOSIS — H40.013 OPEN ANGLE WITH BORDERLINE FINDINGS, LOW RISK, BILATERAL: ICD-10-CM

## 2021-04-21 DIAGNOSIS — H53.022 REFRACTIVE AMBLYOPIA, LEFT EYE: ICD-10-CM

## 2021-04-21 DIAGNOSIS — H02.88B MEIBOMIAN GLAND DYSFUNCTION LEFT EYE, UPPER AND LOWER EYELIDS: ICD-10-CM

## 2021-04-21 DIAGNOSIS — H02.429: ICD-10-CM

## 2021-05-06 ENCOUNTER — APPOINTMENT (OUTPATIENT)
Dept: NEUROLOGY | Facility: CLINIC | Age: 61
End: 2021-05-06
Payer: MEDICAID

## 2021-05-06 VITALS
DIASTOLIC BLOOD PRESSURE: 79 MMHG | HEART RATE: 68 BPM | BODY MASS INDEX: 36.02 KG/M2 | HEIGHT: 64 IN | TEMPERATURE: 98 F | SYSTOLIC BLOOD PRESSURE: 120 MMHG | OXYGEN SATURATION: 98 % | WEIGHT: 211 LBS

## 2021-05-06 PROCEDURE — 99072 ADDL SUPL MATRL&STAF TM PHE: CPT

## 2021-05-06 PROCEDURE — 99214 OFFICE O/P EST MOD 30 MIN: CPT

## 2021-05-07 NOTE — PHYSICAL EXAM
[FreeTextEntry1] : 5/5 upper extremities.\par 5/5 lower extremities except for left hip flexion 5-/5 (at first didn't raise it against gravity then and left foot DF 2/5 ( she states the foot weakness she had for years and was told by another neurologist she wasn't weak otherwise she couldn't walk).\par No eyelid droop or EOM weakness.  She was able to hold upgaze for over a minute w/o any droop.\par She was able to squeeze examiners fingers with her hands repetitively for 50 repetitions w/o significant weakness.\par \par \par \par \par

## 2021-05-07 NOTE — HISTORY OF PRESENT ILLNESS
[FreeTextEntry1] : Pt presents for f/u of MG.  States had one dose of meningiococcal vaccine.  Has not had covid vaccine yet though steroid dose is down to 12.5 mg /day and should be able to get it. States when filled out questionnaire for another family member she thought PLEX was on the screening question list.  I did not see that however on the [place].gov Covid vaccine website.\par \par States she is able to climb a flight of stairs.\par \par She tested positive for Covid in hospital and she states latest test she was negative.\par \par I also checked [facility] for Covid 19 vaccine and did not see PLEX as contraindication.  Nor with discussion with my neuromuscular colleagues did anyone know of PLEX being contraindication.\par \par Getting 90 gram IVIG every 2 weeks and feels improvement from that.  Last Wednesday was last treatment.\par Goes in over 6 hours.\par \par Pyridostigminie dose is 90 mg 4 times a day.\par Prednisone 12.5 mg\par \par States pain doctor thought her left leg weakness was from  myasthenia gravis. (States he did EMG/NCS recently and was told it was normal).  Spine imaging showed disc bulges in 3 areas.\par \par MRI chest w/wo contrast December 2020 was negative for mediastinal mass.\par Acetlycholine receptor binding, blocking, modulating, LRP-4 and MUSK abs' negative.\par \par \par

## 2021-05-07 NOTE — ASSESSMENT
[FreeTextEntry1] : Antibody negative myasthenia gravis s/p recent hospitalization and treatment with PLEX. \par Now she is on 90 gram IVIG q2 weeks for maintenance. \par She had initial meningiococcal vaccine when in hospital but can't find anywhere to give her second meningiococcal vaccination.\par \par She had Coronavirus infection and is now testing negative.  Her Ab level is low.  She has not yet had Covid-19 vaccine.  Her prednisone dose is 12.5 so should not prevent her from getting vaccine.  She had recent PLEX which she thinks would prevent her (from what she recalls of a vaccination questionaire) however [facility] cannot find anything [facility] or generally on CDC or [facility].gov vaccination site that would prevent her from getting the vaccine.  I discussed that she is at high risk of morbidity and mortality from Covid -19 itself and that vaccination would be reasonable for her especially prior to getting on long term immunosuppression.\par \par Plan:\par 1.  Continue current dose of IVIG q2 weeks, prednisone 12.5 mg /day and pyridostigmine 90 mg q6h.\par 2.  Return in 1 month.  Seek medical attention (go to ER) sooner if develops shortness of breath or diffuse weakness.\par 3.  Get Covid-19 vaccine.\par 4.  We will search options for her to get meningiococcal vaccine.

## 2021-05-20 ENCOUNTER — RX RENEWAL (OUTPATIENT)
Age: 61
End: 2021-05-20

## 2021-05-20 ENCOUNTER — TRANSCRIPTION ENCOUNTER (OUTPATIENT)
Age: 61
End: 2021-05-20

## 2021-05-20 NOTE — DISCHARGE NOTE PROVIDER - NSDCQMSTAIRS_GEN_ALL_CORE
No results found for: HGBA1C    Recent Labs     05/19/21  1125 05/19/21  1631 05/19/21  2115 05/20/21  0622   POCGLU 149* 105 120 133       Blood Sugar Average: Last 72 hrs:  (P) 938 [id number]     Resume home medication on discharge No

## 2021-06-01 ENCOUNTER — OUTPATIENT (OUTPATIENT)
Dept: OUTPATIENT SERVICES | Facility: HOSPITAL | Age: 61
LOS: 1 days | End: 2021-06-01
Payer: MEDICAID

## 2021-06-01 ENCOUNTER — RX RENEWAL (OUTPATIENT)
Age: 61
End: 2021-06-01

## 2021-06-01 DIAGNOSIS — Z98.890 OTHER SPECIFIED POSTPROCEDURAL STATES: Chronic | ICD-10-CM

## 2021-06-08 ENCOUNTER — OUTPATIENT (OUTPATIENT)
Dept: OUTPATIENT SERVICES | Facility: HOSPITAL | Age: 61
LOS: 1 days | Discharge: HOME | End: 2021-06-08

## 2021-06-08 DIAGNOSIS — K02.63 DENTAL CARIES ON SMOOTH SURFACE PENETRATING INTO PULP: ICD-10-CM

## 2021-06-08 DIAGNOSIS — Z98.890 OTHER SPECIFIED POSTPROCEDURAL STATES: Chronic | ICD-10-CM

## 2021-06-10 ENCOUNTER — APPOINTMENT (OUTPATIENT)
Dept: NEUROLOGY | Facility: CLINIC | Age: 61
End: 2021-06-10
Payer: MEDICAID

## 2021-06-10 VITALS
OXYGEN SATURATION: 97 % | TEMPERATURE: 97 F | DIASTOLIC BLOOD PRESSURE: 82 MMHG | HEART RATE: 66 BPM | BODY MASS INDEX: 36.02 KG/M2 | HEIGHT: 64 IN | SYSTOLIC BLOOD PRESSURE: 169 MMHG | WEIGHT: 211 LBS

## 2021-06-10 PROCEDURE — 99215 OFFICE O/P EST HI 40 MIN: CPT

## 2021-06-11 ENCOUNTER — APPOINTMENT (OUTPATIENT)
Dept: GASTROENTEROLOGY | Facility: CLINIC | Age: 61
End: 2021-06-11
Payer: MEDICAID

## 2021-06-11 DIAGNOSIS — R10.9 UNSPECIFIED ABDOMINAL PAIN: ICD-10-CM

## 2021-06-11 DIAGNOSIS — R19.4 CHANGE IN BOWEL HABIT: ICD-10-CM

## 2021-06-11 DIAGNOSIS — R10.13 EPIGASTRIC PAIN: ICD-10-CM

## 2021-06-11 DIAGNOSIS — R13.10 DYSPHAGIA, UNSPECIFIED: ICD-10-CM

## 2021-06-11 PROCEDURE — ZZZZZ: CPT

## 2021-06-11 NOTE — ASSESSMENT
[FreeTextEntry1] : 1.  Decrease prednisone to 10 mg/day.\par 2.  Continue gammagard 92 gram q2 weeks.\par 3.  Continue pyridostigmine 90 mg q6h.\par 4.  After completes 2nd Covid vaccine. Will start immunosuppression regimen.  ? Rituxan or Cellcept.\par

## 2021-06-11 NOTE — PHYSICAL EXAM
[FreeTextEntry1] : squeezed hands 28 times then stopped because she felt weaker having more trouble breathing.\par Able to hold upgaze entire time w/o droop or diplopia.\par 5/5 upper and lower extremities proximally and distally.

## 2021-06-11 NOTE — HISTORY OF PRESENT ILLNESS
[FreeTextEntry1] : Pt presents for f/u Myasthenia Gravis.  She feels "a little better".\par States some swallow difficulty but no worse than previously.\par Denies diplopia or ptosis.  She has trouble brushing hair and strength, trouble carrying things and opening jars.\par No worse than previously.  States activity level is a little better.  \par She is getting IVIG (Gammaguard  92 grams IV over 6-8 hours) every 2 weeks.\par She is on prednisone 12.5 mg/day and would like to decrease to 10 mg/day.\par She is taking pyridostigmine 90 mg q6h.\par \par Had to stop the immuran in past due to pancreatitis.\par \par

## 2021-06-22 ENCOUNTER — OUTPATIENT (OUTPATIENT)
Dept: OUTPATIENT SERVICES | Facility: HOSPITAL | Age: 61
LOS: 1 days | End: 2021-06-22

## 2021-06-22 DIAGNOSIS — Z98.890 OTHER SPECIFIED POSTPROCEDURAL STATES: Chronic | ICD-10-CM

## 2021-06-25 NOTE — ASSESSMENT
[FreeTextEntry1] : 61 y/o female patient with PMH of MG treated with mestinone and prednisone 20 mg oral and azathioprin , DLD, HTN, hypothyroidism, valvular heart disease, GERD on protonix 40 po QD and pepcid presented for follow up for intermittent solid food dysphagia. Pt was worked up with EGD, esophagogram and manometry which were all unremarkable. Patient states that she was admitted in February 2021 d/t MG exacerbation and hospital course was complicated by acute pancreatitis which was suspected to be d/t her Azathioprine and was discontinued. Patient is currently being followed up for her dyspepsia like symptoms and change in bowel habits\par \par #Epigastric pain likely dyspepsia d/t worsening GERD\par - EGD 6/18: only non erosive gastritis. negative H. Pylori\par - will increase PPI to 40 BID\par - c/w Pepcid 40 at night\par - will advise patient to return if symptoms worsen\par \par #Change in bowel habits \par - will schedule patient for colonoscopy\par \par #Intermittent dysphagia likely d/t MG - Stable\par - EGD: as above\par - esophagogram and motility studies wnl\par - modified barium swallow unremarkable\par

## 2021-06-25 NOTE — HISTORY OF PRESENT ILLNESS
[Heartburn] : heartburn worsened [Nausea] : denies nausea [Vomiting] : denies vomiting [Constipation] : denies constipation [Abdominal Swelling] : denies abdominal swelling [Yellow Skin Or Eyes (Jaundice)] : denies jaundice [Diarrhea] : diarrhea [Abdominal Pain] : abdominal pain [GERD] : gastroesophageal reflux disease [Pancreatitis] : pancreatitis [de-identified] : 61 y/o female patient with PMH of MG treated with mestinone and prednisone 20 mg oral and azathioprin , DLD, HTN, hypothyroidism, valvular heart disease, GERD on protonix 40 po QD and pepcid presented for follow up for intermittent solid food dysphagia. Pt was worked up with EGD, esophagogram and manometry which were all unremarkable. Patient states that she was admitted in February 2021 d/t MG exacerbation and hospital course was complicated by acute pancreatitis which was suspected to be d/t her Azathioprine and was discontinued. Patient was then discharged on her regular home meds along with protonix 40mg daily and pepcid 40 at night.\par \par Pt now states that for the past month she has been having intermittent epigastric pain while consuming food that is 5/10 acute and sharp and lasts only 30 seconds and resolves on its own and a/w bloating. she states her symptoms of GERD have worsened since then. There has been no change to  her medications besides d/c'ing Azathioprine. Denies any N/V/, fever, chills, weight loss, changes in her diet or nocturnal symptoms.\par \par Patient also states changes in her frequency of stools and bowel habits. She has regular bms every 1-2 days  but for the past month she noticed that she has 2-3 BMs per day every that are intermittenly loose. There are no other associated symptoms. Denies any sick contacts, recent travel, weight loss or nocturnal symptoms or red blood per rectum. There also has not been any changes to her diet. patient denies any family history of colon cancer\par \par EGD: 6/18: Non erosive gastritis. negative h. pyloi. Duodenum appears normal.\par \par \par This was a televisit on 6/11/21 at 4pm

## 2021-06-29 ENCOUNTER — EMERGENCY (EMERGENCY)
Facility: HOSPITAL | Age: 61
LOS: 0 days | Discharge: HOME | End: 2021-06-29
Attending: EMERGENCY MEDICINE | Admitting: EMERGENCY MEDICINE
Payer: MEDICAID

## 2021-06-29 VITALS
TEMPERATURE: 98 F | DIASTOLIC BLOOD PRESSURE: 84 MMHG | HEIGHT: 64 IN | SYSTOLIC BLOOD PRESSURE: 181 MMHG | OXYGEN SATURATION: 100 % | RESPIRATION RATE: 18 BRPM | HEART RATE: 72 BPM | WEIGHT: 220.02 LBS

## 2021-06-29 VITALS
DIASTOLIC BLOOD PRESSURE: 69 MMHG | TEMPERATURE: 98 F | OXYGEN SATURATION: 100 % | HEART RATE: 65 BPM | SYSTOLIC BLOOD PRESSURE: 147 MMHG | RESPIRATION RATE: 18 BRPM

## 2021-06-29 DIAGNOSIS — E78.00 PURE HYPERCHOLESTEROLEMIA, UNSPECIFIED: ICD-10-CM

## 2021-06-29 DIAGNOSIS — M54.2 CERVICALGIA: ICD-10-CM

## 2021-06-29 DIAGNOSIS — Z98.890 OTHER SPECIFIED POSTPROCEDURAL STATES: Chronic | ICD-10-CM

## 2021-06-29 DIAGNOSIS — Z79.82 LONG TERM (CURRENT) USE OF ASPIRIN: ICD-10-CM

## 2021-06-29 DIAGNOSIS — Z88.2 ALLERGY STATUS TO SULFONAMIDES: ICD-10-CM

## 2021-06-29 DIAGNOSIS — M54.9 DORSALGIA, UNSPECIFIED: ICD-10-CM

## 2021-06-29 DIAGNOSIS — Z98.890 OTHER SPECIFIED POSTPROCEDURAL STATES: ICD-10-CM

## 2021-06-29 DIAGNOSIS — M62.838 OTHER MUSCLE SPASM: ICD-10-CM

## 2021-06-29 DIAGNOSIS — I10 ESSENTIAL (PRIMARY) HYPERTENSION: ICD-10-CM

## 2021-06-29 DIAGNOSIS — G70.01 MYASTHENIA GRAVIS WITH (ACUTE) EXACERBATION: ICD-10-CM

## 2021-06-29 DIAGNOSIS — Z79.899 OTHER LONG TERM (CURRENT) DRUG THERAPY: ICD-10-CM

## 2021-06-29 DIAGNOSIS — R51.9 HEADACHE, UNSPECIFIED: ICD-10-CM

## 2021-06-29 DIAGNOSIS — Z87.19 PERSONAL HISTORY OF OTHER DISEASES OF THE DIGESTIVE SYSTEM: ICD-10-CM

## 2021-06-29 DIAGNOSIS — Z86.79 PERSONAL HISTORY OF OTHER DISEASES OF THE CIRCULATORY SYSTEM: ICD-10-CM

## 2021-06-29 PROCEDURE — 99284 EMERGENCY DEPT VISIT MOD MDM: CPT

## 2021-06-29 RX ORDER — SODIUM CHLORIDE 9 MG/ML
1000 INJECTION, SOLUTION INTRAVENOUS ONCE
Refills: 0 | Status: COMPLETED | OUTPATIENT
Start: 2021-06-29 | End: 2021-06-29

## 2021-06-29 RX ORDER — METHOCARBAMOL 500 MG/1
1500 TABLET, FILM COATED ORAL ONCE
Refills: 0 | Status: COMPLETED | OUTPATIENT
Start: 2021-06-29 | End: 2021-06-29

## 2021-06-29 RX ORDER — METOCLOPRAMIDE HCL 10 MG
10 TABLET ORAL ONCE
Refills: 0 | Status: COMPLETED | OUTPATIENT
Start: 2021-06-29 | End: 2021-06-29

## 2021-06-29 RX ORDER — KETOROLAC TROMETHAMINE 30 MG/ML
30 SYRINGE (ML) INJECTION ONCE
Refills: 0 | Status: DISCONTINUED | OUTPATIENT
Start: 2021-06-29 | End: 2021-06-29

## 2021-06-29 RX ORDER — METHOCARBAMOL 500 MG/1
2 TABLET, FILM COATED ORAL
Qty: 24 | Refills: 0
Start: 2021-06-29 | End: 2021-07-02

## 2021-06-29 RX ADMIN — Medication 30 MILLIGRAM(S): at 08:27

## 2021-06-29 RX ADMIN — SODIUM CHLORIDE 1000 MILLILITER(S): 9 INJECTION, SOLUTION INTRAVENOUS at 08:27

## 2021-06-29 RX ADMIN — METHOCARBAMOL 1500 MILLIGRAM(S): 500 TABLET, FILM COATED ORAL at 08:28

## 2021-06-29 RX ADMIN — Medication 10 MILLIGRAM(S): at 08:28

## 2021-06-29 NOTE — ED PROVIDER NOTE - PHYSICAL EXAMINATION
CONSTITUTIONAL: Well-developed; well-nourished; in mild acute distress.   SKIN: warm, dry  HEAD: Normocephalic; atraumatic.  EYES: PERRL, EOMI, normal sclera and conjunctiva, mild nystagmus   ENT: No nasal discharge; airway clear.  NECK: Supple; tender to touch on left side  CARD:  Regular rate and rhythm.   RESP: NO inc WOB   ABD: soft ntnd  EXT: limited ROM, pt's baseline  LYMPH: No acute cervical adenopathy.  NEURO: Alert, oriented, grossly unremarkable, CN2-12 normal,   PSYCH: Cooperative, appropriate. CONSTITUTIONAL: Well-developed; well-nourished; in mild acute distress.   SKIN: warm, dry  HEAD: Normocephalic; atraumatic.  EYES: PERRL, EOMI, normal sclera and conjunctiva, mild nystagmus   ENT: No nasal discharge; airway clear.  NECK: Supple; tender to touch on left side, pain reproducible with touch at paravertebral and trapezius, no midline tenderness,   CARD:  Regular rate and rhythm.   RESP: NO inc WOB   ABD: soft ntnd  EXT: limited ROM, pt's baseline,   LYMPH: No acute cervical adenopathy.  NEURO: Alert, oriented, grossly unremarkable, CN2-12 normal,   PSYCH: Cooperative, appropriate. CONSTITUTIONAL: Well-developed; well-nourished; in mild acute distress.   SKIN: warm, dry  HEAD: Normocephalic; atraumatic.  EYES: PERRL, EOMI, normal sclera and conjunctiva, mild nystagmus   ENT: No nasal discharge; airway clear.  NECK: Supple; tender to touch on left side, pain reproducible with touch at paravertebral and trapezius, no midline tenderness, no nuchal rigidity  CARD:  Regular rate and rhythm.   RESP: NO inc WOB   ABD: soft ntnd  EXT: limited ROM, pt's baseline,   LYMPH: No acute cervical adenopathy.  NEURO: Alert, oriented, grossly unremarkable, CN2-12 normal,   PSYCH: Cooperative, appropriate.

## 2021-06-29 NOTE — ED PROVIDER NOTE - NS ED ROS FT
Constitutional: (-) fever  Eyes/ENT: (-) blurry vision, (-) epistaxis  Cardiovascular: (-) chest pain, (-) syncope  Respiratory: (-) cough, (-) shortness of breath  Gastrointestinal: (-) vomiting, (-) diarrhea  Musculoskeletal: (-) neck pain, (-) back pain, (-) joint pain  Integumentary: (-) rash, (-) edema  Neurological: (+) headache, (-) altered mental status  Psychiatric: (-) hallucinations  Allergic/Immunologic: (-) pruritus Constitutional: (-) fever (-) chills  Eyes/ENT: (-) blurry vision, (-) epistaxis  Cardiovascular: (-) chest pain, (-) syncope  Respiratory: (-) cough, (-) shortness of breath  Gastrointestinal: (-) vomiting, (-) diarrhea  Musculoskeletal: (+) neck pain, (-) back pain, (-) joint pain, (-) pain in extremities  Integumentary: (-) rash, (-) edema  Neurological: (+) headache, (-) altered mental status  Psychiatric: (-) hallucinations  Allergic/Immunologic: (-) pruritus

## 2021-06-29 NOTE — ED PROVIDER NOTE - OBJECTIVE STATEMENT
61 year old female CC of neck and back pain with a pmh of myasthenia gravis and HTN. complains of 3 days of ipsilateral shooting neck pain that radiatates to the neck. pt rates her pain a 8/10 that is mildly reduced with motrin and tramadol. Pt has no other complaints except for nausea. 61 year old female CC of neck and back pain with a pmh of myasthenia gravis and HTN. complains of 3 days of ipsilateral shooting neck pain that radiates to the head. pt rates her pain a 8/10 that is mildly reduced with motrin and tramadol. Pt has no other complaints except for nausea. pt denies fevers and chills.

## 2021-06-29 NOTE — ED PROVIDER NOTE - CARE PROVIDER_API CALL
Storm Baum)  Orthopaedic Surgery  [address] [place] [address]  Phone: [phone]  Fax: [phone]  Follow Up Time:

## 2021-06-29 NOTE — ED PROVIDER NOTE - PATIENT PORTAL LINK FT
You can access the FollowMyHealth Patient Portal offered by [facility] by registering at the following website: [email or web address]. By joining [facility]’s FollowMyHealth portal, you will also be able to view your health information using other applications (apps) compatible with our system.

## 2021-06-29 NOTE — ED ADULT NURSE NOTE - NS_SISCREENINGSR_GEN_ALL_ED
Called patient and confirmed that she was due for a carotid duplex and she states she had it done yesterday at [facility] and will be following up with them for future treatment.     Negative

## 2021-06-29 NOTE — ED PROVIDER NOTE - CLINICAL SUMMARY MEDICAL DECISION MAKING FREE TEXT BOX
61 y.o. female, PMH of MG, comes in c/o neck pain which started 3 days ago, over left side of the neck, 2 days ago started to radiate into her head. Pain is sharp and shooting. No blurry/double vision, (-) photo/phonophobia, (-) CP/SOB/abdominal pain, (-) n/v/c/d. Ambulating at baseline. No weakness in UE. Not dropping things. No radiation of pain into arms. No fever/chills. On exam, pt in NAD, AAOx3, head NC/AT, CN II-XII intact, PEERL, EOMi, TM (-) hemotympanum, neck (-) midline tenderness, (+) reproducible tenderness over left neck and left trapezius m, (-) nuchal rigidity, lungs CTA B/L, CV S1S2 regular, abdomen soft/NT/ND/(+)BS, ext (-) edema, motor 5/5x4, sensation intact, good . Most likely MSK. Pt treated with Toradol/Robaxin with near-complete resolution of symptoms. Will d/c with PMD and ortho follow up. Advised to return for worsening of symptoms.

## 2021-06-29 NOTE — ED PROVIDER NOTE - NSFOLLOWUPINSTRUCTIONS_ED_ALL_ED_FT
Acute Neck Pain    WHAT YOU NEED TO KNOW:    Acute neck pain starts suddenly, increases quickly, and goes away in a few days. The pain may come and go, or be worse with certain movements. The pain may be only in your neck, or it may move to your arms, back, or shoulders. You may also have pain that starts in another body area and moves to your neck. Vertebral Column         DISCHARGE INSTRUCTIONS:    Return to the emergency department if:     You have an injury that causes neck pain and shooting pain down your arms or legs.      Your neck pain suddenly becomes severe.      You have neck pain along with numbness, tingling, or weakness in your arms or legs.      You have a stiff neck, a headache, and a fever.    Contact your healthcare provider if:     You have new or worsening symptoms.      Your symptoms continue even after treatment.      You have questions or concerns about your condition or care.    Medicines:     NSAIDs, such as ibuprofen, help decrease swelling, pain, and fever. This medicine is available without a doctor's order. Ask your healthcare provider which medicine to take and how often to take it. Follow directions. NSAIDs can cause stomach bleeding or kidney problems if not taken correctly. If you take blood thinner medicine, always ask if NSAIDs are safe for you.      Acetaminophen helps decrease pain and fever. Ask your healthcare provider how much to take and how often to take it. Follow directions. Acetaminophen can cause liver damage if not taken correctly.      Steroid medicine may be used to reduce inflammation. This can help relieve pain caused by swelling.      Take your medicine as directed. Contact your healthcare provider if you think your medicine is not helping or if you have side effects. Tell him or her if you are allergic to any medicine. Keep a list of the medicines, vitamins, and herbs you take. Include the amounts, and when and why you take them. Bring the list or the pill bottles to follow-up visits. Carry your medicine list with you in case of an emergency.    Manage or prevent acute neck pain:     Rest your neck as directed. Do not make sudden movements, such as turning your head quickly. Your healthcare provider may recommend you wear a cervical collar for a short time. The collar will prevent you from moving your head. This will give your neck time to heal if an injury is causing your neck pain. Ask your healthcare provider when you can return to sports or other normal daily activities.      Apply heat as directed. Heat helps relieve pain and swelling. Use a heat wrap, or soak a small towel in warm water. Wring out the extra water. Apply the heat wrap or towel for 20 minutes every hour, or as directed.      Apply ice as directed. Ice helps relieve pain and swelling, and can help prevent tissue damage. Use an ice pack, or put ice in a bag. Cover the ice pack or back with a towel before you apply it to your neck. Apply the ice pack or ice for 15 minutes every hour, or as directed. Your healthcare provider can tell you how often to apply ice.      Do neck exercises as directed. Neck exercises help strengthen the muscles and increase range of motion. Your healthcare provider will tell you which exercises are right for you. He may give you instructions, or he may recommend that you work with a physical therapist. Your healthcare provider or therapist can make sure you are doing the exercises correctly.       Maintain good posture. Try to keep your head and shoulders lifted when you sit. If you work in front of a computer, make sure the monitor is at the right level. You should not need to look up down to see the screen. You should also not have to lean forward to be able to read what is on the screen. Make sure your keyboard, mouse, and other computer items are placed where you do not have to extend your shoulder to reach them. Get up often if you work in front of a computer or sit for long periods of time. Stretch or walk around to keep your neck muscles loose.    Follow up with your healthcare provider as directed: Your healthcare provider may refer you to a specialist if your pain does not get better with treatment. Write down your questions so you remember to ask them during your visits.       © Copyright [employer] 2019 All illustrations and images included in CareNotes are the copyrighted property of [employer] or [employer].

## 2021-07-07 DIAGNOSIS — Z71.89 OTHER SPECIFIED COUNSELING: ICD-10-CM

## 2021-07-12 ENCOUNTER — APPOINTMENT (OUTPATIENT)
Dept: NEUROLOGY | Facility: CLINIC | Age: 61
End: 2021-07-12
Payer: MEDICAID

## 2021-07-12 VITALS
BODY MASS INDEX: 36.02 KG/M2 | HEIGHT: 64 IN | WEIGHT: 211 LBS | HEART RATE: 73 BPM | OXYGEN SATURATION: 98 % | DIASTOLIC BLOOD PRESSURE: 93 MMHG | SYSTOLIC BLOOD PRESSURE: 147 MMHG | TEMPERATURE: 98.3 F

## 2021-07-12 DIAGNOSIS — R90.89 OTHER ABNORMAL FINDINGS ON DIAGNOSTIC IMAGING OF CENTRAL NERVOUS SYSTEM: ICD-10-CM

## 2021-07-12 DIAGNOSIS — R25.2 CRAMP AND SPASM: ICD-10-CM

## 2021-07-12 PROCEDURE — 99214 OFFICE O/P EST MOD 30 MIN: CPT

## 2021-07-12 NOTE — PHYSICAL EXAM
[FreeTextEntry1] : 50 hand squeezes\par 2 minute upgaze no ptosis or diplopia\par \par States intermittent dysphagia.  Had swallow test before.  Doesn't recall them finding anything\par \par States of all the myasthenia symptoms the breathing is worse and the weakness and cramps in her legs.\par \par UE's giveway weakness only in prox/distal mm\par LE's.

## 2021-07-12 NOTE — ASSESSMENT
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  I discussed role of long term immunosuppression.\par She has recently completed second Covid-19 vaccine.   \par \par On present exam weakness seems giveway.  She c/o various symptoms which may be related to the pyridostigmine.  No significant fatiguability on 50 hand squeezes and she maintains upgaze for 2 min w/o droop or diplopia.  She does not seem to get short of breath during exam.  \par \par Plan:\par 1.  She may decrease pyridostigmine dose from 90 mg q6h to 60 mg alterating with 90 mg doses with option to decrease to 60 mg q6h if weakness stable and GI symptoms continue.\par 2.  Continue prednisone 10 mg/day.\par 3.  Continue IVIG q2 weeks.\par 4.  Repeat brain MRI due to mild WM abnormalities seen on previous study.\par 5.  Return in 1 month.

## 2021-07-16 ENCOUNTER — APPOINTMENT (OUTPATIENT)
Dept: PULMONOLOGY | Facility: CLINIC | Age: 61
End: 2021-07-16

## 2021-07-27 ENCOUNTER — NON-APPOINTMENT (OUTPATIENT)
Age: 61
End: 2021-07-27

## 2021-07-27 LAB
ALBUMIN SERPL ELPH-MCNC: 4.6 G/DL
ALP BLD-CCNC: 71 U/L
ALT SERPL-CCNC: 101 U/L
ANION GAP SERPL CALC-SCNC: 14 MMOL/L
AST SERPL-CCNC: 95 U/L
BASOPHILS # BLD AUTO: 0.03 K/UL
BASOPHILS NFR BLD AUTO: 0.4 %
BILIRUB SERPL-MCNC: 0.6 MG/DL
BUN SERPL-MCNC: 27 MG/DL
CALCIUM SERPL-MCNC: 10.8 MG/DL
CHLORIDE SERPL-SCNC: 96 MMOL/L
CO2 SERPL-SCNC: 27 MMOL/L
CREAT SERPL-MCNC: 1.2 MG/DL
EOSINOPHIL # BLD AUTO: 0.02 K/UL
EOSINOPHIL NFR BLD AUTO: 0.3 %
FERRITIN SERPL-MCNC: 439 NG/ML
GLUCOSE SERPL-MCNC: 83 MG/DL
HCT VFR BLD CALC: 42.1 %
HGB BLD-MCNC: 14 G/DL
IMM GRANULOCYTES NFR BLD AUTO: 0.3 %
LYMPHOCYTES # BLD AUTO: 1.49 K/UL
LYMPHOCYTES NFR BLD AUTO: 21 %
MAGNESIUM SERPL-MCNC: 2 MG/DL
MAN DIFF?: NORMAL
MCHC RBC-ENTMCNC: 30.8 PG
MCHC RBC-ENTMCNC: 33.3 G/DL
MCV RBC AUTO: 92.7 FL
MONOCYTES # BLD AUTO: 0.75 K/UL
MONOCYTES NFR BLD AUTO: 10.6 %
NEUTROPHILS # BLD AUTO: 4.79 K/UL
NEUTROPHILS NFR BLD AUTO: 67.4 %
PLATELET # BLD AUTO: 369 K/UL
POTASSIUM SERPL-SCNC: 4.9 MMOL/L
PROT SERPL-MCNC: 10.3 G/DL
RBC # BLD: 4.54 M/UL
RBC # FLD: 14.7 %
SODIUM SERPL-SCNC: 137 MMOL/L
WBC # FLD AUTO: 7.1 K/UL

## 2021-07-28 NOTE — PHYSICAL THERAPY INITIAL EVALUATION ADULT - IMPAIRMENTS FOUND, PT EVAL
aerobic capacity/endurance/posture/gait, locomotion, and balance/ergonomics and body mechanics/muscle strength Implemented All Fall with Harm Risk Interventions:  [place] to call system. Call bell, personal items and telephone within reach. Instruct patient to call for assistance. Room bathroom lighting operational. Non-slip footwear when patient is off stretcher. Physically safe environment: no spills, clutter or unnecessary equipment. Stretcher in lowest position, wheels locked, appropriate side rails in place. Provide visual cue, wrist band, yellow gown, etc. Monitor gait and stability. Monitor for mental status changes and reorient to person, place, and time. Review medications for side effects contributing to fall risk. Reinforce activity limits and safety measures with patient and family. Provide visual clues: red socks.

## 2021-08-03 ENCOUNTER — APPOINTMENT (OUTPATIENT)
Dept: NEUROLOGY | Facility: CLINIC | Age: 61
End: 2021-08-03
Payer: MEDICAID

## 2021-08-03 VITALS
OXYGEN SATURATION: 96 % | BODY MASS INDEX: 36.02 KG/M2 | HEIGHT: 64 IN | DIASTOLIC BLOOD PRESSURE: 81 MMHG | WEIGHT: 211 LBS | HEART RATE: 86 BPM | SYSTOLIC BLOOD PRESSURE: 153 MMHG | TEMPERATURE: 97.1 F

## 2021-08-03 PROCEDURE — 99214 OFFICE O/P EST MOD 30 MIN: CPT

## 2021-08-03 NOTE — PHYSICAL EXAM
[FreeTextEntry1] : 5/5 proximal and distal upper and lower extremities.\par No eyelid droop.\par Speech is clear.\par

## 2021-08-03 NOTE — HISTORY OF PRESENT ILLNESS
[FreeTextEntry1] : PT presents today in f/u for MG, ab negative.  Is on IVIG. Has recent increase in LFT's but hepatitis panel negative.  Lastest LFT's normal. She is going to GI on Thursday for evaluation and also has colonoscopy tomorrow.  Ferritin level was low.  \par \par 7/27/21:\par AST = 36\par ALT = 27\par \par 7/12/21:\par AST = 95 (0-41)\par ALT = 101 (0-41)\par Alk phot = 71\par eGFR = 49 mL/min\par \par 1/22/21:\par AST = 51\par ALT = 33\par Alk Phos = 221\par eGFR = 70 mL/min\par \par \par Myasthenia is status quo.  IVIG now every 2 weeks.  Denies problems with infusion.  Was taking benadryl and motrin but will change to benadryl and tylenol (since GFR decreasing).\par \par Denies worsening of breathing (states taken off the inhalers because not warranted by PFTs and wasn't helping her though it had helped her in the past).  States short of breath when carries things, or walks more than 1-2 blocks or climbing a flight of stairs (sometimes has to stop once half way up to catch breath).\par \par Before q2 week IVIG.  Was in hospital every few months, now last hospitalization in February.\par Imuran stopped for about 3 months due to pancreatitis.\par

## 2021-08-03 NOTE — DISCUSSION/SUMMARY
[FreeTextEntry1] : Myasthenia gravis, antibody negative.  She is doing well on q2week IVIG.  \par She had pancreatitis a few months ago and LFT's apparently improved.\par Has GI f/u.  Advised to discontinue advil due to decline in renal function.\par Return in 4-6 weeks.

## 2021-08-04 ENCOUNTER — TRANSCRIPTION ENCOUNTER (OUTPATIENT)
Age: 61
End: 2021-08-04

## 2021-08-04 ENCOUNTER — RESULT REVIEW (OUTPATIENT)
Age: 61
End: 2021-08-04

## 2021-08-04 ENCOUNTER — OUTPATIENT (OUTPATIENT)
Dept: OUTPATIENT SERVICES | Facility: HOSPITAL | Age: 61
LOS: 1 days | Discharge: HOME | End: 2021-08-04
Payer: MEDICAID

## 2021-08-04 VITALS — HEIGHT: 64 IN | WEIGHT: 210.1 LBS

## 2021-08-04 VITALS
SYSTOLIC BLOOD PRESSURE: 121 MMHG | RESPIRATION RATE: 19 BRPM | HEART RATE: 58 BPM | OXYGEN SATURATION: 100 % | DIASTOLIC BLOOD PRESSURE: 65 MMHG

## 2021-08-04 DIAGNOSIS — Z98.890 OTHER SPECIFIED POSTPROCEDURAL STATES: Chronic | ICD-10-CM

## 2021-08-04 PROCEDURE — 45380 COLONOSCOPY AND BIOPSY: CPT

## 2021-08-04 PROCEDURE — 88305 TISSUE EXAM BY PATHOLOGIST: CPT | Mod: 26

## 2021-08-04 PROCEDURE — 43239 EGD BIOPSY SINGLE/MULTIPLE: CPT | Mod: XS

## 2021-08-04 RX ORDER — DOXEPIN HCL 100 MG
0 CAPSULE ORAL
Qty: 0 | Refills: 0 | DISCHARGE

## 2021-08-04 RX ORDER — FAMOTIDINE 10 MG/ML
1 INJECTION INTRAVENOUS
Qty: 30 | Refills: 0
Start: 2021-08-04 | End: 2021-09-02

## 2021-08-04 RX ORDER — BUDESONIDE AND FORMOTEROL FUMARATE DIHYDRATE 160; 4.5 UG/1; UG/1
0 AEROSOL RESPIRATORY (INHALATION)
Qty: 0 | Refills: 0 | DISCHARGE

## 2021-08-04 RX ORDER — PANTOPRAZOLE SODIUM 20 MG/1
1 TABLET, DELAYED RELEASE ORAL
Qty: 60 | Refills: 2
Start: 2021-08-04 | End: 2021-11-01

## 2021-08-04 NOTE — CHART NOTE - NSCHARTNOTEFT_GEN_A_CORE
PACU ANESTHESIA ADMISSION NOTE      Procedure:   Post op diagnosis:      ____  Intubated  TV:______       Rate: ______      FiO2: ______    __x__  Patent Airway    __x__  Full return of protective reflexes    __x__  Full recovery from anesthesia / back to baseline     Vitals:   See Anesthesia record  T- 97.7 P- 66 R-17 B/P- 113/66 SPO2- 100% on RA    Mental Status:  __x__ Awake   _____ Alert   __x___ Drowsy   _____ Sedated    Nausea/Vomiting:  __x__ NO  ______Yes,   See Post - Op Orders          Pain Scale (0-10):  __0___    Treatment: ____ None    ____ See Post - Op/PCA Orders    Post - Operative Fluids:   ____ Oral   __x__ See Post - Op Orders    Plan: Discharge:   __x__Home       _____Floor     _____Critical Care    _____  Other:_________________    Comments: No anesthesia complications/issues noted. Discharge to HOME when criteria met.

## 2021-08-04 NOTE — ASU PATIENT PROFILE, ADULT - PSH
H/O dilation and curettage    History of foot surgery     H/O colonoscopy  2016  H/O dilation and curettage    History of foot surgery

## 2021-08-04 NOTE — ASU DISCHARGE PLAN (ADULT/PEDIATRIC) - ASU DC SPECIAL INSTRUCTIONSFT
Follow up with our [facility] located at [address] [place] [address]. Phone Number: [phone]

## 2021-08-04 NOTE — ASU PATIENT PROFILE, ADULT - PMH
GERD (gastroesophageal reflux disease)    High blood cholesterol    HTN (hypertension)    Hypothyroid    Myasthenia gravis    Pancreatitis    POTS (postural orthostatic tachycardia syndrome)

## 2021-08-04 NOTE — H&P PST ADULT - HISTORY OF PRESENT ILLNESS
59 y/o female patient with PMH of MG treated with mestinone and prednisone 20 mg oral and azathioprin , DLD, HTN, hypothyroidism, valvular heart disease, GERD on protonix 40 po QD and pepcid presented for colonoscopy for change in bowel habits   She has regular bms every 1-2 days but for the past month she noticed that she has 2-3 BMs per day every that are intermittently loose. There are no other associated symptoms. Denies weight loss or nocturnal symptoms or red blood per rectum. There also has not been any changes to her diet. patient denies any family history of colon cancer    also complains of dysphagia to solids s/p multiple EGD, no esophageal biopsies on chart, s/p dilation at [facility]

## 2021-08-06 LAB
SURGICAL PATHOLOGY STUDY: SIGNIFICANT CHANGE UP
SURGICAL PATHOLOGY STUDY: SIGNIFICANT CHANGE UP

## 2021-08-10 DIAGNOSIS — Z88.2 ALLERGY STATUS TO SULFONAMIDES: ICD-10-CM

## 2021-08-10 DIAGNOSIS — I10 ESSENTIAL (PRIMARY) HYPERTENSION: ICD-10-CM

## 2021-08-10 DIAGNOSIS — E03.9 HYPOTHYROIDISM, UNSPECIFIED: ICD-10-CM

## 2021-08-10 DIAGNOSIS — K52.9 NONINFECTIVE GASTROENTERITIS AND COLITIS, UNSPECIFIED: ICD-10-CM

## 2021-08-10 DIAGNOSIS — R19.7 DIARRHEA, UNSPECIFIED: ICD-10-CM

## 2021-08-10 DIAGNOSIS — K74.60 UNSPECIFIED CIRRHOSIS OF LIVER: ICD-10-CM

## 2021-08-10 DIAGNOSIS — K21.9 GASTRO-ESOPHAGEAL REFLUX DISEASE WITHOUT ESOPHAGITIS: ICD-10-CM

## 2021-08-10 DIAGNOSIS — K63.5 POLYP OF COLON: ICD-10-CM

## 2021-08-10 DIAGNOSIS — E78.00 PURE HYPERCHOLESTEROLEMIA, UNSPECIFIED: ICD-10-CM

## 2021-08-18 ENCOUNTER — OUTPATIENT (OUTPATIENT)
Dept: OUTPATIENT SERVICES | Facility: HOSPITAL | Age: 61
LOS: 1 days | Discharge: HOME | End: 2021-08-18
Payer: MEDICAID

## 2021-08-18 ENCOUNTER — RESULT REVIEW (OUTPATIENT)
Age: 61
End: 2021-08-18

## 2021-08-18 DIAGNOSIS — Z98.890 OTHER SPECIFIED POSTPROCEDURAL STATES: Chronic | ICD-10-CM

## 2021-08-18 DIAGNOSIS — Z12.31 ENCOUNTER FOR SCREENING MAMMOGRAM FOR MALIGNANT NEOPLASM OF BREAST: ICD-10-CM

## 2021-08-18 PROBLEM — K21.9 GASTRO-ESOPHAGEAL REFLUX DISEASE WITHOUT ESOPHAGITIS: Chronic | Status: ACTIVE | Noted: 2021-08-04

## 2021-08-18 PROBLEM — E03.9 HYPOTHYROIDISM, UNSPECIFIED: Chronic | Status: ACTIVE | Noted: 2021-08-04

## 2021-08-18 PROBLEM — I10 ESSENTIAL (PRIMARY) HYPERTENSION: Chronic | Status: ACTIVE | Noted: 2021-08-04

## 2021-08-18 PROCEDURE — 77063 BREAST TOMOSYNTHESIS BI: CPT | Mod: 26

## 2021-08-18 PROCEDURE — 77067 SCR MAMMO BI INCL CAD: CPT | Mod: 26

## 2021-09-09 ENCOUNTER — APPOINTMENT (OUTPATIENT)
Dept: NEUROLOGY | Facility: CLINIC | Age: 61
End: 2021-09-09
Payer: MEDICAID

## 2021-09-09 ENCOUNTER — NON-APPOINTMENT (OUTPATIENT)
Age: 61
End: 2021-09-09

## 2021-09-09 VITALS
SYSTOLIC BLOOD PRESSURE: 126 MMHG | TEMPERATURE: 98.2 F | BODY MASS INDEX: 35.51 KG/M2 | WEIGHT: 208 LBS | DIASTOLIC BLOOD PRESSURE: 78 MMHG | HEIGHT: 64 IN | HEART RATE: 66 BPM

## 2021-09-09 DIAGNOSIS — M25.552 PAIN IN LEFT HIP: ICD-10-CM

## 2021-09-09 PROCEDURE — 99214 OFFICE O/P EST MOD 30 MIN: CPT

## 2021-09-09 NOTE — PHYSICAL EXAM
[FreeTextEntry1] : Speech is fluent.  No nasal speech.\par \par 5/5 upper extremities\par 5/5 lower extremities except for Left hip flexion 4/5\par \par ambulates w/o difficulty.\par \par ? malar rash vs lily cheeks, feels flushed in face.\par

## 2021-09-09 NOTE — ASSESSMENT
[FreeTextEntry1] : 1.  Myasthenia gravis, antibody negative, stable on mestinon 60/90/60/90 and q2 week IVIG.\par 2.  s/p pancreatitis when on azathioprine, now resolved.\par 3.  ? malar rash.\par \par Plan:\par 1.  Continue mestinon 60/90/60/90 and q2 week IVIG.\par 2.  Check agrin antibody, antidoublestranded DNA, and vit D level.\par 3.  Return in 3 months.\par

## 2021-09-09 NOTE — HISTORY OF PRESENT ILLNESS
[FreeTextEntry1] : Pt is 61 yof with myasthenia gravis.  States she is at status quo.  Breathing the same.  Climbs one flight w/o stopping.  ANtibody negative myasthenia.  Single fiber testing at [place] on her forehead.  Rep stim negative and antibody testing negative.  Has not had [facility] antibody testing.  States the weekend before the next IVIG (2-3 days) she will feel fatigue starts to build\par \par Drinks through straw is easier than drinking from glass.  No trouble eating solids uses smaller bites especially for meat.  Gets hot flashes and red in cheeks every day for "a long time" 6-12 months.\par \par taking mestinon 60 / 90 / 60 /90 mg ( decreased from 90 mg q6h) and the lower dose helps with the reflux.

## 2021-09-10 ENCOUNTER — NON-APPOINTMENT (OUTPATIENT)
Age: 61
End: 2021-09-10

## 2021-09-10 ENCOUNTER — APPOINTMENT (OUTPATIENT)
Dept: OBGYN | Facility: CLINIC | Age: 61
End: 2021-09-10
Payer: MEDICAID

## 2021-09-10 VITALS
DIASTOLIC BLOOD PRESSURE: 76 MMHG | BODY MASS INDEX: 35.51 KG/M2 | SYSTOLIC BLOOD PRESSURE: 125 MMHG | HEIGHT: 64 IN | WEIGHT: 208 LBS | HEART RATE: 70 BPM | RESPIRATION RATE: 20 BRPM

## 2021-09-10 DIAGNOSIS — Z23 ENCOUNTER FOR IMMUNIZATION: ICD-10-CM

## 2021-09-10 PROCEDURE — 99396 PREV VISIT EST AGE 40-64: CPT

## 2021-09-16 LAB — HPV HIGH+LOW RISK DNA PNL CVX: NOT DETECTED

## 2021-09-21 LAB — CYTOLOGY CVX/VAG DOC THIN PREP: NORMAL

## 2021-09-22 LAB
COVID-19 SPIKE DOMAIN ANTIBODY INTERPRETATION: POSITIVE
SARS-COV-2 AB SERPL IA-ACNC: >250 U/ML

## 2021-09-28 ENCOUNTER — RX RENEWAL (OUTPATIENT)
Age: 61
End: 2021-09-28

## 2021-10-05 ENCOUNTER — OUTPATIENT (OUTPATIENT)
Dept: OUTPATIENT SERVICES | Facility: HOSPITAL | Age: 61
LOS: 1 days | Discharge: HOME | End: 2021-10-05
Payer: MEDICAID

## 2021-10-05 VITALS
HEIGHT: 64 IN | WEIGHT: 209 LBS | RESPIRATION RATE: 16 BRPM | DIASTOLIC BLOOD PRESSURE: 70 MMHG | SYSTOLIC BLOOD PRESSURE: 144 MMHG | HEART RATE: 70 BPM | TEMPERATURE: 97 F | OXYGEN SATURATION: 99 %

## 2021-10-05 DIAGNOSIS — Z98.890 OTHER SPECIFIED POSTPROCEDURAL STATES: Chronic | ICD-10-CM

## 2021-10-05 DIAGNOSIS — K80.20 CALCULUS OF GALLBLADDER WITHOUT CHOLECYSTITIS WITHOUT OBSTRUCTION: ICD-10-CM

## 2021-10-05 DIAGNOSIS — Z01.818 ENCOUNTER FOR OTHER PREPROCEDURAL EXAMINATION: ICD-10-CM

## 2021-10-05 LAB
ALBUMIN SERPL ELPH-MCNC: 4.1 G/DL — SIGNIFICANT CHANGE UP (ref 3.5–5.2)
ALP SERPL-CCNC: 54 U/L — SIGNIFICANT CHANGE UP (ref 30–115)
ALT FLD-CCNC: 26 U/L — SIGNIFICANT CHANGE UP (ref 0–41)
ANION GAP SERPL CALC-SCNC: 13 MMOL/L — SIGNIFICANT CHANGE UP (ref 7–14)
APTT BLD: 30.7 SEC — SIGNIFICANT CHANGE UP (ref 27–39.2)
AST SERPL-CCNC: 31 U/L — SIGNIFICANT CHANGE UP (ref 0–41)
BASOPHILS # BLD AUTO: 0.02 K/UL — SIGNIFICANT CHANGE UP (ref 0–0.2)
BASOPHILS NFR BLD AUTO: 0.4 % — SIGNIFICANT CHANGE UP (ref 0–1)
BILIRUB SERPL-MCNC: 0.5 MG/DL — SIGNIFICANT CHANGE UP (ref 0.2–1.2)
BUN SERPL-MCNC: 19 MG/DL — SIGNIFICANT CHANGE UP (ref 10–20)
CALCIUM SERPL-MCNC: 9.5 MG/DL — SIGNIFICANT CHANGE UP (ref 8.5–10.1)
CHLORIDE SERPL-SCNC: 100 MMOL/L — SIGNIFICANT CHANGE UP (ref 98–110)
CO2 SERPL-SCNC: 22 MMOL/L — SIGNIFICANT CHANGE UP (ref 17–32)
CREAT SERPL-MCNC: 1 MG/DL — SIGNIFICANT CHANGE UP (ref 0.7–1.5)
EOSINOPHIL # BLD AUTO: 0 K/UL — SIGNIFICANT CHANGE UP (ref 0–0.7)
EOSINOPHIL NFR BLD AUTO: 0 % — SIGNIFICANT CHANGE UP (ref 0–8)
GLUCOSE SERPL-MCNC: 88 MG/DL — SIGNIFICANT CHANGE UP (ref 70–99)
HCT VFR BLD CALC: 36.5 % — LOW (ref 37–47)
HGB BLD-MCNC: 12.3 G/DL — SIGNIFICANT CHANGE UP (ref 12–16)
IMM GRANULOCYTES NFR BLD AUTO: 0.4 % — HIGH (ref 0.1–0.3)
INR BLD: 0.92 RATIO — SIGNIFICANT CHANGE UP (ref 0.65–1.3)
LYMPHOCYTES # BLD AUTO: 0.82 K/UL — LOW (ref 1.2–3.4)
LYMPHOCYTES # BLD AUTO: 15.2 % — LOW (ref 20.5–51.1)
MCHC RBC-ENTMCNC: 31.5 PG — HIGH (ref 27–31)
MCHC RBC-ENTMCNC: 33.7 G/DL — SIGNIFICANT CHANGE UP (ref 32–37)
MCV RBC AUTO: 93.4 FL — SIGNIFICANT CHANGE UP (ref 81–99)
MONOCYTES # BLD AUTO: 0.24 K/UL — SIGNIFICANT CHANGE UP (ref 0.1–0.6)
MONOCYTES NFR BLD AUTO: 4.4 % — SIGNIFICANT CHANGE UP (ref 1.7–9.3)
NEUTROPHILS # BLD AUTO: 4.31 K/UL — SIGNIFICANT CHANGE UP (ref 1.4–6.5)
NEUTROPHILS NFR BLD AUTO: 79.6 % — HIGH (ref 42.2–75.2)
NRBC # BLD: 0 /100 WBCS — SIGNIFICANT CHANGE UP (ref 0–0)
PLATELET # BLD AUTO: 359 K/UL — SIGNIFICANT CHANGE UP (ref 130–400)
POTASSIUM SERPL-MCNC: 4.9 MMOL/L — SIGNIFICANT CHANGE UP (ref 3.5–5)
POTASSIUM SERPL-SCNC: 4.9 MMOL/L — SIGNIFICANT CHANGE UP (ref 3.5–5)
PROT SERPL-MCNC: 9.3 G/DL — HIGH (ref 6–8)
PROTHROM AB SERPL-ACNC: 10.6 SEC — SIGNIFICANT CHANGE UP (ref 9.95–12.87)
RBC # BLD: 3.91 M/UL — LOW (ref 4.2–5.4)
RBC # FLD: 14.9 % — HIGH (ref 11.5–14.5)
SODIUM SERPL-SCNC: 135 MMOL/L — SIGNIFICANT CHANGE UP (ref 135–146)
WBC # BLD: 5.41 K/UL — SIGNIFICANT CHANGE UP (ref 4.8–10.8)
WBC # FLD AUTO: 5.41 K/UL — SIGNIFICANT CHANGE UP (ref 4.8–10.8)

## 2021-10-05 PROCEDURE — 93010 ELECTROCARDIOGRAM REPORT: CPT

## 2021-10-05 RX ORDER — LISINOPRIL 2.5 MG/1
1 TABLET ORAL
Qty: 0 | Refills: 0 | DISCHARGE

## 2021-10-05 RX ORDER — PANTOPRAZOLE SODIUM 20 MG/1
1 TABLET, DELAYED RELEASE ORAL
Qty: 0 | Refills: 0 | DISCHARGE

## 2021-10-05 RX ORDER — ATORVASTATIN CALCIUM 80 MG/1
1 TABLET, FILM COATED ORAL
Qty: 0 | Refills: 0 | DISCHARGE

## 2021-10-05 RX ORDER — ALBUTEROL 90 UG/1
2 AEROSOL, METERED ORAL
Qty: 0 | Refills: 0 | DISCHARGE

## 2021-10-05 RX ORDER — PYRIDOSTIGMINE BROMIDE 60 MG/5ML
1.5 SOLUTION ORAL
Qty: 0 | Refills: 0 | DISCHARGE

## 2021-10-05 RX ORDER — BEMPEDOIC ACID AND EZETIMIBE 180; 10 MG/1; MG/1
1 TABLET, FILM COATED ORAL
Qty: 0 | Refills: 0 | DISCHARGE

## 2021-10-05 NOTE — H&P PST ADULT - WEIGHT IN LBS
Implemented All Fall with Harm Risk Interventions:  [place] to call system. Call bell, personal items and telephone within reach. Instruct patient to call for assistance. Room bathroom lighting operational. Non-slip footwear when patient is off stretcher. Physically safe environment: no spills, clutter or unnecessary equipment. Stretcher in lowest position, wheels locked, appropriate side rails in place. Provide visual cue, wrist band, yellow gown, etc. Monitor gait and stability. Monitor for mental status changes and reorient to person, place, and time. Review medications for side effects contributing to fall risk. Reinforce activity limits and safety measures with patient and family. Provide visual clues: red socks.
208.9

## 2021-10-05 NOTE — H&P PST ADULT - REASON FOR ADMISSION
62 yo female presents for PAST in preparation for laparoscopic cholecystectomy possible open on 10/18/2021 under general anesthesia by Dr. LUIGI Little ([facility]).

## 2021-10-05 NOTE — H&P PST ADULT - NSICDXPASTSURGICALHX_GEN_ALL_CORE_FT
PAST SURGICAL HISTORY:  H/O cone biopsy of cervix     H/O dilation and curettage     History of foot surgery

## 2021-10-05 NOTE — H&P PST ADULT - NSICDXPASTMEDICALHX_GEN_ALL_CORE_FT
PAST MEDICAL HISTORY:  GERD (gastroesophageal reflux disease)     High blood cholesterol     HTN (hypertension)     Hypothyroid     Leaky heart valve     Myasthenia gravis     KADI (obstructive sleep apnea)     Pancreatitis     POTS (postural orthostatic tachycardia syndrome)

## 2021-10-05 NOTE — H&P PST ADULT - HISTORY OF PRESENT ILLNESS
Pt states for months she has been experiencing RUQ abdominal pain. Pain started off RUQ then became generalized rated 8/10 on most days. Worse when doing any activity and caused occasional N&V. Came to ER where imaging noted pt to have gallstones. Now for listed procedure.    Denies any chest pain, difficulty breathing, SOB, palpitations, dysuria, URI, or any other infections in the last 2 weeks. Denies any recent travel, contact, or exposure to any persons with known or suspected COVID-19. Pt also denies COVID testing within the last 2 weeks. Pt admits to receiving all doses of Moderna COVID vaccine. Denies any suicidal or homicidal ideations. Pt advised to self quarantine until day of procedure. Poor exercise tolerance as pt is unable to climb a flight of stairs without dyspnea d/t MG. KADI reviewed with patient. Pt verbalized understanding of all pre-operative instructions.    Anesthesia Alert  NO--Difficult Airway  NO--History of neck surgery or radiation  NO--Limited ROM of neck  NO--History of Malignant hyperthermia  NO--No personal or family history of Pseudocholinesterase deficiency.  NO--Prior Anesthesia Complication  NO--Latex Allergy  NO--Loose teeth  NO--History of Rheumatoid Arthritis  YES--KADI  NO--Bleeding Risk  YES--Other: daily prednisone use

## 2021-10-08 ENCOUNTER — NON-APPOINTMENT (OUTPATIENT)
Age: 61
End: 2021-10-08

## 2021-10-15 ENCOUNTER — OUTPATIENT (OUTPATIENT)
Dept: OUTPATIENT SERVICES | Facility: HOSPITAL | Age: 61
LOS: 1 days | Discharge: HOME | End: 2021-10-15

## 2021-10-15 DIAGNOSIS — Z11.59 ENCOUNTER FOR SCREENING FOR OTHER VIRAL DISEASES: ICD-10-CM

## 2021-10-15 DIAGNOSIS — Z98.890 OTHER SPECIFIED POSTPROCEDURAL STATES: Chronic | ICD-10-CM

## 2021-10-16 PROBLEM — G47.33 OBSTRUCTIVE SLEEP APNEA (ADULT) (PEDIATRIC): Chronic | Status: ACTIVE | Noted: 2021-10-05

## 2021-10-18 ENCOUNTER — INPATIENT (INPATIENT)
Facility: HOSPITAL | Age: 61
LOS: 0 days | Discharge: HOME | End: 2021-10-19
Attending: SURGERY | Admitting: SURGERY
Payer: MEDICAID

## 2021-10-18 ENCOUNTER — RESULT REVIEW (OUTPATIENT)
Age: 61
End: 2021-10-18

## 2021-10-18 VITALS
RESPIRATION RATE: 17 BRPM | TEMPERATURE: 97 F | SYSTOLIC BLOOD PRESSURE: 121 MMHG | HEIGHT: 64 IN | OXYGEN SATURATION: 100 % | WEIGHT: 210.1 LBS | HEART RATE: 50 BPM | DIASTOLIC BLOOD PRESSURE: 56 MMHG

## 2021-10-18 DIAGNOSIS — Z98.890 OTHER SPECIFIED POSTPROCEDURAL STATES: Chronic | ICD-10-CM

## 2021-10-18 PROCEDURE — 88304 TISSUE EXAM BY PATHOLOGIST: CPT | Mod: 26

## 2021-10-18 RX ORDER — OXYCODONE HYDROCHLORIDE 5 MG/1
5 TABLET ORAL EVERY 4 HOURS
Refills: 0 | Status: DISCONTINUED | OUTPATIENT
Start: 2021-10-18 | End: 2021-10-19

## 2021-10-18 RX ORDER — ERGOCALCIFEROL 1.25 MG/1
0 CAPSULE ORAL
Qty: 0 | Refills: 0 | DISCHARGE

## 2021-10-18 RX ORDER — PANTOPRAZOLE SODIUM 20 MG/1
40 TABLET, DELAYED RELEASE ORAL DAILY
Refills: 0 | Status: DISCONTINUED | OUTPATIENT
Start: 2021-10-18 | End: 2021-10-19

## 2021-10-18 RX ORDER — IMMUNE GLOBULIN,GAMMA(IGG) 5 %
0 VIAL (ML) INTRAVENOUS
Qty: 0 | Refills: 0 | DISCHARGE

## 2021-10-18 RX ORDER — ACETAMINOPHEN 500 MG
650 TABLET ORAL EVERY 6 HOURS
Refills: 0 | Status: DISCONTINUED | OUTPATIENT
Start: 2021-10-18 | End: 2021-10-19

## 2021-10-18 RX ORDER — HYDROMORPHONE HYDROCHLORIDE 2 MG/ML
0.5 INJECTION INTRAMUSCULAR; INTRAVENOUS; SUBCUTANEOUS
Refills: 0 | Status: DISCONTINUED | OUTPATIENT
Start: 2021-10-18 | End: 2021-10-18

## 2021-10-18 RX ORDER — METOPROLOL TARTRATE 50 MG
25 TABLET ORAL DAILY
Refills: 0 | Status: DISCONTINUED | OUTPATIENT
Start: 2021-10-18 | End: 2021-10-19

## 2021-10-18 RX ORDER — PANTOPRAZOLE SODIUM 20 MG/1
1 TABLET, DELAYED RELEASE ORAL
Qty: 0 | Refills: 0 | DISCHARGE

## 2021-10-18 RX ORDER — HYDROMORPHONE HYDROCHLORIDE 2 MG/ML
0.5 INJECTION INTRAMUSCULAR; INTRAVENOUS; SUBCUTANEOUS EVERY 4 HOURS
Refills: 0 | Status: DISCONTINUED | OUTPATIENT
Start: 2021-10-18 | End: 2021-10-19

## 2021-10-18 RX ORDER — INFLUENZA VIRUS VACCINE 15; 15; 15; 15 UG/.5ML; UG/.5ML; UG/.5ML; UG/.5ML
0.5 SUSPENSION INTRAMUSCULAR ONCE
Refills: 0 | Status: COMPLETED | OUTPATIENT
Start: 2021-10-18 | End: 2021-10-18

## 2021-10-18 RX ORDER — ASPIRIN/CALCIUM CARB/MAGNESIUM 324 MG
1 TABLET ORAL
Qty: 0 | Refills: 0 | DISCHARGE

## 2021-10-18 RX ORDER — ASPIRIN/CALCIUM CARB/MAGNESIUM 324 MG
81 TABLET ORAL DAILY
Refills: 0 | Status: DISCONTINUED | OUTPATIENT
Start: 2021-10-18 | End: 2021-10-19

## 2021-10-18 RX ORDER — SODIUM CHLORIDE 9 MG/ML
1000 INJECTION, SOLUTION INTRAVENOUS
Refills: 0 | Status: DISCONTINUED | OUTPATIENT
Start: 2021-10-18 | End: 2021-10-18

## 2021-10-18 RX ORDER — LEVOTHYROXINE SODIUM 125 MCG
50 TABLET ORAL DAILY
Refills: 0 | Status: DISCONTINUED | OUTPATIENT
Start: 2021-10-18 | End: 2021-10-19

## 2021-10-18 RX ORDER — LISINOPRIL 2.5 MG/1
20 TABLET ORAL DAILY
Refills: 0 | Status: DISCONTINUED | OUTPATIENT
Start: 2021-10-18 | End: 2021-10-19

## 2021-10-18 RX ORDER — FLUTICASONE PROPIONATE 50 MCG
1 SPRAY, SUSPENSION NASAL
Qty: 0 | Refills: 0 | DISCHARGE

## 2021-10-18 RX ORDER — LISINOPRIL 2.5 MG/1
1 TABLET ORAL
Qty: 0 | Refills: 0 | DISCHARGE

## 2021-10-18 RX ORDER — PYRIDOSTIGMINE BROMIDE 60 MG/5ML
60 SOLUTION ORAL
Refills: 0 | Status: DISCONTINUED | OUTPATIENT
Start: 2021-10-18 | End: 2021-10-19

## 2021-10-18 RX ORDER — ONDANSETRON 8 MG/1
4 TABLET, FILM COATED ORAL ONCE
Refills: 0 | Status: COMPLETED | OUTPATIENT
Start: 2021-10-18 | End: 2021-10-18

## 2021-10-18 RX ORDER — ENOXAPARIN SODIUM 100 MG/ML
40 INJECTION SUBCUTANEOUS DAILY
Refills: 0 | Status: DISCONTINUED | OUTPATIENT
Start: 2021-10-18 | End: 2021-10-19

## 2021-10-18 RX ORDER — BEMPEDOIC ACID AND EZETIMIBE 180; 10 MG/1; MG/1
1 TABLET, FILM COATED ORAL
Qty: 0 | Refills: 0 | DISCHARGE

## 2021-10-18 RX ORDER — SODIUM CHLORIDE 9 MG/ML
1000 INJECTION, SOLUTION INTRAVENOUS
Refills: 0 | Status: DISCONTINUED | OUTPATIENT
Start: 2021-10-18 | End: 2021-10-19

## 2021-10-18 RX ORDER — METOPROLOL TARTRATE 50 MG
1 TABLET ORAL
Qty: 0 | Refills: 0 | DISCHARGE

## 2021-10-18 RX ADMIN — Medication 81 MILLIGRAM(S): at 11:49

## 2021-10-18 RX ADMIN — HYDROMORPHONE HYDROCHLORIDE 0.5 MILLIGRAM(S): 2 INJECTION INTRAMUSCULAR; INTRAVENOUS; SUBCUTANEOUS at 10:34

## 2021-10-18 RX ADMIN — HYDROMORPHONE HYDROCHLORIDE 0.5 MILLIGRAM(S): 2 INJECTION INTRAMUSCULAR; INTRAVENOUS; SUBCUTANEOUS at 22:18

## 2021-10-18 RX ADMIN — HYDROMORPHONE HYDROCHLORIDE 0.5 MILLIGRAM(S): 2 INJECTION INTRAMUSCULAR; INTRAVENOUS; SUBCUTANEOUS at 11:19

## 2021-10-18 RX ADMIN — Medication 650 MILLIGRAM(S): at 11:49

## 2021-10-18 RX ADMIN — PANTOPRAZOLE SODIUM 40 MILLIGRAM(S): 20 TABLET, DELAYED RELEASE ORAL at 10:38

## 2021-10-18 RX ADMIN — PYRIDOSTIGMINE BROMIDE 60 MILLIGRAM(S): 60 SOLUTION ORAL at 21:57

## 2021-10-18 RX ADMIN — SODIUM CHLORIDE 100 MILLILITER(S): 9 INJECTION, SOLUTION INTRAVENOUS at 10:35

## 2021-10-18 RX ADMIN — HYDROMORPHONE HYDROCHLORIDE 0.5 MILLIGRAM(S): 2 INJECTION INTRAMUSCULAR; INTRAVENOUS; SUBCUTANEOUS at 12:18

## 2021-10-18 RX ADMIN — ENOXAPARIN SODIUM 40 MILLIGRAM(S): 100 INJECTION SUBCUTANEOUS at 11:59

## 2021-10-18 RX ADMIN — HYDROMORPHONE HYDROCHLORIDE 0.5 MILLIGRAM(S): 2 INJECTION INTRAMUSCULAR; INTRAVENOUS; SUBCUTANEOUS at 13:04

## 2021-10-18 RX ADMIN — ONDANSETRON 4 MILLIGRAM(S): 8 TABLET, FILM COATED ORAL at 10:20

## 2021-10-18 RX ADMIN — HYDROMORPHONE HYDROCHLORIDE 0.5 MILLIGRAM(S): 2 INJECTION INTRAMUSCULAR; INTRAVENOUS; SUBCUTANEOUS at 22:28

## 2021-10-18 NOTE — BRIEF OPERATIVE NOTE - OPERATION/FINDINGS
Cholelithiasis. Gallbladder removed. Cystic duct and artery clipped. No leakage of bile. No complications.

## 2021-10-18 NOTE — CHART NOTE - NSCHARTNOTEFT_GEN_A_CORE
PACU ANESTHESIA ADMISSION NOTE      Procedure: Laparoscopic cholecystectomy  Post op diagnosis:  Acute cholecystitis    ____  Intubated  TV:______       Rate: ______      FiO2: ______    __x__  Patent Airway    __x__  Full return of protective reflexes    __x__  Full recovery from anesthesia / back to baseline status    Vitals:  T(C): 35.9 (10-18-21 @ 07:11), Max: 35.9 (10-18-21 @ 06:28)  HR: 50 (10-18-21 @ 07:11) (50 - 50)  BP: 121/56 (10-18-21 @ 07:11) (121/56 - 121/56)  RR: 17 (10-18-21 @ 07:11) (17 - 17)  SpO2: 100% (10-18-21 @ 07:11) (100% - 100%)    Mental Status:  _x___ Awake   ___x__ Alert   _____ Drowsy   _____ Sedated    Nausea/Vomiting:  ____ NO  ____x__Yes,   See Post - Op Orders          Pain Scale (0-10):  _____    Treatment: ____ None    __x__ See Post - Op/PCA Orders    Post - Operative Fluids:   ____ Oral   _x___ See Post - Op Orders    Plan: Discharge:   _x___Home       _____Floor     _____Critical Care    _____  Other:_________________    Comments: uneventful anesthesia course no complications. VItals stable. Pt transferred to PACU

## 2021-10-18 NOTE — CHART NOTE - NSCHARTNOTEFT_GEN_A_CORE
POC    ·  PRE-OP DIAGNOSIS:  Cholelithiasis 18-Oct-2021 09:49:13  Storm Chacon.  ·  POST-OP DIAGNOSIS:  Cholelithiasis 18-Oct-2021 09:49:23  Storm Chacon.  ·  PROCEDURES:  Laparoscopic cholecystectomy 18-Oct-2021 09:48:50  Storm Chacon. POC:     PRE-OP DIAGNOSIS:  Cholelithiasis   POST-OP DIAGNOSIS:  Cholelithiasis   PROCEDURES:  Laparoscopic cholecystectomy     60 y/o F POD 0 laying in bed in moderate-severe pain and wanting to eat. No other issues as per pt and RN    Vital Signs Last 24 Hrs  T(C): 37.1 (18 Oct 2021 22:00), Max: 37.1 (18 Oct 2021 22:00)  T(F): 98.8 (18 Oct 2021 22:00), Max: 98.8 (18 Oct 2021 22:00)  HR: 77 (18 Oct 2021 22:00) (50 - 80)  BP: 122/60 (18 Oct 2021 22:00) (109/57 - 150/72)  BP(mean): --  RR: 16 (18 Oct 2021 22:00) (15 - 20)  SpO2: 97% (18 Oct 2021 16:00) (96% - 100%)    PE:   ABD: dressing in place right side abd, C/D/I, + tenderness    Plan:  - Diet ordered  - Dilaudid 0.5mg IVP  - Cont to monitor

## 2021-10-18 NOTE — ASU PATIENT PROFILE, ADULT - NSICDXPASTMEDICALHX_GEN_ALL_CORE_FT
PAST MEDICAL HISTORY:  GERD (gastroesophageal reflux disease)     High blood cholesterol     HTN (hypertension)     Hypothyroid     Leaky heart valve MITRAL REGURG    Myasthenia gravis     KADI (obstructive sleep apnea)     Pancreatitis     POTS (postural orthostatic tachycardia syndrome)

## 2021-10-19 ENCOUNTER — TRANSCRIPTION ENCOUNTER (OUTPATIENT)
Age: 61
End: 2021-10-19

## 2021-10-19 VITALS
SYSTOLIC BLOOD PRESSURE: 104 MMHG | RESPIRATION RATE: 16 BRPM | DIASTOLIC BLOOD PRESSURE: 61 MMHG | HEART RATE: 79 BPM | TEMPERATURE: 99 F

## 2021-10-19 RX ORDER — METOCLOPRAMIDE HCL 10 MG
10 TABLET ORAL EVERY 4 HOURS
Refills: 0 | Status: DISCONTINUED | OUTPATIENT
Start: 2021-10-19 | End: 2021-10-19

## 2021-10-19 RX ORDER — ONDANSETRON 8 MG/1
4 TABLET, FILM COATED ORAL EVERY 4 HOURS
Refills: 0 | Status: DISCONTINUED | OUTPATIENT
Start: 2021-10-19 | End: 2021-10-19

## 2021-10-19 RX ORDER — ACETAMINOPHEN 500 MG
2 TABLET ORAL
Qty: 0 | Refills: 0 | DISCHARGE
Start: 2021-10-19

## 2021-10-19 RX ADMIN — Medication 650 MILLIGRAM(S): at 05:03

## 2021-10-19 RX ADMIN — LISINOPRIL 20 MILLIGRAM(S): 2.5 TABLET ORAL at 05:03

## 2021-10-19 RX ADMIN — ENOXAPARIN SODIUM 40 MILLIGRAM(S): 100 INJECTION SUBCUTANEOUS at 11:08

## 2021-10-19 RX ADMIN — HYDROMORPHONE HYDROCHLORIDE 0.5 MILLIGRAM(S): 2 INJECTION INTRAMUSCULAR; INTRAVENOUS; SUBCUTANEOUS at 04:05

## 2021-10-19 RX ADMIN — Medication 650 MILLIGRAM(S): at 12:17

## 2021-10-19 RX ADMIN — PYRIDOSTIGMINE BROMIDE 60 MILLIGRAM(S): 60 SOLUTION ORAL at 05:49

## 2021-10-19 RX ADMIN — Medication 650 MILLIGRAM(S): at 01:32

## 2021-10-19 RX ADMIN — Medication 10 MILLIGRAM(S): at 04:05

## 2021-10-19 RX ADMIN — PYRIDOSTIGMINE BROMIDE 60 MILLIGRAM(S): 60 SOLUTION ORAL at 17:02

## 2021-10-19 RX ADMIN — PANTOPRAZOLE SODIUM 40 MILLIGRAM(S): 20 TABLET, DELAYED RELEASE ORAL at 11:09

## 2021-10-19 RX ADMIN — Medication 650 MILLIGRAM(S): at 17:02

## 2021-10-19 RX ADMIN — Medication 650 MILLIGRAM(S): at 11:07

## 2021-10-19 RX ADMIN — Medication 10 MILLIGRAM(S): at 05:03

## 2021-10-19 RX ADMIN — Medication 50 MICROGRAM(S): at 05:03

## 2021-10-19 RX ADMIN — Medication 25 MILLIGRAM(S): at 05:03

## 2021-10-19 RX ADMIN — Medication 650 MILLIGRAM(S): at 05:04

## 2021-10-19 RX ADMIN — ONDANSETRON 4 MILLIGRAM(S): 8 TABLET, FILM COATED ORAL at 02:02

## 2021-10-19 RX ADMIN — Medication 81 MILLIGRAM(S): at 11:08

## 2021-10-19 RX ADMIN — HYDROMORPHONE HYDROCHLORIDE 0.5 MILLIGRAM(S): 2 INJECTION INTRAMUSCULAR; INTRAVENOUS; SUBCUTANEOUS at 04:20

## 2021-10-19 RX ADMIN — Medication 650 MILLIGRAM(S): at 02:06

## 2021-10-19 NOTE — DISCHARGE NOTE PROVIDER - NSDCFUADDINST_GEN_ALL_CORE_FT
Follow Up with Dr. Little in 1 weeks. Please call office for confirmation of your appointment.    Diet: Low fat, avoid spicy and greasy    Pain: You can take over the counter medications such as Tylenol, and Ibuprofen for pain control. Please adhere to the instructions on the back of the bottle.    Antibiotics: None    If you develop fevers, chills, worsening pain, increased drainage from the wound, foul smelling drainage from the wound, nausea that won't subside, vomiting, or any other symptoms of concern, please call MD for further advice, evaluation, and/or treatment.    Activity: Ambulate and get out of bed as tolerated, and with assistance if feeling weak.    Keep dressings on until you see Dr. Little. You can shower.

## 2021-10-19 NOTE — DISCHARGE NOTE NURSING/CASE MANAGEMENT/SOCIAL WORK - NSDCVIVACCINE_GEN_ALL_CORE_FT
meningococcal MCV4P; 26-Jan-2021 18:47; Malia Tang (RN); [employer]; [id number] (Exp. Date: 20-Apr-2021); IntraMuscular; Deltoid Right.; 0.5 milliLiter(s); VIS (VIS Published: 17-Dec-2020, VIS Presented: 26-Jan-2021);

## 2021-10-19 NOTE — DISCHARGE NOTE PROVIDER - CARE PROVIDER_API CALL
Hugo Little  SURGERY  [address] [place] [address]  Phone: [phone]  Fax: [phone]  Follow Up Time:

## 2021-10-19 NOTE — DISCHARGE NOTE NURSING/CASE MANAGEMENT/SOCIAL WORK - PATIENT PORTAL LINK FT
You can access the FollowMyHealth Patient Portal offered by [facility] by registering at the following website: [email or web address]. By joining [facility]’s FollowMyHealth portal, you will also be able to view your health information using other applications (apps) compatible with our system.

## 2021-10-19 NOTE — DISCHARGE NOTE PROVIDER - NSDCFUSCHEDAPPT_GEN_ALL_CORE_FT
GHADA ROPER ; 10/25/2021 ; NPP Neurology [address] GHADA Perla ; 11/08/2021 ; NPP Ophthal  [place] GHADA Perla ; 11/08/2021 ; NPP Urogynecology [address]

## 2021-10-19 NOTE — DISCHARGE NOTE PROVIDER - HOSPITAL COURSE
61 year old female with PMhx of  KADI, Hypothyroid, HTN, GERD, HLD, pancreatitis, and myasthenia gravis who came into the hospital yesterday for an elective laparoscopic cholecystectomy. Surgery went as planned, but due to patient's history of myasthenia gravis, anesthesia advised admission and observation after surgery. Post operative course was only complicated by low grade fever 100.6 in the middle of the night, but patient got tylenol, fever down, and patient feeling well. voiding. Ambulating. Tolerating diet. patient is comfortable and requesting to go home.

## 2021-10-19 NOTE — DISCHARGE NOTE PROVIDER - NSDCMRMEDTOKEN_GEN_ALL_CORE_FT
acetaminophen 325 mg oral tablet: 2 tab(s) orally every 6 hours  aspirin 81 mg oral tablet, chewable: 1 tab(s) orally once a day (AM)  Gammar I.V. 1 g intravenous injection: intravenous once a week  lisinopril 20 mg oral tablet: 1 tab(s) orally once a day (at bedtime)  Mestinon 60 mg oral tablet: 1.5 tab(s) orally 2 times a day  Mestinon 60 mg oral tablet: 1 tab(s) orally 2 times a day  Nexlizet 180 mg-10 mg oral tablet: 1 tab(s) orally once a day (at bedtime)  pantoprazole 40 mg oral delayed release tablet: 1 tab(s) orally 2 times a day   Pepcid 40 mg oral tablet: 1 tab(s) orally once a day (at bedtime)  predniSONE 10 mg oral tablet: 1 tab(s) orally once a day (in the morning)  Synthroid 50 mcg (0.05 mg) oral tablet: 1 tab(s) orally once a day (AM)  Toprol-XL 25 mg oral tablet, extended release: 1 tab(s) orally once a day (AM)  Vitamin D2: every friday

## 2021-10-19 NOTE — DISCHARGE NOTE PROVIDER - NSDCQMERRANDS_GEN_ALL_CORE
Office Visit - General Surgery  Dillan Claros MRN: [id number]  Encounter: [id number]    Assessment and Plan    Problem List Items Addressed This Visit        Other    Reducible left inguinal hernia - Primary     Symptomatic but reducible    Will schedule for open left inguinal hernia repair  The risks, benefits and alternatives were discussed with the patient, including bleeding, infection, injury to nearby structures and risk of recurrence  All questions answered  Consent signed and placed in chart  Ancef on call to OR  No preop testing required               Chief Complaint:  Dillan Claros is a 47 y o  male who presents for Inguinal Hernia (Left)    Subjective  Dillan Claros is a 40M who presents with L groin pain for the past month  Pt was seen at an urgent care and diagnosed with a left inguinal hernia  He presents today for surgical evaluation  Pt has not felt a significant bulge in this region but states it is painful  No pain in the left groin  Denies changes in bowel/bladder habits or N/V  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, as well as weight-lifting as a hobby  Past Medical History  History reviewed  No pertinent past medical history  Past Surgical History  History reviewed  No pertinent surgical history      Family History  Family History   Problem Relation Age of Onset    Diabetes Mother     No Known Problems Father        Medications  Current Outpatient Medications on File Prior to Visit   Medication Sig Dispense Refill    acetaminophen (TYLENOL) 325 mg tablet Take 650 mg by mouth every 6 (six) hours as needed for mild pain      ibuprofen (MOTRIN) 200 mg tablet Take by mouth every 6 (six) hours as needed for mild pain      tadalafil (CIALIS) 20 MG tablet Take by mouth      Multiple Vitamins-Minerals (MULTI FOR HIM PO) Take by mouth Daily      [DISCONTINUED] tobramycin-dexamethasone (TOBRADEX) ophthalmic suspension Administer 1 drop to the right eye every 4 (four) hours while awake (Patient not taking: Reported on 7/25/2019) 10 mL 0     No current facility-administered medications on file prior to visit  Allergies  No Known Allergies    Review of Systems   Constitutional: Negative for activity change, chills, fever and unexpected weight change  HENT: Negative  Negative for congestion, sneezing and sore throat  Eyes: Negative  Negative for pain and redness  Respiratory: Negative  Negative for chest tightness, shortness of breath and wheezing  Cardiovascular: Negative  Negative for chest pain and palpitations  Gastrointestinal: Negative for abdominal distention, abdominal pain, constipation, diarrhea, nausea and vomiting  Endocrine: Negative  Negative for cold intolerance and heat intolerance  Genitourinary: Negative for dysuria, hematuria and urgency  Left groin pain   Musculoskeletal: Negative  Negative for arthralgias and back pain  Skin: Negative for color change and rash  Allergic/Immunologic: Negative  Negative for environmental allergies and food allergies  Neurological: Negative  Negative for dizziness and light-headedness  Hematological: Negative  Negative for adenopathy  Does not bruise/bleed easily  Psychiatric/Behavioral: Negative  Negative for agitation and sleep disturbance  The patient is not nervous/anxious  Objective  Vitals:    07/25/19 0819   BP: 110/74   Pulse: 70   Temp: (!) 96 6 °F (35 9 °C)       Physical Exam   Constitutional: He is oriented to person, place, and time  He appears well-developed and well-nourished  No distress  HENT:   Head: Normocephalic and atraumatic  Mouth/Throat: Oropharynx is clear and moist    Eyes: Pupils are equal, round, and reactive to light  Conjunctivae and EOM are normal  No scleral icterus  Neck: Normal range of motion  Neck supple  No tracheal deviation present  No thyromegaly present     Cardiovascular: Normal rate, regular rhythm, normal heart sounds and intact distal pulses  Exam reveals no gallop and no friction rub  No murmur heard  Pulmonary/Chest: Effort normal and breath sounds normal  No stridor  No respiratory distress  He has no wheezes  He has no rales  Abdominal: Soft  He exhibits no distension  There is no rebound and no guarding  Genitourinary: Penis normal    Genitourinary Comments: Small left inguinal hernia, grade 1, reducible  No right inguinal hernia   Musculoskeletal: Normal range of motion  Neurological: He is alert and oriented to person, place, and time  Skin: Skin is warm and dry  He is not diaphoretic  Psychiatric: He has a normal mood and affect  His behavior is normal  Judgment and thought content normal    Nursing note and vitals reviewed  No

## 2021-10-20 LAB
COVID-19 SPIKE DOMAIN AB INTERP: POSITIVE
COVID-19 SPIKE DOMAIN ANTIBODY RESULT: >250 U/ML — HIGH
SARS-COV-2 IGG+IGM SERPL QL IA: >250 U/ML — HIGH
SARS-COV-2 IGG+IGM SERPL QL IA: POSITIVE

## 2021-10-21 LAB — SURGICAL PATHOLOGY STUDY: SIGNIFICANT CHANGE UP

## 2021-10-25 ENCOUNTER — OUTPATIENT (OUTPATIENT)
Dept: OUTPATIENT SERVICES | Facility: HOSPITAL | Age: 61
LOS: 1 days | Discharge: HOME | End: 2021-10-25

## 2021-10-25 ENCOUNTER — APPOINTMENT (OUTPATIENT)
Dept: NEUROLOGY | Facility: CLINIC | Age: 61
End: 2021-10-25
Payer: MEDICAID

## 2021-10-25 VITALS
DIASTOLIC BLOOD PRESSURE: 86 MMHG | OXYGEN SATURATION: 98 % | TEMPERATURE: 98 F | SYSTOLIC BLOOD PRESSURE: 156 MMHG | BODY MASS INDEX: 35.85 KG/M2 | HEIGHT: 64 IN | WEIGHT: 210 LBS | HEART RATE: 69 BPM

## 2021-10-25 DIAGNOSIS — Z98.890 OTHER SPECIFIED POSTPROCEDURAL STATES: Chronic | ICD-10-CM

## 2021-10-25 DIAGNOSIS — Z11.59 ENCOUNTER FOR SCREENING FOR OTHER VIRAL DISEASES: ICD-10-CM

## 2021-10-25 PROBLEM — I38 ENDOCARDITIS, VALVE UNSPECIFIED: Chronic | Status: ACTIVE | Noted: 2021-10-05

## 2021-10-25 PROCEDURE — 99214 OFFICE O/P EST MOD 30 MIN: CPT

## 2021-10-25 NOTE — HISTORY OF PRESENT ILLNESS
[FreeTextEntry1] : Pt with ab negative MG underwent cholecystectomy a week ago.  States had a brief fever and kept for 3 day then released.  No flare up of myasthenia though she felt like she "threw out my back" because she was in bed and getting out of bed "I sprained my back".  Patient states her left leg feels weaker than right.\par \par Pt reports pending trip to [facility] [place].  States next IVIG is tomorrow.\par States had sore throat and earache she thinks is allergy related.\par \par Went to city and watched 3 plays.  Couldn't walk far.  States breathing primarily limited her.\par Has seen pulmonologist but she states he says her breathing problems are all from her myasthenia.\par \par She has sleep apnea and previously prescribed BIPAP to use at night by pulmonary.  She has in past been given Symbicort and Rescue Inhaler and noted to have had mild obstructive pattern on pfts.\par

## 2021-10-25 NOTE — ASSESSMENT
[FreeTextEntry1] : 1. Myasthenia gravis, stable.  She did well s/p cholecystectomy.  She will continue 10 mg prednisone, pyridostigmine 60/90/60/90 and q2 week IVIG 94 gram.\par 2.  Sore throat and earache.  Will get a Covid PCR and she will reach out to PCP.\par 3.  Return in 3 months.  Other immunosuppresive options can be considered (had pancreatitis to azathioprine).

## 2021-10-25 NOTE — PHYSICAL EXAM
[FreeTextEntry1] : no eyelid droop.  Normal EOM's.\par 5/5 strength upper and lower extrmeities.\par counted to 30 with one breath.\par lungs clear\par

## 2021-10-26 DIAGNOSIS — Z79.82 LONG TERM (CURRENT) USE OF ASPIRIN: ICD-10-CM

## 2021-10-26 DIAGNOSIS — Z87.891 PERSONAL HISTORY OF NICOTINE DEPENDENCE: ICD-10-CM

## 2021-10-26 DIAGNOSIS — I10 ESSENTIAL (PRIMARY) HYPERTENSION: ICD-10-CM

## 2021-10-26 DIAGNOSIS — R50.82 POSTPROCEDURAL FEVER: ICD-10-CM

## 2021-10-26 DIAGNOSIS — K21.9 GASTRO-ESOPHAGEAL REFLUX DISEASE WITHOUT ESOPHAGITIS: ICD-10-CM

## 2021-10-26 DIAGNOSIS — G47.33 OBSTRUCTIVE SLEEP APNEA (ADULT) (PEDIATRIC): ICD-10-CM

## 2021-10-26 DIAGNOSIS — K66.0 PERITONEAL ADHESIONS (POSTPROCEDURAL) (POSTINFECTION): ICD-10-CM

## 2021-10-26 DIAGNOSIS — Z79.52 LONG TERM (CURRENT) USE OF SYSTEMIC STEROIDS: ICD-10-CM

## 2021-10-26 DIAGNOSIS — Z87.19 PERSONAL HISTORY OF OTHER DISEASES OF THE DIGESTIVE SYSTEM: ICD-10-CM

## 2021-10-26 DIAGNOSIS — K80.20 CALCULUS OF GALLBLADDER WITHOUT CHOLECYSTITIS WITHOUT OBSTRUCTION: ICD-10-CM

## 2021-10-26 DIAGNOSIS — Z88.8 ALLERGY STATUS TO OTHER DRUGS, MEDICAMENTS AND BIOLOGICAL SUBSTANCES STATUS: ICD-10-CM

## 2021-10-26 DIAGNOSIS — K80.00 CALCULUS OF GALLBLADDER WITH ACUTE CHOLECYSTITIS WITHOUT OBSTRUCTION: ICD-10-CM

## 2021-10-26 DIAGNOSIS — G70.00 MYASTHENIA GRAVIS WITHOUT (ACUTE) EXACERBATION: ICD-10-CM

## 2021-10-26 DIAGNOSIS — Z88.2 ALLERGY STATUS TO SULFONAMIDES: ICD-10-CM

## 2021-10-26 DIAGNOSIS — E78.00 PURE HYPERCHOLESTEROLEMIA, UNSPECIFIED: ICD-10-CM

## 2021-11-08 ENCOUNTER — APPOINTMENT (OUTPATIENT)
Dept: UROGYNECOLOGY | Facility: CLINIC | Age: 61
End: 2021-11-08

## 2021-11-15 ENCOUNTER — APPOINTMENT (OUTPATIENT)
Dept: UROGYNECOLOGY | Facility: CLINIC | Age: 61
End: 2021-11-15

## 2021-11-22 ENCOUNTER — OUTPATIENT (OUTPATIENT)
Dept: OUTPATIENT SERVICES | Facility: HOSPITAL | Age: 61
LOS: 1 days | Discharge: HOME | End: 2021-11-22
Payer: COMMERCIAL

## 2021-11-22 ENCOUNTER — APPOINTMENT (OUTPATIENT)
Dept: OPHTHALMOLOGY | Facility: CLINIC | Age: 61
End: 2021-11-22

## 2021-11-22 DIAGNOSIS — Z98.890 OTHER SPECIFIED POSTPROCEDURAL STATES: Chronic | ICD-10-CM

## 2021-11-22 PROCEDURE — 92012 INTRM OPH EXAM EST PATIENT: CPT

## 2021-11-26 DIAGNOSIS — H53.022 REFRACTIVE AMBLYOPIA, LEFT EYE: ICD-10-CM

## 2021-11-26 DIAGNOSIS — H40.009 PREGLAUCOMA, UNSPECIFIED, UNSPECIFIED EYE: ICD-10-CM

## 2021-11-26 DIAGNOSIS — G70.00 MYASTHENIA GRAVIS WITHOUT (ACUTE) EXACERBATION: ICD-10-CM

## 2021-11-26 DIAGNOSIS — H25.041 POSTERIOR SUBCAPSULAR POLAR AGE-RELATED CATARACT, RIGHT EYE: ICD-10-CM

## 2021-12-01 PROCEDURE — G9005: CPT

## 2021-12-17 ENCOUNTER — OUTPATIENT (OUTPATIENT)
Dept: OUTPATIENT SERVICES | Facility: HOSPITAL | Age: 61
LOS: 1 days | Discharge: HOME | End: 2021-12-17

## 2021-12-17 ENCOUNTER — APPOINTMENT (OUTPATIENT)
Dept: UROGYNECOLOGY | Facility: CLINIC | Age: 61
End: 2021-12-17

## 2021-12-17 ENCOUNTER — APPOINTMENT (OUTPATIENT)
Dept: UROGYNECOLOGY | Facility: CLINIC | Age: 61
End: 2021-12-17
Payer: MEDICAID

## 2021-12-17 VITALS
WEIGHT: 218 LBS | HEIGHT: 64 IN | BODY MASS INDEX: 37.22 KG/M2 | DIASTOLIC BLOOD PRESSURE: 72 MMHG | SYSTOLIC BLOOD PRESSURE: 140 MMHG

## 2021-12-17 DIAGNOSIS — Z98.890 OTHER SPECIFIED POSTPROCEDURAL STATES: Chronic | ICD-10-CM

## 2021-12-17 DIAGNOSIS — Z01.419 ENCOUNTER FOR GYNECOLOGICAL EXAMINATION (GENERAL) (ROUTINE) W/OUT ABNORMAL FINDINGS: ICD-10-CM

## 2021-12-17 DIAGNOSIS — R39.14 FEELING OF INCOMPLETE BLADDER EMPTYING: ICD-10-CM

## 2021-12-17 DIAGNOSIS — R32 UNSPECIFIED URINARY INCONTINENCE: ICD-10-CM

## 2021-12-17 DIAGNOSIS — Z87.898 PERSONAL HISTORY OF OTHER SPECIFIED CONDITIONS: ICD-10-CM

## 2021-12-17 DIAGNOSIS — Z87.09 PERSONAL HISTORY OF OTHER DISEASES OF THE RESPIRATORY SYSTEM: ICD-10-CM

## 2021-12-17 DIAGNOSIS — Z78.9 OTHER SPECIFIED HEALTH STATUS: ICD-10-CM

## 2021-12-17 PROCEDURE — 99215 OFFICE O/P EST HI 40 MIN: CPT

## 2021-12-17 PROCEDURE — 99205 OFFICE O/P NEW HI 60 MIN: CPT | Mod: 25

## 2021-12-17 PROCEDURE — 51701 INSERT BLADDER CATHETER: CPT

## 2021-12-17 RX ORDER — BISACODYL 5 MG/1
5 TABLET ORAL
Qty: 6 | Refills: 0 | Status: DISCONTINUED | COMMUNITY
Start: 2021-06-11 | End: 2021-12-17

## 2021-12-17 RX ORDER — PANTOPRAZOLE 40 MG/1
40 TABLET, DELAYED RELEASE ORAL DAILY
Qty: 30 | Refills: 1 | Status: DISCONTINUED | COMMUNITY
Start: 2021-06-11 | End: 2021-12-17

## 2021-12-17 RX ORDER — ALBUTEROL SULFATE 90 UG/1
108 (90 BASE) INHALANT RESPIRATORY (INHALATION)
Qty: 1 | Refills: 0 | Status: DISCONTINUED | COMMUNITY
Start: 2020-04-17 | End: 2021-12-17

## 2021-12-17 RX ORDER — PREDNISONE 2.5 MG/1
2.5 TABLET ORAL DAILY
Qty: 150 | Refills: 5 | Status: DISCONTINUED | COMMUNITY
Start: 2020-01-02 | End: 2021-12-17

## 2021-12-17 RX ORDER — ALBUTEROL SULFATE 90 UG/1
108 (90 BASE) INHALANT RESPIRATORY (INHALATION) EVERY 4 HOURS
Qty: 1 | Refills: 5 | Status: DISCONTINUED | COMMUNITY
Start: 2019-12-13 | End: 2021-12-17

## 2021-12-17 RX ORDER — PANTOPRAZOLE 40 MG/1
40 TABLET, DELAYED RELEASE ORAL DAILY
Qty: 30 | Refills: 3 | Status: DISCONTINUED | COMMUNITY
Start: 2021-07-06 | End: 2021-12-17

## 2021-12-17 RX ORDER — FLUTICASONE PROPIONATE AND SALMETEROL 50; 250 UG/1; UG/1
250-50 POWDER RESPIRATORY (INHALATION)
Qty: 1 | Refills: 6 | Status: DISCONTINUED | COMMUNITY
Start: 2020-07-10 | End: 2021-12-17

## 2021-12-17 RX ORDER — BISACODYL 5 MG/1
5 TABLET ORAL
Qty: 4 | Refills: 0 | Status: DISCONTINUED | COMMUNITY
Start: 2021-07-06 | End: 2021-12-17

## 2021-12-17 RX ORDER — POLYETHYLENE GLYCOL 3350 AND ELECTROLYTES WITH LEMON FLAVOR 236; 22.74; 6.74; 5.86; 2.97 G/4L; G/4L; G/4L; G/4L; G/4L
236 POWDER, FOR SOLUTION ORAL
Qty: 1 | Refills: 0 | Status: DISCONTINUED | COMMUNITY
Start: 2021-06-11 | End: 2021-12-17

## 2021-12-17 RX ORDER — POLYETHYLENE GLYCOL 3350 AND ELECTROLYTES WITH LEMON FLAVOR 236; 22.74; 6.74; 5.86; 2.97 G/4L; G/4L; G/4L; G/4L; G/4L
236 POWDER, FOR SOLUTION ORAL
Qty: 1 | Refills: 0 | Status: DISCONTINUED | COMMUNITY
Start: 2021-07-06 | End: 2021-12-17

## 2021-12-17 NOTE — COUNSELING
[FreeTextEntry1] : Please return for urodynamics (bladder test) and please make sure to have a comfortably full bladder on that day. You will the also need an appointment with me to have cystoscopy and consultation where we will review the result of your urodynamics and come up with further treatment plans.\par \par Place a pea size (0.5 grams or less) dab of Estrogen vaginal cream using finger (NOT the applicator) into the vagina 3 times a week ( Monday, Wednesday, Friday)\par \par For Urgency, Frequency and Urge related incontinence.\par \par Please decrease or stop the use of the following:\par \par 1. Coffee (Caffeinated and decaffeinated)\par \par 2. Teas (Caffeinated, decaffeinated, Ice tea, and green teas\par \par 3. All sodas (Caffeinated, decaffeinated, energy drinks)\par \par 4. All carbonated drinks including seltzer water\par \par 5. All citric fruit juices\par \par 6. Water with lemon or lime\par \par 7. Spicy foods\par \par 8. Tomato Sauce based foods\par \par 9. Chocolate and chocolate containing products\par \par 10. All alcohol\par

## 2021-12-17 NOTE — PHYSICAL EXAM
[Chaperone Present] : A chaperone was present in the examining room during all aspects of the physical examination [FreeTextEntry1] : Void: 200 cc\par \par PVR: 10 cc\par \par Urethra was prepped in sterile fashion and then a sterile catheter was used by me to drain the bladder.\par \par Mild anterior vaginal wall prolapse to -1\par \par neg empty cough stress test\par \par + atrophy\par \par no urethral caruncle\par \par no vestibular tenderness\par \par mesh palpated approximately in midurethra, no evidence of exposure or tenderness over mesh site\par \par + prolapse, as above\par \par + urethral hypermobility\par \par mild pelvic floor dysfunction (L levator ani 1/5)\par \par no urethral tenderness\par \par no bladder tenderness\par \par normal appearing cervix\par \par mildly tender uterus\par \par no Mesh exposure\par \par good sphincter tone\par \par no enterocele\par \par good rectal squeeze\par \par intact sacral nerves\par \par 1/5 Kegel\par

## 2021-12-17 NOTE — ASSESSMENT
[FreeTextEntry1] : Mixed urinary incontinence -\par This is in the context of previous retropubic synthetic sling placement. Will perform UDS and cystoscopy to further characterize her symptoms.\par \par Urinary urgency and frequency -\par Will perform cystoscopy. Patient was counseled regarding the bladder diet and was given a copy of the diet.\par \par Prolapse of anterior vaginal wall -\par Mild descent to -1. Patient is borderline symptomatic from this. Will reevaluate at the next visit.\par \par Vaginal atrophy -\par Discussed etiology and treatment options with patient. Discussed R/B/A of estrogen vaginal cream. Patient will start using as follows:\par Place a pea size (0.5 grams or less) dab of Estrogen vaginal cream using finger (NOT the applicator) into the vagina 3 times a week ( Monday, Wednesday, Friday)\par \par Dyspareunia -\par Likely due to vaginal atrophy. Will reevaluate once patient starts using estrogen cream.

## 2021-12-20 DIAGNOSIS — R39.14 FEELING OF INCOMPLETE BLADDER EMPTYING: ICD-10-CM

## 2021-12-20 DIAGNOSIS — N81.10 CYSTOCELE, UNSPECIFIED: ICD-10-CM

## 2021-12-20 DIAGNOSIS — R35.0 FREQUENCY OF MICTURITION: ICD-10-CM

## 2021-12-20 DIAGNOSIS — R39.15 URGENCY OF URINATION: ICD-10-CM

## 2021-12-20 DIAGNOSIS — N94.10 UNSPECIFIED DYSPAREUNIA: ICD-10-CM

## 2021-12-20 DIAGNOSIS — N95.2 POSTMENOPAUSAL ATROPHIC VAGINITIS: ICD-10-CM

## 2021-12-20 LAB
APPEARANCE: CLEAR
BILIRUBIN URINE: NEGATIVE
BLOOD URINE: NEGATIVE
COLOR: YELLOW
GLUCOSE QUALITATIVE U: NEGATIVE
KETONES URINE: NEGATIVE
LEUKOCYTE ESTERASE URINE: NEGATIVE
NITRITE URINE: NEGATIVE
PH URINE: 5.5
PROTEIN URINE: NEGATIVE
SPECIFIC GRAVITY URINE: 1.02
URINE CULTURE <10: NORMAL
UROBILINOGEN URINE: NORMAL

## 2021-12-21 LAB — SARS-COV-2 N GENE NPH QL NAA+PROBE: NOT DETECTED

## 2021-12-24 NOTE — PROGRESS NOTE ADULT - SUBJECTIVE AND OBJECTIVE BOX
Patient called to book her the cosmetic consult.   Patient is a 59y old  Female who presents with a chief complaint of Weakness (14 Jun 2020 11:42)      Over Night Events:  Patient seen and examined.   s/p plasma pharesis times 3   feel better     ROS:  See HPI    PHYSICAL EXAM    ICU Vital Signs Last 24 Hrs  T(C): 36.4 (15 Willy 2020 07:01), Max: 37.1 (14 Jun 2020 11:00)  T(F): 97.6 (15 Willy 2020 07:01), Max: 98.8 (14 Jun 2020 11:00)  HR: 60 (15 Willy 2020 07:47) (51 - 84)  BP: 116/57 (15 Willy 2020 07:47) (78/43 - 143/71)  BP(mean): 58 (15 Willy 2020 07:47) (58 - 97)  ABP: --  ABP(mean): --  RR: 18 (15 Willy 2020 07:47) (10 - 44)  SpO2: 100% (15 Willy 2020 07:47) (98% - 100%)      General: Aox3  HEENT:  [place]              Lymph Nodes: NO cervical LN   Lungs: Bilateral BS  Cardiovascular: Regular   Abdomen: Soft, Positive BS  Extremities: No clubbing   Skin: warm   Neurological: weakness b/l   Musculoskeletal: move all ext     I&O's Detail    14 Jun 2020 07:01  -  15 Willy 2020 07:00  --------------------------------------------------------  IN:    Oral Fluid: 460 mL    Other: 75 mL  Total IN: 535 mL    OUT:    Voided: 1850 mL  Total OUT: 1850 mL    Total NET: -1315 mL      15 Willy 2020 07:01  -  15 Willy 2020 08:19  --------------------------------------------------------  IN:  Total IN: 0 mL    OUT:    Voided: 250 mL  Total OUT: 250 mL    Total NET: -250 mL          LABS:                          12.0   10.85 )-----------( 252      ( 14 Jun 2020 05:45 )             34.8         14 Jun 2020 05:45    139    |  103    |  18     ----------------------------<  104    4.1     |  27     |  0.7      Ca    9.2        14 Jun 2020 05:45  Mg     1.8       14 Jun 2020 05:45                                                                                                                                                                                                                                       MEDICATIONS  (STANDING):  aspirin  chewable 81 milliGRAM(s) Oral daily  atorvastatin 20 milliGRAM(s) Oral at bedtime  azaTHIOprine 50 milliGRAM(s) Oral <User Schedule>  budesonide  80 MICROgram(s)/formoterol 4.5 MICROgram(s) Inhaler 2 Puff(s) Inhalation two times a day  chlorhexidine 4% Liquid 1 Application(s) Topical two times a day  enoxaparin Injectable 40 milliGRAM(s) SubCutaneous daily  famotidine    Tablet 40 milliGRAM(s) Oral at bedtime  levothyroxine 50 MICROGram(s) Oral daily  methylPREDNISolone sodium succinate Injectable 40 milliGRAM(s) IV Push daily  ondansetron Injectable 4 milliGRAM(s) IV Push every 8 hours  pantoprazole    Tablet 40 milliGRAM(s) Oral before breakfast  pyridostigmine 90 milliGRAM(s) Oral four times a day    MEDICATIONS  (PRN):  acetaminophen   Tablet .. 650 milliGRAM(s) Oral every 6 hours PRN Mild Pain (1 - 3), Moderate Pain (4 - 6)          Xrays:  TLC:  OG:  ET tube:                                                                                       ECHO:  CAM ICU:

## 2021-12-28 ENCOUNTER — APPOINTMENT (OUTPATIENT)
Dept: OBGYN | Facility: CLINIC | Age: 61
End: 2021-12-28
Payer: MEDICAID

## 2021-12-28 VITALS — HEIGHT: 64 IN | BODY MASS INDEX: 35.51 KG/M2 | WEIGHT: 208 LBS

## 2021-12-28 DIAGNOSIS — N95.0 POSTMENOPAUSAL BLEEDING: ICD-10-CM

## 2021-12-28 PROCEDURE — 76830 TRANSVAGINAL US NON-OB: CPT

## 2021-12-28 PROCEDURE — 58100 BIOPSY OF UTERUS LINING: CPT

## 2021-12-28 PROCEDURE — 99213 OFFICE O/P EST LOW 20 MIN: CPT | Mod: 25

## 2021-12-28 NOTE — PROCEDURE
[Endometrial Biopsy] : Endometrial biopsy [Time out performed] : Pre-procedure time out performed.  Patient's name, date of birth and procedure confirmed. [Consent Obtained] : Consent obtained [Risks] : risks [Benefits] : benefits [Alternatives] : alternatives [Patient] : patient [Infection] : infection [Bleeding] : bleeding [Allergic Reaction] : allergic reaction [Uterine Perforation] : uterine perforation [Pain] : pain [Negative] : negative pregnancy test [Betadine] : Betadine [None] : none [Tenaculum] : Tenaculum [Anteverted] : anteverted [Sent to Pathology] : placed in buffered formalin and sent for pathology [Tolerated Well] : Patient tolerated the procedure well [No Complications] : No complications [Abnormal Uterine Bleeding] : abnormal uterine bleeding [Transvaginal Ultrasound] : transvaginal ultrasound [L: ___ cm] : L: [unfilled] cm [W: ___cm] : W: [unfilled] cm [FreeTextEntry7] : 1.69 [FreeTextEntry5] : mateo richard [FreeTextEntry8] : 1.51 [FreeTextEntry4] : Normal exam

## 2021-12-30 LAB — CORE LAB BIOPSY: NORMAL

## 2022-01-01 NOTE — PROVIDER CONTACT NOTE (OTHER) - DATE AND TIME:
22 year old male is present in  urgent care for uri symptoms that started 2 days ago. Patient informed me that his symptoms are sore throat, diarrhea, and headache. No medication taken OTC. Patient is vaccinated for Covid.         Patient would like communication of their results via:     Cell Phone:   Telephone Information:   Mobile [phone]   Mobile Not on file.     Okay to leave a message containing results? Yes     29-Jan-2021 17:25

## 2022-01-07 ENCOUNTER — OUTPATIENT (OUTPATIENT)
Dept: OUTPATIENT SERVICES | Facility: HOSPITAL | Age: 62
LOS: 1 days | Discharge: HOME | End: 2022-01-07

## 2022-01-07 ENCOUNTER — APPOINTMENT (OUTPATIENT)
Dept: UROGYNECOLOGY | Facility: CLINIC | Age: 62
End: 2022-01-07
Payer: MEDICAID

## 2022-01-07 ENCOUNTER — RESULT CHARGE (OUTPATIENT)
Age: 62
End: 2022-01-07

## 2022-01-07 VITALS — SYSTOLIC BLOOD PRESSURE: 130 MMHG | BODY MASS INDEX: 35.36 KG/M2 | WEIGHT: 206 LBS | DIASTOLIC BLOOD PRESSURE: 80 MMHG

## 2022-01-07 DIAGNOSIS — Z98.890 OTHER SPECIFIED POSTPROCEDURAL STATES: Chronic | ICD-10-CM

## 2022-01-07 PROCEDURE — 51728 CYSTOMETROGRAM W/VP: CPT | Mod: 26

## 2022-01-07 PROCEDURE — 51741 ELECTRO-UROFLOWMETRY FIRST: CPT | Mod: 26

## 2022-01-07 PROCEDURE — 51784 ANAL/URINARY MUSCLE STUDY: CPT | Mod: 26

## 2022-01-07 PROCEDURE — 51797 INTRAABDOMINAL PRESSURE TEST: CPT | Mod: 26

## 2022-01-07 PROCEDURE — 64566 NEUROELTRD STIM POST TIBIAL: CPT

## 2022-01-07 RX ORDER — ESTRADIOL 10 UG/1
10 TABLET VAGINAL
Qty: 64 | Refills: 3 | Status: DISCONTINUED | COMMUNITY
Start: 2021-12-28 | End: 2022-01-07

## 2022-01-07 RX ORDER — PANTOPRAZOLE 40 MG/1
40 TABLET, DELAYED RELEASE ORAL
Qty: 60 | Refills: 0 | Status: DISCONTINUED | COMMUNITY
Start: 2021-08-26 | End: 2022-01-07

## 2022-01-10 LAB
BILIRUB UR QL STRIP: NEGATIVE
CLARITY UR: CLEAR
COLLECTION METHOD: NORMAL
GLUCOSE UR-MCNC: NEGATIVE
HCG UR QL: 0.2 EU/DL
HGB UR QL STRIP.AUTO: NORMAL
KETONES UR-MCNC: NEGATIVE
LEUKOCYTE ESTERASE UR QL STRIP: NEGATIVE
NITRITE UR QL STRIP: NEGATIVE
PH UR STRIP: 6
PROT UR STRIP-MCNC: NEGATIVE
SP GR UR STRIP: 1.03

## 2022-01-14 ENCOUNTER — NON-APPOINTMENT (OUTPATIENT)
Age: 62
End: 2022-01-14

## 2022-01-26 ENCOUNTER — APPOINTMENT (OUTPATIENT)
Dept: NEUROLOGY | Facility: CLINIC | Age: 62
End: 2022-01-26
Payer: MEDICAID

## 2022-01-26 VITALS
SYSTOLIC BLOOD PRESSURE: 163 MMHG | HEART RATE: 56 BPM | HEIGHT: 64 IN | DIASTOLIC BLOOD PRESSURE: 90 MMHG | BODY MASS INDEX: 35.51 KG/M2 | WEIGHT: 208 LBS | TEMPERATURE: 97.6 F | OXYGEN SATURATION: 98 %

## 2022-01-26 PROCEDURE — 99214 OFFICE O/P EST MOD 30 MIN: CPT

## 2022-01-26 NOTE — HISTORY OF PRESENT ILLNESS
[FreeTextEntry1] : Pt with ab negative MG underwent cholecystectomy in October.  States had a brief fever and kept for 3 day then released.  No flare up of myasthenia though she felt like she "threw out my back" because she was in bed and getting out of bed "I sprained my back".  Patient states her left leg feels weaker than right.  Had Covid twice, last time well over a year ago.  She has 2 Covid shots, not the booster.\par \par [place] [email or web address] [place] [facility] but didn't do much activities, sat around a lot.  States next IVIG is rst.  She continue IVIG 94 grams q2 weeks.  Denies side effects from IVIG.  Just received epipen from [facility] [employer] (states has it on hand, never had to use it, last pen  so they gave her new one).\par \par \par Pulmonologist she states says her breathing problems are all from her myasthenia.\par \par She has sleep apnea and had CPAP but taken away due to recall but should be getting it back soon to use at night by pulmonary.  She has in past been given Symbicort and Rescue Inhaler discontinued because they weren't working and dyspnea neuromuscular origin.  Not worse.

## 2022-01-26 NOTE — PHYSICAL EXAM
[FreeTextEntry1] : no eyelid droop.  Normal EOM's.\par 5/5 strength upper and lower extremities except for left thigh flexion which is 4-/5.\par counted to 30 with one breath.\par lungs clear\par

## 2022-01-26 NOTE — ASSESSMENT
[FreeTextEntry1] : 1. Myasthenia gravis, stable.  She did well s/p cholecystectomy in October.  She will decrease prednisone from 10 mg/day to 7.5 mg/day and continue pyridostigmine 60/90/60/90 and q2 week IVIG 94 gram.\par 2.  Return in 3 months.  Other immunosuppresive options can be considered (had pancreatitis to azathioprine).

## 2022-02-11 ENCOUNTER — APPOINTMENT (OUTPATIENT)
Dept: UROGYNECOLOGY | Facility: CLINIC | Age: 62
End: 2022-02-11
Payer: MEDICAID

## 2022-02-11 ENCOUNTER — OUTPATIENT (OUTPATIENT)
Dept: OUTPATIENT SERVICES | Facility: HOSPITAL | Age: 62
LOS: 1 days | Discharge: HOME | End: 2022-02-11

## 2022-02-11 VITALS — DIASTOLIC BLOOD PRESSURE: 68 MMHG | SYSTOLIC BLOOD PRESSURE: 120 MMHG

## 2022-02-11 DIAGNOSIS — Z98.890 OTHER SPECIFIED POSTPROCEDURAL STATES: Chronic | ICD-10-CM

## 2022-02-11 DIAGNOSIS — R19.7 DIARRHEA, UNSPECIFIED: ICD-10-CM

## 2022-02-11 DIAGNOSIS — N94.10 UNSPECIFIED DYSPAREUNIA: ICD-10-CM

## 2022-02-11 PROCEDURE — 99215 OFFICE O/P EST HI 40 MIN: CPT

## 2022-02-11 NOTE — COUNSELING
[FreeTextEntry1] : Please return for cystoscopy.\par \par We will also check on your bulge at that time.\par \par Place a garbanzo bean size (0.5 grams or less) dab of Estrogen vaginal cream using finger (NOT the applicator) into the vagina 3 times a week ( Monday, Wednesday, Friday)\par \par \par

## 2022-02-11 NOTE — ASSESSMENT
[FreeTextEntry1] : Urinary urgency, frequency, and UUI -\par Patient reports her symptoms are better. She will return for cystoscopy in light of her hx of sling placement. Discussed UDS results with her today. Discussed 2nd and 3rd line treatment options. Discussed R/B/A of medication use (anticholinergics versus b-3 agonists). Patient is not a candidate for b-3 agonists at this time as her BP is labile. She would like to hold off on starting anticholinergics. She believes she tried the patch a while ago and  developed a rash on her skin at the site of the patch placement. She also believes she tried an oral medication a while ago, but was forgetting to take it at that time. She states she remembers to take medications now.\par We will reassess her symptoms at the next visit and may potentially start an anticholinergic at that time.\par \par Anterior vaginal wall prolapse -\par During her last POP-Q, we were able to elicit anterior vaginal wall descent to -1. I discussed this with the patient. She states her vaginal symptoms did improve but she still feels the bulge occasionally at the vaginal opening. I discussed with her that we can try fitting her with a pessary at the next visit to see if that would help alleviate her symptoms. I also discussed with her that she may be feeling atrophy symptoms which would be treated if she continues to use vaginal estrogen cream.\par \par Stress urinary incontinence -\par Patient states this is rare and is not as bothersome for her at this time.\par \par Vaginal atrophy -\par Patient started using vaginal estrogen cream. She is happy with it. We reviewed instructions for use today:\par Place a pea size (0.5 grams or less) dab of Estrogen vaginal cream using finger (NOT the applicator) into the vagina 3 times a week ( Monday, Wednesday, Friday). She will continue with this regimen.\par \par Diarrhea -\par Likely at least in part due to her recent cholecystectomy. She is currently working with a GI regarding her GI symptoms.

## 2022-02-11 NOTE — HISTORY OF PRESENT ILLNESS
[FreeTextEntry1] : 61 year para 2 with vaginal atrophy, urinary frequency, urgency, feeling of incomplete bladder emptying, UUI, DI, dyspareunia, and anterior vaginal wall prolapse, and PMB. Uroflow today was not performed as patient was only able to void 28cc.\par She states her DI symptoms are very rare, and overall her urinary symptoms have improved after starting to use vaginal estrogen cream. She states she feels the bulge still at the vaginal opening but overall her vaginal discomfort is improved.\par \par 22 - EMB: benign\par \par 22 - UDS:\par  no DI (Stress Urinary Incontinence)\par  no ISD (Intrinsic Sphincteric Deficiency)\par  no DO (Detrussor Overactivity)\par  + Sensory Urgency\par  not able to determine Uroflow\par  unable to interpret PFS study due to computer problem with recording PFS\par  \par Voids 5-6/ day, 2-3 /night. improved urgency, 1 DI/week, 3 UUI/week, no UTI's in past 6 months, follows bladder diet.\par 21 BM/week. occasional FI, no fiber, no Miralax currently working with GI on her BMs\par Uses vaginal estrogen 3x/week. no vaginal bleeding, no vaginal/pelvic pain. yes sexually active.\par  \par Med/Surg Hx\par s/p cholecystectomy\par hx of Myasthenia Gravis\par \par From previous visit:\par 61 year para 2 ( x4, 2 living children) presents with complaints of urinary frequency, urgency, traces of blood on toilet paper when she wipes, and a vaginal bulge. She has been having these symptoms for the past past year. \par \par Pelvic organ prolapse: + bulge, + pressure/heaviness, duration 2 months\par \par Stress urinary incontinence: 7 x/week yes prior incontinence procedures: midurethral sling\par \par Overactive bladder syndrome: daily frequency 10 x/day, 2-3 x/night,  + urgency,  7 x/week UUI episodes,    1-2 pads/day     Bladder irritants include coffee (1 cup)    Prior OAB meds: took oxytrol patch and states it helped somewhat but developed a rash and stopped it\par \par Voiding dysfunction: + Incomplete bladder emptying, + hesitancy\par \par Lower urinary tract/vaginal symptoms: no UTIs per year\par \par 28 BM/week     no constipation     Fecal incontinence 1 time\par \par Sexually active yes       Dyspareunia +     Pelvic pain mild     Vaginal dryness +    LMP age 55     PMB no (but occasionally sees pink on toilet paper when wiping)\par \par 2006 - Dr. Farrar: [employer] retropubic midurethral sling, cystoscopy\par \par hx of Myasthenia Gravis
No

## 2022-02-18 ENCOUNTER — TRANSCRIPTION ENCOUNTER (OUTPATIENT)
Age: 62
End: 2022-02-18

## 2022-02-23 DIAGNOSIS — N39.41 URGE INCONTINENCE: ICD-10-CM

## 2022-02-23 DIAGNOSIS — N81.10 CYSTOCELE, UNSPECIFIED: ICD-10-CM

## 2022-02-23 DIAGNOSIS — R39.15 URGENCY OF URINATION: ICD-10-CM

## 2022-02-23 DIAGNOSIS — R19.7 DIARRHEA, UNSPECIFIED: ICD-10-CM

## 2022-02-23 DIAGNOSIS — N39.3 STRESS INCONTINENCE (FEMALE) (MALE): ICD-10-CM

## 2022-02-23 DIAGNOSIS — N95.2 POSTMENOPAUSAL ATROPHIC VAGINITIS: ICD-10-CM

## 2022-02-23 DIAGNOSIS — R35.0 FREQUENCY OF MICTURITION: ICD-10-CM

## 2022-02-23 DIAGNOSIS — N94.10 UNSPECIFIED DYSPAREUNIA: ICD-10-CM

## 2022-03-03 ENCOUNTER — OUTPATIENT (OUTPATIENT)
Dept: OUTPATIENT SERVICES | Facility: HOSPITAL | Age: 62
LOS: 1 days | Discharge: HOME | End: 2022-03-03

## 2022-03-03 DIAGNOSIS — K02.53 DENTAL CARIES ON PIT AND FISSURE SURFACE PENETRATING INTO PULP: ICD-10-CM

## 2022-03-03 DIAGNOSIS — Z98.890 OTHER SPECIFIED POSTPROCEDURAL STATES: Chronic | ICD-10-CM

## 2022-03-07 ENCOUNTER — RESULT CHARGE (OUTPATIENT)
Age: 62
End: 2022-03-07

## 2022-03-07 ENCOUNTER — APPOINTMENT (OUTPATIENT)
Dept: UROGYNECOLOGY | Facility: CLINIC | Age: 62
End: 2022-03-07
Payer: MEDICAID

## 2022-03-07 ENCOUNTER — OUTPATIENT (OUTPATIENT)
Dept: OUTPATIENT SERVICES | Facility: HOSPITAL | Age: 62
LOS: 1 days | Discharge: HOME | End: 2022-03-07

## 2022-03-07 VITALS — WEIGHT: 202 LBS | DIASTOLIC BLOOD PRESSURE: 80 MMHG | SYSTOLIC BLOOD PRESSURE: 110 MMHG | BODY MASS INDEX: 34.67 KG/M2

## 2022-03-07 DIAGNOSIS — Z98.890 OTHER SPECIFIED POSTPROCEDURAL STATES: Chronic | ICD-10-CM

## 2022-03-07 DIAGNOSIS — N39.41 URGE INCONTINENCE: ICD-10-CM

## 2022-03-07 DIAGNOSIS — N81.10 CYSTOCELE, UNSPECIFIED: ICD-10-CM

## 2022-03-07 DIAGNOSIS — N39.3 STRESS INCONTINENCE (FEMALE) (MALE): ICD-10-CM

## 2022-03-07 DIAGNOSIS — N95.2 POSTMENOPAUSAL ATROPHIC VAGINITIS: ICD-10-CM

## 2022-03-07 DIAGNOSIS — R39.15 URGENCY OF URINATION: ICD-10-CM

## 2022-03-07 DIAGNOSIS — R35.0 FREQUENCY OF MICTURITION: ICD-10-CM

## 2022-03-07 LAB
BILIRUB UR QL STRIP: NEGATIVE
CLARITY UR: CLEAR
COLLECTION METHOD: NORMAL
GLUCOSE UR-MCNC: NEGATIVE
HCG UR QL: 0.2 EU/DL
HGB UR QL STRIP.AUTO: NEGATIVE
KETONES UR-MCNC: NEGATIVE
LEUKOCYTE ESTERASE UR QL STRIP: NEGATIVE
NITRITE UR QL STRIP: NEGATIVE
PH UR STRIP: 6
PROT UR STRIP-MCNC: NEGATIVE
SP GR UR STRIP: 1.03

## 2022-03-07 PROCEDURE — 52000 CYSTOURETHROSCOPY: CPT

## 2022-03-07 RX ORDER — OXYBUTYNIN CHLORIDE 5 MG/1
5 TABLET, EXTENDED RELEASE ORAL
Qty: 30 | Refills: 3 | Status: ACTIVE | COMMUNITY
Start: 2022-03-07 | End: 1900-01-01

## 2022-03-07 RX ORDER — BEMPEDOIC ACID AND EZETIMIBE 180; 10 MG/1; MG/1
180-10 TABLET, FILM COATED ORAL
Refills: 0 | Status: ACTIVE | COMMUNITY

## 2022-03-07 RX ORDER — ASPIRIN 81 MG
81 TABLET,CHEWABLE ORAL
Refills: 0 | Status: DISCONTINUED | COMMUNITY
End: 2022-03-07

## 2022-03-07 RX ORDER — PANTOPRAZOLE 40 MG/1
40 TABLET, DELAYED RELEASE ORAL DAILY
Qty: 30 | Refills: 3 | Status: ACTIVE | COMMUNITY
Start: 2020-01-24

## 2022-03-07 RX ORDER — LEVOTHYROXINE SODIUM 0.05 MG/1
50 TABLET ORAL
Refills: 0 | Status: ACTIVE | COMMUNITY

## 2022-03-07 RX ORDER — METOPROLOL SUCCINATE 25 MG/1
25 TABLET, EXTENDED RELEASE ORAL
Refills: 0 | Status: ACTIVE | COMMUNITY

## 2022-03-07 RX ORDER — CHROMIUM 200 MCG
TABLET ORAL
Refills: 0 | Status: ACTIVE | COMMUNITY

## 2022-03-07 RX ORDER — ESTRADIOL 10 UG/1
10 TABLET VAGINAL
Qty: 64 | Refills: 3 | Status: DISCONTINUED | COMMUNITY
Start: 2021-12-28 | End: 2022-03-07

## 2022-03-07 RX ORDER — ESTRADIOL 0.1 MG/G
0.1 CREAM VAGINAL
Qty: 1 | Refills: 6 | Status: ACTIVE | COMMUNITY
Start: 2021-12-17

## 2022-03-14 DIAGNOSIS — N39.3 STRESS INCONTINENCE (FEMALE) (MALE): ICD-10-CM

## 2022-03-14 DIAGNOSIS — N81.10 CYSTOCELE, UNSPECIFIED: ICD-10-CM

## 2022-03-14 DIAGNOSIS — N39.41 URGE INCONTINENCE: ICD-10-CM

## 2022-03-14 DIAGNOSIS — N95.2 POSTMENOPAUSAL ATROPHIC VAGINITIS: ICD-10-CM

## 2022-03-14 DIAGNOSIS — R35.0 FREQUENCY OF MICTURITION: ICD-10-CM

## 2022-03-14 DIAGNOSIS — R39.15 URGENCY OF URINATION: ICD-10-CM

## 2022-03-23 NOTE — DIETITIAN INITIAL EVALUATION ADULT. - ADD RECOMMEND
Pt is comfortable with discharge plan.   continue DASH/TLC diet sp EGD, diet texture/consistency per SLP, add ensure clear BID, encourage po intake, provide assistance with meals PRN. continue DASH/TLC diet sp EGD, diet texture/consistency per SLP, add ensure clear BID, encourage po intake, provide assistance with meals PRN. Recs discussed with ICU resident [phone].

## 2022-03-30 ENCOUNTER — RX RENEWAL (OUTPATIENT)
Age: 62
End: 2022-03-30

## 2022-04-19 ENCOUNTER — NON-APPOINTMENT (OUTPATIENT)
Age: 62
End: 2022-04-19

## 2022-04-28 ENCOUNTER — LABORATORY RESULT (OUTPATIENT)
Age: 62
End: 2022-04-28

## 2022-04-28 ENCOUNTER — APPOINTMENT (OUTPATIENT)
Dept: NEUROLOGY | Facility: CLINIC | Age: 62
End: 2022-04-28
Payer: MEDICAID

## 2022-04-28 VITALS
SYSTOLIC BLOOD PRESSURE: 110 MMHG | OXYGEN SATURATION: 98 % | WEIGHT: 208 LBS | DIASTOLIC BLOOD PRESSURE: 75 MMHG | BODY MASS INDEX: 35.51 KG/M2 | HEART RATE: 80 BPM | HEIGHT: 64 IN

## 2022-04-28 PROCEDURE — 99214 OFFICE O/P EST MOD 30 MIN: CPT

## 2022-04-28 NOTE — ASSESSMENT
[FreeTextEntry1] : Myasthenia gravis, ab negative and no thymoma on MRI from 2020.\par She will get anti striatal muscle ab and increase prednisone back to 10 mg/day.\par Continue IVIG q2 weeks.\par Return in 6 weeks.\par I discussed option of starting a long term immunosuppressant but patient did not want to at this time.\par She should go to ER if becomes progressively weaker or has difficulty breathing.\par Continue pyridostigmine.

## 2022-04-28 NOTE — HISTORY OF PRESENT ILLNESS
[FreeTextEntry1] : Pt is 61 yof states she feels more weak since decrease in prednisone.  (was on 10 mg/day but decreased to 7.5 mg in January).  States started to feel weaker 2 weeks ago.  Last IVIG yesterday.  Doesn't feel boost from IVIG.\par Hasn't had the boost with IVIG yet but usually gets it.\par \par STates her arms are really weak and started PT.  States pain doctor ordered MRI of her shoulder for suspected torn rotator cuff.  States she has been complaining of weakness in UE's for a long time.  Can't get grandson out of car seat because can't press the button.  Today feet feel tingling in her toes.\par \par States applying for SSI.  States she used to work as .  States last worked several years ago.  Was affecting her job.\par \par States she loses her voice, can't be on the phone talking to people.    States she couldn't sit for long periods of time, has to change position because she is uncomfortable because she "just doesn't feel good".  The most comfortable position for her is reclining for the breathing.\par \par She decreased pyridostigmine to 1.5 / 1 / 1.5 / 1 mg tabs .\par \par \par \par \par \par

## 2022-04-28 NOTE — PHYSICAL EXAM
[FreeTextEntry1] : Able to count to 20 with one breath.\par Arms 5-/5 prox/distally.\par LLE 4/5 hip flexion. 4+ knee extension. 4/5 DF.\par RLE 5-/5 hip flexion 5/5 knee extension and foot DF.\par Has some difficulty rising from chair.\par

## 2022-05-11 ENCOUNTER — APPOINTMENT (OUTPATIENT)
Dept: OPHTHALMOLOGY | Facility: CLINIC | Age: 62
End: 2022-05-11

## 2022-05-11 ENCOUNTER — OUTPATIENT (OUTPATIENT)
Dept: OUTPATIENT SERVICES | Facility: HOSPITAL | Age: 62
LOS: 1 days | Discharge: HOME | End: 2022-05-11
Payer: COMMERCIAL

## 2022-05-11 DIAGNOSIS — Z98.890 OTHER SPECIFIED POSTPROCEDURAL STATES: Chronic | ICD-10-CM

## 2022-05-11 PROCEDURE — 92012 INTRM OPH EXAM EST PATIENT: CPT

## 2022-05-17 DIAGNOSIS — H53.022 REFRACTIVE AMBLYOPIA, LEFT EYE: ICD-10-CM

## 2022-05-17 DIAGNOSIS — G70.00 MYASTHENIA GRAVIS WITHOUT (ACUTE) EXACERBATION: ICD-10-CM

## 2022-05-17 DIAGNOSIS — H25.041 POSTERIOR SUBCAPSULAR POLAR AGE-RELATED CATARACT, RIGHT EYE: ICD-10-CM

## 2022-05-17 DIAGNOSIS — H40.013 OPEN ANGLE WITH BORDERLINE FINDINGS, LOW RISK, BILATERAL: ICD-10-CM

## 2022-06-09 ENCOUNTER — APPOINTMENT (OUTPATIENT)
Dept: NEUROLOGY | Facility: CLINIC | Age: 62
End: 2022-06-09
Payer: MEDICAID

## 2022-06-09 PROCEDURE — 99214 OFFICE O/P EST MOD 30 MIN: CPT

## 2022-06-09 NOTE — HISTORY OF PRESENT ILLNESS
[FreeTextEntry1] : Pt with MG prsents for f/u.  States had gone up from 5 mg/day to 7.5 mg/day but after a couple of weeks didn't feel much different so reduced back to 5 mg/day.  Lost 10 lbs sicne April.\par She is more physically active.  Has baseline breathing difficulty, no worse than previously.  Can climb one flight before having to rest.\par \par Has chewing and swallowing difficulty.  Potatoes, starchey foods.  This is her baseline.  Gets out of breath while talking. Has been that way since beginning.  Had social security hearing.  WIll be finally getting her disability.\par \par Still getting IVIG every 2 weeks.  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 She is in process of finding out what supplemental insurance to get.\par \par F/u up with pulmonary.  Supposed to get CPAP machine.\par \par \par

## 2022-06-09 NOTE — ASSESSMENT
[FreeTextEntry1] : Generalized MG, stable with low dose steroids, mestinon and q2 week IVIG.\par \par Plan:\par Continue current treatment.\par Option for different steroid sparing medication in future (had side effects to imuran).\par Will need to change IVIG companies in 2 months since apparently [facility] infusion services closing according to pt.\par Return in 2 months.

## 2022-06-09 NOTE — PHYSICAL EXAM
[FreeTextEntry1] : 5/5 upper and lower extrem except for left hip flexion 4-/5\par EOM's full.\par speech strong.\par \par counts to 21 with single breath

## 2022-06-14 NOTE — DISCHARGE NOTE PROVIDER - NSDCHC_MEDRECSTATUS_GEN_ALL_CORE
NOTIFICATION RETURN TO WORK / SCHOOL    6/14/2022 4:55 PM    Mr. Marcelina Boateng  [address] [facility]      To Whom It May Concern:    Marcelina Boateng is currently under the care of [address] [place] [address]. He will return to work 7/18/22 with no restrictions. If there are questions or concerns please have the patient contact our office.         Sincerely,      Ayala Cook MD
Admission Reconciliation is Completed  Discharge Reconciliation is Completed

## 2022-07-07 ENCOUNTER — APPOINTMENT (OUTPATIENT)
Dept: OBGYN | Facility: CLINIC | Age: 62
End: 2022-07-07

## 2022-07-07 VITALS — HEIGHT: 64 IN | BODY MASS INDEX: 34.15 KG/M2 | WEIGHT: 200 LBS

## 2022-07-07 DIAGNOSIS — L91.8 OTHER HYPERTROPHIC DISORDERS OF THE SKIN: ICD-10-CM

## 2022-07-07 DIAGNOSIS — A63.0 ANOGENITAL (VENEREAL) WARTS: ICD-10-CM

## 2022-07-07 DIAGNOSIS — N90.89 OTHER SPECIFIED NONINFLAMMATORY DISORDERS OF VULVA AND PERINEUM: ICD-10-CM

## 2022-07-07 PROCEDURE — 56605 BIOPSY OF VULVA/PERINEUM: CPT

## 2022-07-07 PROCEDURE — 99213 OFFICE O/P EST LOW 20 MIN: CPT | Mod: 25

## 2022-07-07 NOTE — PROCEDURE
[Vulvar Biopsy] : Vulvar Biopsy [] : on the right labia majora [Size of Biopsy Taken: ___ (mm)] : [unfilled]Umm

## 2022-07-07 NOTE — PHYSICAL EXAM
[Appropriately responsive] : appropriately responsive [Alert] : alert [No Acute Distress] : no acute distress [No Lymphadenopathy] : no lymphadenopathy [Regular Rate Rhythm] : regular rate rhythm [No Murmurs] : no murmurs [Clear to Auscultation B/L] : clear to auscultation bilaterally [Soft] : soft [Non-tender] : non-tender [Non-distended] : non-distended [No HSM] : No HSM [No Lesions] : no lesions [No Mass] : no mass [Oriented x3] : oriented x3 [FreeTextEntry2] : 2 mm lesion

## 2022-07-12 LAB — CORE LAB BIOPSY: NORMAL

## 2022-07-15 RX ORDER — IMMUNE GLOBULIN INFUSION (HUMAN) 100 MG/ML
30 INJECTION, SOLUTION INTRAVENOUS; SUBCUTANEOUS
Qty: 920 | Refills: 5 | Status: ACTIVE | COMMUNITY
Start: 2021-09-28 | End: 1900-01-01

## 2022-08-04 ENCOUNTER — APPOINTMENT (OUTPATIENT)
Dept: NEUROLOGY | Facility: CLINIC | Age: 62
End: 2022-08-04

## 2022-08-04 VITALS
WEIGHT: 200 LBS | OXYGEN SATURATION: 98 % | BODY MASS INDEX: 34.15 KG/M2 | DIASTOLIC BLOOD PRESSURE: 79 MMHG | TEMPERATURE: 98 F | HEIGHT: 64 IN | SYSTOLIC BLOOD PRESSURE: 135 MMHG | HEART RATE: 77 BPM

## 2022-08-04 PROCEDURE — 99214 OFFICE O/P EST MOD 30 MIN: CPT

## 2022-08-06 NOTE — END OF VISIT
[] : Resident [FreeTextEntry3] : Patient examined in f/u for MG.  Stable onf 5 mg/day prednisone, q2 week IVIG, and mestinon 60/90/60/90.  She developed pancreatitis on azathioprine.  Continue current treatment and return in 3 months.

## 2022-08-06 NOTE — HISTORY OF PRESENT ILLNESS
[FreeTextEntry1] : 62F with PMH of Myasthenia gravis presents for follow-up. She is currently taking Prednisone 5mg/day, Mestinon 60/90/60/90mg QID, and IVIG q2 wks. \par \par She mentions continued difficulty swallowing and chewing but no change since last visit. She also mentions continued left-sided weakness, but again consistent with baseline, no change since last visit.\par \par She has changed insurance to medicare and is now getting IVIG infusions with a different company, but has has no interruptions in infusions. \par

## 2022-08-06 NOTE — PHYSICAL EXAM
[FreeTextEntry1] : Neurological Examination:\par General:  Appearance is consistent with chronologic age.  No abnormal facies.  Gross skin survey within normal limits.  \par Cognitive/Language:  Awake, alert, and oriented to person, place, time and date.  Recent and remote memory intact.  Fund of knowledge is appropriate.  Naming, repetition and comprehension intact.   Nondysarthric.  \par Cranial Nerves\par - Eyes: Visual acuity intact, Visual fields full.  EOMI w/o nystagmus, skew or reported double vision.  PERRL.  No ptosis/weakness of eyelid closure.  \par - Face:  Facial sensation normal V1 - 3, no facial asymmetry.  \par - Ears/Nose/Throat:  Hearing grossly intact b/l to finger rub.  Palate elevates midline.  Tongue and uvula midline. \par Motor examination:  Upper Extremities: L 4/5, R 5-/5; Lower extremities: L 3/5, R 4+/5.  No observable drift. Normal tone and bulk. No tenderness, twitching, tremors or involuntary movements.\par Sensory examination:   Intact to light touch throughout\par Cerebellum:   FTN/HKS intact.  No dysmetria or dysdiadokinesia. Gait slow with decreased ground clearance

## 2022-08-06 NOTE — ASSESSMENT
[FreeTextEntry1] : 62F with PMH of Myasthenia gravis presents for follow-up. She is currently taking Prednisone 5mg/day, Mestinon 60/90/60/90mg QID, and IVIG q2 wks. \par \par Impression\par - Currently stable, no change in exam.\par \par Plan\par - C/w current regimen\par - F/u in 3 months

## 2022-08-19 ENCOUNTER — RESULT REVIEW (OUTPATIENT)
Age: 62
End: 2022-08-19

## 2022-08-19 ENCOUNTER — OUTPATIENT (OUTPATIENT)
Dept: OUTPATIENT SERVICES | Facility: HOSPITAL | Age: 62
LOS: 1 days | Discharge: HOME | End: 2022-08-19

## 2022-08-19 DIAGNOSIS — Z98.890 OTHER SPECIFIED POSTPROCEDURAL STATES: Chronic | ICD-10-CM

## 2022-08-19 DIAGNOSIS — Z12.31 ENCOUNTER FOR SCREENING MAMMOGRAM FOR MALIGNANT NEOPLASM OF BREAST: ICD-10-CM

## 2022-08-19 PROCEDURE — 77067 SCR MAMMO BI INCL CAD: CPT | Mod: 26

## 2022-08-19 PROCEDURE — 77063 BREAST TOMOSYNTHESIS BI: CPT | Mod: 26

## 2022-09-30 ENCOUNTER — APPOINTMENT (OUTPATIENT)
Dept: PULMONOLOGY | Facility: CLINIC | Age: 62
End: 2022-09-30

## 2022-10-04 ENCOUNTER — APPOINTMENT (OUTPATIENT)
Dept: OBGYN | Facility: CLINIC | Age: 62
End: 2022-10-04

## 2022-10-04 VITALS
BODY MASS INDEX: 33.63 KG/M2 | HEIGHT: 64 IN | WEIGHT: 197 LBS | DIASTOLIC BLOOD PRESSURE: 75 MMHG | SYSTOLIC BLOOD PRESSURE: 120 MMHG

## 2022-10-04 DIAGNOSIS — Z01.419 ENCOUNTER FOR GYNECOLOGICAL EXAMINATION (GENERAL) (ROUTINE) W/OUT ABNORMAL FINDINGS: ICD-10-CM

## 2022-10-04 PROCEDURE — G0101: CPT

## 2022-10-11 ENCOUNTER — EMERGENCY (EMERGENCY)
Facility: HOSPITAL | Age: 62
LOS: 0 days | Discharge: HOME | End: 2022-10-11
Attending: EMERGENCY MEDICINE | Admitting: EMERGENCY MEDICINE

## 2022-10-11 VITALS
HEART RATE: 71 BPM | DIASTOLIC BLOOD PRESSURE: 75 MMHG | WEIGHT: 195.11 LBS | HEIGHT: 64 IN | SYSTOLIC BLOOD PRESSURE: 129 MMHG | OXYGEN SATURATION: 98 % | RESPIRATION RATE: 18 BRPM | TEMPERATURE: 97 F

## 2022-10-11 DIAGNOSIS — Z86.79 PERSONAL HISTORY OF OTHER DISEASES OF THE CIRCULATORY SYSTEM: ICD-10-CM

## 2022-10-11 DIAGNOSIS — Z79.82 LONG TERM (CURRENT) USE OF ASPIRIN: ICD-10-CM

## 2022-10-11 DIAGNOSIS — Z88.8 ALLERGY STATUS TO OTHER DRUGS, MEDICAMENTS AND BIOLOGICAL SUBSTANCES STATUS: ICD-10-CM

## 2022-10-11 DIAGNOSIS — K21.9 GASTRO-ESOPHAGEAL REFLUX DISEASE WITHOUT ESOPHAGITIS: ICD-10-CM

## 2022-10-11 DIAGNOSIS — E03.9 HYPOTHYROIDISM, UNSPECIFIED: ICD-10-CM

## 2022-10-11 DIAGNOSIS — Z88.2 ALLERGY STATUS TO SULFONAMIDES: ICD-10-CM

## 2022-10-11 DIAGNOSIS — Z98.890 OTHER SPECIFIED POSTPROCEDURAL STATES: Chronic | ICD-10-CM

## 2022-10-11 DIAGNOSIS — R07.89 OTHER CHEST PAIN: ICD-10-CM

## 2022-10-11 DIAGNOSIS — I34.0 NONRHEUMATIC MITRAL (VALVE) INSUFFICIENCY: ICD-10-CM

## 2022-10-11 DIAGNOSIS — G70.00 MYASTHENIA GRAVIS WITHOUT (ACUTE) EXACERBATION: ICD-10-CM

## 2022-10-11 DIAGNOSIS — E78.00 PURE HYPERCHOLESTEROLEMIA, UNSPECIFIED: ICD-10-CM

## 2022-10-11 DIAGNOSIS — I10 ESSENTIAL (PRIMARY) HYPERTENSION: ICD-10-CM

## 2022-10-11 DIAGNOSIS — Z87.19 PERSONAL HISTORY OF OTHER DISEASES OF THE DIGESTIVE SYSTEM: ICD-10-CM

## 2022-10-11 LAB
ALBUMIN SERPL ELPH-MCNC: 3.9 G/DL — SIGNIFICANT CHANGE UP (ref 3.5–5.2)
ALP SERPL-CCNC: 62 U/L — SIGNIFICANT CHANGE UP (ref 30–115)
ALT FLD-CCNC: 24 U/L — SIGNIFICANT CHANGE UP (ref 0–41)
ANION GAP SERPL CALC-SCNC: 9 MMOL/L — SIGNIFICANT CHANGE UP (ref 7–14)
AST SERPL-CCNC: 34 U/L — SIGNIFICANT CHANGE UP (ref 0–41)
BASOPHILS # BLD AUTO: 0.04 K/UL — SIGNIFICANT CHANGE UP (ref 0–0.2)
BASOPHILS NFR BLD AUTO: 0.9 % — SIGNIFICANT CHANGE UP (ref 0–1)
BILIRUB SERPL-MCNC: 0.3 MG/DL — SIGNIFICANT CHANGE UP (ref 0.2–1.2)
BUN SERPL-MCNC: 22 MG/DL — HIGH (ref 10–20)
CALCIUM SERPL-MCNC: 9.3 MG/DL — SIGNIFICANT CHANGE UP (ref 8.4–10.5)
CHLORIDE SERPL-SCNC: 102 MMOL/L — SIGNIFICANT CHANGE UP (ref 98–110)
CO2 SERPL-SCNC: 27 MMOL/L — SIGNIFICANT CHANGE UP (ref 17–32)
CREAT SERPL-MCNC: 0.8 MG/DL — SIGNIFICANT CHANGE UP (ref 0.7–1.5)
D DIMER BLD IA.RAPID-MCNC: 165 NG/ML DDU — SIGNIFICANT CHANGE UP (ref 0–230)
EGFR: 84 ML/MIN/1.73M2 — SIGNIFICANT CHANGE UP
EOSINOPHIL # BLD AUTO: 0.03 K/UL — SIGNIFICANT CHANGE UP (ref 0–0.7)
EOSINOPHIL NFR BLD AUTO: 0.7 % — SIGNIFICANT CHANGE UP (ref 0–8)
GLUCOSE SERPL-MCNC: 110 MG/DL — HIGH (ref 70–99)
HCT VFR BLD CALC: 36.2 % — LOW (ref 37–47)
HGB BLD-MCNC: 12.1 G/DL — SIGNIFICANT CHANGE UP (ref 12–16)
IMM GRANULOCYTES NFR BLD AUTO: 1.3 % — HIGH (ref 0.1–0.3)
LYMPHOCYTES # BLD AUTO: 1.15 K/UL — LOW (ref 1.2–3.4)
LYMPHOCYTES # BLD AUTO: 25.7 % — SIGNIFICANT CHANGE UP (ref 20.5–51.1)
MCHC RBC-ENTMCNC: 30.6 PG — SIGNIFICANT CHANGE UP (ref 27–31)
MCHC RBC-ENTMCNC: 33.4 G/DL — SIGNIFICANT CHANGE UP (ref 32–37)
MCV RBC AUTO: 91.6 FL — SIGNIFICANT CHANGE UP (ref 81–99)
MONOCYTES # BLD AUTO: 0.36 K/UL — SIGNIFICANT CHANGE UP (ref 0.1–0.6)
MONOCYTES NFR BLD AUTO: 8 % — SIGNIFICANT CHANGE UP (ref 1.7–9.3)
NEUTROPHILS # BLD AUTO: 2.84 K/UL — SIGNIFICANT CHANGE UP (ref 1.4–6.5)
NEUTROPHILS NFR BLD AUTO: 63.4 % — SIGNIFICANT CHANGE UP (ref 42.2–75.2)
NRBC # BLD: 0 /100 WBCS — SIGNIFICANT CHANGE UP (ref 0–0)
PLATELET # BLD AUTO: 207 K/UL — SIGNIFICANT CHANGE UP (ref 130–400)
POTASSIUM SERPL-MCNC: 4 MMOL/L — SIGNIFICANT CHANGE UP (ref 3.5–5)
POTASSIUM SERPL-SCNC: 4 MMOL/L — SIGNIFICANT CHANGE UP (ref 3.5–5)
PROT SERPL-MCNC: 8.5 G/DL — HIGH (ref 6–8)
RBC # BLD: 3.95 M/UL — LOW (ref 4.2–5.4)
RBC # FLD: 13.2 % — SIGNIFICANT CHANGE UP (ref 11.5–14.5)
SODIUM SERPL-SCNC: 138 MMOL/L — SIGNIFICANT CHANGE UP (ref 135–146)
TROPONIN T SERPL-MCNC: <0.01 NG/ML — SIGNIFICANT CHANGE UP
WBC # BLD: 4.48 K/UL — LOW (ref 4.8–10.8)
WBC # FLD AUTO: 4.48 K/UL — LOW (ref 4.8–10.8)

## 2022-10-11 PROCEDURE — 99285 EMERGENCY DEPT VISIT HI MDM: CPT | Mod: FS

## 2022-10-11 PROCEDURE — 71046 X-RAY EXAM CHEST 2 VIEWS: CPT | Mod: 26

## 2022-10-11 PROCEDURE — 93010 ELECTROCARDIOGRAM REPORT: CPT

## 2022-10-11 RX ORDER — KETOROLAC TROMETHAMINE 30 MG/ML
30 SYRINGE (ML) INJECTION ONCE
Refills: 0 | Status: DISCONTINUED | OUTPATIENT
Start: 2022-10-11 | End: 2022-10-11

## 2022-10-11 RX ADMIN — Medication 30 MILLIGRAM(S): at 14:25

## 2022-10-11 NOTE — ED PROVIDER NOTE - OBJECTIVE STATEMENT
63 y/o female non smoker presents to the ED with 3 day hx of left lower chest wall pain worse with movement and deep inspiration . patient without any relief with ibuprofen. patient denies any vomiting , diarrhea or abdominal pain. no rashes. no dysuria or gross hematuria. patient denies any dizziness, palpitations, syncope, headache.

## 2022-10-11 NOTE — ED PROVIDER NOTE - PHYSICAL EXAMINATION
Vital Signs: I have reviewed the initial vital signs.  Constitutional: well-nourished, no acute distress, normocephalic  Eyes: PERRLA, EOMI, clear conjunctiva  ENT: MMM,   Cardiovascular: regular rate, regular rhythm, no murmur appreciated  Respiratory: unlabored respiratory effort, clear to auscultation bilaterally, +left lower chest wall tenderness   Gastrointestinal: soft, non-tender, non-distended  abdomen, no pulsatile mass  Musculoskeletal: supple neck, no lower extremity edema, no bony tenderness  Integumentary: warm, dry, no rash  Neurologic: awake, alert, cranial nerves II-XII grossly intact, extremities’ motor and sensory functions grossly intact, no focal deficits

## 2022-10-11 NOTE — ED PROVIDER NOTE - CLINICAL SUMMARY MEDICAL DECISION MAKING FREE TEXT BOX
chest wall pain as above, labs and studies reviewed, will d/c supportive care, pmd f/u. Patient counseled regarding conditions which should prompt return.

## 2022-10-11 NOTE — ED ADULT TRIAGE NOTE - WEIGHT IN KG
88.5 Post-Care Instructions: I reviewed with the patient in detail post-care instructions. Patient is not to engage in any heavy lifting, exercise, or swimming for the next 14 days. Should the patient develop any fevers, chills, bleeding, severe pain patient will contact the office immediately.

## 2022-10-11 NOTE — ED PROVIDER NOTE - PATIENT PORTAL LINK FT
You can access the FollowMyHealth Patient Portal offered by [facility] by registering at the following website: [email or web address]. By joining [employer]’s FollowMyHealth portal, you will also be able to view your health information using other applications (apps) compatible with our system.

## 2022-10-11 NOTE — ED PROVIDER NOTE - NSICDXPASTSURGICALHX_GEN_ALL_CORE_FT
PAST SURGICAL HISTORY:  H/O colonoscopy 2021    H/O cone biopsy of cervix     H/O dilation and curettage     History of esophagogastroduodenoscopy (EGD)     History of foot surgery

## 2022-10-14 LAB — CYTOLOGY CVX/VAG DOC THIN PREP: ABNORMAL

## 2022-10-25 ENCOUNTER — APPOINTMENT (OUTPATIENT)
Dept: OPHTHALMOLOGY | Facility: CLINIC | Age: 62
End: 2022-10-25

## 2022-10-25 ENCOUNTER — OUTPATIENT (OUTPATIENT)
Dept: OUTPATIENT SERVICES | Facility: HOSPITAL | Age: 62
LOS: 1 days | Discharge: HOME | End: 2022-10-25

## 2022-10-25 ENCOUNTER — EMERGENCY (EMERGENCY)
Facility: HOSPITAL | Age: 62
LOS: 0 days | Discharge: HOME | End: 2022-10-26
Attending: EMERGENCY MEDICINE | Admitting: EMERGENCY MEDICINE

## 2022-10-25 VITALS
HEART RATE: 71 BPM | DIASTOLIC BLOOD PRESSURE: 89 MMHG | HEIGHT: 64 IN | OXYGEN SATURATION: 100 % | TEMPERATURE: 98 F | WEIGHT: 195.11 LBS | SYSTOLIC BLOOD PRESSURE: 160 MMHG | RESPIRATION RATE: 19 BRPM

## 2022-10-25 DIAGNOSIS — G70.00 MYASTHENIA GRAVIS WITHOUT (ACUTE) EXACERBATION: ICD-10-CM

## 2022-10-25 DIAGNOSIS — I10 ESSENTIAL (PRIMARY) HYPERTENSION: ICD-10-CM

## 2022-10-25 DIAGNOSIS — Z98.890 OTHER SPECIFIED POSTPROCEDURAL STATES: Chronic | ICD-10-CM

## 2022-10-25 DIAGNOSIS — H57.12 OCULAR PAIN, LEFT EYE: ICD-10-CM

## 2022-10-25 DIAGNOSIS — Z98.890 OTHER SPECIFIED POSTPROCEDURAL STATES: ICD-10-CM

## 2022-10-25 DIAGNOSIS — E03.9 HYPOTHYROIDISM, UNSPECIFIED: ICD-10-CM

## 2022-10-25 DIAGNOSIS — Z88.8 ALLERGY STATUS TO OTHER DRUGS, MEDICAMENTS AND BIOLOGICAL SUBSTANCES STATUS: ICD-10-CM

## 2022-10-25 DIAGNOSIS — R51.9 HEADACHE, UNSPECIFIED: ICD-10-CM

## 2022-10-25 DIAGNOSIS — E78.00 PURE HYPERCHOLESTEROLEMIA, UNSPECIFIED: ICD-10-CM

## 2022-10-25 DIAGNOSIS — Z87.19 PERSONAL HISTORY OF OTHER DISEASES OF THE DIGESTIVE SYSTEM: ICD-10-CM

## 2022-10-25 DIAGNOSIS — Z79.82 LONG TERM (CURRENT) USE OF ASPIRIN: ICD-10-CM

## 2022-10-25 DIAGNOSIS — K21.9 GASTRO-ESOPHAGEAL REFLUX DISEASE WITHOUT ESOPHAGITIS: ICD-10-CM

## 2022-10-25 DIAGNOSIS — G47.33 OBSTRUCTIVE SLEEP APNEA (ADULT) (PEDIATRIC): ICD-10-CM

## 2022-10-25 DIAGNOSIS — Z88.2 ALLERGY STATUS TO SULFONAMIDES: ICD-10-CM

## 2022-10-25 DIAGNOSIS — H57.04 MYDRIASIS: ICD-10-CM

## 2022-10-25 LAB
ALBUMIN SERPL ELPH-MCNC: 4.1 G/DL — SIGNIFICANT CHANGE UP (ref 3.5–5.2)
ALP SERPL-CCNC: 70 U/L — SIGNIFICANT CHANGE UP (ref 30–115)
ALT FLD-CCNC: 22 U/L — SIGNIFICANT CHANGE UP (ref 0–41)
ANION GAP SERPL CALC-SCNC: 11 MMOL/L — SIGNIFICANT CHANGE UP (ref 7–14)
AST SERPL-CCNC: 25 U/L — SIGNIFICANT CHANGE UP (ref 0–41)
BASOPHILS # BLD AUTO: 0.02 K/UL — SIGNIFICANT CHANGE UP (ref 0–0.2)
BASOPHILS NFR BLD AUTO: 0.5 % — SIGNIFICANT CHANGE UP (ref 0–1)
BILIRUB SERPL-MCNC: 0.2 MG/DL — SIGNIFICANT CHANGE UP (ref 0.2–1.2)
BUN SERPL-MCNC: 22 MG/DL — HIGH (ref 10–20)
CALCIUM SERPL-MCNC: 9.4 MG/DL — SIGNIFICANT CHANGE UP (ref 8.4–10.5)
CHLORIDE SERPL-SCNC: 98 MMOL/L — SIGNIFICANT CHANGE UP (ref 98–110)
CO2 SERPL-SCNC: 26 MMOL/L — SIGNIFICANT CHANGE UP (ref 17–32)
CREAT SERPL-MCNC: 0.9 MG/DL — SIGNIFICANT CHANGE UP (ref 0.7–1.5)
EGFR: 73 ML/MIN/1.73M2 — SIGNIFICANT CHANGE UP
EOSINOPHIL # BLD AUTO: 0.04 K/UL — SIGNIFICANT CHANGE UP (ref 0–0.7)
EOSINOPHIL NFR BLD AUTO: 0.9 % — SIGNIFICANT CHANGE UP (ref 0–8)
GLUCOSE SERPL-MCNC: 99 MG/DL — SIGNIFICANT CHANGE UP (ref 70–99)
HCT VFR BLD CALC: 36.8 % — LOW (ref 37–47)
HGB BLD-MCNC: 12.4 G/DL — SIGNIFICANT CHANGE UP (ref 12–16)
IMM GRANULOCYTES NFR BLD AUTO: 0.2 % — SIGNIFICANT CHANGE UP (ref 0.1–0.3)
LYMPHOCYTES # BLD AUTO: 1.9 K/UL — SIGNIFICANT CHANGE UP (ref 1.2–3.4)
LYMPHOCYTES # BLD AUTO: 44.7 % — SIGNIFICANT CHANGE UP (ref 20.5–51.1)
MAGNESIUM SERPL-MCNC: 1.7 MG/DL — LOW (ref 1.8–2.4)
MCHC RBC-ENTMCNC: 31.1 PG — HIGH (ref 27–31)
MCHC RBC-ENTMCNC: 33.7 G/DL — SIGNIFICANT CHANGE UP (ref 32–37)
MCV RBC AUTO: 92.2 FL — SIGNIFICANT CHANGE UP (ref 81–99)
MONOCYTES # BLD AUTO: 0.38 K/UL — SIGNIFICANT CHANGE UP (ref 0.1–0.6)
MONOCYTES NFR BLD AUTO: 8.9 % — SIGNIFICANT CHANGE UP (ref 1.7–9.3)
NEUTROPHILS # BLD AUTO: 1.9 K/UL — SIGNIFICANT CHANGE UP (ref 1.4–6.5)
NEUTROPHILS NFR BLD AUTO: 44.8 % — SIGNIFICANT CHANGE UP (ref 42.2–75.2)
NRBC # BLD: 0 /100 WBCS — SIGNIFICANT CHANGE UP (ref 0–0)
PLATELET # BLD AUTO: 273 K/UL — SIGNIFICANT CHANGE UP (ref 130–400)
POTASSIUM SERPL-MCNC: 4.4 MMOL/L — SIGNIFICANT CHANGE UP (ref 3.5–5)
POTASSIUM SERPL-SCNC: 4.4 MMOL/L — SIGNIFICANT CHANGE UP (ref 3.5–5)
PROT SERPL-MCNC: 8.3 G/DL — HIGH (ref 6–8)
RBC # BLD: 3.99 M/UL — LOW (ref 4.2–5.4)
RBC # FLD: 13.7 % — SIGNIFICANT CHANGE UP (ref 11.5–14.5)
SODIUM SERPL-SCNC: 135 MMOL/L — SIGNIFICANT CHANGE UP (ref 135–146)
WBC # BLD: 4.25 K/UL — LOW (ref 4.8–10.8)
WBC # FLD AUTO: 4.25 K/UL — LOW (ref 4.8–10.8)

## 2022-10-25 PROCEDURE — 99285 EMERGENCY DEPT VISIT HI MDM: CPT | Mod: FS

## 2022-10-25 PROCEDURE — 92014 COMPRE OPH EXAM EST PT 1/>: CPT

## 2022-10-25 PROCEDURE — 70450 CT HEAD/BRAIN W/O DYE: CPT | Mod: 26,MA

## 2022-10-25 PROCEDURE — 92133 CPTRZD OPH DX IMG PST SGM ON: CPT | Mod: 26

## 2022-10-25 RX ORDER — MORPHINE SULFATE 50 MG/1
4 CAPSULE, EXTENDED RELEASE ORAL ONCE
Refills: 0 | Status: DISCONTINUED | OUTPATIENT
Start: 2022-10-25 | End: 2022-10-25

## 2022-10-25 RX ORDER — DIPHENHYDRAMINE HCL 50 MG
50 CAPSULE ORAL ONCE
Refills: 0 | Status: COMPLETED | OUTPATIENT
Start: 2022-10-25 | End: 2022-10-25

## 2022-10-25 RX ORDER — METOCLOPRAMIDE HCL 10 MG
10 TABLET ORAL ONCE
Refills: 0 | Status: COMPLETED | OUTPATIENT
Start: 2022-10-25 | End: 2022-10-25

## 2022-10-25 RX ORDER — SODIUM CHLORIDE 9 MG/ML
1000 INJECTION INTRAMUSCULAR; INTRAVENOUS; SUBCUTANEOUS ONCE
Refills: 0 | Status: COMPLETED | OUTPATIENT
Start: 2022-10-25 | End: 2022-10-25

## 2022-10-25 RX ADMIN — MORPHINE SULFATE 4 MILLIGRAM(S): 50 CAPSULE, EXTENDED RELEASE ORAL at 23:56

## 2022-10-25 RX ADMIN — SODIUM CHLORIDE 2000 MILLILITER(S): 9 INJECTION INTRAMUSCULAR; INTRAVENOUS; SUBCUTANEOUS at 23:56

## 2022-10-25 RX ADMIN — Medication 50 MILLIGRAM(S): at 23:56

## 2022-10-25 RX ADMIN — Medication 10 MILLIGRAM(S): at 23:57

## 2022-10-25 NOTE — ED ADULT NURSE NOTE - NSIMPLEMENTINTERV_GEN_ALL_ED
Pt missed an appointment 09/05/18. No answer when I called. Missed appointment/ No Show  letter mailed to patient. Implemented All Fall Risk Interventions:  [place] to call system. Call bell, personal items and telephone within reach. Instruct patient to call for assistance. Room bathroom lighting operational. Non-slip footwear when patient is off stretcher. Physically safe environment: no spills, clutter or unnecessary equipment. Stretcher in lowest position, wheels locked, appropriate side rails in place. Provide visual cue, wrist band, yellow gown, etc. Monitor gait and stability. Monitor for mental status changes and reorient to person, place, and time. Review medications for side effects contributing to fall risk. Reinforce activity limits and safety measures with patient and family.

## 2022-10-25 NOTE — ED ADULT TRIAGE NOTE - CHIEF COMPLAINT QUOTE
pt c/o R eye pain started yesterday to cheek traveled to eye today, denies vision change denies trauma

## 2022-10-25 NOTE — ED ADULT TRIAGE NOTE - HEIGHT IN CM
162.56 Protopic Pregnancy And Lactation Text: This medication is Pregnancy Category C. It is unknown if this medication is excreted in breast milk when applied topically.

## 2022-10-26 VITALS
SYSTOLIC BLOOD PRESSURE: 106 MMHG | RESPIRATION RATE: 20 BRPM | TEMPERATURE: 97 F | DIASTOLIC BLOOD PRESSURE: 66 MMHG | OXYGEN SATURATION: 98 % | HEART RATE: 56 BPM

## 2022-10-26 RX ORDER — KETOROLAC TROMETHAMINE 30 MG/ML
30 SYRINGE (ML) INJECTION ONCE
Refills: 0 | Status: DISCONTINUED | OUTPATIENT
Start: 2022-10-26 | End: 2022-10-26

## 2022-10-26 RX ORDER — MAGNESIUM SULFATE 500 MG/ML
2 VIAL (ML) INJECTION ONCE
Refills: 0 | Status: COMPLETED | OUTPATIENT
Start: 2022-10-26 | End: 2022-10-26

## 2022-10-26 RX ADMIN — Medication 30 MILLIGRAM(S): at 01:49

## 2022-10-26 RX ADMIN — Medication 25 GRAM(S): at 01:07

## 2022-10-26 NOTE — ED PROVIDER NOTE - NSICDXFAMILYHX_GEN_ALL_CORE_FT
-- DO NOT REPLY / DO NOT REPLY ALL --  -- Message is from the [facility]--    Offered Waitlist if Available for the Visit Type? Yes    Caller is requesting an appointment - at a sooner time than what was available.      Caller wants sooner appointment - offered trusted partner & sister site            Patient is willing to be seen by: PCP only    Reason for Visit: Headaches, referral on file    Is the patient currently scheduled? No    Preferred time to be seen: anytime    Caller Information       Type Contact Phone/Fax    07/18/2022 03:52 PM CDT Phone (Incoming) Vladimir Marion (Self) [phone] (M)          Alternative phone number: no    Turnaround time given to caller:   \"This message will be sent to [state Provider's name]. The clinical team will fulfill your request as soon as they review your message.\"   FAMILY HISTORY:  FH: lung cancer, father  FH: renal failure, mother

## 2022-10-26 NOTE — ED PROVIDER NOTE - CLINICAL SUMMARY MEDICAL DECISION MAKING FREE TEXT BOX
62yF pmhx MG followed by neuro HTN HLD POTS, hypothyroid and KADI pw  left sided retroorbital sharp  pain since yesterday. Pt already had  opthalmology appointment routine scheduled for today -  eye dilated pressures normal ,  cleare from opthalmic  standpoint so patient came to ED.    Alert nontoxic, perrl eomi, left pupil dilated 4mm,  [facility] R[facility], Resp CTA no tachypnea no hyperpnea,    Alert and oriented.  CN 2-12 intact.  Motor strength and sensory response is symmetric.  CB intact.  Pt feesl much better after  analgesia,   CT head no acute pathology  labs reviewed -  pt  will folllow up with her neurologist  for possible MRIoutpt  Patient to be discharged from ED in well appearing condition. Any available test results were discussed with and printed  for patient.  Verbal instructions given, including instructions to return to ED immediately for any new, worsening, or concerning symptoms. Limitations of ED work up discussed.  Patient reports understanding of above with capacity and insight. Written discharge instructions additionally given, including follow-up plan.

## 2022-10-26 NOTE — ED PROVIDER NOTE - NSFOLLOWUPINSTRUCTIONS_ED_ALL_ED_FT
Follow up with your primary care doctor and your neurologist in 1-2 days    General Headache Without Cause  A headache is pain or discomfort felt around the head or neck area. The specific cause of a headache may not be found. There are many causes and types of headaches. A few common ones are:    Tension headaches.  Migraine headaches.  Cluster headaches.  Chronic daily headaches.    Follow these instructions at home:  Watch your condition for any changes. Take these steps to help with your condition:    Managing pain     Take over-the-counter and prescription medicines only as told by your health care provider.  Lie down in a dark, quiet room when you have a headache.  If directed, apply ice to the head and neck area:    Put ice in a plastic bag.  Place a towel between your skin and the bag.  Leave the ice on for 20 minutes, 2–3 times per day.    Use a heating pad or hot shower to apply heat to the head and neck area as told by your health care provider.  ImageKeep lights dim if bright lights bother you or make your headaches worse.  Eating and drinking     Eat meals on a regular schedule.  Limit alcohol use.  Decrease the amount of caffeine you drink, or stop drinking caffeine.  General instructions     Keep all follow-up visits as told by your health care provider. This is important.  Keep a headache journal to help find out what may trigger your headaches. For example, write down:    What you eat and drink.  How much sleep you get.  Any change to your diet or medicines.    Try massage or other relaxation techniques.  Limit stress.  Sit up straight, and do not tense your muscles.  Do not use tobacco products, including cigarettes, chewing tobacco, or e-cigarettes. If you need help quitting, ask your health care provider.  Exercise regularly as told by your health care provider.  ImageSleep on a regular schedule. Get 7–9 hours of sleep, or the amount recommended by your health care provider.  Contact a health care provider if:  Your symptoms are not helped by medicine.  You have a headache that is different from the usual headache.  You have nausea or you vomit.  You have a fever.  Get help right away if:  Your headache becomes severe.  You have repeated vomiting.  You have a stiff neck.  You have a loss of vision.  You have problems with speech.  You have pain in the eye or ear.  You have muscular weakness or loss of muscle control.  You lose your balance or have trouble walking.  You feel faint or pass out.  You have confusion.  This information is not intended to replace advice given to you by your health care provider. Make sure you discuss any questions you have with your health care provider.

## 2022-10-26 NOTE — ED PROVIDER NOTE - PATIENT PORTAL LINK FT
You can access the FollowMyHealth Patient Portal offered by [facility] by registering at the following website: [email or web address]. By joining [employer]’s FollowMyHealth portal, you will also be able to view your health information using other applications (apps) compatible with our system.

## 2022-10-26 NOTE — ED PROVIDER NOTE - PROGRESS NOTE DETAILS
Pt feeling much better, sleeping comfortably. still having some pain. will give dose of ketorlac and f/u with primary care and neurologist

## 2022-10-26 NOTE — ED PROVIDER NOTE - OBJECTIVE STATEMENT
62 year old female past medical history of Hypertension and MG comes to emergency room for headache and left eye pain. patient states that started yesterday and was seen today by optho and had pressure checked and eye dilated which all were normal. patient was told to f/u with her neurologist. patient now in emergency room because pain is persistent. denies vomiting. no fever/chills. no rash

## 2022-10-27 DIAGNOSIS — G70.00 MYASTHENIA GRAVIS WITHOUT (ACUTE) EXACERBATION: ICD-10-CM

## 2022-10-27 DIAGNOSIS — H57.12 OCULAR PAIN, LEFT EYE: ICD-10-CM

## 2022-10-27 DIAGNOSIS — H53.022 REFRACTIVE AMBLYOPIA, LEFT EYE: ICD-10-CM

## 2022-10-27 DIAGNOSIS — H04.123 DRY EYE SYNDROME OF BILATERAL LACRIMAL GLANDS: ICD-10-CM

## 2022-10-27 DIAGNOSIS — H40.013 OPEN ANGLE WITH BORDERLINE FINDINGS, LOW RISK, BILATERAL: ICD-10-CM

## 2022-11-02 NOTE — H&P ADULT - NSHPOUTPATIENTPROVIDERS_GEN_ALL_CORE
[Partial] : partial [Does not reveal pre-existing condition(s) that may affect treatment/prognosis] : does not reveal pre-existing condition(s) that may affect treatment/prognosis [Patient] : patient [No Rx restrictions] : No Rx restrictions. [I provided the services listed above] :  I provided the services listed above. Neurology- Dr Cárdenas

## 2022-11-04 ENCOUNTER — APPOINTMENT (OUTPATIENT)
Dept: NEUROLOGY | Facility: CLINIC | Age: 62
End: 2022-11-04

## 2023-01-05 ENCOUNTER — LABORATORY RESULT (OUTPATIENT)
Age: 63
End: 2023-01-05

## 2023-01-05 ENCOUNTER — APPOINTMENT (OUTPATIENT)
Dept: NEUROLOGY | Facility: CLINIC | Age: 63
End: 2023-01-05
Payer: MEDICARE

## 2023-01-05 PROCEDURE — 99215 OFFICE O/P EST HI 40 MIN: CPT

## 2023-01-05 RX ORDER — FAMOTIDINE 40 MG/1
40 TABLET, FILM COATED ORAL
Qty: 30 | Refills: 3 | Status: DISCONTINUED | COMMUNITY
Start: 2020-01-24 | End: 2023-01-05

## 2023-01-05 RX ORDER — PYRIDOSTIGMINE BROMIDE 60 MG/1
60 TABLET ORAL 3 TIMES DAILY
Qty: 90 | Refills: 5 | Status: ACTIVE | COMMUNITY
Start: 2020-01-02 | End: 1900-01-01

## 2023-01-05 RX ORDER — MYCOPHENOLATE MOFETIL 250 MG/1
250 CAPSULE ORAL TWICE DAILY
Qty: 60 | Refills: 5 | Status: ACTIVE | COMMUNITY
Start: 2023-01-05 | End: 1900-01-01

## 2023-01-05 NOTE — HISTORY OF PRESENT ILLNESS
[FreeTextEntry1] : This is a 63 yo RHF previously followed by Dr. Velazquez for seronegative MG here for transfer of care.  Pt previously diagnosed with MG in 2020 while at [facility] seen by Dr. Cárdenas and had series of tests eventually diagnosed with "seronegative MG" due to response to trial of acetylcholinesterase treatments.  Pt had IVIG done at that time and responded well.  Was placed on prednisone and pyridostigmine and followed up with Dr. Velazquez.  PT underwent extensive w/u including AChR and MuSK testing both negative.  Had RNS proximal and distal also negative.  Had SFEMG w/ Dr. Diallo at [facility] w/ 4 of 20 fiber pairs with jitter > "moderately normal" but can be consistent with MG.  On basis of SFEMG and treatment response, pt continued on DMTs including prednisone.  \par \par Pt was started on AZA in addition to pyridostigmine and prednisone but had to stop medications after several months due to pancreatitis.  Had 2 hospitalizations at [facility] for presumed exacerbations requiring PLEX with good treatment response.  IVIG maintenance had been steadily increasing in frequency due to presumed diminishing treatment response.  Pt currently on Prednisone 5 mg QD, pyridostigmine 60 mg QID, IVIG 0.5g/kg Q2 wks.  \par \par Currently still has bouts of subjective "dysphagia" with having sensation of things stuck in her throat particularly when fatigued.  Also has bouts of fatigue intermittently without specific pattern.  Has occasional SOB and occasionally sleeps on recliner but denies orthopnea/FISHER otherwise.  Denies any ptosis or diplopia and has never had ocular symptoms in the past.  Has recurrent fatigue in arms and legs variable.  Is otherwise stable and tolerating medications well.

## 2023-01-05 NOTE — PHYSICAL EXAM
[FreeTextEntry1] : NAD.  AOx3.  Intact memory.  Speech fluent, nondysarthric.  CN 2 – 12 normal.  No Fatigue.  No ptosis/diplopia.  No Cogan, hopping lid sign.\par Strength 5/5 b/l UE/RLE.  LLE w/ giveway weakness  proximal and distal (chronic per pt). NL tone, bulk.  No abnl movements.  DTRs 2+ throughout.  Plantar response downgoing b/l.  (-) Hoffmans, clonus.  Sensory intact LT/PP, pain, temp, proprioception and vibration.  NL FTN/HKS.  No dysdiadokinesia.  Gait narrow based/NL tandem.\par \par

## 2023-01-05 NOTE — ASSESSMENT
[FreeTextEntry1] : 63 yo RHF w/ h/o pancreatitis, hypothyroid, IBS carries diagnosis of double-seronegative MG (Class 3A) on basis of treatment response and mildly + SFEMG.  Currently stable on multiple medications but will attempt steroid sparing treatment if possible. \par \par Recommendations:\par - continue prednisone 5 mg QD \par - start mycophenolate 250 mg BID\par - start mestinon  mg BID\par - additional mestinon IR PRN\par - continue IVIG 0.5 g/kg IV Q2 weeks\par - check b/w including VGCC, LRP4, striated muscle abs\par - consider CT Chest at next visit\par - RTC in 3 months

## 2023-01-09 ENCOUNTER — NON-APPOINTMENT (OUTPATIENT)
Age: 63
End: 2023-01-09

## 2023-01-26 NOTE — ED PROVIDER NOTE - PROGRESS NOTE DETAILS
Thank you for choosing us for your  care today  If you have any questions about your ultrasound or care, please do not hesitate to contact us or your primary obstetrician  Some general instructions for your pregnancy are:    Exercise: Aim for 22 minutes per day (150 minutes per week) of regular exercise  Walking is great! Nutrition: Choose healthy sources of calcium, iron, and protein  Learn about Preeclampsia: preeclampsia is a common, serious high blood pressure complication in pregnancy  A blood pressure of 871TEBR (systolic or top number) or 55QMHD (diastolic or bottom number) is not normal and needs evaluation by your doctor  Aspirin is sometimes prescribed in early pregnancy to prevent preeclampsia in women with risk factors - ask your obstetrician if you should be on this medication  For more resources, visit:  https://mothertobaby org/fact-sheets/low-dose-aspirin/  [employer] com cy  https://www highriskpregnancyinfo org/preeclampsia  If you smoke, try to reduce how many cigarettes you smoke or try to quit completely  Do not vape  Other warning signs to watch out for in pregnancy or postpartum: chest pain, obstructed breathing or shortness of breath, seizures, thoughts of hurting yourself or your baby, bleeding, a painful or swollen leg, fever, or headache (see [facility] POST-BIRTH Warning Signs campaign)  If these happen call 911  Itching is also not normal in pregnancy and if you experience this, especially over your hands and feet, potentially worse at night, notify your doctors  S/o to Dr. Calderon. results discussed will admit patient will be admited spoke to yoli

## 2023-02-02 ENCOUNTER — INPATIENT (INPATIENT)
Facility: HOSPITAL | Age: 63
LOS: 3 days | Discharge: HOME HEALTH | DRG: 57 | End: 2023-02-06
Attending: INTERNAL MEDICINE | Admitting: INTERNAL MEDICINE
Payer: MEDICARE

## 2023-02-02 VITALS
TEMPERATURE: 97 F | SYSTOLIC BLOOD PRESSURE: 135 MMHG | DIASTOLIC BLOOD PRESSURE: 77 MMHG | RESPIRATION RATE: 20 BRPM | OXYGEN SATURATION: 98 % | HEART RATE: 80 BPM

## 2023-02-02 DIAGNOSIS — Z98.890 OTHER SPECIFIED POSTPROCEDURAL STATES: Chronic | ICD-10-CM

## 2023-02-02 LAB
ALBUMIN SERPL ELPH-MCNC: 4 G/DL — SIGNIFICANT CHANGE UP (ref 3.5–5.2)
ALP SERPL-CCNC: 54 U/L — SIGNIFICANT CHANGE UP (ref 30–115)
ALT FLD-CCNC: 26 U/L — SIGNIFICANT CHANGE UP (ref 0–41)
ANION GAP SERPL CALC-SCNC: 9 MMOL/L — SIGNIFICANT CHANGE UP (ref 7–14)
AST SERPL-CCNC: 31 U/L — SIGNIFICANT CHANGE UP (ref 0–41)
BASOPHILS # BLD AUTO: 0.01 K/UL — SIGNIFICANT CHANGE UP (ref 0–0.2)
BASOPHILS NFR BLD AUTO: 0.2 % — SIGNIFICANT CHANGE UP (ref 0–1)
BILIRUB SERPL-MCNC: 0.5 MG/DL — SIGNIFICANT CHANGE UP (ref 0.2–1.2)
BUN SERPL-MCNC: 20 MG/DL — SIGNIFICANT CHANGE UP (ref 10–20)
CALCIUM SERPL-MCNC: 9.6 MG/DL — SIGNIFICANT CHANGE UP (ref 8.4–10.5)
CHLORIDE SERPL-SCNC: 98 MMOL/L — SIGNIFICANT CHANGE UP (ref 98–110)
CO2 SERPL-SCNC: 26 MMOL/L — SIGNIFICANT CHANGE UP (ref 17–32)
CREAT SERPL-MCNC: 0.8 MG/DL — SIGNIFICANT CHANGE UP (ref 0.7–1.5)
EGFR: 84 ML/MIN/1.73M2 — SIGNIFICANT CHANGE UP
EOSINOPHIL # BLD AUTO: 0 K/UL — SIGNIFICANT CHANGE UP (ref 0–0.7)
EOSINOPHIL NFR BLD AUTO: 0 % — SIGNIFICANT CHANGE UP (ref 0–8)
GLUCOSE SERPL-MCNC: 92 MG/DL — SIGNIFICANT CHANGE UP (ref 70–99)
HCT VFR BLD CALC: 34.6 % — LOW (ref 37–47)
HGB BLD-MCNC: 11.6 G/DL — LOW (ref 12–16)
IMM GRANULOCYTES NFR BLD AUTO: 0 % — LOW (ref 0.1–0.3)
LYMPHOCYTES # BLD AUTO: 0.72 K/UL — LOW (ref 1.2–3.4)
LYMPHOCYTES # BLD AUTO: 16.3 % — LOW (ref 20.5–51.1)
MAGNESIUM SERPL-MCNC: 1.8 MG/DL — SIGNIFICANT CHANGE UP (ref 1.8–2.4)
MCHC RBC-ENTMCNC: 30.9 PG — SIGNIFICANT CHANGE UP (ref 27–31)
MCHC RBC-ENTMCNC: 33.5 G/DL — SIGNIFICANT CHANGE UP (ref 32–37)
MCV RBC AUTO: 92 FL — SIGNIFICANT CHANGE UP (ref 81–99)
MONOCYTES # BLD AUTO: 0.23 K/UL — SIGNIFICANT CHANGE UP (ref 0.1–0.6)
MONOCYTES NFR BLD AUTO: 5.2 % — SIGNIFICANT CHANGE UP (ref 1.7–9.3)
NEUTROPHILS # BLD AUTO: 3.45 K/UL — SIGNIFICANT CHANGE UP (ref 1.4–6.5)
NEUTROPHILS NFR BLD AUTO: 78.3 % — HIGH (ref 42.2–75.2)
NRBC # BLD: 0 /100 WBCS — SIGNIFICANT CHANGE UP (ref 0–0)
NT-PROBNP SERPL-SCNC: 75 PG/ML — SIGNIFICANT CHANGE UP (ref 0–300)
PLATELET # BLD AUTO: 271 K/UL — SIGNIFICANT CHANGE UP (ref 130–400)
POTASSIUM SERPL-MCNC: 4.6 MMOL/L — SIGNIFICANT CHANGE UP (ref 3.5–5)
POTASSIUM SERPL-SCNC: 4.6 MMOL/L — SIGNIFICANT CHANGE UP (ref 3.5–5)
PROT SERPL-MCNC: 10 G/DL — HIGH (ref 6–8)
RBC # BLD: 3.76 M/UL — LOW (ref 4.2–5.4)
RBC # FLD: 14.2 % — SIGNIFICANT CHANGE UP (ref 11.5–14.5)
SARS-COV-2 RNA SPEC QL NAA+PROBE: SIGNIFICANT CHANGE UP
SODIUM SERPL-SCNC: 133 MMOL/L — LOW (ref 135–146)
TROPONIN T SERPL-MCNC: <0.01 NG/ML — SIGNIFICANT CHANGE UP
WBC # BLD: 4.41 K/UL — LOW (ref 4.8–10.8)
WBC # FLD AUTO: 4.41 K/UL — LOW (ref 4.8–10.8)

## 2023-02-02 PROCEDURE — 93010 ELECTROCARDIOGRAM REPORT: CPT | Mod: 77

## 2023-02-02 PROCEDURE — 82164 ANGIOTENSIN I ENZYME TEST: CPT

## 2023-02-02 PROCEDURE — 71045 X-RAY EXAM CHEST 1 VIEW: CPT | Mod: 26

## 2023-02-02 PROCEDURE — 84484 ASSAY OF TROPONIN QUANT: CPT

## 2023-02-02 PROCEDURE — 85025 COMPLETE CBC W/AUTO DIFF WBC: CPT

## 2023-02-02 PROCEDURE — 84100 ASSAY OF PHOSPHORUS: CPT

## 2023-02-02 PROCEDURE — 83735 ASSAY OF MAGNESIUM: CPT

## 2023-02-02 PROCEDURE — 93005 ELECTROCARDIOGRAM TRACING: CPT

## 2023-02-02 PROCEDURE — 83880 ASSAY OF NATRIURETIC PEPTIDE: CPT

## 2023-02-02 PROCEDURE — 71260 CT THORAX DX C+: CPT | Mod: 26

## 2023-02-02 PROCEDURE — 80053 COMPREHEN METABOLIC PANEL: CPT

## 2023-02-02 PROCEDURE — 93010 ELECTROCARDIOGRAM REPORT: CPT

## 2023-02-02 PROCEDURE — 71045 X-RAY EXAM CHEST 1 VIEW: CPT

## 2023-02-02 PROCEDURE — 80180 DRUG SCRN QUAN MYCOPHENOLATE: CPT

## 2023-02-02 PROCEDURE — 97161 PT EVAL LOW COMPLEX 20 MIN: CPT | Mod: GP

## 2023-02-02 PROCEDURE — 36415 COLL VENOUS BLD VENIPUNCTURE: CPT

## 2023-02-02 PROCEDURE — 71260 CT THORAX DX C+: CPT

## 2023-02-02 PROCEDURE — 99222 1ST HOSP IP/OBS MODERATE 55: CPT

## 2023-02-02 PROCEDURE — 92610 EVALUATE SWALLOWING FUNCTION: CPT | Mod: GN

## 2023-02-02 PROCEDURE — 99285 EMERGENCY DEPT VISIT HI MDM: CPT | Mod: FS

## 2023-02-02 PROCEDURE — 99285 EMERGENCY DEPT VISIT HI MDM: CPT | Mod: 25

## 2023-02-02 RX ORDER — LISINOPRIL 2.5 MG/1
20 TABLET ORAL DAILY
Refills: 0 | Status: DISCONTINUED | OUTPATIENT
Start: 2023-02-02 | End: 2023-02-03

## 2023-02-02 RX ORDER — CHLORHEXIDINE GLUCONATE 213 G/1000ML
1 SOLUTION TOPICAL
Refills: 0 | Status: DISCONTINUED | OUTPATIENT
Start: 2023-02-02 | End: 2023-02-06

## 2023-02-02 RX ORDER — PANTOPRAZOLE SODIUM 20 MG/1
40 TABLET, DELAYED RELEASE ORAL
Refills: 0 | Status: DISCONTINUED | OUTPATIENT
Start: 2023-02-02 | End: 2023-02-06

## 2023-02-02 RX ORDER — PYRIDOSTIGMINE BROMIDE 60 MG/5ML
60 SOLUTION ORAL
Refills: 0 | Status: DISCONTINUED | OUTPATIENT
Start: 2023-02-02 | End: 2023-02-02

## 2023-02-02 RX ORDER — MYCOPHENOLATE MOFETIL 250 MG/1
250 CAPSULE ORAL
Refills: 0 | Status: DISCONTINUED | OUTPATIENT
Start: 2023-02-02 | End: 2023-02-06

## 2023-02-02 RX ORDER — ASPIRIN/CALCIUM CARB/MAGNESIUM 324 MG
81 TABLET ORAL DAILY
Refills: 0 | Status: DISCONTINUED | OUTPATIENT
Start: 2023-02-02 | End: 2023-02-06

## 2023-02-02 RX ORDER — ENOXAPARIN SODIUM 100 MG/ML
40 INJECTION SUBCUTANEOUS EVERY 24 HOURS
Refills: 0 | Status: DISCONTINUED | OUTPATIENT
Start: 2023-02-02 | End: 2023-02-06

## 2023-02-02 RX ORDER — LEVOTHYROXINE SODIUM 125 MCG
50 TABLET ORAL DAILY
Refills: 0 | Status: DISCONTINUED | OUTPATIENT
Start: 2023-02-02 | End: 2023-02-06

## 2023-02-02 RX ORDER — PYRIDOSTIGMINE BROMIDE 60 MG/5ML
180 SOLUTION ORAL
Refills: 0 | Status: DISCONTINUED | OUTPATIENT
Start: 2023-02-02 | End: 2023-02-06

## 2023-02-02 RX ORDER — METOPROLOL TARTRATE 50 MG
25 TABLET ORAL DAILY
Refills: 0 | Status: DISCONTINUED | OUTPATIENT
Start: 2023-02-02 | End: 2023-02-03

## 2023-02-02 NOTE — H&P ADULT - ASSESSMENT
A/P:     1. MG crisis   - neuro fu   - NIF q 6  - fu CT chest   - send TSH   - Solumedrol 1g IVPB QD x2-3 days  - will c/w cellcept and mestilon   - monitor fs   - consider work up for paraneoplastic malignancies   - DVT ppx

## 2023-02-02 NOTE — PATIENT PROFILE ADULT - FALL HARM RISK - RISK INTERVENTIONS

## 2023-02-02 NOTE — PATIENT PROFILE ADULT - FUNCTIONAL ASSESSMENT - DAILY ACTIVITY ASSESSMENT TYPE
Contacted by pre-admissions that pt has not obtained PT/INR prior to surgery scheduled on 12/15/21. Per Dr. Bennett pt does not need lab done and order can be canceled. Order canceled per MD request.   
Admission

## 2023-02-02 NOTE — ED ADULT TRIAGE NOTE - GLASGOW COMA SCALE: BEST VERBAL RESPONSE, MLM
Worsening    Cholesterol (mg/dL)   Date Value   07/26/2021 216 (H)     HDL (mg/dL)   Date Value   07/26/2021 48 (L)     Cholesterol/ HDL Ratio (no units)   Date Value   07/26/2021 4.5 (H)     Triglycerides (mg/dL)   Date Value   07/26/2021 153 (H)     LDL (mg/dL)   Date Value   07/26/2021 137 (H)        (V5) oriented

## 2023-02-02 NOTE — H&P ADULT - HISTORY OF PRESENT ILLNESS
62 year old F non smoker hx of htn, hypothyroid, MG on pyridostigmine, mycophenolate, prednisone 5mg, IVIG q 2 weeks and prior MG flares requiring hospitalizations for plasmapheresis presenting to er for eval. Sts over the past week has had worsening gen weakness, fatigue and sob. Sts this is typical presentation of ms flare.

## 2023-02-02 NOTE — ED PROVIDER NOTE - PHYSICAL EXAMINATION
CONSTITUTIONAL: Well-appearing; well-nourished; in no apparent distress.   EYES: PERRL; EOM intact.   ENT: normal nose; no rhinorrhea; normal pharynx with no tonsillar hypertrophy.   NECK: Supple; non-tender; no cervical lymphadenopathy.  CARDIOVASCULAR: Normal S1, S2; no murmurs, rubs, or gallops.   RESPIRATORY: Normal chest excursion with respiration; breath sounds clear and equal bilaterally; no wheezes, rhonchi, or rales.  GI/: Normal bowel sounds; non-distended; non-tender; no palpable organomegaly.   MS: No evidence of trauma or deformity. Normal ROM in all four extremities; non-tender to palpation; distal pulses are normal.   SKIN: Normal for age and race; warm; dry; good turgor; no apparent lesions or exudate.   NEURO/PSYCH: A & O x 4; grossly unremarkable. mood and manner are appropriate.

## 2023-02-02 NOTE — H&P ADULT - NSHPPHYSICALEXAM_GEN_ALL_CORE
PHYSICAL EXAM:  GENERAL: NAD, well-groomed, well-developed  HEAD:  Atraumatic, Normocephalic  EYES: EOMI, PERRLA, conjunctiva and sclera clear  ENMT: No tonsillar erythema, exudates, or enlargement; Moist mucous membranes, Good dentition, No lesions  NECK: Supple, No JVD, Normal thyroid  NERVOUS SYSTEM:  Alert & Oriented X3, Good concentration; Motor Strength 5/5 B/L upper and lower extremities; DTRs 2+ intact and symmetric  CHEST/LUNG: Clear to percussion bilaterally; No rales, rhonchi, wheezing, or rubs  HEART: Regular rate and rhythm; No murmurs, rubs, or gallops  ABDOMEN: Soft, Nontender, Nondistended; Bowel sounds present  EXTREMITIES:  2+ Peripheral Pulses, No clubbing, cyanosis, or edema  SKIN: No rashes or lesions

## 2023-02-02 NOTE — ED PROVIDER NOTE - CLINICAL SUMMARY MEDICAL DECISION MAKING FREE TEXT BOX
Patient here with possible myasthenia gravis exacerbation.  The patient's NIF vital capacity are within normal limits.  Patient seen at bedside by neurology and plan for admission for serial observation and pulmonary function test. Seen by ICU admit to step down unit.

## 2023-02-02 NOTE — H&P ADULT - NSHPLABSRESULTS_GEN_ALL_CORE
labs  02-02    133<L>  |  98  |  20  ----------------------------<  92  4.6   |  26  |  0.8    Ca    9.6      02 Feb 2023 13:40  Mg     1.8     02-02    TPro  10.0<H>  /  Alb  4.0  /  TBili  0.5  /  DBili  x   /  AST  31  /  ALT  26  /  AlkPhos  54  02-02                          11.6   4.41  )-----------( 271      ( 02 Feb 2023 13:40 )             34.6

## 2023-02-02 NOTE — ED PROVIDER NOTE - OBJECTIVE STATEMENT
62 year old F non smoker hx of htn, hypothyroid, MG on pyridostigmine, mycophenolate, prednisone 5mg, IVIG q 2 weeks and prior MG flares requiring hospitalizations for plasmapheresis presenting to er for eval. Sts over the past week has had worsening gen weakness, fatigue and sob. Sts this is typical presentation of ms flare. No fever, chest pain, leg pain, abd pain, nausea, vomiting, diarrhea, sick contacts, recent travel, cardiac hx,.

## 2023-02-02 NOTE — CONSULT NOTE ADULT - SUBJECTIVE AND OBJECTIVE BOX
GHADA ROPER     62y     Female    MRN-[id number]                                                           CC:Patient is a 62y old  Female who presents with a chief complaint of MG excerbation    HPI:  62 year old F non smoker hx of htn, hypothyroid, MG on pyridostigmine, mycophenolate, prednisone 5mg, IVIG q 2 weeks and prior MG flares requiring hospitalizations for plasmapheresis presenting to er for eval. Sts over the past week has had worsening gen weakness, fatigue and sob. Sts this is typical presentation of ms flare. No fever, chest pain, leg pain, abd pain, nausea, vomiting, diarrhea, sick contacts, recent travel, cardiac hx,.    Patient seen and examined and history reviewed form out patient chart, EMR and her neurologist over the phone.  Sh believes she has been getting weaker over the last 2 weeks and waited for IVIG treatment yesterday to see if she would improve.  Not SOB but feels muscles are fatiguing and weak    ROS:  Constitutional, Neurological, Psychiatric, Eyes, ENT, Cardiovascular, Respiratory, Gastrointestinal, Genitourinary, Musculoskeletal, Integumentary, Endocrine and Heme/Lymph are otherwise negative. Except for noted above above    Social History: No smoking, No drinking, No drug use    FAMILY HISTORY:  FH: renal failure  mother    FH: lung cancer  father                Vital Signs Last 24 Hrs  T(C): 36.2 (02 Feb 2023 12:09), Max: 36.2 (02 Feb 2023 12:09)  T(F): 97.1 (02 Feb 2023 12:09), Max: 97.1 (02 Feb 2023 12:09)  HR: 80 (02 Feb 2023 12:09) (80 - 80)  BP: 135/77 (02 Feb 2023 12:09) (135/77 - 135/77)  BP(mean): --  RR: 20 (02 Feb 2023 12:09) (20 - 20)  SpO2: 98% (02 Feb 2023 12:09) (98% - 98%)    Parameters below as of 02 Feb 2023 12:09  Patient On (Oxygen Delivery Method): room air        Physical Exam:  Constitutional: alert and in no acute distress.  Eyes: the sclera and conjunctiva were normal, pupils were equal in size, round, reactive to light, with normal accommodation and extraocular movements were intact.   Back: no costovertebral angle tenderness and no spinal tenderness.      Neuro Exam:  a+Ox3 language and attention normal  CN 2-12 normal  Neck flexion 4/5 and neck extnesion 4+/5  UE 5/5 and LLE 4+/5 and RLE 5/5  Sensory decreased in LLE>LUE  FTN NL  Able to count to 16 in one breath    NIF -40 and VC 1.8L      Allergies    Imuran (Other)  sulfa drugs (Unknown)    Intolerances       Home Medications:  acetaminophen 325 mg oral tablet: 2 tab(s) orally every 6 hours (19 Oct 2021 15:22)  aspirin 81 mg oral tablet, chewable: 1 tab(s) orally once a day (AM) (18 Oct 2021 06:36)  Gammar I.V. 1 g intravenous injection: intravenous once a week (18 Oct 2021 06:36)  lisinopril 20 mg oral tablet: 1 tab(s) orally once a day (at bedtime) (18 Oct 2021 06:36)  Mestinon 60 mg oral tablet: 1.5 tab(s) orally 2 times a day (18 Oct 2021 06:36)  Mestinon 60 mg oral tablet: 1 tab(s) orally 2 times a day (18 Oct 2021 06:36)  Nexlizet 180 mg-10 mg oral tablet: 1 tab(s) orally once a day (at bedtime) (18 Oct 2021 06:36)  predniSONE 10 mg oral tablet: 1 tab(s) orally once a day (in the morning) (18 Oct 2021 06:36)  Toprol-XL 25 mg oral tablet, extended release: 1 tab(s) orally once a day (AM) (18 Oct 2021 06:36)  Vitamin D2: every friday (18 Oct 2021 06:36)      MEDICATIONS  (STANDING):    MEDICATIONS  (PRN):      LABS:                        11.6   4.41  )-----------( 271      ( 02 Feb 2023 13:40 )             34.6     02-02    133<L>  |  98  |  20  ----------------------------<  92  4.6   |  26  |  0.8    Ca    9.6      02 Feb 2023 13:40  Mg     1.8     02-02    TPro  10.0<H>  /  Alb  4.0  /  TBili  0.5  /  DBili  x   /  AST  31  /  ALT  26  /  AlkPhos  54  02-02                Assessment / Plan: This is a 62y year old Female presenting with possible myasthenia exacerbation.  She is awaiting repeat CT chest as out patient and new labs which were sent.  She was started on cellcept recently and Mestinon CR 180mg BID.  Would give 2-3 days pulsed steroid to see if can prevent her from worsening.  Dicussed with her neurologist Dr. Urbina  1. Solumedrol 1g IVPB QD x2-3 days  2. Check NIF and VC Q6 hours  3. CT chest w/w/o Contrast  4. PT  5. Vitals q6 hours  6. Continue cellcept and mestinon  7. Restart prednisone after solumedrol completed

## 2023-02-02 NOTE — ED PROVIDER NOTE - CONSIDERATION OF ADMISSION OBSERVATION
Consideration of Admission/Observation need monitoring of NIF and VC as inpatient possible further tx of myasthenia gravis

## 2023-02-02 NOTE — H&P ADULT - NSHPREVIEWOFSYSTEMS_GEN_ALL_CORE
REVIEW OF SYSTEMS  General:	feels weak  Skin/Breast: no rashes  Ophthalmologic: no blurry vision   ENMT:	no thrush  Respiratory and Thorax: no sob   Cardiovascular:	no chest pain   Gastrointestinal:	 no diarrhea  Genitourinary:	no dysuria  Musculoskeletal:+ weakness  Neurological:	+ weakness  Psychiatric:	no hallucinations

## 2023-02-02 NOTE — ED ADULT TRIAGE NOTE - CHIEF COMPLAINT QUOTE
patient states "I think I am having a Myasthenia crisis" c/o weakness and difficulty breathing, had IVIG infusion yesterday

## 2023-02-02 NOTE — ED PROVIDER NOTE - ATTENDING APP SHARED VISIT CONTRIBUTION OF CARE
62-year-old female with a pertinent history of myasthenia gravis on pyridostigmine prednisone with IVIG infusions every 2 weeks here for evaluation of weakness.  Patient reports generalized weakness fatigue shortness of breath which she claims is typical of her symptoms.  Patient got the IVIG yesterday and when she did not improve she came to ED.  Here exam patient no distress S1-S2 clear to auscultation belly soft nontender motor 5 to 5 x 4  Impression  Patient here with possible myasthenia gravis exacerbation.  The patient's Kniffen vital capacity are within normal limits.  Patient seen at bedside by neurology and plan for admission for serial observation and pulmonary function test. 62-year-old female with a pertinent history of myasthenia gravis on pyridostigmine prednisone with IVIG infusions every 2 weeks here for evaluation of weakness.  Patient reports generalized weakness fatigue shortness of breath which she claims is typical of her symptoms.  Patient got the IVIG yesterday and when she did not improve she came to ED.  Here exam patient no distress S1-S2 clear to auscultation belly soft nontender motor 5 to 5 x 4  Impression  Patient here with possible myasthenia gravis exacerbation.  The patient's NIF vital capacity are within normal limits.  Patient seen at bedside by neurology and plan for admission for serial observation and pulmonary function test. Seen by ICU admit to step down unit.

## 2023-02-03 LAB
ALBUMIN SERPL ELPH-MCNC: 3.7 G/DL — SIGNIFICANT CHANGE UP (ref 3.5–5.2)
ALP SERPL-CCNC: 54 U/L — SIGNIFICANT CHANGE UP (ref 30–115)
ALT FLD-CCNC: 31 U/L — SIGNIFICANT CHANGE UP (ref 0–41)
ANION GAP SERPL CALC-SCNC: 9 MMOL/L — SIGNIFICANT CHANGE UP (ref 7–14)
AST SERPL-CCNC: 35 U/L — SIGNIFICANT CHANGE UP (ref 0–41)
BASOPHILS # BLD AUTO: 0.01 K/UL — SIGNIFICANT CHANGE UP (ref 0–0.2)
BASOPHILS NFR BLD AUTO: 0.3 % — SIGNIFICANT CHANGE UP (ref 0–1)
BILIRUB SERPL-MCNC: 0.4 MG/DL — SIGNIFICANT CHANGE UP (ref 0.2–1.2)
BUN SERPL-MCNC: 24 MG/DL — HIGH (ref 10–20)
CALCIUM SERPL-MCNC: 9.3 MG/DL — SIGNIFICANT CHANGE UP (ref 8.4–10.5)
CHLORIDE SERPL-SCNC: 101 MMOL/L — SIGNIFICANT CHANGE UP (ref 98–110)
CO2 SERPL-SCNC: 24 MMOL/L — SIGNIFICANT CHANGE UP (ref 17–32)
CREAT SERPL-MCNC: 0.7 MG/DL — SIGNIFICANT CHANGE UP (ref 0.7–1.5)
EGFR: 99 ML/MIN/1.73M2 — SIGNIFICANT CHANGE UP
EOSINOPHIL # BLD AUTO: 0.02 K/UL — SIGNIFICANT CHANGE UP (ref 0–0.7)
EOSINOPHIL NFR BLD AUTO: 0.7 % — SIGNIFICANT CHANGE UP (ref 0–8)
GLUCOSE SERPL-MCNC: 82 MG/DL — SIGNIFICANT CHANGE UP (ref 70–99)
HCT VFR BLD CALC: 33.3 % — LOW (ref 37–47)
HGB BLD-MCNC: 11.1 G/DL — LOW (ref 12–16)
IMM GRANULOCYTES NFR BLD AUTO: 0.3 % — SIGNIFICANT CHANGE UP (ref 0.1–0.3)
LYMPHOCYTES # BLD AUTO: 1.2 K/UL — SIGNIFICANT CHANGE UP (ref 1.2–3.4)
LYMPHOCYTES # BLD AUTO: 39.1 % — SIGNIFICANT CHANGE UP (ref 20.5–51.1)
MAGNESIUM SERPL-MCNC: 1.7 MG/DL — LOW (ref 1.8–2.4)
MCHC RBC-ENTMCNC: 30.6 PG — SIGNIFICANT CHANGE UP (ref 27–31)
MCHC RBC-ENTMCNC: 33.3 G/DL — SIGNIFICANT CHANGE UP (ref 32–37)
MCV RBC AUTO: 91.7 FL — SIGNIFICANT CHANGE UP (ref 81–99)
MONOCYTES # BLD AUTO: 0.38 K/UL — SIGNIFICANT CHANGE UP (ref 0.1–0.6)
MONOCYTES NFR BLD AUTO: 12.4 % — HIGH (ref 1.7–9.3)
NEUTROPHILS # BLD AUTO: 1.45 K/UL — SIGNIFICANT CHANGE UP (ref 1.4–6.5)
NEUTROPHILS NFR BLD AUTO: 47.2 % — SIGNIFICANT CHANGE UP (ref 42.2–75.2)
NRBC # BLD: 0 /100 WBCS — SIGNIFICANT CHANGE UP (ref 0–0)
PHOSPHATE SERPL-MCNC: 4.4 MG/DL — SIGNIFICANT CHANGE UP (ref 2.1–4.9)
PLATELET # BLD AUTO: 253 K/UL — SIGNIFICANT CHANGE UP (ref 130–400)
POTASSIUM SERPL-MCNC: 4.3 MMOL/L — SIGNIFICANT CHANGE UP (ref 3.5–5)
POTASSIUM SERPL-SCNC: 4.3 MMOL/L — SIGNIFICANT CHANGE UP (ref 3.5–5)
PROT SERPL-MCNC: 9 G/DL — HIGH (ref 6–8)
RBC # BLD: 3.63 M/UL — LOW (ref 4.2–5.4)
RBC # FLD: 14.1 % — SIGNIFICANT CHANGE UP (ref 11.5–14.5)
SODIUM SERPL-SCNC: 134 MMOL/L — LOW (ref 135–146)
WBC # BLD: 3.07 K/UL — LOW (ref 4.8–10.8)
WBC # FLD AUTO: 3.07 K/UL — LOW (ref 4.8–10.8)

## 2023-02-03 PROCEDURE — 99232 SBSQ HOSP IP/OBS MODERATE 35: CPT

## 2023-02-03 PROCEDURE — 99233 SBSQ HOSP IP/OBS HIGH 50: CPT

## 2023-02-03 RX ORDER — LISINOPRIL 2.5 MG/1
20 TABLET ORAL AT BEDTIME
Refills: 0 | Status: DISCONTINUED | OUTPATIENT
Start: 2023-02-03 | End: 2023-02-04

## 2023-02-03 RX ORDER — METOPROLOL TARTRATE 50 MG
25 TABLET ORAL
Refills: 0 | Status: DISCONTINUED | OUTPATIENT
Start: 2023-02-03 | End: 2023-02-06

## 2023-02-03 RX ORDER — MAGNESIUM SULFATE 500 MG/ML
2 VIAL (ML) INJECTION ONCE
Refills: 0 | Status: COMPLETED | OUTPATIENT
Start: 2023-02-03 | End: 2023-02-03

## 2023-02-03 RX ADMIN — Medication 25 GRAM(S): at 09:21

## 2023-02-03 RX ADMIN — CHLORHEXIDINE GLUCONATE 1 APPLICATION(S): 213 SOLUTION TOPICAL at 05:52

## 2023-02-03 RX ADMIN — LISINOPRIL 20 MILLIGRAM(S): 2.5 TABLET ORAL at 21:56

## 2023-02-03 RX ADMIN — Medication 116 MILLIGRAM(S): at 05:43

## 2023-02-03 RX ADMIN — Medication 81 MILLIGRAM(S): at 11:58

## 2023-02-03 RX ADMIN — Medication 50 MICROGRAM(S): at 05:42

## 2023-02-03 RX ADMIN — Medication 25 MILLIGRAM(S): at 05:42

## 2023-02-03 RX ADMIN — PANTOPRAZOLE SODIUM 40 MILLIGRAM(S): 20 TABLET, DELAYED RELEASE ORAL at 05:52

## 2023-02-03 RX ADMIN — PYRIDOSTIGMINE BROMIDE 180 MILLIGRAM(S): 60 SOLUTION ORAL at 18:26

## 2023-02-03 RX ADMIN — ENOXAPARIN SODIUM 40 MILLIGRAM(S): 100 INJECTION SUBCUTANEOUS at 05:43

## 2023-02-03 RX ADMIN — MYCOPHENOLATE MOFETIL 250 MILLIGRAM(S): 250 CAPSULE ORAL at 18:27

## 2023-02-03 RX ADMIN — PYRIDOSTIGMINE BROMIDE 180 MILLIGRAM(S): 60 SOLUTION ORAL at 05:42

## 2023-02-03 RX ADMIN — MYCOPHENOLATE MOFETIL 250 MILLIGRAM(S): 250 CAPSULE ORAL at 05:42

## 2023-02-03 NOTE — CONSULT NOTE ADULT - ASSESSMENT
IMPRESSION:      PLAN:    CNS:    HEENT: Oral care    PULMONARY:  HOB @ 45 degrees    CARDIOVASCULAR:    GI: GI prophylaxis.  Feeding     RENAL:  Follow up lytes.  Correct as needed    INFECTIOUS DISEASE: Follow up cultures    HEMATOLOGICAL:  DVT prophylaxis.    ENDOCRINE:  Follow up FS.  Insulin protocol if needed.    MUSCULOSKELETAL:         IMPRESSION:    MG exacerbation  HO MG on MMF    PLAN:    CNS: Pulse dose steroids, MMF, pyridostigmine . Neuro follow up.    HEENT: Oral care    PULMONARY:  HOB @ 45 degrees. NIF VC monitoring. On RA    CARDIOVASCULAR: Avoid overload.    GI: GI prophylaxis.  Feeding .    RENAL:  Follow up lytes.  Correct as needed    INFECTIOUS DISEASE: Follow up cultures    HEMATOLOGICAL:  DVT prophylaxis.    ENDOCRINE:  Follow up FS.  Insulin protocol if needed.    MUSCULOSKELETAL: OOBTC. PT / OT    SDU         IMPRESSION:    MG exacerbation  HO MG on MMF    PLAN:    CNS: Pulse dose steroids, MMF, pyridostigmine . Neuro follow up.    HEENT: Oral care    PULMONARY:  HOB @ 45 degrees. NIF VC monitoring. On RA  ETCO2 monitor    CARDIOVASCULAR: Avoid overload.    GI: GI prophylaxis.  Feeding .    RENAL:  Follow up lytes.  Correct as needed    INFECTIOUS DISEASE: Follow up cultures    HEMATOLOGICAL:  DVT prophylaxis.    ENDOCRINE:  Follow up FS.  Insulin protocol if needed.    MUSCULOSKELETAL: OOBTC. PT / OT    SDU

## 2023-02-03 NOTE — PHYSICAL THERAPY INITIAL EVALUATION ADULT - NSACTIVITYREC_GEN_A_PT
OOB to chair as tolerated with staff supervision for safety, ambulate around room as tolerated with supervision for safety

## 2023-02-03 NOTE — CONSULT NOTE ADULT - ATTENDING COMMENTS
Attending Statement: I have personally performed a face to face diagnostic evaluation on this patient. The patient is suffering from:  MG exacerbation  I have made amendments to the documentation where necessary. I have personally seen and examined this patient.  I have fully participated in the care of this patient.  I have reviewed all pertinent clinical information, including history, physical exam, plan and note.

## 2023-02-03 NOTE — PROGRESS NOTE ADULT - SUBJECTIVE AND OBJECTIVE BOX
Neurology Follow up note    Name  GHADA ROPER    HPI:  62 year old F non smoker hx of htn, hypothyroid, MG on pyridostigmine, mycophenolate, prednisone 5mg, IVIG q 2 weeks and prior MG flares requiring hospitalizations for plasmapheresis presenting to er for eval. Sts over the past week has had worsening gen weakness, fatigue and sob. Sts this is typical presentation of ms flare.     (02 Feb 2023 18:14)      Interval History -Patient has no new complaints and still having SOB with minimal movement          Vital Signs Last 24 Hrs  T(C): 36.9 (03 Feb 2023 07:10), Max: 37 (02 Feb 2023 23:00)  T(F): 98.5 (03 Feb 2023 07:10), Max: 98.6 (02 Feb 2023 23:00)  HR: 66 (03 Feb 2023 07:10) (66 - 86)  BP: 120/58 (03 Feb 2023 07:10) (117/57 - 132/60)  BP(mean): 83 (03 Feb 2023 07:10) (79 - 95)  RR: 17 (03 Feb 2023 07:10) (13 - 23)  SpO2: 95% (03 Feb 2023 07:10) (95% - 100%)    Parameters below as of 03 Feb 2023 07:10  Patient On (Oxygen Delivery Method): room air      ICU Vital Signs Last 24 Hrs  T(C): 36.9 (03 Feb 2023 07:10), Max: 37 (02 Feb 2023 23:00)  T(F): 98.5 (03 Feb 2023 07:10), Max: 98.6 (02 Feb 2023 23:00)  HR: 66 (03 Feb 2023 07:10) (66 - 86)  BP: 120/58 (03 Feb 2023 07:10) (117/57 - 132/60)  BP(mean): 83 (03 Feb 2023 07:10) (79 - 95)  ABP: --  ABP(mean): --  RR: 17 (03 Feb 2023 07:10) (13 - 23)  SpO2: 95% (03 Feb 2023 07:10) (95% - 100%)    O2 Parameters below as of 03 Feb 2023 07:10  Patient On (Oxygen Delivery Method): room air                Neurological Exam:   a+Ox3 language and attention normal  CN 2-12 normal  Neck flexion 4+/5 and neck extension 5/5  UE 5/5 and LLE 5/5 and RLE 5/5  Sensory decreased in LLE>LUE  FTN NL  Able to count to 15 in one breath      Medications  aspirin  chewable 81 milliGRAM(s) Oral daily  chlorhexidine 2% Cloths 1 Application(s) Topical <User Schedule>  enoxaparin Injectable 40 milliGRAM(s) SubCutaneous every 24 hours  levothyroxine 50 MICROGram(s) Oral daily  lisinopril 20 milliGRAM(s) Oral at bedtime  methylPREDNISolone sodium succinate IVPB 1000 milliGRAM(s) IV Intermittent daily  metoprolol succinate ER 25 milliGRAM(s) Oral daily  mycophenolate mofetil 250 milliGRAM(s) Oral two times a day  pantoprazole    Tablet 40 milliGRAM(s) Oral before breakfast  pyridostigmine  milliGRAM(s) Oral two times a day      Lab                        11.1   3.07  )-----------( 253      ( 03 Feb 2023 05:53 )             33.3     02-03    134<L>  |  101  |  24<H>  ----------------------------<  82  4.3   |  24  |  0.7    Ca    9.3      03 Feb 2023 05:53  Phos  4.4     02-03  Mg     1.7     02-03    TPro  9.0<H>  /  Alb  3.7  /  TBili  0.4  /  DBili  x   /  AST  35  /  ALT  31  /  AlkPhos  54  02-03    CAPILLARY BLOOD GLUCOSE        LIVER FUNCTIONS - ( 03 Feb 2023 05:53 )  Alb: 3.7 g/dL / Pro: 9.0 g/dL / ALK PHOS: 54 U/L / ALT: 31 U/L / AST: 35 U/L / GGT: x               Radiology  < from: CT Chest w/ IV Cont (02.02.23 @ 20:35) >  PROCEDURE DATE:  02/02/2023          INTERPRETATION:  HISTORY: History of myasthenia gravis presenting with   flare up.    TECHNIQUE: CT of the thorax was performed after administration of 95 cc   of Omnipaque 350 intravenous contrast with 5 cc discarded. Sagittal and   coronal reformats were performed, as well as MIPS.    COMPARISON: CT chest 7/1/2008.      FINDINGS:  TUBES/LINES: None    LUNGS, PLEURA, AND AIRWAYS: Patent trachea. No lobar consolidation,   pleural effusion, or pneumothorax.    Dependent and subsegmental atelectasis, left lower lobe.    Scattered calcified granulomas (303/118 and 303/116).    PULMONARY NODULES:  -No suspicious pulmonary nodules.    HEART/GREAT VESSELS: Normal-sized heart. Normal caliber aorta and main   pulmonary artery. No pericardial effusion. Mild aortic calcifications.    MEDIASTINUM/LYMPH NODES: No enlarged mediastinal, hilar, or axillary   lymph nodes. Unremarkable thyroid gland.    UPPER ABDOMEN: . Postcholecystectomy. CBD dilatation to 1 cm, likely   sequelae of prior cholecystectomy.    BONES AND SOFT TISSUES: Degenerative changes of the spine. Visualized   soft tissues are unremarkable.      IMPRESSION:      No current CT evidence of active intrathoracic disease.    --- End of Report ---    < end of copied text >      Other studies:     Assessment- This is a 62y year old Female presenting with possible early myasthenia gravis exacerbation.  She is receiving pulsed steroids and her exam is improving however still c/o SOB.    Plan  1. Continue home medications for Myasthenia  2. Solumedrol 1g IVPB qd (total 3 doses)  3. Send Angiotensin converting enzymes  4. Continue following NIF and FVC (last -60 and 1300)  5. Encouraged exercises and OOB to chair

## 2023-02-03 NOTE — CONSULT NOTE ADULT - SUBJECTIVE AND OBJECTIVE BOX
Patient is a 62y old  Female who presents with a chief complaint of Myasthenia Gravis (03 Feb 2023 08:16)        Over Night Events:        ROS:  See HPI    PHYSICAL EXAM    ICU Vital Signs Last 24 Hrs  T(C): 36.9 (03 Feb 2023 07:10), Max: 37 (02 Feb 2023 23:00)  T(F): 98.5 (03 Feb 2023 07:10), Max: 98.6 (02 Feb 2023 23:00)  HR: 76 (03 Feb 2023 05:44) (70 - 86)  BP: 132/60 (03 Feb 2023 05:44) (117/57 - 135/77)  BP(mean): 85 (03 Feb 2023 05:44) (79 - 95)  ABP: --  ABP(mean): --  RR: 17 (03 Feb 2023 07:10) (13 - 23)  SpO2: 96% (03 Feb 2023 05:44) (96% - 100%)    O2 Parameters below as of 02 Feb 2023 17:53  Patient On (Oxygen Delivery Method): room air            02-02-23 @ 07:01  -  02-03-23 @ 07:00  --------------------------------------------------------  IN:    Oral Fluid: 1000 mL  Total IN: 1000 mL    OUT:    Voided (mL): 301 mL  Total OUT: 301 mL    Total NET: 699 mL            CONSTITUTIONAL:  Well nourished.  NAD    ENT:   Airway patent,   No thrush    EYES:   Clear bilaterally,   pupils equal,   round and reactive to light.    CARDIAC:   Normal rate,   regular rhythm.    no edema      CAROTID:   normal systolic impulse  no bruits    RESPIRATORY:   No wheezing  Normal chest expansion  Not tachypneic,  No use of accessory muscles    GASTROINTESTINAL:  Abdomen soft,   non-tender,   no guarding,   + BS    MUSCULOSKELETAL:   range of motion is not limited,  no clubbing, cyanosis    NEUROLOGICAL:   Alert and oriented   no motor deficits.        LABS:                            11.1   3.07  )-----------( 253      ( 03 Feb 2023 05:53 )             33.3                                               02-03    134<L>  |  101  |  24<H>  ----------------------------<  82  4.3   |  24  |  0.7    Ca    9.3      03 Feb 2023 05:53  Phos  4.4     02-03  Mg     1.7     02-03    TPro  9.0<H>  /  Alb  3.7  /  TBili  0.4  /  DBili  x   /  AST  35  /  ALT  31  /  AlkPhos  54  02-03                                                 CARDIAC MARKERS ( 02 Feb 2023 13:40 )  x     / <0.01 ng/mL / x     / x     / x                                                LIVER FUNCTIONS - ( 03 Feb 2023 05:53 )  Alb: 3.7 g/dL / Pro: 9.0 g/dL / ALK PHOS: 54 U/L / ALT: 31 U/L / AST: 35 U/L / GGT: x                                                                                                                                               MEDICATIONS  (STANDING):  aspirin  chewable 81 milliGRAM(s) Oral daily  chlorhexidine 2% Cloths 1 Application(s) Topical <User Schedule>  enoxaparin Injectable 40 milliGRAM(s) SubCutaneous every 24 hours  levothyroxine 50 MICROGram(s) Oral daily  lisinopril 20 milliGRAM(s) Oral at bedtime  methylPREDNISolone sodium succinate IVPB 1000 milliGRAM(s) IV Intermittent daily  metoprolol succinate ER 25 milliGRAM(s) Oral daily  mycophenolate mofetil 250 milliGRAM(s) Oral two times a day  pantoprazole    Tablet 40 milliGRAM(s) Oral before breakfast  pyridostigmine  milliGRAM(s) Oral two times a day    MEDICATIONS  (PRN):      Xrays:                                                                                     ECHO     Patient is a 62y old  Female who presents with a chief complaint of Myasthenia Gravis (03 Feb 2023 10:11)      HPI:  62 year old F non smoker hx of htn, hypothyroid, MG on pyridostigmine, mycophenolate, prednisone 5mg, IVIG q 2 weeks and prior MG flares requiring hospitalizations for plasmapheresis presenting to er for eval. Sts over the past week has had worsening gen weakness, fatigue and sob. Sts this is typical presentation of ms flare.     (02 Feb 2023 18:14)      PAST MEDICAL & SURGICAL HISTORY:  POTS (postural orthostatic tachycardia syndrome)      Pancreatitis      High blood cholesterol      Myasthenia gravis      GERD (gastroesophageal reflux disease)      HTN (hypertension)      Hypothyroid      KADI (obstructive sleep apnea)      Leaky heart valve  MITRAL REGURG      History of foot surgery      H/O dilation and curettage      H/O cone biopsy of cervix      H/O colonoscopy  2021      History of esophagogastroduodenoscopy (EGD)          SOCIAL HX:   Smoking                         ETOH                            Other    FAMILY HISTORY:  FH: renal failure  mother    FH: lung cancer  father    :  No known cardiovacular family hisotry     Review Of Systems:     All ROS are negative except per HPI       Allergies    Imuran (Other)  sulfa drugs (Unknown)    Intolerances          PHYSICAL EXAM    ICU Vital Signs Last 24 Hrs  T(C): 36.9 (03 Feb 2023 07:10), Max: 37 (02 Feb 2023 23:00)  T(F): 98.5 (03 Feb 2023 07:10), Max: 98.6 (02 Feb 2023 23:00)  HR: 76 (03 Feb 2023 05:44) (70 - 86)  BP: 132/60 (03 Feb 2023 05:44) (117/57 - 135/77)  BP(mean): 85 (03 Feb 2023 05:44) (79 - 95)  ABP: --  ABP(mean): --  RR: 17 (03 Feb 2023 07:10) (13 - 23)  SpO2: 96% (03 Feb 2023 05:44) (96% - 100%)    O2 Parameters below as of 02 Feb 2023 17:53  Patient On (Oxygen Delivery Method): room air            CONSTITUTIONAL:  Well nourished.  NAD    ENT:   Airway patent,   Mouth with normal mucosa.   No thrush    EYES:   pupils equal,   round and reactive to light.    CARDIAC:   Normal rate,   Regular rhythm.    Heart sounds S1, S2.   No edema      Vascular:   normal systolic impulse  no bruits    RESPIRATORY:   No wheezing   Normal chest expansion  No use of accessory muscles    GASTROINTESTINAL:  Abdomen soft   Non-tender,   No guarding,   + BS    MUSCULOSKELETAL:   Range of motion is not limited,  Nno clubbing, cyanosis    NEUROLOGICAL:   Alert and oriented   No motor or sensory deficits.  Pertinent DTRs normal          02-02-23 @ 07:01  -  02-03-23 @ 07:00  --------------------------------------------------------  IN:    Oral Fluid: 1000 mL  Total IN: 1000 mL    OUT:    Voided (mL): 301 mL  Total OUT: 301 mL    Total NET: 699 mL          LABS:                          11.1   3.07  )-----------( 253      ( 03 Feb 2023 05:53 )             33.3                                               02-03    134<L>  |  101  |  24<H>  ----------------------------<  82  4.3   |  24  |  0.7    Ca    9.3      03 Feb 2023 05:53  Phos  4.4     02-03  Mg     1.7     02-03    TPro  9.0<H>  /  Alb  3.7  /  TBili  0.4  /  DBili  x   /  AST  35  /  ALT  31  /  AlkPhos  54  02-03                                                 CARDIAC MARKERS ( 02 Feb 2023 13:40 )  x     / <0.01 ng/mL / x     / x     / x                                                LIVER FUNCTIONS - ( 03 Feb 2023 05:53 )  Alb: 3.7 g/dL / Pro: 9.0 g/dL / ALK PHOS: 54 U/L / ALT: 31 U/L / AST: 35 U/L / GGT: x                                                                                                                                       X-Rays reviewed:                                                                                    ECHO    CXR interpreted by me:    MEDICATIONS  (STANDING):  aspirin  chewable 81 milliGRAM(s) Oral daily  chlorhexidine 2% Cloths 1 Application(s) Topical <User Schedule>  enoxaparin Injectable 40 milliGRAM(s) SubCutaneous every 24 hours  levothyroxine 50 MICROGram(s) Oral daily  lisinopril 20 milliGRAM(s) Oral at bedtime  methylPREDNISolone sodium succinate IVPB 1000 milliGRAM(s) IV Intermittent daily  metoprolol succinate ER 25 milliGRAM(s) Oral daily  mycophenolate mofetil 250 milliGRAM(s) Oral two times a day  pantoprazole    Tablet 40 milliGRAM(s) Oral before breakfast  pyridostigmine  milliGRAM(s) Oral two times a day    MEDICATIONS  (PRN):

## 2023-02-03 NOTE — PROGRESS NOTE ADULT - SUBJECTIVE AND OBJECTIVE BOX
GHADA ROPER 62y Female  MRN#: [id number]   CODE STATUS: Full       SUBJECTIVE  Patient is a 62y old Female who presents with a chief complaint of Myasthenia Gravis (02 Feb 2023 18:14)  Currently admitted to medicine with the primary diagnosis of Myasthenia gravis    Today is hospital day 1d, and this morning she is resting comfortably in bed and reports minimal improvement in her breathing.   No overnight events.       OBJECTIVE  PAST MEDICAL & SURGICAL HISTORY  POTS (postural orthostatic tachycardia syndrome)    Pancreatitis    High blood cholesterol    Myasthenia gravis    GERD (gastroesophageal reflux disease)    HTN (hypertension)    Hypothyroid    KADI (obstructive sleep apnea)    Leaky heart valve  MITRAL REGURG    History of foot surgery    H/O dilation and curettage    H/O cone biopsy of cervix    H/O colonoscopy  2021    History of esophagogastroduodenoscopy (EGD)      ALLERGIES:  Imuran (Other)  sulfa drugs (Unknown)    MEDICATIONS:  STANDING MEDICATIONS  aspirin  chewable 81 milliGRAM(s) Oral daily  chlorhexidine 2% Cloths 1 Application(s) Topical <User Schedule>  enoxaparin Injectable 40 milliGRAM(s) SubCutaneous every 24 hours  levothyroxine 50 MICROGram(s) Oral daily  lisinopril 20 milliGRAM(s) Oral at bedtime  magnesium sulfate  IVPB 2 Gram(s) IV Intermittent once  methylPREDNISolone sodium succinate IVPB 1000 milliGRAM(s) IV Intermittent daily  metoprolol succinate ER 25 milliGRAM(s) Oral daily  mycophenolate mofetil 250 milliGRAM(s) Oral two times a day  pantoprazole    Tablet 40 milliGRAM(s) Oral before breakfast  pyridostigmine  milliGRAM(s) Oral two times a day    PRN MEDICATIONS      VITAL SIGNS: Last 24 Hours  T(C): 36.9 (03 Feb 2023 07:10), Max: 37 (02 Feb 2023 23:00)  T(F): 98.5 (03 Feb 2023 07:10), Max: 98.6 (02 Feb 2023 23:00)  HR: 76 (03 Feb 2023 05:44) (70 - 86)  BP: 132/60 (03 Feb 2023 05:44) (117/57 - 135/77)  BP(mean): 85 (03 Feb 2023 05:44) (79 - 95)  RR: 17 (03 Feb 2023 07:10) (13 - 23)  SpO2: 96% (03 Feb 2023 05:44) (96% - 100%)    LABS:                        11.1   3.07  )-----------( 253      ( 03 Feb 2023 05:53 )             33.3     02-03    134<L>  |  101  |  24<H>  ----------------------------<  82  4.3   |  24  |  0.7    Ca    9.3      03 Feb 2023 05:53  Phos  4.4     02-03  Mg     1.7     02-03    TPro  9.0<H>  /  Alb  3.7  /  TBili  0.4  /  DBili  x   /  AST  35  /  ALT  31  /  AlkPhos  54  02-03          Troponin T, Serum: <0.01 ng/mL (02-02-23 @ 13:40)      CARDIAC MARKERS ( 02 Feb 2023 13:40 )  x     / <0.01 ng/mL / x     / x     / x          RADIOLOGY:  < from: CT Chest w/ IV Cont (02.02.23 @ 20:35) >    ACC: [id number] EXAM:  CT CHEST IC   ORDERED BY: ANA VILLANUEVA     PROCEDURE DATE:  02/02/2023    ******PRELIMINARY REPORT******      ******PRELIMINARY REPORT******           INTERPRETATION:  HISTORY: History of myasthenia gravis presenting with   flare up.    TECHNIQUE: CT of the thorax was performed after administration of 95 cc   of Omnipaque 350 intravenous contrast with 5 cc discarded. Sagittal and   coronal reformats were performed, as well as MIPS.    COMPARISON: CT chest 7/1/2008.      FINDINGS:  TUBES/LINES: None    LUNGS, PLEURA, AND AIRWAYS: Patent trachea. No lobar consolidation,   pleural effusion, or pneumothorax.    Dependent and subsegmental atelectasis.    Scattered calcified granulomas (303/118 and 303/116).    PULMONARY NODULES:  -New pulmonary nodules.    HEART/GREAT VESSELS: Normal-sized heart. Normal caliber aorta and main   pulmonary artery. No pericardial effusion. Mild aortic calcifications.    MEDIASTINUM/LYMPH NODES: No enlarged mediastinal, hilar, or axillary   lymph nodes. Unremarkable thyroid gland.    UPPER ABDOMEN: Diffuse hepatic steatosis. Postcholecystectomy. CBD   dilatation to 1 cm, likely sequelae of prior cholecystectomy.    BONES AND SOFT TISSUES: Degenerative changes of the spine. Visualized   soft tissues are unremarkable.      IMPRESSION:  No acute intrathoracic pathology.        ******PRELIMINARY REPORT******      < end of copied text >    < from: Xray Chest 1 View-PORTABLE IMMEDIATE (Xray Chest 1 View-PORTABLE IMMEDIATE .) (02.02.23 @ 13:20) >  Impression:    No radiographic evidence of acute cardiopulmonary disease.        --- End of Report ---    < end of copied text >      PHYSICAL EXAM:  GENERAL: NAD, well-developed, AAOx3  HEENT:  Atraumatic, Normocephalic. EOMI, PERRLA, conjunctiva and sclera clear, No JVD  PULMONARY: Clear to auscultation bilaterally; No wheeze  CARDIOVASCULAR: Regular rate and rhythm; No murmurs, rubs, or gallops  GASTROINTESTINAL: Soft, Nontender, Nondistended; Bowel sounds present  MUSCULOSKELETAL:  2+ Peripheral Pulses, No clubbing, cyanosis, or edema  NEUROLOGY: non-focal  SKIN: No rashes or lesions      ASSESSMENT & PLAN  62 year old F non smoker hx of htn, hypothyroid, MG on pyridostigmine, mycophenolate, prednisone 5mg, IVIG q 2 weeks and prior MG flares requiring hospitalizations for plasmapheresis     started on cellcept recently and Mestinon CR 180mg BID.  Would give 2-3 days pulsed steroid to see if can prevent her from worsening.  Dicussed with her neurologist Dr. Urbina  1. Solumedrol 1g IVPB QD x2-3 days  2. Check NIF and VC Q6 hours  3. CT chest w/w/o Contrast  4. PT  5. Vitals q6 hours  6. Continue cellcept and mestinon  7. Restart prednisone after solumedrol completed     GHADA ROPER 62y Female  MRN#: [id number]   CODE STATUS: Full       SUBJECTIVE  Patient is a 62y old Female who presents with a chief complaint of Myasthenia Gravis (02 Feb 2023 18:14)  Currently admitted to medicine with the primary diagnosis of Myasthenia gravis    Today is hospital day 1d, and this morning she is resting comfortably in bed and reports minimal improvement in her breathing.   No overnight events.       OBJECTIVE  PAST MEDICAL & SURGICAL HISTORY  POTS (postural orthostatic tachycardia syndrome)    Pancreatitis    High blood cholesterol    Myasthenia gravis    GERD (gastroesophageal reflux disease)    HTN (hypertension)    Hypothyroid    KADI (obstructive sleep apnea)    Leaky heart valve  MITRAL REGURG    History of foot surgery    H/O dilation and curettage    H/O cone biopsy of cervix    H/O colonoscopy  2021    History of esophagogastroduodenoscopy (EGD)      ALLERGIES:  Imuran (Other)  sulfa drugs (Unknown)    MEDICATIONS:  STANDING MEDICATIONS  aspirin  chewable 81 milliGRAM(s) Oral daily  chlorhexidine 2% Cloths 1 Application(s) Topical <User Schedule>  enoxaparin Injectable 40 milliGRAM(s) SubCutaneous every 24 hours  levothyroxine 50 MICROGram(s) Oral daily  lisinopril 20 milliGRAM(s) Oral at bedtime  magnesium sulfate  IVPB 2 Gram(s) IV Intermittent once  methylPREDNISolone sodium succinate IVPB 1000 milliGRAM(s) IV Intermittent daily  metoprolol succinate ER 25 milliGRAM(s) Oral daily  mycophenolate mofetil 250 milliGRAM(s) Oral two times a day  pantoprazole    Tablet 40 milliGRAM(s) Oral before breakfast  pyridostigmine  milliGRAM(s) Oral two times a day    PRN MEDICATIONS      VITAL SIGNS: Last 24 Hours  T(C): 36.9 (03 Feb 2023 07:10), Max: 37 (02 Feb 2023 23:00)  T(F): 98.5 (03 Feb 2023 07:10), Max: 98.6 (02 Feb 2023 23:00)  HR: 76 (03 Feb 2023 05:44) (70 - 86)  BP: 132/60 (03 Feb 2023 05:44) (117/57 - 135/77)  BP(mean): 85 (03 Feb 2023 05:44) (79 - 95)  RR: 17 (03 Feb 2023 07:10) (13 - 23)  SpO2: 96% (03 Feb 2023 05:44) (96% - 100%)    LABS:                        11.1   3.07  )-----------( 253      ( 03 Feb 2023 05:53 )             33.3     02-03    134<L>  |  101  |  24<H>  ----------------------------<  82  4.3   |  24  |  0.7    Ca    9.3      03 Feb 2023 05:53  Phos  4.4     02-03  Mg     1.7     02-03    TPro  9.0<H>  /  Alb  3.7  /  TBili  0.4  /  DBili  x   /  AST  35  /  ALT  31  /  AlkPhos  54  02-03          Troponin T, Serum: <0.01 ng/mL (02-02-23 @ 13:40)      CARDIAC MARKERS ( 02 Feb 2023 13:40 )  x     / <0.01 ng/mL / x     / x     / x          RADIOLOGY:  < from: CT Chest w/ IV Cont (02.02.23 @ 20:35) >  ACC: [id number] EXAM:  CT CHEST IC   ORDERED BY: ANA VILLANUEVA   PROCEDURE DATE:  02/02/2023    ******PRELIMINARY REPORT******    ******PRELIMINARY REPORT******   INTERPRETATION:  HISTORY: History of myasthenia gravis presenting with   flare up.  TECHNIQUE: CT of the thorax was performed after administration of 95 cc   of Omnipaque 350 intravenous contrast with 5 cc discarded. Sagittal and   coronal reformats were performed, as well as MIPS.  COMPARISON: CT chest 7/1/2008.  FINDINGS:  TUBES/LINES: None  LUNGS, PLEURA, AND AIRWAYS: Patent trachea. No lobar consolidation,   pleural effusion, or pneumothorax.  Dependent and subsegmental atelectasis.  Scattered calcified granulomas (303/118 and 303/116).  PULMONARY NODULES:  -New pulmonary nodules.  HEART/GREAT VESSELS: Normal-sized heart. Normal caliber aorta and main   pulmonary artery. No pericardial effusion. Mild aortic calcifications.  MEDIASTINUM/LYMPH NODES: No enlarged mediastinal, hilar, or axillary   lymph nodes. Unremarkable thyroid gland.  UPPER ABDOMEN: Diffuse hepatic steatosis. Postcholecystectomy. CBD   dilatation to 1 cm, likely sequelae of prior cholecystectomy.  BONES AND SOFT TISSUES: Degenerative changes of the spine. Visualized   soft tissues are unremarkable.  IMPRESSION:  No acute intrathoracic pathology.  ******PRELIMINARY REPORT******    < end of copied text >    < from: Xray Chest 1 View-PORTABLE IMMEDIATE (Xray Chest 1 View-PORTABLE IMMEDIATE .) (02.02.23 @ 13:20) >  Impression:  No radiographic evidence of acute cardiopulmonary disease.  --- End of Report ---  < end of copied text >      PHYSICAL EXAM:  GENERAL: NAD, well-developed, AAOx3  HEENT:  Atraumatic, Normocephalic. EOMI, conjunctiva and sclera clear, No JVD  PULMONARY: decreased breath sounds bilaterally; No wheeze  CARDIOVASCULAR: Regular rate and rhythm; No murmurs, rubs, or gallops  GASTROINTESTINAL: Soft, Nontender, Nondistended; Bowel sounds present  MUSCULOSKELETAL: No clubbing, cyanosis, or edema  NEUROLOGY: non-focal  SKIN: No rashes or lesions      ASSESSMENT & PLAN  62 year old F non smoker hx of htn, hypothyroid, MG on pyridostigmine, mycophenolate, prednisone 5mg, IVIG q 2 weeks and prior MG flares requiring hospitalizations for plasmapheresis     #Myasthenia Gravis Crisis   - NIF -40, VC 1.8L -> check q6 hrs   - CT chest: prelim read unremarkable   - started on cellcept recently and Mestinon CR 180mg BID -> cont   - neuro recs: Solumedrol 1g IVPB QD x2-3 days  - Neurologist Dr Urbina - has IVIG infusions q2wks   - PT eval   - Restart prednisone after solumedrol completed    #HTN - cont lisinopril 20mg qd, metoprolol succ 25   #Hypothyroid - cont synthroid 50 mcg, f/u TSH    #Misc   Diet DASH  GI ppx PPI   DVT ppx lovenox   Activity IAT

## 2023-02-03 NOTE — PROGRESS NOTE ADULT - SUBJECTIVE AND OBJECTIVE BOX
SUBJECTIVE:    Patient is a 62y old Female who presents with a chief complaint of Myasthenia Gravis (03 Feb 2023 10:11)    Currently admitted to medicine with the primary diagnosis of Myasthenia gravis       Today is hospital day 1d. This morning she is resting comfortably in bed and reports no new issues or overnight events.     ROS:   CONSTITUTIONAL: No weakness, fevers or chills   EYES/ENT: No visual changes; No vertigo or throat pain   NECK: No pain or stiffness   RESPIRATORY: No cough, wheezing, hemoptysis; No shortness of breath   CARDIOVASCULAR: No chest pain or palpitations   GASTROINTESTINAL: No abdominal or epigastric pain. No nausea, vomiting, or hematemesis; No diarrhea or constipation. No melena or hematochezia.  GENITOURINARY: No dysuria, frequency or hematuria  NEUROLOGICAL: No numbness or weakness  SKIN: No itching, rashes      PAST MEDICAL & SURGICAL HISTORY  POTS (postural orthostatic tachycardia syndrome)    Pancreatitis    High blood cholesterol    Myasthenia gravis    GERD (gastroesophageal reflux disease)    HTN (hypertension)    Hypothyroid    KDAI (obstructive sleep apnea)    Leaky heart valve  MITRAL REGURG    History of foot surgery    H/O dilation and curettage    H/O cone biopsy of cervix    H/O colonoscopy  2021    History of esophagogastroduodenoscopy (EGD)      SOCIAL HISTORY:    ALLERGIES:  Imuran (Other)  sulfa drugs (Unknown)    MEDICATIONS:  STANDING MEDICATIONS  aspirin  chewable 81 milliGRAM(s) Oral daily  chlorhexidine 2% Cloths 1 Application(s) Topical <User Schedule>  enoxaparin Injectable 40 milliGRAM(s) SubCutaneous every 24 hours  levothyroxine 50 MICROGram(s) Oral daily  lisinopril 20 milliGRAM(s) Oral at bedtime  methylPREDNISolone sodium succinate IVPB 1000 milliGRAM(s) IV Intermittent daily  metoprolol succinate ER 25 milliGRAM(s) Oral daily  mycophenolate mofetil 250 milliGRAM(s) Oral two times a day  pantoprazole    Tablet 40 milliGRAM(s) Oral before breakfast  pyridostigmine  milliGRAM(s) Oral two times a day    PRN MEDICATIONS    VITALS:   T(F): 98.5  HR: 66  BP: 120/58  RR: 17  SpO2: 95%    LABS:  Negative for smoking/alcohol/drug use.                         11.1   3.07  )-----------( 253      ( 03 Feb 2023 05:53 )             33.3     02-03    134<L>  |  101  |  24<H>  ----------------------------<  82  4.3   |  24  |  0.7    Ca    9.3      03 Feb 2023 05:53  Phos  4.4     02-03  Mg     1.7     02-03    TPro  9.0<H>  /  Alb  3.7  /  TBili  0.4  /  DBili  x   /  AST  35  /  ALT  31  /  AlkPhos  54  02-03              CARDIAC MARKERS ( 02 Feb 2023 13:40 )  x     / <0.01 ng/mL / x     / x     / x          RADIOLOGY:  < from: CT Chest w/ IV Cont (02.02.23 @ 20:35) >  IMPRESSION:      No current CT evidence of active intrathoracic disease.    < end of copied text >    PHYSICAL EXAM:  GEN: No acute distress  HEENT: normocephalic, atraumatic, aniceteric  LUNGS: Clear to auscultation bilaterally, no rales/wheezing/ rhonchi  HEART: S1/S2 present. RRR, no murmurs  ABD: Soft, non-tender, non-distended. Bowel sounds present  EXT: warm   NEURO: AAOX3, normal affect      ASSESSMENT AND PLAN:    62 year old F non smoker hx of htn, hypothyroid, MG on pyridostigmine, mycophenolate, prednisone 5mg, IVIG q 2 weeks and prior MG flares requiring hospitalizations for plasmapheresis     #Myasthenia Gravis Flare   - NIF -40, VC 1.8L -> check q6 hrs   - CT chest w/ IV unremarkable  - cw cellcept and Mestinon  - on IVIG [id number] with Dr. Urbina  - Solumedrol 1g IVPB QD x2-3 days  - PT eval - no needs  - Restart home dose prednisone after solumedrol completed    #H/O HTN - on lisinopril 20mg qd and metoprolol succ 25   #H/O Hypothyroid - cont synthroid 50 mcg      Diet DASH  GI ppx PPI   DVT ppx lovenox   Dispo: acute    case discussed with cc team

## 2023-02-04 LAB
ALBUMIN SERPL ELPH-MCNC: 3.7 G/DL — SIGNIFICANT CHANGE UP (ref 3.5–5.2)
ALP SERPL-CCNC: 55 U/L — SIGNIFICANT CHANGE UP (ref 30–115)
ALT FLD-CCNC: 58 U/L — HIGH (ref 0–41)
ANION GAP SERPL CALC-SCNC: 8 MMOL/L — SIGNIFICANT CHANGE UP (ref 7–14)
AST SERPL-CCNC: 47 U/L — HIGH (ref 0–41)
BASOPHILS # BLD AUTO: 0.01 K/UL — SIGNIFICANT CHANGE UP (ref 0–0.2)
BASOPHILS NFR BLD AUTO: 0.1 % — SIGNIFICANT CHANGE UP (ref 0–1)
BILIRUB SERPL-MCNC: 0.4 MG/DL — SIGNIFICANT CHANGE UP (ref 0.2–1.2)
BUN SERPL-MCNC: 24 MG/DL — HIGH (ref 10–20)
CALCIUM SERPL-MCNC: 8.8 MG/DL — SIGNIFICANT CHANGE UP (ref 8.4–10.5)
CHLORIDE SERPL-SCNC: 105 MMOL/L — SIGNIFICANT CHANGE UP (ref 98–110)
CO2 SERPL-SCNC: 24 MMOL/L — SIGNIFICANT CHANGE UP (ref 17–32)
CREAT SERPL-MCNC: 0.8 MG/DL — SIGNIFICANT CHANGE UP (ref 0.7–1.5)
EGFR: 84 ML/MIN/1.73M2 — SIGNIFICANT CHANGE UP
EOSINOPHIL # BLD AUTO: 0 K/UL — SIGNIFICANT CHANGE UP (ref 0–0.7)
EOSINOPHIL NFR BLD AUTO: 0 % — SIGNIFICANT CHANGE UP (ref 0–8)
GLUCOSE SERPL-MCNC: 183 MG/DL — HIGH (ref 70–99)
HCT VFR BLD CALC: 34.1 % — LOW (ref 37–47)
HGB BLD-MCNC: 11.7 G/DL — LOW (ref 12–16)
IMM GRANULOCYTES NFR BLD AUTO: 0.4 % — HIGH (ref 0.1–0.3)
LYMPHOCYTES # BLD AUTO: 0.76 K/UL — LOW (ref 1.2–3.4)
LYMPHOCYTES # BLD AUTO: 5.6 % — LOW (ref 20.5–51.1)
MAGNESIUM SERPL-MCNC: 1.9 MG/DL — SIGNIFICANT CHANGE UP (ref 1.8–2.4)
MCHC RBC-ENTMCNC: 31.4 PG — HIGH (ref 27–31)
MCHC RBC-ENTMCNC: 34.3 G/DL — SIGNIFICANT CHANGE UP (ref 32–37)
MCV RBC AUTO: 91.4 FL — SIGNIFICANT CHANGE UP (ref 81–99)
MONOCYTES # BLD AUTO: 0.7 K/UL — HIGH (ref 0.1–0.6)
MONOCYTES NFR BLD AUTO: 5.2 % — SIGNIFICANT CHANGE UP (ref 1.7–9.3)
NEUTROPHILS # BLD AUTO: 11.96 K/UL — HIGH (ref 1.4–6.5)
NEUTROPHILS NFR BLD AUTO: 88.7 % — HIGH (ref 42.2–75.2)
NRBC # BLD: 0 /100 WBCS — SIGNIFICANT CHANGE UP (ref 0–0)
PLATELET # BLD AUTO: 275 K/UL — SIGNIFICANT CHANGE UP (ref 130–400)
POTASSIUM SERPL-MCNC: 4.6 MMOL/L — SIGNIFICANT CHANGE UP (ref 3.5–5)
POTASSIUM SERPL-SCNC: 4.6 MMOL/L — SIGNIFICANT CHANGE UP (ref 3.5–5)
PROT SERPL-MCNC: 9.1 G/DL — HIGH (ref 6–8)
RBC # BLD: 3.73 M/UL — LOW (ref 4.2–5.4)
RBC # FLD: 13.8 % — SIGNIFICANT CHANGE UP (ref 11.5–14.5)
SODIUM SERPL-SCNC: 137 MMOL/L — SIGNIFICANT CHANGE UP (ref 135–146)
WBC # BLD: 13.49 K/UL — HIGH (ref 4.8–10.8)
WBC # FLD AUTO: 13.49 K/UL — HIGH (ref 4.8–10.8)

## 2023-02-04 PROCEDURE — 99232 SBSQ HOSP IP/OBS MODERATE 35: CPT

## 2023-02-04 PROCEDURE — 99233 SBSQ HOSP IP/OBS HIGH 50: CPT

## 2023-02-04 RX ORDER — LANOLIN ALCOHOL/MO/W.PET/CERES
5 CREAM (GRAM) TOPICAL AT BEDTIME
Refills: 0 | Status: DISCONTINUED | OUTPATIENT
Start: 2023-02-04 | End: 2023-02-06

## 2023-02-04 RX ORDER — ACETAMINOPHEN 500 MG
650 TABLET ORAL EVERY 6 HOURS
Refills: 0 | Status: DISCONTINUED | OUTPATIENT
Start: 2023-02-04 | End: 2023-02-06

## 2023-02-04 RX ADMIN — PYRIDOSTIGMINE BROMIDE 180 MILLIGRAM(S): 60 SOLUTION ORAL at 18:26

## 2023-02-04 RX ADMIN — Medication 116 MILLIGRAM(S): at 06:19

## 2023-02-04 RX ADMIN — Medication 25 MILLIGRAM(S): at 06:20

## 2023-02-04 RX ADMIN — Medication 5 MILLIGRAM(S): at 22:16

## 2023-02-04 RX ADMIN — Medication 50 MICROGRAM(S): at 06:20

## 2023-02-04 RX ADMIN — PYRIDOSTIGMINE BROMIDE 180 MILLIGRAM(S): 60 SOLUTION ORAL at 06:20

## 2023-02-04 RX ADMIN — ENOXAPARIN SODIUM 40 MILLIGRAM(S): 100 INJECTION SUBCUTANEOUS at 06:19

## 2023-02-04 RX ADMIN — MYCOPHENOLATE MOFETIL 250 MILLIGRAM(S): 250 CAPSULE ORAL at 18:26

## 2023-02-04 RX ADMIN — Medication 81 MILLIGRAM(S): at 12:51

## 2023-02-04 RX ADMIN — MYCOPHENOLATE MOFETIL 250 MILLIGRAM(S): 250 CAPSULE ORAL at 06:19

## 2023-02-04 RX ADMIN — PANTOPRAZOLE SODIUM 40 MILLIGRAM(S): 20 TABLET, DELAYED RELEASE ORAL at 06:20

## 2023-02-04 RX ADMIN — CHLORHEXIDINE GLUCONATE 1 APPLICATION(S): 213 SOLUTION TOPICAL at 06:31

## 2023-02-04 RX ADMIN — Medication 25 MILLIGRAM(S): at 18:26

## 2023-02-04 NOTE — DIETITIAN INITIAL EVALUATION ADULT - PERTINENT MEDS FT
MEDICATIONS  (STANDING):  aspirin  chewable 81 milliGRAM(s) Oral daily  chlorhexidine 2% Cloths 1 Application(s) Topical <User Schedule>  enoxaparin Injectable 40 milliGRAM(s) SubCutaneous every 24 hours  levothyroxine 50 MICROGram(s) Oral daily  lisinopril 20 milliGRAM(s) Oral at bedtime  metoprolol tartrate 25 milliGRAM(s) Oral two times a day  mycophenolate mofetil 250 milliGRAM(s) Oral two times a day  pantoprazole    Tablet 40 milliGRAM(s) Oral before breakfast  pyridostigmine  milliGRAM(s) Oral two times a day    MEDICATIONS  (PRN):

## 2023-02-04 NOTE — DIETITIAN INITIAL EVALUATION ADULT - ORAL INTAKE PTA/DIET HISTORY
Discussed with pt at bedside. Pt reports good appetite PTA. Consumed 3 meals daily; no chewing or swallowing difficulties on regular texture food. Denies taking vitamin or mineral supplements. Denies drinking protein shake supplements. NKFA; denies having any restrictive cultural/[employer] food preferences.

## 2023-02-04 NOTE — DIETITIAN INITIAL EVALUATION ADULT - PERTINENT LABORATORY DATA
02-04    137  |  105  |  24<H>  ----------------------------<  183<H>  4.6   |  24  |  0.8    Ca    8.8      04 Feb 2023 06:39  Phos  4.4     02-03  Mg     1.9     02-04    TPro  9.1<H>  /  Alb  3.7  /  TBili  0.4  /  DBili  x   /  AST  47<H>  /  ALT  58<H>  /  AlkPhos  55  02-04

## 2023-02-04 NOTE — DIETITIAN INITIAL EVALUATION ADULT - EDUCATION DIETARY MODIFICATIONS
Discussed importance of PO intake, current diet order and vitamins/minerals./(2) meets goals/outcomes/verbalization

## 2023-02-04 NOTE — PROGRESS NOTE ADULT - SUBJECTIVE AND OBJECTIVE BOX
Neurology Follow up note    Name  GHADA ROPER    HPI:  62 year old F non smoker hx of htn, hypothyroid, MG on pyridostigmine, mycophenolate, prednisone 5mg, IVIG q 2 weeks and prior MG flares requiring hospitalizations for plasmapheresis presenting to er for eval. Sts over the past week has had worsening gen weakness, fatigue and sob. Sts this is typical presentation of ms flare.     (02 Feb 2023 18:14)      Interval History - Says she feels worse today more SOB; not sleeping due too the SOlumedrol          Vital Signs Last 24 Hrs  T(C): 36.5 (03 Feb 2023 23:00), Max: 36.5 (03 Feb 2023 23:00)  T(F): 97.7 (03 Feb 2023 23:00), Max: 97.7 (03 Feb 2023 23:00)  HR: 58 (04 Feb 2023 07:19) (58 - 79)  BP: 103/66 (04 Feb 2023 07:19) (103/66 - 139/67)  BP(mean): 79 (04 Feb 2023 07:19) (78 - 96)  RR: 23 (04 Feb 2023 07:19) (20 - 32)  SpO2: 98% (04 Feb 2023 07:19) (94% - 98%)    Parameters below as of 04 Feb 2023 07:19  Patient On (Oxygen Delivery Method): nasal cannula      ICU Vital Signs Last 24 Hrs  T(C): 36.5 (03 Feb 2023 23:00), Max: 36.5 (03 Feb 2023 23:00)  T(F): 97.7 (03 Feb 2023 23:00), Max: 97.7 (03 Feb 2023 23:00)  HR: 58 (04 Feb 2023 07:19) (58 - 79)  BP: 103/66 (04 Feb 2023 07:19) (103/66 - 139/67)  BP(mean): 79 (04 Feb 2023 07:19) (78 - 96)  ABP: --  ABP(mean): --  RR: 23 (04 Feb 2023 07:19) (20 - 32)  SpO2: 98% (04 Feb 2023 07:19) (94% - 98%)    O2 Parameters below as of 04 Feb 2023 07:19  Patient On (Oxygen Delivery Method): nasal cannula                Neurological Exam:   a+Ox3 language and attention normal  CN 2-12 normal  NIF -32 FVC 1640  Neck flexion 4/5 and neck extension 4+/5  No drift  power 5/5 except RLE 4/5      Medications  acetaminophen     Tablet .. 650 milliGRAM(s) Oral every 6 hours PRN  aspirin  chewable 81 milliGRAM(s) Oral daily  chlorhexidine 2% Cloths 1 Application(s) Topical <User Schedule>  enoxaparin Injectable 40 milliGRAM(s) SubCutaneous every 24 hours  levothyroxine 50 MICROGram(s) Oral daily  melatonin 5 milliGRAM(s) Oral at bedtime  metoprolol tartrate 25 milliGRAM(s) Oral two times a day  mycophenolate mofetil 250 milliGRAM(s) Oral two times a day  pantoprazole    Tablet 40 milliGRAM(s) Oral before breakfast  pyridostigmine  milliGRAM(s) Oral two times a day      Lab                        11.7   13.49 )-----------( 275      ( 04 Feb 2023 06:39 )             34.1     02-04    137  |  105  |  24<H>  ----------------------------<  183<H>  4.6   |  24  |  0.8    Ca    8.8      04 Feb 2023 06:39  Phos  4.4     02-03  Mg     1.9     02-04    TPro  9.1<H>  /  Alb  3.7  /  TBili  0.4  /  DBili  x   /  AST  47<H>  /  ALT  58<H>  /  AlkPhos  55  02-04    CAPILLARY BLOOD GLUCOSE        LIVER FUNCTIONS - ( 04 Feb 2023 06:39 )  Alb: 3.7 g/dL / Pro: 9.1 g/dL / ALK PHOS: 55 U/L / ALT: 58 U/L / AST: 47 U/L / GGT: x               Radiology  < from: CT Chest w/ IV Cont (02.02.23 @ 20:35) >  FINDINGS:  TUBES/LINES: None    LUNGS, PLEURA, AND AIRWAYS: Patent trachea. No lobar consolidation,   pleural effusion, or pneumothorax.    Dependent and subsegmental atelectasis, left lower lobe.    Scattered calcified granulomas (303/118 and 303/116).    PULMONARY NODULES:  -No suspicious pulmonary nodules.    HEART/GREAT VESSELS: Normal-sized heart. Normal caliber aorta and main   pulmonary artery. No pericardial effusion. Mild aortic calcifications.    MEDIASTINUM/LYMPH NODES: No enlarged mediastinal, hilar, or axillary   lymph nodes. Unremarkable thyroid gland.    UPPER ABDOMEN: . Postcholecystectomy. CBD dilatation to 1 cm, likely   sequelae of prior cholecystectomy.    BONES AND SOFT TISSUES: Degenerative changes of the spine. Visualized   soft tissues are unremarkable.      IMPRESSION:      No current CT evidence of active intrathoracic disease.    --- End of Report ---          CHUY ECHEVERRIA MD; Resident Radiologist  This document has been electronically signed.  CAREY SANDERS MD; Attending Radiologist  This document has been electronically signed. Feb  3 2023  9:58AM    < end of copied text >      Other studies:     Assessment- This is a 62y year old Female presenting with possible early myasthenia gravis exacerbation.  She is receiving pulsed steroids and her exam is fluctuating slightly while in the hospital.  Her SOB symptoms are still persistent.  Will complete the Solumedrol 3 days and then depending on respiratory status will consider IVIG or PLEX.    Plan  1. Continue home medications for Myasthenia  2. Solumedrol 1g IVPB qd (total 3 doses)  3. Send Angiotensin converting enzymes  4. Continue following NIF and FVC q8 hours (none from today seen)  5. If worsening of SOB or decrease in oxygen saturation 90 or less, >20 point change in NIF then would obtain ABG  6. If symptoms are improved tomorrow can be downgraded to floor

## 2023-02-04 NOTE — DIETITIAN INITIAL EVALUATION ADULT - OTHER CALCULATIONS
Using ABW and IBW: ENERGY: 9233-4618 kcal/day (MSJ 1516.54*1-1.1 SF); PROTEIN:  g/day (1.5-2 g/kg); FLUID: 1mL/kcal vs 2363 mL/day (25 mL/kg) -- with consideration for obese BMI, age, critical care

## 2023-02-04 NOTE — DIETITIAN INITIAL EVALUATION ADULT - OTHER INFO
Pertinent Medical Information: Per H&P, pt is a 61 y/o female with PMHx of HTN, hypothyroid, MG on pyridostigmine, mycophenolate, prednisone 5mg, IVIG q 2 weeks and prior MG flares requiring hospitalizations for plasmapheresis presenting to ER for eval. Chief complaint of Myasthenia Gravis.     Pertinent Subjective Information: Pt reports good appetite; consuming % of meals provided in-house. No chewing or swallowing difficulties noted. No nausea or vomiting reported.     Weight hx: #/90.9 KG -- checked a few days ago per pt. Current dosing weight not documented in EMR. Latest daily weight taken on 2/3: 94.5 KG. 3.8% unintentional weight gain in a few days. Pt does not meet weight criteria for malnutrition at this time. Height is 5'4" per pt - no height documented per flowsheet.

## 2023-02-04 NOTE — PROGRESS NOTE ADULT - SUBJECTIVE AND OBJECTIVE BOX
Patient is a 62y old  Female who presents with a chief complaint of Myasthenia Gravis (03 Feb 2023 14:12)        Over Night Events:    On room air   No issues  No complaints         ROS:  See HPI    PHYSICAL EXAM    ICU Vital Signs Last 24 Hrs  T(C): 36.5 (03 Feb 2023 23:00), Max: 36.7 (03 Feb 2023 15:10)  T(F): 97.7 (03 Feb 2023 23:00), Max: 98 (03 Feb 2023 15:10)  HR: 74 (04 Feb 2023 06:22) (66 - 79)  BP: 109/58 (04 Feb 2023 06:22) (109/58 - 139/67)  BP(mean): 78 (04 Feb 2023 06:22) (78 - 96)  ABP: --  ABP(mean): --  RR: 32 (04 Feb 2023 06:22) (16 - 32)  SpO2: 95% (04 Feb 2023 06:22) (94% - 99%)    O2 Parameters below as of 03 Feb 2023 23:16  Patient On (Oxygen Delivery Method): nasal cannula  O2 Flow (L/min): 2          General: NAD   HEENT: JONAS             Lymphatic system: No cervical LN   Lungs: Bilateral BS  Cardiovascular: Regular   Gastrointestinal: Soft, Positive BS  Extremities: No clubbing.  Moves extremities.  Full Range of motion   Skin: Warm, intact  Neurological: No motor or sensory deficit       02-03-23 @ 07:01  -  02-04-23 @ 07:00  --------------------------------------------------------  IN:    IV PiggyBack: 50 mL  Total IN: 50 mL    OUT:  Total OUT: 0 mL    Total NET: 50 mL          LABS:                            11.7   13.49 )-----------( 275      ( 04 Feb 2023 06:39 )             34.1                                               02-03    134<L>  |  101  |  24<H>  ----------------------------<  82  4.3   |  24  |  0.7    Ca    9.3      03 Feb 2023 05:53  Phos  4.4     02-03  Mg     1.7     02-03    TPro  9.0<H>  /  Alb  3.7  /  TBili  0.4  /  DBili  x   /  AST  35  /  ALT  31  /  AlkPhos  54  02-03                                                 CARDIAC MARKERS ( 02 Feb 2023 13:40 )  x     / <0.01 ng/mL / x     / x     / x                                                LIVER FUNCTIONS - ( 03 Feb 2023 05:53 )  Alb: 3.7 g/dL / Pro: 9.0 g/dL / ALK PHOS: 54 U/L / ALT: 31 U/L / AST: 35 U/L / GGT: x                                                                                                                                       MEDICATIONS  (STANDING):  aspirin  chewable 81 milliGRAM(s) Oral daily  chlorhexidine 2% Cloths 1 Application(s) Topical <User Schedule>  enoxaparin Injectable 40 milliGRAM(s) SubCutaneous every 24 hours  levothyroxine 50 MICROGram(s) Oral daily  lisinopril 20 milliGRAM(s) Oral at bedtime  metoprolol tartrate 25 milliGRAM(s) Oral two times a day  mycophenolate mofetil 250 milliGRAM(s) Oral two times a day  pantoprazole    Tablet 40 milliGRAM(s) Oral before breakfast  pyridostigmine  milliGRAM(s) Oral two times a day    MEDICATIONS  (PRN):      Xrays:  clear                                                                                    ECHO

## 2023-02-04 NOTE — DIETITIAN INITIAL EVALUATION ADULT - NAME AND PHONE
Karlene [phone] or TEAMS    Nutrition Monitoring: Diet order, PO intake, weights, labs, NFPF, body composition, BM

## 2023-02-04 NOTE — PROGRESS NOTE ADULT - SUBJECTIVE AND OBJECTIVE BOX
SUBJECTIVE:    Patient is a 62y old Female who presents with a chief complaint of MYASTHENIA GRAVIS     (04 Feb 2023 10:49)    Currently admitted to medicine with the primary diagnosis of Myasthenia gravis       Today is hospital day 2d. This morning she is resting comfortably in bed and reports no new issues or overnight events.     ROS:   CONSTITUTIONAL: + generalized  weakness, no  fevers or chills   EYES/ENT: No visual changes; No vertigo or throat pain   NECK: No pain or stiffness   RESPIRATORY: No cough, wheezing, hemoptysis; No shortness of breath   CARDIOVASCULAR: No chest pain or palpitations   GASTROINTESTINAL: No abdominal or epigastric pain. No nausea, vomiting, or hematemesis; No diarrhea or constipation. No melena or hematochezia.  GENITOURINARY: No dysuria, frequency or hematuria  NEUROLOGICAL: No numbness or weakness        PAST MEDICAL & SURGICAL HISTORY  POTS (postural orthostatic tachycardia syndrome)    Pancreatitis    High blood cholesterol    Myasthenia gravis    GERD (gastroesophageal reflux disease)    HTN (hypertension)    Hypothyroid    KADI (obstructive sleep apnea)    Leaky heart valve  MITRAL REGURG    History of foot surgery    H/O dilation and curettage    H/O cone biopsy of cervix    H/O colonoscopy  2021    History of esophagogastroduodenoscopy (EGD)      SOCIAL HISTORY:    ALLERGIES:  Imuran (Other)  sulfa drugs (Unknown)    MEDICATIONS:  STANDING MEDICATIONS  aspirin  chewable 81 milliGRAM(s) Oral daily  chlorhexidine 2% Cloths 1 Application(s) Topical <User Schedule>  enoxaparin Injectable 40 milliGRAM(s) SubCutaneous every 24 hours  levothyroxine 50 MICROGram(s) Oral daily  lisinopril 20 milliGRAM(s) Oral at bedtime  melatonin 5 milliGRAM(s) Oral at bedtime  metoprolol tartrate 25 milliGRAM(s) Oral two times a day  mycophenolate mofetil 250 milliGRAM(s) Oral two times a day  pantoprazole    Tablet 40 milliGRAM(s) Oral before breakfast  pyridostigmine  milliGRAM(s) Oral two times a day    PRN MEDICATIONS  acetaminophen     Tablet .. 650 milliGRAM(s) Oral every 6 hours PRN    VITALS:   T(F): 97.7  HR: 58  BP: 103/66  RR: 23  SpO2: 98%    LABS:  Negative for smoking/alcohol/drug use.                         11.7   13.49 )-----------( 275      ( 04 Feb 2023 06:39 )             34.1     02-04    137  |  105  |  24<H>  ----------------------------<  183<H>  4.6   |  24  |  0.8    Ca    8.8      04 Feb 2023 06:39  Phos  4.4     02-03  Mg     1.9     02-04    TPro  9.1<H>  /  Alb  3.7  /  TBili  0.4  /  DBili  x   /  AST  47<H>  /  ALT  58<H>  /  AlkPhos  55  02-04              CARDIAC MARKERS ( 02 Feb 2023 13:40 )  x     / <0.01 ng/mL / x     / x     / x          RADIOLOGY:    PHYSICAL EXAM:  GEN: No acute distress  HEENT: normocephalic, atraumatic, aniceteric  LUNGS: Clear to auscultation bilaterally, no rales/wheezing/ rhonchi  HEART: S1/S2 present. RRR, no murmurs  ABD: Soft, non-tender, non-distended. Bowel sounds present  EXT: warm   NEURO: AAOX3, normal affect      ASSESSMENT AND PLAN:    62 year old F non smoker hx of htn, hypothyroid, MG on pyridostigmine, mycophenolate, prednisone 5mg, IVIG q 2 weeks and prior MG flares requiring hospitalizations for plasmapheresis     #Myasthenia Gravis Flare   - NIF -40, VC 1.8L -> check q6 hrs   - CT chest w/ IV unremarkable  - cw cellcept and Mestinon  - on IVIG o2acaty with Dr. Urbina  - Solumedrol 1g IVPB QD x2-3 days  - PT eval - no needs  - Restart home dose prednisone 5 mg QD after solumedrol completed (last dose 2/5/23)  - neuro fu     #H/O HTN - hold lisinopril 20mg qd given soft bp . cw metoprolol succ 25   #H/O Hypothyroid - cont synthroid 50 mcg      Diet DASH  GI ppx PPI   DVT ppx lovenox   Dispo: acute    case discussed with cc team    SUBJECTIVE:    Patient is a 62y old Female who presents with a chief complaint of MYASTHENIA GRAVIS     (04 Feb 2023 10:49)    Currently admitted to medicine with the primary diagnosis of Myasthenia gravis       Today is hospital day 2d. This morning she is resting comfortably in bed and reports no new issues or overnight events.     ROS:   CONSTITUTIONAL: + generalized  weakness, no  fevers or chills   EYES/ENT: No visual changes; No vertigo or throat pain   NECK: No pain or stiffness   RESPIRATORY: No cough, wheezing, hemoptysis; No shortness of breath   CARDIOVASCULAR: No chest pain or palpitations   GASTROINTESTINAL: No abdominal or epigastric pain. No nausea, vomiting, or hematemesis; No diarrhea or constipation. No melena or hematochezia.  GENITOURINARY: No dysuria, frequency or hematuria  NEUROLOGICAL: No numbness or weakness        PAST MEDICAL & SURGICAL HISTORY  POTS (postural orthostatic tachycardia syndrome)    Pancreatitis    High blood cholesterol    Myasthenia gravis    GERD (gastroesophageal reflux disease)    HTN (hypertension)    Hypothyroid    KADI (obstructive sleep apnea)    Leaky heart valve  MITRAL REGURG    History of foot surgery    H/O dilation and curettage    H/O cone biopsy of cervix    H/O colonoscopy  2021    History of esophagogastroduodenoscopy (EGD)      SOCIAL HISTORY:    ALLERGIES:  Imuran (Other)  sulfa drugs (Unknown)    MEDICATIONS:  STANDING MEDICATIONS  aspirin  chewable 81 milliGRAM(s) Oral daily  chlorhexidine 2% Cloths 1 Application(s) Topical <User Schedule>  enoxaparin Injectable 40 milliGRAM(s) SubCutaneous every 24 hours  levothyroxine 50 MICROGram(s) Oral daily  lisinopril 20 milliGRAM(s) Oral at bedtime  melatonin 5 milliGRAM(s) Oral at bedtime  metoprolol tartrate 25 milliGRAM(s) Oral two times a day  mycophenolate mofetil 250 milliGRAM(s) Oral two times a day  pantoprazole    Tablet 40 milliGRAM(s) Oral before breakfast  pyridostigmine  milliGRAM(s) Oral two times a day    PRN MEDICATIONS  acetaminophen     Tablet .. 650 milliGRAM(s) Oral every 6 hours PRN    VITALS:   T(F): 97.7  HR: 58  BP: 103/66  RR: 23  SpO2: 98%    LABS:  Negative for smoking/alcohol/drug use.                         11.7   13.49 )-----------( 275      ( 04 Feb 2023 06:39 )             34.1     02-04    137  |  105  |  24<H>  ----------------------------<  183<H>  4.6   |  24  |  0.8    Ca    8.8      04 Feb 2023 06:39  Phos  4.4     02-03  Mg     1.9     02-04    TPro  9.1<H>  /  Alb  3.7  /  TBili  0.4  /  DBili  x   /  AST  47<H>  /  ALT  58<H>  /  AlkPhos  55  02-04              CARDIAC MARKERS ( 02 Feb 2023 13:40 )  x     / <0.01 ng/mL / x     / x     / x          RADIOLOGY:    PHYSICAL EXAM:  GEN: No acute distress  HEENT: normocephalic, atraumatic, aniceteric  LUNGS: Clear to auscultation bilaterally, no rales/wheezing/ rhonchi  HEART: S1/S2 present. RRR, no murmurs  ABD: Soft, non-tender, non-distended. Bowel sounds present  EXT: warm   NEURO: AAOX3, normal affect      ASSESSMENT AND PLAN:    62 year old F non smoker hx of htn, hypothyroid, MG on pyridostigmine, mycophenolate, prednisone 5mg, IVIG q 2 weeks and prior MG flares requiring hospitalizations for plasmapheresis     #Myasthenia Gravis Flare   - NIF -40, VC 1.8L -> check q6 hrs   - CT chest w/ IV unremarkable  - cw cellcept and Mestinon  - on IVIG u0lccsb with Dr. Urbina  - Solumedrol 1g IVPB QD x2-3 days  - PT eval - no needs  - Restart home dose prednisone 5 mg QD after solumedrol completed (last dose 2/5/23)  - neuro fu   - fu ace enzyme     #H/O HTN - hold lisinopril 20mg qd given soft bp . cw metoprolol succ 25   #H/O Hypothyroid - cont synthroid 50 mcg      Diet DASH  GI ppx PPI   DVT ppx lovenox   Dispo: acute    case discussed with cc team

## 2023-02-04 NOTE — DIETITIAN INITIAL EVALUATION ADULT - ADD RECOMMEND
1. Continue with current diet order  2. Encourage PO intake and assist during meals prn    Pt assessed to be at low nutrition risk; will f/u in 7-10 days or prn.

## 2023-02-05 DIAGNOSIS — G70.00 MYASTHENIA GRAVIS WITHOUT (ACUTE) EXACERBATION: ICD-10-CM

## 2023-02-05 DIAGNOSIS — F44.5 CONVERSION DISORDER WITH SEIZURES OR CONVULSIONS: ICD-10-CM

## 2023-02-05 LAB
ALBUMIN SERPL ELPH-MCNC: 3.5 G/DL — SIGNIFICANT CHANGE UP (ref 3.5–5.2)
ALP SERPL-CCNC: 52 U/L — SIGNIFICANT CHANGE UP (ref 30–115)
ALT FLD-CCNC: 42 U/L — HIGH (ref 0–41)
ANION GAP SERPL CALC-SCNC: 8 MMOL/L — SIGNIFICANT CHANGE UP (ref 7–14)
AST SERPL-CCNC: 27 U/L — SIGNIFICANT CHANGE UP (ref 0–41)
BASOPHILS # BLD AUTO: 0.02 K/UL — SIGNIFICANT CHANGE UP (ref 0–0.2)
BASOPHILS NFR BLD AUTO: 0.1 % — SIGNIFICANT CHANGE UP (ref 0–1)
BILIRUB SERPL-MCNC: 0.3 MG/DL — SIGNIFICANT CHANGE UP (ref 0.2–1.2)
BUN SERPL-MCNC: 27 MG/DL — HIGH (ref 10–20)
CALCIUM SERPL-MCNC: 8.7 MG/DL — SIGNIFICANT CHANGE UP (ref 8.4–10.5)
CHLORIDE SERPL-SCNC: 105 MMOL/L — SIGNIFICANT CHANGE UP (ref 98–110)
CO2 SERPL-SCNC: 25 MMOL/L — SIGNIFICANT CHANGE UP (ref 17–32)
CREAT SERPL-MCNC: 0.7 MG/DL — SIGNIFICANT CHANGE UP (ref 0.7–1.5)
EGFR: 99 ML/MIN/1.73M2 — SIGNIFICANT CHANGE UP
EOSINOPHIL # BLD AUTO: 0 K/UL — SIGNIFICANT CHANGE UP (ref 0–0.7)
EOSINOPHIL NFR BLD AUTO: 0 % — SIGNIFICANT CHANGE UP (ref 0–8)
GLUCOSE SERPL-MCNC: 110 MG/DL — HIGH (ref 70–99)
HCT VFR BLD CALC: 34.7 % — LOW (ref 37–47)
HGB BLD-MCNC: 11.5 G/DL — LOW (ref 12–16)
IMM GRANULOCYTES NFR BLD AUTO: 0.7 % — HIGH (ref 0.1–0.3)
LYMPHOCYTES # BLD AUTO: 0.54 K/UL — LOW (ref 1.2–3.4)
LYMPHOCYTES # BLD AUTO: 3.4 % — LOW (ref 20.5–51.1)
MAGNESIUM SERPL-MCNC: 2.1 MG/DL — SIGNIFICANT CHANGE UP (ref 1.8–2.4)
MCHC RBC-ENTMCNC: 30.6 PG — SIGNIFICANT CHANGE UP (ref 27–31)
MCHC RBC-ENTMCNC: 33.1 G/DL — SIGNIFICANT CHANGE UP (ref 32–37)
MCV RBC AUTO: 92.3 FL — SIGNIFICANT CHANGE UP (ref 81–99)
MONOCYTES # BLD AUTO: 0.89 K/UL — HIGH (ref 0.1–0.6)
MONOCYTES NFR BLD AUTO: 5.5 % — SIGNIFICANT CHANGE UP (ref 1.7–9.3)
NEUTROPHILS # BLD AUTO: 14.48 K/UL — HIGH (ref 1.4–6.5)
NEUTROPHILS NFR BLD AUTO: 90.3 % — HIGH (ref 42.2–75.2)
NRBC # BLD: 0 /100 WBCS — SIGNIFICANT CHANGE UP (ref 0–0)
PLATELET # BLD AUTO: 287 K/UL — SIGNIFICANT CHANGE UP (ref 130–400)
POTASSIUM SERPL-MCNC: 4.5 MMOL/L — SIGNIFICANT CHANGE UP (ref 3.5–5)
POTASSIUM SERPL-SCNC: 4.5 MMOL/L — SIGNIFICANT CHANGE UP (ref 3.5–5)
PROT SERPL-MCNC: 8.6 G/DL — HIGH (ref 6–8)
RBC # BLD: 3.76 M/UL — LOW (ref 4.2–5.4)
RBC # FLD: 14.2 % — SIGNIFICANT CHANGE UP (ref 11.5–14.5)
SODIUM SERPL-SCNC: 138 MMOL/L — SIGNIFICANT CHANGE UP (ref 135–146)
WBC # BLD: 16.05 K/UL — HIGH (ref 4.8–10.8)
WBC # FLD AUTO: 16.05 K/UL — HIGH (ref 4.8–10.8)

## 2023-02-05 PROCEDURE — 99232 SBSQ HOSP IP/OBS MODERATE 35: CPT

## 2023-02-05 PROCEDURE — 99233 SBSQ HOSP IP/OBS HIGH 50: CPT

## 2023-02-05 RX ORDER — SODIUM CHLORIDE 9 MG/ML
1000 INJECTION INTRAMUSCULAR; INTRAVENOUS; SUBCUTANEOUS
Refills: 0 | Status: DISCONTINUED | OUTPATIENT
Start: 2023-02-05 | End: 2023-02-05

## 2023-02-05 RX ADMIN — Medication 81 MILLIGRAM(S): at 11:07

## 2023-02-05 RX ADMIN — PYRIDOSTIGMINE BROMIDE 180 MILLIGRAM(S): 60 SOLUTION ORAL at 06:02

## 2023-02-05 RX ADMIN — PYRIDOSTIGMINE BROMIDE 180 MILLIGRAM(S): 60 SOLUTION ORAL at 17:13

## 2023-02-05 RX ADMIN — Medication 5 MILLIGRAM(S): at 21:53

## 2023-02-05 RX ADMIN — PANTOPRAZOLE SODIUM 40 MILLIGRAM(S): 20 TABLET, DELAYED RELEASE ORAL at 07:41

## 2023-02-05 RX ADMIN — Medication 25 MILLIGRAM(S): at 06:03

## 2023-02-05 RX ADMIN — MYCOPHENOLATE MOFETIL 250 MILLIGRAM(S): 250 CAPSULE ORAL at 17:13

## 2023-02-05 RX ADMIN — Medication 50 MICROGRAM(S): at 06:02

## 2023-02-05 RX ADMIN — Medication 25 MILLIGRAM(S): at 17:13

## 2023-02-05 RX ADMIN — ENOXAPARIN SODIUM 40 MILLIGRAM(S): 100 INJECTION SUBCUTANEOUS at 06:03

## 2023-02-05 RX ADMIN — Medication 58 MILLIGRAM(S): at 06:03

## 2023-02-05 RX ADMIN — CHLORHEXIDINE GLUCONATE 1 APPLICATION(S): 213 SOLUTION TOPICAL at 06:03

## 2023-02-05 RX ADMIN — MYCOPHENOLATE MOFETIL 250 MILLIGRAM(S): 250 CAPSULE ORAL at 06:02

## 2023-02-05 NOTE — PROGRESS NOTE ADULT - SUBJECTIVE AND OBJECTIVE BOX
SUBJECTIVE:    Patient is a 62y old Female who presents with a chief complaint of Myasthenia Gravis (05 Feb 2023 13:42)    Currently admitted to medicine with the primary diagnosis of Myasthenia gravis       Today is hospital day 3d. This morning she is resting comfortably in bed     Reports improvement in breathing, however notes worsening dysphagia      ROS:   CONSTITUTIONAL: No weakness, fevers or chills   EYES/ENT: No visual changes; No vertigo or throat pain   NECK: No pain or stiffness   RESPIRATORY: No cough, wheezing, hemoptysis; No shortness of breath   CARDIOVASCULAR: No chest pain or palpitations   GASTROINTESTINAL: No abdominal or epigastric pain. No nausea, vomiting, or hematemesis; No diarrhea or constipation. No melena or hematochezia. +dysphagia   GENITOURINARY: No dysuria, frequency or hematuria  NEUROLOGICAL: No numbness or weakness      PAST MEDICAL & SURGICAL HISTORY  POTS (postural orthostatic tachycardia syndrome)    Pancreatitis    High blood cholesterol    Myasthenia gravis    GERD (gastroesophageal reflux disease)    HTN (hypertension)    Hypothyroid    KADI (obstructive sleep apnea)    Leaky heart valve  MITRAL REGURG    History of foot surgery    H/O dilation and curettage    H/O cone biopsy of cervix    H/O colonoscopy  2021    History of esophagogastroduodenoscopy (EGD)      SOCIAL HISTORY:    ALLERGIES:  Imuran (Other)  sulfa drugs (Unknown)    MEDICATIONS:  STANDING MEDICATIONS  aspirin  chewable 81 milliGRAM(s) Oral daily  chlorhexidine 2% Cloths 1 Application(s) Topical <User Schedule>  enoxaparin Injectable 40 milliGRAM(s) SubCutaneous every 24 hours  levothyroxine 50 MICROGram(s) Oral daily  melatonin 5 milliGRAM(s) Oral at bedtime  metoprolol tartrate 25 milliGRAM(s) Oral two times a day  mycophenolate mofetil 250 milliGRAM(s) Oral two times a day  pantoprazole    Tablet 40 milliGRAM(s) Oral before breakfast  pyridostigmine  milliGRAM(s) Oral two times a day  sodium chloride 0.9%. 1000 milliLiter(s) IV Continuous <Continuous>    PRN MEDICATIONS  acetaminophen     Tablet .. 650 milliGRAM(s) Oral every 6 hours PRN    VITALS:   T(F): 96.8  HR: 49  BP: 133/66  RR: 15  SpO2: 96%    LABS:  Negative for smoking/alcohol/drug use.                         11.5   16.05 )-----------( 287      ( 05 Feb 2023 06:18 )             34.7     02-05    138  |  105  |  27<H>  ----------------------------<  110<H>  4.5   |  25  |  0.7    Ca    8.7      05 Feb 2023 06:18  Mg     2.1     02-05    TPro  8.6<H>  /  Alb  3.5  /  TBili  0.3  /  DBili  x   /  AST  27  /  ALT  42<H>  /  AlkPhos  52  02-05                  RADIOLOGY:    PHYSICAL EXAM:  GEN: No acute distress  HEENT: normocephalic, atraumatic, aniceteric  LUNGS: Clear to auscultation bilaterally, no rales/wheezing/ rhonchi  HEART: S1/S2 present. RRR, no murmurs  ABD: Soft, non-tender, non-distended. Bowel sounds present  EXT: warm   NEURO: AAOX3, normal affect      ASSESSMENT AND PLAN:    62 year old F non smoker hx of htn, hypothyroid, MG on pyridostigmine, mycophenolate, prednisone 5mg, IVIG q 2 weeks and prior MG flares requiring hospitalizations for plasmapheresis     #Myasthenia Gravis Flare   - on room air  - NIF -40, VC 1.8L -> check q8 hrs per neuro  - CT chest w/ IV unremarkable  - cw cellcept and Mestinon  - on IVIG [id number] with Dr. Urbina  - sp Solumedrol 1g IVPB QD x3d  - PT eval - no needs  - monitor NIF/VC   - resume home prednisone   - neuro fu , patient with dysphagia   - fu ace enzyme     #H/O HTN - hold lisinopril 20mg qd given soft bp . cw metoprolol succ 25   #H/O Hypothyroid - cont synthroid 50 mcg      Diet DASH  GI ppx PPI   DVT ppx lovenox   Dispo: acute    case discussed with cc team  SUBJECTIVE:    Patient is a 62y old Female who presents with a chief complaint of Myasthenia Gravis (05 Feb 2023 13:42)    Currently admitted to medicine with the primary diagnosis of Myasthenia gravis       Today is hospital day 3d. This morning she is resting comfortably in bed     Reports improvement in breathing, however notes worsening dysphagia      ROS:   CONSTITUTIONAL: No weakness, fevers or chills   EYES/ENT: No visual changes; No vertigo or throat pain   NECK: No pain or stiffness   RESPIRATORY: No cough, wheezing, hemoptysis; No shortness of breath   CARDIOVASCULAR: No chest pain or palpitations   GASTROINTESTINAL: No abdominal or epigastric pain. No nausea, vomiting, or hematemesis; No diarrhea or constipation. No melena or hematochezia. +dysphagia   GENITOURINARY: No dysuria, frequency or hematuria  NEUROLOGICAL: No numbness or weakness      PAST MEDICAL & SURGICAL HISTORY  POTS (postural orthostatic tachycardia syndrome)    Pancreatitis    High blood cholesterol    Myasthenia gravis    GERD (gastroesophageal reflux disease)    HTN (hypertension)    Hypothyroid    KADI (obstructive sleep apnea)    Leaky heart valve  MITRAL REGURG    History of foot surgery    H/O dilation and curettage    H/O cone biopsy of cervix    H/O colonoscopy  2021    History of esophagogastroduodenoscopy (EGD)      SOCIAL HISTORY:    ALLERGIES:  Imuran (Other)  sulfa drugs (Unknown)    MEDICATIONS:  STANDING MEDICATIONS  aspirin  chewable 81 milliGRAM(s) Oral daily  chlorhexidine 2% Cloths 1 Application(s) Topical <User Schedule>  enoxaparin Injectable 40 milliGRAM(s) SubCutaneous every 24 hours  levothyroxine 50 MICROGram(s) Oral daily  melatonin 5 milliGRAM(s) Oral at bedtime  metoprolol tartrate 25 milliGRAM(s) Oral two times a day  mycophenolate mofetil 250 milliGRAM(s) Oral two times a day  pantoprazole    Tablet 40 milliGRAM(s) Oral before breakfast  pyridostigmine  milliGRAM(s) Oral two times a day  sodium chloride 0.9%. 1000 milliLiter(s) IV Continuous <Continuous>    PRN MEDICATIONS  acetaminophen     Tablet .. 650 milliGRAM(s) Oral every 6 hours PRN    VITALS:   T(F): 96.8  HR: 49  BP: 133/66  RR: 15  SpO2: 96%    LABS:  Negative for smoking/alcohol/drug use.                         11.5   16.05 )-----------( 287      ( 05 Feb 2023 06:18 )             34.7     02-05    138  |  105  |  27<H>  ----------------------------<  110<H>  4.5   |  25  |  0.7    Ca    8.7      05 Feb 2023 06:18  Mg     2.1     02-05    TPro  8.6<H>  /  Alb  3.5  /  TBili  0.3  /  DBili  x   /  AST  27  /  ALT  42<H>  /  AlkPhos  52  02-05                  RADIOLOGY:    PHYSICAL EXAM:  GEN: No acute distress  HEENT: normocephalic, atraumatic, aniceteric  LUNGS: Clear to auscultation bilaterally, no rales/wheezing/ rhonchi  HEART: S1/S2 present. RRR, no murmurs  ABD: Soft, non-tender, non-distended. Bowel sounds present  EXT: warm   NEURO: AAOX3, normal affect      ASSESSMENT AND PLAN:    62 year old F non smoker hx of htn, hypothyroid, MG on pyridostigmine, mycophenolate, prednisone 5mg, IVIG q 2 weeks and prior MG flares requiring hospitalizations for plasmapheresis     #Myasthenia Gravis Flare , now with dysphagia   - on room air  - NIF -40, VC 1.8L -> check q8 hrs per neuro  - CT chest w/ IV unremarkable  - cw cellcept and Mestinon  - on IVIG [id number] with Dr. Urbina  - received Solumedrol 1g IVPB QD x3d  - PT eval - no needs  - monitor NIF/VC   - resume home prednisone if ok with neuro   - neuro fu  - NPO  , IVF   - fu ace enzyme     #H/O HTN - hold lisinopril 20mg qd given soft bp . cw metoprolol succ 25   #H/O Hypothyroid - cont synthroid 50 mcg      Diet DASH  GI ppx PPI   DVT ppx lovenox   Dispo: acute    case discussed with cc team

## 2023-02-05 NOTE — SWALLOW BEDSIDE ASSESSMENT ADULT - SLP GENERAL OBSERVATIONS
Pt received A&Ox4 resting in bed with family at bedside. Provided extensive medical history as it relates to dysphagia. +2L NC

## 2023-02-05 NOTE — SWALLOW BEDSIDE ASSESSMENT ADULT - SLP PERTINENT HISTORY OF CURRENT PROBLEM
62 year old F non smoker hx of htn, hypothyroid, MG on pyridostigmine, mycophenolate, prednisone 5mg, IVIG q 2 weeks and prior MG flares requiring hospitalizations for plasmapheresis presenting to er for eval. Sts over the past week has had worsening gen weakness, fatigue and sob. Sts this is typical presentation of MG flare.

## 2023-02-05 NOTE — PROGRESS NOTE ADULT - SUBJECTIVE AND OBJECTIVE BOX
Patient is a 62y old  Female who presents with a chief complaint of Myasthenia Gravis (04 Feb 2023 15:36)        Over Night Events:    Weakness  No shortness of breath   On room air         ROS:  See HPI    PHYSICAL EXAM    ICU Vital Signs Last 24 Hrs  T(C): 36 (05 Feb 2023 05:02), Max: 36.2 (04 Feb 2023 23:20)  T(F): 96.8 (05 Feb 2023 05:02), Max: 97.2 (04 Feb 2023 23:20)  HR: 18 (05 Feb 2023 04:00) (18 - 71)  BP: 104/54 (05 Feb 2023 04:00) (103/66 - 138/73)  BP(mean): 74 (05 Feb 2023 04:00) (74 - 98)  ABP: --  ABP(mean): --  RR: 14 (05 Feb 2023 05:02) (14 - 29)  SpO2: 97% (05 Feb 2023 04:00) (96% - 98%)    O2 Parameters below as of 05 Feb 2023 04:00  Patient On (Oxygen Delivery Method): nasal cannula            General: NAD  HEENT: JONAS             Lymphatic system: No cervical LN   Lungs: Bilateral BS  Cardiovascular: Regular   Gastrointestinal: Soft, Positive BS  Extremities: No clubbing.  Moves extremities.  Full Range of motion   Skin: Warm, intact  Neurological: No motor or sensory deficit       02-03-23 @ 07:01  -  02-04-23 @ 07:00  --------------------------------------------------------  IN:    IV PiggyBack: 50 mL  Total IN: 50 mL    OUT:  Total OUT: 0 mL    Total NET: 50 mL      02-04-23 @ 07:01  -  02-05-23 @ 06:37  --------------------------------------------------------  IN:    Oral Fluid: 540 mL  Total IN: 540 mL    OUT:  Total OUT: 0 mL    Total NET: 540 mL          LABS:                            11.7   13.49 )-----------( 275      ( 04 Feb 2023 06:39 )             34.1                                               02-04    137  |  105  |  24<H>  ----------------------------<  183<H>  4.6   |  24  |  0.8    Ca    8.8      04 Feb 2023 06:39  Mg     1.9     02-04    TPro  9.1<H>  /  Alb  3.7  /  TBili  0.4  /  DBili  x   /  AST  47<H>  /  ALT  58<H>  /  AlkPhos  55  02-04                                                                                           LIVER FUNCTIONS - ( 04 Feb 2023 06:39 )  Alb: 3.7 g/dL / Pro: 9.1 g/dL / ALK PHOS: 55 U/L / ALT: 58 U/L / AST: 47 U/L / GGT: x                                                                                                                                       MEDICATIONS  (STANDING):  aspirin  chewable 81 milliGRAM(s) Oral daily  chlorhexidine 2% Cloths 1 Application(s) Topical <User Schedule>  enoxaparin Injectable 40 milliGRAM(s) SubCutaneous every 24 hours  levothyroxine 50 MICROGram(s) Oral daily  melatonin 5 milliGRAM(s) Oral at bedtime  metoprolol tartrate 25 milliGRAM(s) Oral two times a day  mycophenolate mofetil 250 milliGRAM(s) Oral two times a day  pantoprazole    Tablet 40 milliGRAM(s) Oral before breakfast  pyridostigmine  milliGRAM(s) Oral two times a day    MEDICATIONS  (PRN):  acetaminophen     Tablet .. 650 milliGRAM(s) Oral every 6 hours PRN Temp greater or equal to 38C (100.4F), Mild Pain (1 - 3)

## 2023-02-05 NOTE — PROGRESS NOTE ADULT - SUBJECTIVE AND OBJECTIVE BOX
Neurology Follow up note    Name  GHADA ROPER    HPI:  62 year old F non smoker hx of htn, hypothyroid, MG on pyridostigmine, mycophenolate, prednisone 5mg, IVIG q 2 weeks and prior MG flares requiring hospitalizations for plasmapheresis presenting to er for eval. Sts over the past week has had worsening gen weakness, fatigue and sob. Sts this is typical presentation of ms flare.     (02 Feb 2023 18:14)      Interval History -Feels slightly better.  Complains of difficulty chewing during breakfast which improves later in the day.  Still SOB when doing things      Vital Signs Last 24 Hrs  T(C): 36 (05 Feb 2023 07:10), Max: 36.2 (04 Feb 2023 23:20)  T(F): 96.8 (05 Feb 2023 07:10), Max: 97.2 (04 Feb 2023 23:20)  HR: 49 (05 Feb 2023 07:10) (18 - 71)  BP: 133/66 (05 Feb 2023 07:10) (104/54 - 138/73)  BP(mean): 93 (05 Feb 2023 07:10) (74 - 98)  RR: 15 (05 Feb 2023 07:10) (14 - 29)  SpO2: 96% (05 Feb 2023 07:10) (96% - 97%)    Parameters below as of 05 Feb 2023 04:00  Patient On (Oxygen Delivery Method): nasal cannula      ICU Vital Signs Last 24 Hrs  T(C): 36 (05 Feb 2023 07:10), Max: 36.2 (04 Feb 2023 23:20)  T(F): 96.8 (05 Feb 2023 07:10), Max: 97.2 (04 Feb 2023 23:20)  HR: 49 (05 Feb 2023 07:10) (18 - 71)  BP: 133/66 (05 Feb 2023 07:10) (104/54 - 138/73)  BP(mean): 93 (05 Feb 2023 07:10) (74 - 98)  ABP: --  ABP(mean): --  RR: 15 (05 Feb 2023 07:10) (14 - 29)  SpO2: 96% (05 Feb 2023 07:10) (96% - 97%)    O2 Parameters below as of 05 Feb 2023 04:00  Patient On (Oxygen Delivery Method): nasal cannula                Neurological Exam:     Neurological Exam:   a+Ox3 language and attention normal  CN 2-12 normal  Neck flexion 4+/5 and neck extension 5/5  No drift  power 5/5 except RLE 4/5      Medications  acetaminophen     Tablet .. 650 milliGRAM(s) Oral every 6 hours PRN  aspirin  chewable 81 milliGRAM(s) Oral daily  chlorhexidine 2% Cloths 1 Application(s) Topical <User Schedule>  enoxaparin Injectable 40 milliGRAM(s) SubCutaneous every 24 hours  levothyroxine 50 MICROGram(s) Oral daily  melatonin 5 milliGRAM(s) Oral at bedtime  metoprolol tartrate 25 milliGRAM(s) Oral two times a day  mycophenolate mofetil 250 milliGRAM(s) Oral two times a day  pantoprazole    Tablet 40 milliGRAM(s) Oral before breakfast  pyridostigmine  milliGRAM(s) Oral two times a day  sodium chloride 0.9%. 1000 milliLiter(s) IV Continuous <Continuous>      Lab                        11.5   16.05 )-----------( 287      ( 05 Feb 2023 06:18 )             34.7     02-05    138  |  105  |  27<H>  ----------------------------<  110<H>  4.5   |  25  |  0.7    Ca    8.7      05 Feb 2023 06:18  Mg     2.1     02-05    TPro  8.6<H>  /  Alb  3.5  /  TBili  0.3  /  DBili  x   /  AST  27  /  ALT  42<H>  /  AlkPhos  52  02-05    CAPILLARY BLOOD GLUCOSE        LIVER FUNCTIONS - ( 05 Feb 2023 06:18 )  Alb: 3.5 g/dL / Pro: 8.6 g/dL / ALK PHOS: 52 U/L / ALT: 42 U/L / AST: 27 U/L / GGT: x               Radiology  < from: CT Chest w/ IV Cont (02.02.23 @ 20:35) >  COMPARISON: CT chest 7/1/2008.      FINDINGS:  TUBES/LINES: None    LUNGS, PLEURA, AND AIRWAYS: Patent trachea. No lobar consolidation,   pleural effusion, or pneumothorax.    Dependent and subsegmental atelectasis, left lower lobe.    Scattered calcified granulomas (303/118 and 303/116).    PULMONARY NODULES:  -No suspicious pulmonary nodules.    HEART/GREAT VESSELS: Normal-sized heart. Normal caliber aorta and main   pulmonary artery. No pericardial effusion. Mild aortic calcifications.    MEDIASTINUM/LYMPH NODES: No enlarged mediastinal, hilar, or axillary   lymph nodes. Unremarkable thyroid gland.    UPPER ABDOMEN: . Postcholecystectomy. CBD dilatation to 1 cm, likely   sequelae of prior cholecystectomy.    BONES AND SOFT TISSUES: Degenerative changes of the spine. Visualized   soft tissues are unremarkable.      IMPRESSION:      No current CT evidence of active intrathoracic disease.    --- End of Report ---    < end of copied text >      Other studies:     Assessment- This is a 62y year old Female presenting with possible early myasthenia gravis exacerbation.  She is receiving pulsed steroids and her exam is fluctuating slightly while in the hospital.  Her SOB symptoms are still persistent.  Will complete the Solumedrol 3 days and then depending on respiratory status will consider IVIG or PLEX.    Plan  1. Continue home medications for Myasthenia  2. Solumedrol 1g IVPB completed 3 days  3. Send Angiotensin converting enzymes  4. Continue following NIF and FVC q8 hours (none from today seen)  5. OK to downgrade to floor  6. Anticipate if no worsening will plan for discharge tomorrow

## 2023-02-05 NOTE — SWALLOW BEDSIDE ASSESSMENT ADULT - SWALLOW EVAL: DIAGNOSIS
+ toleration observed without overt symptoms of penetration/aspiration for Regular/thins. Pt reports that swallow function is at baseline and she wishes to continue with REgular/thins. Pt is A&Ox4 with baseline dysphagia in the mornings, which she successfully manages on her own with substitutions and modifications as needed.

## 2023-02-05 NOTE — SWALLOW BEDSIDE ASSESSMENT ADULT - COMMENTS
Pt reports h/o dysphagia during "bad" MG flares, requiring more significant diet modification with SLP involvement. Pt reports that this is a "mild flare" and denies any current change in her baseline swallow function. Pt states that at baseline (when not having MG flare) she experiences only mild dysphagia, usually in the morning. She manages her dysphagia by avoiding dry, difficult to chew/swallow foods and consumes softer foods that require less effort. Pt denies choking, denies h/o PNA.     Pt is known to SLP services from June 2020. MBS completed at that time and revealed oropharyngeal swallow within functional limits with some esophageal impairment. GI consult recommended at that time and pt was discharged on regular/thins.

## 2023-02-06 ENCOUNTER — TRANSCRIPTION ENCOUNTER (OUTPATIENT)
Age: 63
End: 2023-02-06

## 2023-02-06 VITALS
DIASTOLIC BLOOD PRESSURE: 63 MMHG | RESPIRATION RATE: 16 BRPM | HEART RATE: 56 BPM | SYSTOLIC BLOOD PRESSURE: 121 MMHG | OXYGEN SATURATION: 96 %

## 2023-02-06 LAB
ACE SERPL-CCNC: 15 U/L — SIGNIFICANT CHANGE UP (ref 14–82)
ALBUMIN SERPL ELPH-MCNC: 3.6 G/DL — SIGNIFICANT CHANGE UP (ref 3.5–5.2)
ALP SERPL-CCNC: 59 U/L — SIGNIFICANT CHANGE UP (ref 30–115)
ALT FLD-CCNC: 37 U/L — SIGNIFICANT CHANGE UP (ref 0–41)
ANION GAP SERPL CALC-SCNC: 10 MMOL/L — SIGNIFICANT CHANGE UP (ref 7–14)
AST SERPL-CCNC: 23 U/L — SIGNIFICANT CHANGE UP (ref 0–41)
BASOPHILS # BLD AUTO: 0.02 K/UL — SIGNIFICANT CHANGE UP (ref 0–0.2)
BASOPHILS NFR BLD AUTO: 0.1 % — SIGNIFICANT CHANGE UP (ref 0–1)
BILIRUB SERPL-MCNC: 0.3 MG/DL — SIGNIFICANT CHANGE UP (ref 0.2–1.2)
BUN SERPL-MCNC: 29 MG/DL — HIGH (ref 10–20)
CALCIUM SERPL-MCNC: 8.5 MG/DL — SIGNIFICANT CHANGE UP (ref 8.4–10.5)
CHLORIDE SERPL-SCNC: 100 MMOL/L — SIGNIFICANT CHANGE UP (ref 98–110)
CO2 SERPL-SCNC: 25 MMOL/L — SIGNIFICANT CHANGE UP (ref 17–32)
CREAT SERPL-MCNC: 0.7 MG/DL — SIGNIFICANT CHANGE UP (ref 0.7–1.5)
EGFR: 99 ML/MIN/1.73M2 — SIGNIFICANT CHANGE UP
EOSINOPHIL # BLD AUTO: 0 K/UL — SIGNIFICANT CHANGE UP (ref 0–0.7)
EOSINOPHIL NFR BLD AUTO: 0 % — SIGNIFICANT CHANGE UP (ref 0–8)
GLUCOSE SERPL-MCNC: 106 MG/DL — HIGH (ref 70–99)
HCT VFR BLD CALC: 35.5 % — LOW (ref 37–47)
HGB BLD-MCNC: 12 G/DL — SIGNIFICANT CHANGE UP (ref 12–16)
IMM GRANULOCYTES NFR BLD AUTO: 0.8 % — HIGH (ref 0.1–0.3)
LYMPHOCYTES # BLD AUTO: 0.66 K/UL — LOW (ref 1.2–3.4)
LYMPHOCYTES # BLD AUTO: 4.2 % — LOW (ref 20.5–51.1)
MAGNESIUM SERPL-MCNC: 2.3 MG/DL — SIGNIFICANT CHANGE UP (ref 1.8–2.4)
MCHC RBC-ENTMCNC: 31.3 PG — HIGH (ref 27–31)
MCHC RBC-ENTMCNC: 33.8 G/DL — SIGNIFICANT CHANGE UP (ref 32–37)
MCV RBC AUTO: 92.4 FL — SIGNIFICANT CHANGE UP (ref 81–99)
MONOCYTES # BLD AUTO: 0.79 K/UL — HIGH (ref 0.1–0.6)
MONOCYTES NFR BLD AUTO: 5.1 % — SIGNIFICANT CHANGE UP (ref 1.7–9.3)
NEUTROPHILS # BLD AUTO: 13.94 K/UL — HIGH (ref 1.4–6.5)
NEUTROPHILS NFR BLD AUTO: 89.8 % — HIGH (ref 42.2–75.2)
NRBC # BLD: 0 /100 WBCS — SIGNIFICANT CHANGE UP (ref 0–0)
PLATELET # BLD AUTO: 294 K/UL — SIGNIFICANT CHANGE UP (ref 130–400)
POTASSIUM SERPL-MCNC: 4.5 MMOL/L — SIGNIFICANT CHANGE UP (ref 3.5–5)
POTASSIUM SERPL-SCNC: 4.5 MMOL/L — SIGNIFICANT CHANGE UP (ref 3.5–5)
PROT SERPL-MCNC: 8.6 G/DL — HIGH (ref 6–8)
RBC # BLD: 3.84 M/UL — LOW (ref 4.2–5.4)
RBC # FLD: 13.9 % — SIGNIFICANT CHANGE UP (ref 11.5–14.5)
SODIUM SERPL-SCNC: 135 MMOL/L — SIGNIFICANT CHANGE UP (ref 135–146)
WBC # BLD: 15.54 K/UL — HIGH (ref 4.8–10.8)
WBC # FLD AUTO: 15.54 K/UL — HIGH (ref 4.8–10.8)

## 2023-02-06 PROCEDURE — 99232 SBSQ HOSP IP/OBS MODERATE 35: CPT

## 2023-02-06 PROCEDURE — 99239 HOSP IP/OBS DSCHRG MGMT >30: CPT

## 2023-02-06 RX ORDER — PYRIDOSTIGMINE BROMIDE 60 MG/5ML
1.5 SOLUTION ORAL
Qty: 0 | Refills: 0 | DISCHARGE

## 2023-02-06 RX ORDER — PYRIDOSTIGMINE BROMIDE 60 MG/5ML
1 SOLUTION ORAL
Qty: 0 | Refills: 0 | DISCHARGE

## 2023-02-06 RX ORDER — MYCOPHENOLATE MOFETIL 250 MG/1
1 CAPSULE ORAL
Qty: 0 | Refills: 0 | DISCHARGE
Start: 2023-02-06

## 2023-02-06 RX ORDER — PYRIDOSTIGMINE BROMIDE 60 MG/5ML
3 SOLUTION ORAL
Qty: 0 | Refills: 0 | DISCHARGE

## 2023-02-06 RX ADMIN — PYRIDOSTIGMINE BROMIDE 180 MILLIGRAM(S): 60 SOLUTION ORAL at 06:32

## 2023-02-06 RX ADMIN — Medication 50 MICROGRAM(S): at 06:33

## 2023-02-06 RX ADMIN — ENOXAPARIN SODIUM 40 MILLIGRAM(S): 100 INJECTION SUBCUTANEOUS at 06:33

## 2023-02-06 RX ADMIN — PANTOPRAZOLE SODIUM 40 MILLIGRAM(S): 20 TABLET, DELAYED RELEASE ORAL at 06:37

## 2023-02-06 RX ADMIN — Medication 10 MILLIGRAM(S): at 06:32

## 2023-02-06 RX ADMIN — Medication 81 MILLIGRAM(S): at 11:15

## 2023-02-06 RX ADMIN — CHLORHEXIDINE GLUCONATE 1 APPLICATION(S): 213 SOLUTION TOPICAL at 06:36

## 2023-02-06 RX ADMIN — MYCOPHENOLATE MOFETIL 250 MILLIGRAM(S): 250 CAPSULE ORAL at 06:32

## 2023-02-06 NOTE — DISCHARGE NOTE NURSING/CASE MANAGEMENT/SOCIAL WORK - PATIENT PORTAL LINK FT
You can access the FollowMyHealth Patient Portal offered by [facility] by registering at the following website: [email or web address]. By joining [facility]’s FollowMyHealth portal, you will also be able to view your health information using other applications (apps) compatible with our system.

## 2023-02-06 NOTE — DISCHARGE NOTE PROVIDER - HOSPITAL COURSE
62 year old F non smoker hx of htn, hypothyroid, MG on pyridostigmine, mycophenolate, prednisone 5mg, IVIG q 2 weeks and prior MG flares requiring hospitalizations for plasmapheresis. Patient got 3 days of pulse steroids with improvement in her breathing. Patient is stable for discharge with neuro follow up. 62 year old F non smoker hx of htn, hypothyroid, MG on pyridostigmine, mycophenolate, prednisone 5mg, IVIG q 2 weeks and prior MG flares requiring hospitalizations for plasmapheresis. Patient got 3 days of pulse steroids with improvement in her breathing. Patient is stable for discharge with neuro follow up.     attending attestation:  patient seen and examined on day of dc  patient has no complaints  no difficulty breathing/no dysphagia  reports feeling well and wants to go home  per neuro note can be discharged today   vitals signs and labs stable and reviewed

## 2023-02-06 NOTE — DISCHARGE NOTE PROVIDER - NSDCCPCAREPLAN_GEN_ALL_CORE_FT
PRINCIPAL DISCHARGE DIAGNOSIS  Diagnosis: Myasthenia gravis  Assessment and Plan of Treatment: A worsening of myasthenia gravis may occur with medication such as fluoroquinolones, aminoglycosides, and magnesium.   Acetylcholinesterase inhibitors can provide symptomatic benefit and may not fully remove a person's weakness from MG.While they might not fully remove all symptoms of MG, they still may allow a person the ability to perform normal daily activities. If taken 30 minutes before a meal, symptoms will be mild during eating, which is helpful for those who have difficulty swallowing due to their illness. Pyridostigmine is a relatively long-acting drug (when compared to other cholinergic agonists), with a half-life around four hours with relatively few side effects.  Immune suppressants  The steroid prednisone might also be used to achieve a better result, but it can lead to the worsening of symptoms and takes weeks to achieve its maximal effectiveness. Due to the myriad symptoms that steroid treatments can cause, it is not the preferred method of treatment. Other immune suppressing medications may also be used including rituximab or azathioprine.  Plasmapheresis and IVIG  In myasthenia crisis, plasmapheresis can be used to remove the putative antibodies from the circulation. Also, intravenous immunoglobulins (IVIGs) can be used to bind the circulating antibodies. Both of these treatments have relatively short-lived benefits, typically measured in weeks.

## 2023-02-06 NOTE — DISCHARGE NOTE PROVIDER - ATTENDING DISCHARGE PHYSICAL EXAMINATION:
PHYSICAL EXAM:  GENERAL: NAD, lying in bed comfortably  HEAD:  Atraumatic, Normocephalic  EYES: EOMI, PERRLA, conjunctiva and sclera clear  ENT: Moist mucous membranes  NECK: Supple, No JVD  CHEST/LUNG: Clear to auscultation bilaterally; No rales, rhonchi, wheezing, or rubs. Unlabored respirations  HEART: Regular rate and rhythm; No murmurs, rubs, or gallops  ABDOMEN: Bowel sounds present; Soft, Nontender, Nondistended. No hepatomegally  EXTREMITIES:  2+ Peripheral Pulses, brisk capillary refill. No clubbing, cyanosis, or edema  NERVOUS SYSTEM:  Alert & Oriented X3, speech clear. No deficits   MSK: FROM all 4 extremities, full and equal strength

## 2023-02-06 NOTE — PROGRESS NOTE ADULT - PROVIDER SPECIALTY LIST ADULT
Hospitalist
Critical Care
Hospitalist
Neurology
Neurology
Critical Care
Critical Care
Hospitalist
Neurology
Pulmonology

## 2023-02-06 NOTE — DISCHARGE NOTE NURSING/CASE MANAGEMENT/SOCIAL WORK - NSDCPEFALRISK_GEN_ALL_CORE
For information on Fall & Injury Prevention, visit: [email or web address] OR  [email or web address] OR  https://www.cdc.gov/steadi/patient.html

## 2023-02-06 NOTE — DISCHARGE NOTE PROVIDER - CARE PROVIDER_API CALL
Satinder Urbina)  Neuromuscular Medicine  [address] [place] [address], [address]  [place], [place] [id number]  Phone: [phone]  Fax: [phone]  Follow Up Time:

## 2023-02-06 NOTE — PROGRESS NOTE ADULT - SUBJECTIVE AND OBJECTIVE BOX
Patient is a 62y old  Female who presents with a chief complaint of Myasthenia Gravis (05 Feb 2023 13:53)      Over Night Events:  Patient seen and examined.   no distress   comfortbale     ROS:  See HPI    PHYSICAL EXAM    ICU Vital Signs Last 24 Hrs  T(C): 36.1 (06 Feb 2023 07:00), Max: 36.6 (05 Feb 2023 15:23)  T(F): 97 (06 Feb 2023 07:00), Max: 97.9 (05 Feb 2023 15:23)  HR: 52 (06 Feb 2023 08:00) (50 - 67)  BP: 146/70 (06 Feb 2023 07:00) (124/71 - 146/70)  BP(mean): 97 (06 Feb 2023 05:00) (85 - 97)  ABP: --  ABP(mean): --  RR: 12 (06 Feb 2023 08:00) (12 - 27)  SpO2: 95% (06 Feb 2023 08:00) (95% - 97%)    O2 Parameters below as of 06 Feb 2023 08:00  Patient On (Oxygen Delivery Method): room air            General: Awake   HEENT:       [place]         Lymph Nodes: NO cervical LN   Lungs: Bilateral BS  Cardiovascular: Regular   Abdomen: Soft, Positive BS  Extremities: No clubbing   Skin: warm   Neurological:   Musculoskeletal: move all ext     I&O's Detail      LABS:                          12.0   15.54 )-----------( 294      ( 06 Feb 2023 06:15 )             35.5         05 Feb 2023 06:18    138    |  105    |  27     ----------------------------<  110    4.5     |  25     |  0.7      Ca    8.7        05 Feb 2023 06:18  Mg     2.1       05 Feb 2023 06:18                                                                                                                                                                                                                                       MEDICATIONS  (STANDING):  aspirin  chewable 81 milliGRAM(s) Oral daily  chlorhexidine 2% Cloths 1 Application(s) Topical <User Schedule>  enoxaparin Injectable 40 milliGRAM(s) SubCutaneous every 24 hours  levothyroxine 50 MICROGram(s) Oral daily  melatonin 5 milliGRAM(s) Oral at bedtime  metoprolol tartrate 25 milliGRAM(s) Oral two times a day  mycophenolate mofetil 250 milliGRAM(s) Oral two times a day  pantoprazole    Tablet 40 milliGRAM(s) Oral before breakfast  predniSONE   Tablet 10 milliGRAM(s) Oral daily  pyridostigmine  milliGRAM(s) Oral two times a day    MEDICATIONS  (PRN):  acetaminophen     Tablet .. 650 milliGRAM(s) Oral every 6 hours PRN Temp greater or equal to 38C (100.4F), Mild Pain (1 - 3)          Xrays:  TLC:  OG:  ET tube:                                                                                       ECHO:  CAM ICU:

## 2023-02-06 NOTE — PROGRESS NOTE ADULT - ASSESSMENT
IMPRESSION:    MG exacerbation  HO MG on MMF  Weakness     PLAN:    CNS: Pulse dose steroids, MMF, pyridostigmine . Neuro follow up.    HEENT: Oral care    PULMONARY:  HOB @ 45 degrees. NIF, MEP VC monitoring per neurology. On RA still.     CARDIOVASCULAR: Avoid overload. Keep equal balance.     GI: GI prophylaxis.  Feeding .    RENAL:  Follow up lytes.  Correct as needed    INFECTIOUS DISEASE: Follow up cultures. Off abx.     HEMATOLOGICAL:  DVT prophylaxis.    ENDOCRINE:  Follow up FS.  Insulin protocol if needed.    MUSCULOSKELETAL: OOBTC. PT / OT    Dispo per neurology/         
IMPRESSION:    MG exacerbation  HO MG on MMF  Weakness     PLAN:    CNS: Pulse dose steroids, MMF, pyridostigmine . Neuro follow up.    HEENT: Oral care    PULMONARY:  HOB @ 45 degrees. NIF, MEP VC monitoring per neurology. On RA still.     CARDIOVASCULAR: Avoid overload. Keep equal balance.     GI: GI prophylaxis.  Feeding .    RENAL:  Follow up lytes.  Correct as needed    INFECTIOUS DISEASE: Follow up cultures. Off abx.     HEMATOLOGICAL:  DVT prophylaxis.    ENDOCRINE:  Follow up FS.  Insulin protocol if needed.    MUSCULOSKELETAL: OOBTC. PT / OT    Dispo per neurology/   recall prn         
IMPRESSION:    MG exacerbation  HO MG on MMF  Weakness     PLAN:    CNS: Pulse dose steroids, MMF, pyridostigmine . Neuro follow up.    HEENT: Oral care    PULMONARY:  HOB @ 45 degrees. NIF VC monitoring per neurology. On RA    CARDIOVASCULAR: Avoid overload.    GI: GI prophylaxis.  Feeding .    RENAL:  Follow up lytes.  Correct as needed    INFECTIOUS DISEASE: Follow up cultures. Off abx.     HEMATOLOGICAL:  DVT prophylaxis.    ENDOCRINE:  Follow up FS.  Insulin protocol if needed.    MUSCULOSKELETAL: OOBTC. PT / OT    Transfer to floor if ok with neurology.

## 2023-02-06 NOTE — PROGRESS NOTE ADULT - REASON FOR ADMISSION
Myasthenia Gravis

## 2023-02-06 NOTE — DISCHARGE NOTE PROVIDER - NSDCMRMEDTOKEN_GEN_ALL_CORE_FT
acetaminophen 325 mg oral tablet: 2 tab(s) orally every 6 hours  aspirin 81 mg oral tablet, chewable: 1 tab(s) orally once a day (AM)  Gammar I.V. 1 g intravenous injection: intravenous once a week  lisinopril 20 mg oral tablet: 1 tab(s) orally once a day (at bedtime)  Mestinon 60 mg oral tablet: 1.5 tab(s) orally 2 times a day  Mestinon 60 mg oral tablet: 1 tab(s) orally 2 times a day  Nexlizet 180 mg-10 mg oral tablet: 1 tab(s) orally once a day (at bedtime)  pantoprazole 40 mg oral delayed release tablet: 1 tab(s) orally 2 times a day   Pepcid 40 mg oral tablet: 1 tab(s) orally once a day (at bedtime)  predniSONE 10 mg oral tablet: 1 tab(s) orally once a day (in the morning)  Synthroid 50 mcg (0.05 mg) oral tablet: 1 tab(s) orally once a day (AM)  Toprol-XL 25 mg oral tablet, extended release: 1 tab(s) orally once a day (AM)  Vitamin D2: every friday   acetaminophen 325 mg oral tablet: 2 tab(s) orally every 6 hours  aspirin 81 mg oral tablet, chewable: 1 tab(s) orally once a day (AM)  Gammar I.V. 1 g intravenous injection: intravenous once a week  lisinopril 20 mg oral tablet: 1 tab(s) orally once a day (at bedtime)  Mestinon 60 mg oral tablet: 3 tab(s) orally 2 times a day  mycophenolate mofetil 250 mg oral capsule: 1 cap(s) orally 2 times a day  Nexlizet 180 mg-10 mg oral tablet: 1 tab(s) orally once a day (at bedtime)  pantoprazole 40 mg oral delayed release tablet: 1 tab(s) orally 2 times a day   Pepcid 40 mg oral tablet: 1 tab(s) orally once a day (at bedtime)  predniSONE 10 mg oral tablet: 1 tab(s) orally once a day (in the morning)  Synthroid 50 mcg (0.05 mg) oral tablet: 1 tab(s) orally once a day (AM)  Toprol-XL 25 mg oral tablet, extended release: 1 tab(s) orally once a day (AM)  Vitamin D2: every friday

## 2023-02-07 ENCOUNTER — APPOINTMENT (OUTPATIENT)
Dept: OPHTHALMOLOGY | Facility: CLINIC | Age: 63
End: 2023-02-07

## 2023-02-07 ENCOUNTER — APPOINTMENT (OUTPATIENT)
Age: 63
End: 2023-02-07

## 2023-02-13 LAB
MYCOPHENOLATE SERPL-MCNC: <0.5 UG/ML — LOW (ref 1–3.5)
MYCOPHENOLIC ACID GLUCURONIDE: <5 UG/ML — LOW (ref 15–125)

## 2023-02-14 DIAGNOSIS — Z80.2 FAMILY HISTORY OF MALIGNANT NEOPLASM OF OTHER RESPIRATORY AND INTRATHORACIC ORGANS: ICD-10-CM

## 2023-02-14 DIAGNOSIS — Z79.890 HORMONE REPLACEMENT THERAPY: ICD-10-CM

## 2023-02-14 DIAGNOSIS — I10 ESSENTIAL (PRIMARY) HYPERTENSION: ICD-10-CM

## 2023-02-14 DIAGNOSIS — Z84.1 FAMILY HISTORY OF DISORDERS OF KIDNEY AND URETER: ICD-10-CM

## 2023-02-14 DIAGNOSIS — E03.9 HYPOTHYROIDISM, UNSPECIFIED: ICD-10-CM

## 2023-02-14 DIAGNOSIS — Z79.82 LONG TERM (CURRENT) USE OF ASPIRIN: ICD-10-CM

## 2023-02-14 DIAGNOSIS — E83.42 HYPOMAGNESEMIA: ICD-10-CM

## 2023-02-14 DIAGNOSIS — Z88.8 ALLERGY STATUS TO OTHER DRUGS, MEDICAMENTS AND BIOLOGICAL SUBSTANCES STATUS: ICD-10-CM

## 2023-02-14 DIAGNOSIS — G70.01 MYASTHENIA GRAVIS WITH (ACUTE) EXACERBATION: ICD-10-CM

## 2023-02-14 DIAGNOSIS — Z88.2 ALLERGY STATUS TO SULFONAMIDES: ICD-10-CM

## 2023-02-14 DIAGNOSIS — G90.A POSTURAL ORTHOSTATIC TACHYCARDIA SYNDROME [POTS]: ICD-10-CM

## 2023-02-14 DIAGNOSIS — R13.10 DYSPHAGIA, UNSPECIFIED: ICD-10-CM

## 2023-02-14 DIAGNOSIS — Z41.8 ENCOUNTER FOR OTHER PROCEDURES FOR PURPOSES OTHER THAN REMEDYING HEALTH STATE: ICD-10-CM

## 2023-02-14 DIAGNOSIS — Z20.822 CONTACT WITH AND (SUSPECTED) EXPOSURE TO COVID-19: ICD-10-CM

## 2023-02-27 ENCOUNTER — EMERGENCY (EMERGENCY)
Facility: HOSPITAL | Age: 63
LOS: 0 days | Discharge: HOME | End: 2023-02-27
Attending: EMERGENCY MEDICINE
Payer: MEDICARE

## 2023-02-27 VITALS
HEART RATE: 75 BPM | TEMPERATURE: 98 F | SYSTOLIC BLOOD PRESSURE: 130 MMHG | DIASTOLIC BLOOD PRESSURE: 80 MMHG | RESPIRATION RATE: 19 BRPM | OXYGEN SATURATION: 98 %

## 2023-02-27 VITALS — HEIGHT: 64 IN | WEIGHT: 201.94 LBS

## 2023-02-27 DIAGNOSIS — M25.561 PAIN IN RIGHT KNEE: ICD-10-CM

## 2023-02-27 DIAGNOSIS — Z88.2 ALLERGY STATUS TO SULFONAMIDES: ICD-10-CM

## 2023-02-27 DIAGNOSIS — Z98.890 OTHER SPECIFIED POSTPROCEDURAL STATES: Chronic | ICD-10-CM

## 2023-02-27 DIAGNOSIS — Z88.1 ALLERGY STATUS TO OTHER ANTIBIOTIC AGENTS STATUS: ICD-10-CM

## 2023-02-27 DIAGNOSIS — I10 ESSENTIAL (PRIMARY) HYPERTENSION: ICD-10-CM

## 2023-02-27 DIAGNOSIS — E03.9 HYPOTHYROIDISM, UNSPECIFIED: ICD-10-CM

## 2023-02-27 DIAGNOSIS — Z79.82 LONG TERM (CURRENT) USE OF ASPIRIN: ICD-10-CM

## 2023-02-27 DIAGNOSIS — G70.00 MYASTHENIA GRAVIS WITHOUT (ACUTE) EXACERBATION: ICD-10-CM

## 2023-02-27 PROCEDURE — 96372 THER/PROPH/DIAG INJ SC/IM: CPT

## 2023-02-27 PROCEDURE — 99283 EMERGENCY DEPT VISIT LOW MDM: CPT

## 2023-02-27 PROCEDURE — 73564 X-RAY EXAM KNEE 4 OR MORE: CPT | Mod: RT

## 2023-02-27 PROCEDURE — 73564 X-RAY EXAM KNEE 4 OR MORE: CPT | Mod: 26,RT

## 2023-02-27 PROCEDURE — 99284 EMERGENCY DEPT VISIT MOD MDM: CPT

## 2023-02-27 RX ORDER — KETOROLAC TROMETHAMINE 30 MG/ML
30 SYRINGE (ML) INJECTION ONCE
Refills: 0 | Status: DISCONTINUED | OUTPATIENT
Start: 2023-02-27 | End: 2023-02-27

## 2023-02-27 RX ADMIN — Medication 30 MILLIGRAM(S): at 17:58

## 2023-02-27 NOTE — ED PROVIDER NOTE - NSFOLLOWUPINSTRUCTIONS_ED_ALL_ED_FT

## 2023-02-27 NOTE — ED PROVIDER NOTE - PHYSICAL EXAMINATION
CONSTITUTIONAL:  in mild acute distress.   SKIN: warm, dry  HEAD: Normocephalic; atraumatic.  EYES: PERRL, EOMI, normal sclera and conjunctiva   ENT: No nasal discharge; airway clear.  NECK: Supple; non tender.  CARD:  Regular rate and rhythm.   RESP: NO inc WOB   ABD: soft ntnd  EXT: Normal ROM.  negative anterior drawer, negative posterior drawer sign  LYMPH: No acute cervical adenopathy.  NEURO: Alert, oriented, grossly unremarkable  PSYCH: Cooperative, appropriate.

## 2023-02-27 NOTE — ED PROVIDER NOTE - OBJECTIVE STATEMENT
62-year-old female past medical history of hypertension hypothyroid myasthenia gravis coming in here for knee pain status post fall 2 days ago.  Patient taking Motrin with moderate relief and is able to ambulate with pain.  Patient initially went to [address] [place] [address] to the walk-in clinic with orthopedic doctor but could not due to closing time.  Patient here in the ED for further eval.  Denies any head trauma or  LOC.

## 2023-02-27 NOTE — ED PROVIDER NOTE - PATIENT PORTAL LINK FT
You can access the FollowMyHealth Patient Portal offered by [facility] by registering at the following website: [email or web address]. By joining [email or web address]’s FollowMyHealth portal, you will also be able to view your health information using other applications (apps) compatible with our system.

## 2023-02-27 NOTE — ED PROVIDER NOTE - NS ED ATTENDING STATEMENT MOD
This was a shared visit with the JAYLAN. I reviewed and verified the documentation and independently performed the documented: Attending with

## 2023-02-27 NOTE — ED PROVIDER NOTE - ATTENDING CONTRIBUTION TO CARE
I have personally performed a history and physical exam on this patient and personally directed the management of the patient. Patient is a 62-year-old female presents for evaluation of right knee pain onset throbbing moderate no fevers no chills patient is able to ambulate patient is status post fall approximately 3 days ago of a mechanical nature    On physical exam patient is normocephalic atraumatic pupils equal round react light accommodation extraocular muscles intact oropharynx clear chest clear to station bilaterally abdomen soft nontender nondistended bowel sounds positive no guarding no rebound extremities right knee reveals no warmth no redness pedal pulses 2+ capillary refills normal negative anterior posterior draw sign no pain with varus valgus maneuvers    Assessment plan patient presents for evaluation of right knee pain no signs of redness no signs of warmth no signs of infection we obtained x-ray which appears negative per my independent evaluation patient given knee immobilizer and discharged patient refused crutches this is not consistent with septic arthritis not consistent with gout

## 2023-02-27 NOTE — ED PROVIDER NOTE - CLINICAL SUMMARY MEDICAL DECISION MAKING FREE TEXT BOX
patient presents for evaluation of right knee pain no signs of redness no signs of warmth no signs of infection we obtained x-ray which appears negative per my independent evaluation patient given knee immobilizer and discharged patient refused crutches this is not consistent with septic arthritis not consistent with gout

## 2023-03-02 ENCOUNTER — APPOINTMENT (OUTPATIENT)
Dept: ORTHOPEDIC SURGERY | Facility: CLINIC | Age: 63
End: 2023-03-02

## 2023-03-16 ENCOUNTER — APPOINTMENT (OUTPATIENT)
Dept: NEUROLOGY | Facility: CLINIC | Age: 63
End: 2023-03-16
Payer: MEDICARE

## 2023-03-16 ENCOUNTER — NON-APPOINTMENT (OUTPATIENT)
Age: 63
End: 2023-03-16

## 2023-03-16 VITALS
SYSTOLIC BLOOD PRESSURE: 129 MMHG | BODY MASS INDEX: 34.15 KG/M2 | DIASTOLIC BLOOD PRESSURE: 84 MMHG | HEART RATE: 70 BPM | HEIGHT: 64 IN | WEIGHT: 200 LBS

## 2023-03-16 DIAGNOSIS — M62.81 MUSCLE WEAKNESS (GENERALIZED): ICD-10-CM

## 2023-03-16 PROCEDURE — 99215 OFFICE O/P EST HI 40 MIN: CPT

## 2023-03-16 RX ORDER — PREDNISONE 5 MG/1
5 TABLET ORAL DAILY
Qty: 30 | Refills: 5 | Status: DISCONTINUED | COMMUNITY
Start: 2021-06-01 | End: 2023-03-16

## 2023-03-16 NOTE — ASSESSMENT
[FreeTextEntry1] : 61 yo RHF w/ h/o pancreatitis, hypothyroid, IBS carries diagnosis of double-seronegative MG (Class 3A) on basis of treatment response and mildly + SFEMG.  Recent hospitalization for nonspecific generalized weakness and SOB without any signs of neuromuscular exacerbation given normal PFTs.  Currently stable neurologically without any obvious worsening objectively and some inconsistencies on examination.  Will continue mycophenolate and IVIG at current dose and taper prednisone.  \par \par Recommendations:\par - tapering down prednisone\par - c/w mycophenolate 500 mg BID\par - Mestinon  mg BID\par - additional Mestinon IR PRN\par - continue IVIG 0.5 g/kg IV Q2 weeks\par - f/u endocrinologist regarding thyroid autoantibodies\par - RTC in 4 months

## 2023-03-16 NOTE — PHYSICAL EXAM
[FreeTextEntry1] : NAD.  AOx3.  Intact memory.  Speech fluent, nondysarthric.  CN 2 – 12 normal.  No Fatigue.  No ptosis/diplopia.  \par Strength 4-5 b/l UE/LE with giveway weakness  proximal and distal (chronic per pt). Unable to sustain force on proximal hip flexion L side but able to stand from sitting without use of arms.  Able to squat and ambulate without assist.  NL tone, bulk.  No abnl movements.  DTRs 2+ throughout.  Plantar response downgoing b/l.  (-) Hoffmans, clonus.  Sensory intact LT/PP, pain, temp, proprioception and vibration.  NL FTN/HKS.  No dysdiadokinesia.  Gait narrow based/NL tandem. \par

## 2023-03-16 NOTE — HISTORY OF PRESENT ILLNESS
[FreeTextEntry1] : This is a 63 yo RHF previously followed by Dr. Velazquez for seronegative MG here for follow up. \par Since last visit in January 2023, she hospitalized for her MG exacerbation in Feb 2-6 with SOB, weakness in legs, slurring speech, episodes of choking, VC - 1800, NIF - 40. Recieved Solu-Medrol x3 with tapering down to 10 mg form 50 mg. Still on IVIG q2 w, last one was 3/15/2023 and on Mestinon 180 ER bid. Usually she takes 7am and about 5 pm.  She doesn't notice any changes with Mestinone tx.  On last lab, TPO increased; CT chest negative, MUSK neg, Striated neg.\par \par Currently still has bouts of subjective "dysphagia" with having sensation of things stuck in her throat particularly when fatigued.  Also has bouts of fatigue intermittently without specific pattern.  Has occasional SOB and occasionally sleeps on recliner but denies orthopnea/FISHER otherwise.  Denies any ptosis or diplopia and has never had ocular symptoms in the past.  Has recurrent fatigue in arms and legs variable.  Is otherwise stable and tolerating medications well.  \par \par MG-ADL - 6/24

## 2023-03-22 ENCOUNTER — APPOINTMENT (OUTPATIENT)
Dept: ORTHOPEDIC SURGERY | Facility: CLINIC | Age: 63
End: 2023-03-22

## 2023-03-23 NOTE — ASU PATIENT PROFILE, ADULT - TEACHING/LEARNING RELIGIOUS CONSIDERATIONS
PC to confirm per Freedom Garcia / pt the expected date of lab work done for procedure? Per [place], anytime after 04/04/2023. Pt informed, stated OK of understanding and thanks, to [employer].
none

## 2023-05-15 NOTE — PROGRESS NOTE ADULT - REASON FOR ADMISSION
Pt already had mammo order place it was scheduled for 2024 instead on 2023 pt mammogram has been rescheduled   
Weakness

## 2023-05-24 ENCOUNTER — RX RENEWAL (OUTPATIENT)
Age: 63
End: 2023-05-24

## 2023-07-13 ENCOUNTER — APPOINTMENT (OUTPATIENT)
Dept: NEUROLOGY | Facility: CLINIC | Age: 63
End: 2023-07-13

## 2023-07-25 ENCOUNTER — OUTPATIENT (OUTPATIENT)
Dept: OUTPATIENT SERVICES | Facility: HOSPITAL | Age: 63
LOS: 1 days | End: 2023-07-25
Payer: COMMERCIAL

## 2023-07-25 DIAGNOSIS — Z98.890 OTHER SPECIFIED POSTPROCEDURAL STATES: Chronic | ICD-10-CM

## 2023-07-25 DIAGNOSIS — Z01.20 ENCOUNTER FOR DENTAL EXAMINATION AND CLEANING WITHOUT ABNORMAL FINDINGS: ICD-10-CM

## 2023-07-25 PROCEDURE — D0230: CPT

## 2023-07-25 PROCEDURE — D0120: CPT

## 2023-07-25 PROCEDURE — D1110: CPT

## 2023-07-25 PROCEDURE — D0220: CPT

## 2023-08-01 DIAGNOSIS — Z01.21 ENCOUNTER FOR DENTAL EXAMINATION AND CLEANING WITH ABNORMAL FINDINGS: ICD-10-CM

## 2023-08-02 NOTE — ED ADULT TRIAGE NOTE - TEMPERATURE IN CELSIUS (DEGREES C)
Thank you for choosing [facility] - Pulmonary/Sleep Medicine for your healthcare needs.    It is our goal to provide you enter family with excellent care.    Contact Information:  [address]  [place]  [address]  Phone: [phone]  Fax: [phone]    Our office hours are 8am - 5pm.    We strive to answer a phone calls and MyChart messages within 2 business days.  Please do not hesitate to contact our office if your concern is not addressed within this timeframe.    Lab Results:    Which you had your lab test done, please level 5-10 days to receive the results.  You will receive a phone call with results or they will be discussed at your follow-up visit.      Medical Imaging:    Please allow 10-14 days to be contacted by our Central Scheduling as all Medical Imaging Services require prior approval from insurances.  If you have not heard after 2 weeks, please contact our office.  You will receive a phone call with results or they will be discussed at your follow-up visit.    Sleep Studies:    Please allow 10-14 days to be contacted by Central Scheduling or the Sleep Lab as all sleep testing requires prior approval from insurance.  If you have not heard after 2 weeks, please contact our office.  Or sleep lap at [phone]  You will receive a phone call with results or they will be discussed at your follow-up visit.    Durable Medical Equipment (DME):     If in order was sent to a DME company, please allow 7 days for processing.  If you have not heard from the DME company regarding changes to your medical equipment within this time frame, please contact the office.    Refills:  Please contact her pharmacy for all non-controlled substances refills.  Please contact the office for all controlled substance refills.  Please allow 2-3 business days for review and processing of refills.      Referrals:  If you were referred to a specialist for further treatment, please allow 1-2 weeks  to be contacted by the specialty office.  If you do not hear from them, please contact our office.    Please do not hesitate to contact our office with any follow-up questions or concerns.  We can be contacted by phone or through patient portal  [employer].        Sincerely,     Jersey Calles MD [facility] Pulmonary/Sleep [facility]   [id number] [place] [address], [address]  [place] [address]  Phone [phone]  Fax [phone]    36

## 2023-08-21 ENCOUNTER — RESULT REVIEW (OUTPATIENT)
Age: 63
End: 2023-08-21

## 2023-08-21 ENCOUNTER — OUTPATIENT (OUTPATIENT)
Dept: OUTPATIENT SERVICES | Facility: HOSPITAL | Age: 63
LOS: 1 days | End: 2023-08-21
Payer: MEDICARE

## 2023-08-21 DIAGNOSIS — Z12.31 ENCOUNTER FOR SCREENING MAMMOGRAM FOR MALIGNANT NEOPLASM OF BREAST: ICD-10-CM

## 2023-08-21 DIAGNOSIS — Z98.890 OTHER SPECIFIED POSTPROCEDURAL STATES: Chronic | ICD-10-CM

## 2023-08-21 PROCEDURE — 77063 BREAST TOMOSYNTHESIS BI: CPT

## 2023-08-21 PROCEDURE — 77067 SCR MAMMO BI INCL CAD: CPT | Mod: 26

## 2023-08-21 PROCEDURE — 77067 SCR MAMMO BI INCL CAD: CPT

## 2023-08-21 PROCEDURE — 77063 BREAST TOMOSYNTHESIS BI: CPT | Mod: 26

## 2023-08-22 DIAGNOSIS — Z12.31 ENCOUNTER FOR SCREENING MAMMOGRAM FOR MALIGNANT NEOPLASM OF BREAST: ICD-10-CM

## 2023-09-07 ENCOUNTER — RX RENEWAL (OUTPATIENT)
Age: 63
End: 2023-09-07

## 2023-10-23 NOTE — ED PROVIDER NOTE - PROVIDER TOKENS
PAST SURGICAL HISTORY:  Enlarged tonsils s/p tonsillectomy    H/O Mohs micrographic surgery for skin cancer      PROVIDER:[TOKEN:[[id number]],FOLLOWUP:[7-10 Days]]

## 2023-11-03 NOTE — ED ADULT NURSE NOTE - IS THE PATIENT ABLE TO BE SCREENED?
Yes
PAST MEDICAL HISTORY:  APC (atrial premature contractions)     Benign neoplasm of ascending colon     COVID-19 virus infection 5/2020, denies any hospiatl admission or intubation    Hepatic steatosis     Hiatal hernia with GERD     History of uterine leiomyoma     Hypothyroidism     Mitral regurgitation [id number]    OAB (overactive bladder)     Obesity BMI 37.5    Palpitations

## 2023-11-15 ENCOUNTER — APPOINTMENT (OUTPATIENT)
Dept: ORTHOPEDIC SURGERY | Facility: CLINIC | Age: 63
End: 2023-11-15
Payer: MEDICARE

## 2023-11-15 VITALS — WEIGHT: 185 LBS | BODY MASS INDEX: 31.58 KG/M2 | HEIGHT: 64 IN

## 2023-11-15 DIAGNOSIS — M25.531 PAIN IN RIGHT WRIST: ICD-10-CM

## 2023-11-15 PROCEDURE — L3908: CPT | Mod: RT

## 2023-11-15 PROCEDURE — 73110 X-RAY EXAM OF WRIST: CPT | Mod: RT

## 2023-11-15 PROCEDURE — 99203 OFFICE O/P NEW LOW 30 MIN: CPT | Mod: 25

## 2023-11-16 ENCOUNTER — APPOINTMENT (OUTPATIENT)
Dept: NEUROLOGY | Facility: CLINIC | Age: 63
End: 2023-11-16
Payer: MEDICARE

## 2023-11-16 VITALS
HEART RATE: 59 BPM | DIASTOLIC BLOOD PRESSURE: 82 MMHG | WEIGHT: 181 LBS | SYSTOLIC BLOOD PRESSURE: 131 MMHG | HEIGHT: 64 IN | BODY MASS INDEX: 30.9 KG/M2

## 2023-11-16 PROCEDURE — 99214 OFFICE O/P EST MOD 30 MIN: CPT

## 2023-11-16 RX ORDER — MYCOPHENOLATE MOFETIL 500 MG/1
500 TABLET ORAL
Qty: 180 | Refills: 3 | Status: ACTIVE | COMMUNITY
Start: 2023-03-16 | End: 1900-01-01

## 2023-11-16 RX ORDER — PYRIDOSTIGMINE BROMIDE 180 MG/1
180 TABLET ORAL
Qty: 180 | Refills: 3 | Status: ACTIVE | COMMUNITY
Start: 2023-01-05 | End: 1900-01-01

## 2023-11-24 LAB
ALBUMIN SERPL ELPH-MCNC: 4.4 G/DL
ALP BLD-CCNC: 69 U/L
ALT SERPL-CCNC: 15 U/L
ANION GAP SERPL CALC-SCNC: 8 MMOL/L
AST SERPL-CCNC: 32 U/L
BILIRUB SERPL-MCNC: 0.4 MG/DL
BUN SERPL-MCNC: 22 MG/DL
CALCIUM SERPL-MCNC: 9.6 MG/DL
CHLORIDE SERPL-SCNC: 102 MMOL/L
CO2 SERPL-SCNC: 26 MMOL/L
CREAT SERPL-MCNC: 0.8 MG/DL
EGFR: 84 ML/MIN/1.73M2
GLUCOSE SERPL-MCNC: 89 MG/DL
HCT VFR BLD CALC: 36.8 %
HGB BLD-MCNC: 11.9 G/DL
MCHC RBC-ENTMCNC: 30.4 PG
MCHC RBC-ENTMCNC: 32.3 G/DL
MCV RBC AUTO: 93.9 FL
PLATELET # BLD AUTO: 316 K/UL
PMV BLD: 10.9 FL
POTASSIUM SERPL-SCNC: 4.8 MMOL/L
PROT SERPL-MCNC: 8.6 G/DL
RBC # BLD: 3.92 M/UL
RBC # FLD: 14.3 %
SODIUM SERPL-SCNC: 136 MMOL/L
WBC # FLD AUTO: 3.93 K/UL

## 2023-11-28 LAB
ACRM BINDING ANTIBODY: <0.03 NMOL/L
VGCC-P/Q BIND AB SER-SCNC: 7.8 PMOL/L

## 2023-11-29 ENCOUNTER — APPOINTMENT (OUTPATIENT)
Dept: ORTHOPEDIC SURGERY | Facility: CLINIC | Age: 63
End: 2023-11-29

## 2023-11-29 LAB
ACHR BLOCK AB SER QL: 7 %
ACHR MOD AB SER-ACNC: 0 %

## 2023-12-21 LAB
LRP4 AUTOANTIBODY TEST: NORMAL
MUSK ANTIBODY TEST: NORMAL

## 2024-02-13 NOTE — ED ADULT NURSE NOTE - PRO INTERPRETER NEED 2
Is This A New Presentation, Or A Follow-Up?: Skin Lesions What Type Of Note Output Would You Prefer (Optional)?: Standard Output How Severe Is Your Skin Lesion?: mild Has Your Skin Lesion Been Treated?: not been treated English

## 2024-02-14 ENCOUNTER — NON-APPOINTMENT (OUTPATIENT)
Age: 64
End: 2024-02-14

## 2024-05-29 ENCOUNTER — NON-APPOINTMENT (OUTPATIENT)
Age: 64
End: 2024-05-29

## 2024-05-30 ENCOUNTER — APPOINTMENT (OUTPATIENT)
Dept: NEUROLOGY | Facility: CLINIC | Age: 64
End: 2024-05-30
Payer: MEDICARE

## 2024-05-30 VITALS
SYSTOLIC BLOOD PRESSURE: 146 MMHG | BODY MASS INDEX: 31.07 KG/M2 | TEMPERATURE: 97.8 F | DIASTOLIC BLOOD PRESSURE: 86 MMHG | WEIGHT: 182 LBS | HEIGHT: 64 IN | OXYGEN SATURATION: 98 % | HEART RATE: 65 BPM

## 2024-05-30 DIAGNOSIS — G70.00 MYASTHENIA GRAVIS W/OUT (ACUTE) EXACERBATION: ICD-10-CM

## 2024-05-30 DIAGNOSIS — R21 RASH AND OTHER NONSPECIFIC SKIN ERUPTION: ICD-10-CM

## 2024-05-30 LAB
CK SERPL-CCNC: 82 U/L
CRP SERPL-MCNC: <3 MG/L
ERYTHROCYTE [SEDIMENTATION RATE] IN BLOOD BY WESTERGREN METHOD: 123 MM/HR

## 2024-05-30 PROCEDURE — 99215 OFFICE O/P EST HI 40 MIN: CPT

## 2024-05-30 PROCEDURE — G2211 COMPLEX E/M VISIT ADD ON: CPT

## 2024-05-30 RX ORDER — MYCOPHENOLATE MOFETIL 500 MG/1
500 TABLET ORAL TWICE DAILY
Qty: 120 | Refills: 3 | Status: ACTIVE | COMMUNITY
Start: 2024-05-30 | End: 1900-01-01

## 2024-05-30 RX ORDER — LISINOPRIL 10 MG/1
10 TABLET ORAL DAILY
Refills: 0 | Status: ACTIVE | COMMUNITY

## 2024-05-30 NOTE — HISTORY OF PRESENT ILLNESS
[FreeTextEntry1] : Since her last visit, Ms. Hewitt is doing well with IVIG 0.5g/kg Q2 wks without any recurrence of dysphagia, fatigue, weakness or diplopia/ptosis.  Tolerating treatments without any side effects.  Remains on mycophenolate and Mestinon CR and has not had any recurrent symptoms after tapering off prednisone several months ago.  Denies any orthopnea or FISHER.  Tolerating medications without side effects.  Had seen endocrinologist and did not start on new medications since levels were not high.  Is otherwise in her usual state of health.  Thinks IVIG hasn't changed her weakness and generalized fatigue. Fatigue is chronic and not associated with specific time of day or with increased activity/exercise.   Weakness also persistent and not fluctuating.  Still having malar rash with some worsening in distribution but denies and admits her aunt has Sjogren.  During excessive sun exposure has more prominent at V-neck area, cheeks and near temple.

## 2024-05-30 NOTE — ASSESSMENT
[FreeTextEntry1] : 64 yo RHF w/ h/o pancreatitis, hypothyroid, IBS previously diagnosed w/ double-seronegative MG (Class 3A) on basis of treatment response and mildly (+) SFEMG but (-) RNS per Dr. Velazquez currently stable but with without significant subjective response to IVIG.  Clinically continues to have generalize fatigue with predominantly proximal weakness +/- rash suspicious for myopathic process rather than NMJ disorder.  Has had abnormal thyroid labs in past with presence of malar rash worse after stopping corticosteroids will reassess for possible underlying myopathic process which can mimic NMJ disease as well as SFEMG abnl.    Recommendations: - Will repeat Inflammatory markers, myopathic markers and serologies - increase mycophenolate 1000 mg BID - c/w additional Mestinon IR PRN - c/w Mestinon  mg BID - continue IVIG 0.5 g/kg IV Q2 weeks for now - check b/w including serologies - repeat EMG/NCS/RNS at next visit - RTC in 4 weeks

## 2024-05-30 NOTE — PHYSICAL EXAM
[FreeTextEntry1] : NAD. Some red rash noted at her b/l cheeks, temple and eyebrow area, some V-neck/shawl area as well. No rash in hips or groin.  No nailbed abnormalities.   AOx3.  Intact memory.  Speech fluent, nondysarthric.  CN 2 - 12 normal.  No Fatigue.  No ptosis/diplopia.   Strength 4+/4+ proximal UE, 5/5 b/l distat UE, 5/5 b/l LE proximal/distal.  NF/NE 5/5. NL tone, bulk.  No abnl movements.  DTRs 1+ throughout.  Plantar response downgoing b/l.  (-) Hoffmans, clonus.  Sensory intact LT/PP, pain, temp, proprioception and vibration.  NL FTN/HKS.  No dysdiadokinesia.  Gait narrow based/NL tandem.

## 2024-05-31 LAB
C3 SERPL-MCNC: 140 MG/DL
C4 SERPL-MCNC: 12 MG/DL
CENTROMERE IGG SER-ACNC: <0.2 AL
DSDNA AB SER-ACNC: 1 IU/ML

## 2024-06-01 LAB
ANA PAT FLD IF-IMP: ABNORMAL
ANA SER IF-ACNC: ABNORMAL
CARDIOLIPIN AB SER IA-ACNC: NEGATIVE
ENA SS-A AB SER IA-ACNC: 1.7 AL
ENA SS-B AB SER IA-ACNC: <0.2 AL

## 2024-06-03 LAB — ALDOLASE SERPL-CCNC: 3.7 U/L

## 2024-06-15 LAB
EJ AB SER QL: NEGATIVE
ENA JO1 AB SER IA-ACNC: <20 UNITS
ENA PM/SCL AB SER-ACNC: <20 UNITS
ENA SM+RNP AB SER IA-ACNC: <20 UNITS
ENA SS-A IGG SER QL: 52 UNITS
FIBRILLARIN AB SER QL: NEGATIVE
KU AB SER QL: NEGATIVE
MDA-5 (P140)(CADM-140): <20 UNITS
MI2 AB SER QL: NEGATIVE
NXP-2 (P140): <20 UNITS
OJ AB SER QL: NEGATIVE
PL12 AB SER QL: NEGATIVE
PL7 AB SER QL: NEGATIVE
SRP AB SERPL QL: NEGATIVE
TIF GAMMA (P155/140): <20 UNITS
U2 SNRNP AB SER QL: NEGATIVE

## 2024-08-16 NOTE — PHYSICAL THERAPY INITIAL EVALUATION ADULT - PHYSICAL ASSIST/NONPHYSICAL ASSIST: SUPINE/SIT, REHAB EVAL
Physical Therapy  Treatment Note    Name: Alecia Cifuentes  : 1956  MRN: [id number]      Date of Service: 2022    Evaluating PT:  Sarah Theresajasmin, PT, DPT  [id number]     Room #:  6856/0660-W  Diagnosis:  Subdural hematoma [S06. 5XAA]  SAH (subarachnoid hemorrhage) (HCC) [I60.9]  Subarachnoid bleed (HCC) [I60.9]  JOSIE (acute kidney injury) ([facility] [W72.4]  Acute alcoholic intoxication with complication ([facility] [N81.274]  Closed fracture of occipital bone, unspecified laterality, unspecified occipital fracture type, initial encounter ([facility] [G45.429U]  COVID-19 [U07.1]  PMHx/PSHx:   has a past medical history of Hepatitis C and Herniated disc, cervical.   Procedure/Surgery:  EGD, PEG placement ; Intubated ; Extubated     Precautions:  Falls, Droplet Plus isolation, COVID-19, Cognition, Safety, PEG, O2, Occipital bone fx, SDH/SAH/IPH, BP<140  Equipment Needs:  TBD    SUBJECTIVE:    Pt unable to provide social history or PLOF. Per chart review, pt lives with his domestic partner, Paulino Michelle, in a 2 story duplex with 2nd floor bedroom and bathroom. Pt is independent at baseline. OBJECTIVE:   Initial Evaluation  Date: 22 Treatment  Short Term/ Long Term   Goals   AM-PAC 6 Clicks  45/12    Was pt agreeable to Eval/treatment? Yes  yes    Does pt have pain? None reported  No c/o pain    Bed Mobility  Rolling: Mod A  Supine to sit:  Max A  Sit to supine: Max A  Scooting: Min A to EOB Rolling: Saray  Supine to sit: Saray  Sit to supine: ModA  Scooting: ModA to EOB Rolling: SBA  Supine to sit: SBA  Sit to supine: SBA  Scooting: SBA   Transfers Sit to stand: NT  Stand to sit: NT  Stand pivot: NT Sit to stand: ModA  Stand to sit: ModA  Stand pivot: NT Sit to stand: Min A  Stand to sit: Min A  Stand pivot: Min A with AAD   Ambulation    NT 3' lateral ModA [employer] >40 feet with AAD Mod A   Stair negotiation: ascended and descended  NT  NA   ROM BUE:  WFL  BLE:  WFL     Strength BUE:  Unable to follow MMT no rashes , no jaundice present , good turgor , no masses , no tenderness on palpation commands  BLE:  Unable to follow MMT commands - grossly >3+/5 through observation      Balance Sitting EOB:  Min<>mod A  Dynamic Standing:  NT d/t safety  Sitting EOB:  Saray  Dynamic Standing:  ModA Sitting EOB:  SBA  Dynamic Standing:  Min A     Pt is A & O x  (self). Follows 1 step commands with proper cues  Sensation:  Pt denies numbness and tingling to extremities  Edema:  Unremarkable    Therapeutic Exercises:      Functional mobility    Patient education  Pt educated on PT role, safety during functional mobility    Patient response to education:   Pt verbalized understanding Pt demonstrated skill Pt requires further education in this area   no no yes     ASSESSMENT:    Conditions Requiring Skilled Therapeutic Intervention:    [x]Decreased strength     []Decreased ROM  [x]Decreased functional mobility  [x]Decreased balance   [x]Decreased endurance   [x]Decreased posture  []Decreased sensation  [x]Decreased coordination   []Decreased vision  [x]Decreased safety awareness   []Increased pain       Comments:  Pt agreeable to PT. Pt requires assistance and cues for bed mobility, transfers, and ambulation. Pt is confused requiring cues for facilitation of mobility. Pt with posterior lean in stance. Patient would benefit from continued skilled PT to maximize functional mobility independence. Treatment:  Patient practiced and was instructed in the following treatment:    Bed mobility training - pt given verbal and tactile cues to facilitate proper sequencing and safety during rolling and supine>sit as well as provided with physical assistance to complete task    Sitting EOB for ~3 minutes for upright tolerance, postural awareness and BLE ROM   Functional transfers-Verbal cues for proper positioning and sequencing to perform transfers safely with maximum independence.    Skilled positioning - Pt placed in the semi chair position with pillows utilized to facilitate upright posture, joint and skin integrity, and interaction with environment. Pt's/ family goals   1. None stated     Prognosis is good for reaching above PT goals. Patient and or family understand(s) diagnosis, prognosis, and plan of care. PHYSICAL THERAPY PLAN OF CARE:    PT POC is established based on physician order and patient diagnosis     Referring provider/PT Order:  Omer Gomez MD / PT eval and treat   Diagnosis:  Subdural hematoma [S06. 5XAA]  SAH (subarachnoid hemorrhage) (HCC) [I60.9]  Subarachnoid bleed (HCC) [I60.9]  JOSIE (acute kidney injury) ([facility] [Z41.3]  Acute alcoholic intoxication with complication ([facility] [L10.293]  Closed fracture of occipital bone, unspecified laterality, unspecified occipital fracture type, initial encounter ([facility] [P39.919P]  COVID-19 [U07.1]  Specific instructions for next treatment:  progress functional activity as pt is able to safely participate     Current Treatment Recommendations:     [x] Strengthening to improve independence with functional mobility   [] ROM to improve independence with functional mobility   [x] Balance Training to improve static/dynamic balance and to reduce fall risk  [x] Endurance Training to improve activity tolerance during functional mobility   [x] Transfer Training to improve safety and independence with all functional transfers   [x] Gait Training to improve gait mechanics, endurance and asses need for appropriate assistive device  [] Stair Training in preparation for safe discharge home and/or into the community   [x] Positioning to prevent skin breakdown and contractures  [x] Safety and Education Training   [x] Patient/Caregiver Education   [x] HEP  [] Other     PT long term treatment goals are located in above grid    Frequency of treatments: 2-5x/week x 1-2 weeks.     Time in  1030  Time out  1045    Total Treatment Time  15 minutes     Evaluation Time includes thorough review of current medical information, gathering information on past medical history/social history and prior level of function, completion of standardized testing/informal observation of tasks, assessment of data and education on plan of care and goals.     CPT codes:  [] Low Complexity PT evaluation 63169  [] Moderate Complexity PT evaluation 70273  [] High Complexity PT evaluation 72853  [] PT Re-evaluation 27547  [] Gait training 47100 -- minutes  [] Manual therapy [phone] -- minutes  [x] Therapeutic activities 54426 15 minutes  [] Therapeutic exercises 76339 - minutes  [] Neuromuscular reeducation 32729 -- minutes       Perez Umana, PT, DPT  [id number] supervision/verbal cues

## 2024-08-19 ENCOUNTER — APPOINTMENT (OUTPATIENT)
Dept: NEUROLOGY | Facility: CLINIC | Age: 64
End: 2024-08-19
Payer: MEDICARE

## 2024-08-19 DIAGNOSIS — M60.9 MYOSITIS, UNSPECIFIED: ICD-10-CM

## 2024-08-19 PROCEDURE — 99215 OFFICE O/P EST HI 40 MIN: CPT

## 2024-08-19 PROCEDURE — 95886 MUSC TEST DONE W/N TEST COMP: CPT

## 2024-08-19 PROCEDURE — 95911 NRV CNDJ TEST 9-10 STUDIES: CPT

## 2024-08-19 PROCEDURE — G2211 COMPLEX E/M VISIT ADD ON: CPT

## 2024-08-19 NOTE — HISTORY OF PRESENT ILLNESS
[FreeTextEntry1] : Since her last visit, Ms. Hewitt has not received her IVIG > 1 month due to lack of insurance approval.  Continues to take mycophenolate and pyridostigmine but has noted significant worsening in weakness particularly with proximal strength. Has noted some worsening orthopnea and feels better while using her CPAP at night.  Had seen pulmonologist Dr. Sheets who attributed her symptoms to her neuromuscular issues.  Had PFT done at that time but unclear about her results.  Denies any dysphagia or dysarthria.  No ptosis or diplopia.  Eye symptoms more blurriness than diplopia.  Continues to have malar rash and has not followed up with rheumatologist Dr. Morales since last set of inflammatory markers were negative.  Believes that rash had started after starting corticosteroids years ago since being diagnosed with myasthenia in 2019 symptoms improved after tapering off steroids but never back to normal.  Has extensive family history of RA and rheumatologic disorders.  Currently denies any arthralgias or myalgias.  Tolerating pyridostigmine and mycophenolate.

## 2024-08-19 NOTE — ASSESSMENT
[FreeTextEntry1] : 63 yo RHF w/ h/o pancreatitis, hypothyroid, IBS previously diagnosed w/ seronegative MG on basis of treatment response and mildly (+) SFEMG but since has had negative RNS previously and currently with EMG findings suggestive of a proximal myopathy.  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  Recommend restarting IVIG induction course followed by 0.5g/kg Q2 wks and consider switch to SCIG if pt wishes.  IIn meantime continue mycophenolate 1000 mg BID and may hold pyridostigmine for now.    Recommendations: -  continue mycophenolate 1000 mg BID -  hold Mestinon for now -  restart IVIG 0.4 g/kg IV daily x 5d induction course followed by maintenance 0.5g/kg Q2 wks - check MRI UE/LE w/w/o sanjuana for evidence of inflammation and consider targeted muscle biopsy  - RTC in 3 months

## 2024-08-19 NOTE — PHYSICAL EXAM
[FreeTextEntry1] : NAD. mild rash noted at her b/l cheeks, temple and eyebrow area, some V-neck/shawl area as well. No rash in hips or groin. No nailbed abnormalities. AOx3. Intact memory. Speech fluent, nondysarthric. CN 2 - 12 normal. No Fatigue. No ptosis/diplopia. Strength 4+/4+ proximal UE, 5/5 b/l distal UE, 5/5 b/l LE proximal/distal. NF/NE 5/5. NL tone, bulk. No abnl movements. DTRs 1+ throughout. Plantar response downgoing b/l. (-) Hoffmans, clonus. Sensory intact LT/PP, pain, temp, proprioception and vibration. NL FTN/HKS. No dysdiadokinesia. Gait narrow based/NL tandem.

## 2024-08-19 NOTE — PROCEDURE
[FreeTextEntry1] : EMG/NCS: The electrodiagnostic findings are most suggestive of myopathic process affecting proximal muscles.  There is currently no evidence for a neuropathic or radicular process affecting the left side.

## 2024-08-22 ENCOUNTER — RESULT REVIEW (OUTPATIENT)
Age: 64
End: 2024-08-22

## 2024-08-28 ENCOUNTER — TRANSCRIPTION ENCOUNTER (OUTPATIENT)
Age: 64
End: 2024-08-28

## 2024-09-01 ENCOUNTER — NON-APPOINTMENT (OUTPATIENT)
Age: 64
End: 2024-09-01

## 2024-09-01 ENCOUNTER — EMERGENCY (EMERGENCY)
Facility: HOSPITAL | Age: 64
LOS: 0 days | Discharge: HOME | End: 2024-09-01
Attending: EMERGENCY MEDICINE
Payer: MEDICARE

## 2024-09-01 VITALS
OXYGEN SATURATION: 95 % | DIASTOLIC BLOOD PRESSURE: 69 MMHG | TEMPERATURE: 98 F | RESPIRATION RATE: 19 BRPM | SYSTOLIC BLOOD PRESSURE: 132 MMHG | WEIGHT: 182.1 LBS | HEART RATE: 64 BPM

## 2024-09-01 DIAGNOSIS — Z88.8 ALLERGY STATUS TO OTHER DRUGS, MEDICAMENTS AND BIOLOGICAL SUBSTANCES: ICD-10-CM

## 2024-09-01 DIAGNOSIS — Z98.890 OTHER SPECIFIED POSTPROCEDURAL STATES: Chronic | ICD-10-CM

## 2024-09-01 DIAGNOSIS — Z87.19 PERSONAL HISTORY OF OTHER DISEASES OF THE DIGESTIVE SYSTEM: ICD-10-CM

## 2024-09-01 DIAGNOSIS — Z88.2 ALLERGY STATUS TO SULFONAMIDES: ICD-10-CM

## 2024-09-01 DIAGNOSIS — R10.12 LEFT UPPER QUADRANT PAIN: ICD-10-CM

## 2024-09-01 DIAGNOSIS — R11.2 NAUSEA WITH VOMITING, UNSPECIFIED: ICD-10-CM

## 2024-09-01 DIAGNOSIS — I10 ESSENTIAL (PRIMARY) HYPERTENSION: ICD-10-CM

## 2024-09-01 DIAGNOSIS — Z90.49 ACQUIRED ABSENCE OF OTHER SPECIFIED PARTS OF DIGESTIVE TRACT: ICD-10-CM

## 2024-09-01 DIAGNOSIS — R10.9 UNSPECIFIED ABDOMINAL PAIN: ICD-10-CM

## 2024-09-01 LAB
ALBUMIN SERPL ELPH-MCNC: 4.4 G/DL — SIGNIFICANT CHANGE UP (ref 3.5–5.2)
ALP SERPL-CCNC: 90 U/L — SIGNIFICANT CHANGE UP (ref 30–115)
ALT FLD-CCNC: 25 U/L — SIGNIFICANT CHANGE UP (ref 0–41)
ANION GAP SERPL CALC-SCNC: 12 MMOL/L — SIGNIFICANT CHANGE UP (ref 7–14)
APPEARANCE UR: CLEAR — SIGNIFICANT CHANGE UP
AST SERPL-CCNC: 24 U/L — SIGNIFICANT CHANGE UP (ref 0–41)
BACTERIA # UR AUTO: ABNORMAL /HPF
BASOPHILS # BLD AUTO: 0.03 K/UL — SIGNIFICANT CHANGE UP (ref 0–0.2)
BASOPHILS NFR BLD AUTO: 0.4 % — SIGNIFICANT CHANGE UP (ref 0–1)
BILIRUB SERPL-MCNC: 0.2 MG/DL — SIGNIFICANT CHANGE UP (ref 0.2–1.2)
BILIRUB UR-MCNC: NEGATIVE — SIGNIFICANT CHANGE UP
BUN SERPL-MCNC: 23 MG/DL — HIGH (ref 10–20)
CALCIUM SERPL-MCNC: 9.4 MG/DL — SIGNIFICANT CHANGE UP (ref 8.4–10.5)
CHLORIDE SERPL-SCNC: 101 MMOL/L — SIGNIFICANT CHANGE UP (ref 98–110)
CO2 SERPL-SCNC: 25 MMOL/L — SIGNIFICANT CHANGE UP (ref 17–32)
COLOR SPEC: YELLOW — SIGNIFICANT CHANGE UP
CREAT SERPL-MCNC: 0.8 MG/DL — SIGNIFICANT CHANGE UP (ref 0.7–1.5)
DIFF PNL FLD: NEGATIVE — SIGNIFICANT CHANGE UP
EGFR: 83 ML/MIN/1.73M2 — SIGNIFICANT CHANGE UP
EOSINOPHIL # BLD AUTO: 0.08 K/UL — SIGNIFICANT CHANGE UP (ref 0–0.7)
EOSINOPHIL NFR BLD AUTO: 1.2 % — SIGNIFICANT CHANGE UP (ref 0–8)
EPI CELLS # UR: PRESENT
GLUCOSE SERPL-MCNC: 85 MG/DL — SIGNIFICANT CHANGE UP (ref 70–99)
GLUCOSE UR QL: NEGATIVE MG/DL — SIGNIFICANT CHANGE UP
HCT VFR BLD CALC: 37.8 % — SIGNIFICANT CHANGE UP (ref 37–47)
HGB BLD-MCNC: 12.6 G/DL — SIGNIFICANT CHANGE UP (ref 12–16)
IMM GRANULOCYTES NFR BLD AUTO: 0.3 % — SIGNIFICANT CHANGE UP (ref 0.1–0.3)
KETONES UR-MCNC: NEGATIVE MG/DL — SIGNIFICANT CHANGE UP
LACTATE SERPL-SCNC: 1.6 MMOL/L — SIGNIFICANT CHANGE UP (ref 0.7–2)
LEUKOCYTE ESTERASE UR-ACNC: ABNORMAL
LIDOCAIN IGE QN: 43 U/L — SIGNIFICANT CHANGE UP (ref 7–60)
LYMPHOCYTES # BLD AUTO: 1.61 K/UL — SIGNIFICANT CHANGE UP (ref 1.2–3.4)
LYMPHOCYTES # BLD AUTO: 23.6 % — SIGNIFICANT CHANGE UP (ref 20.5–51.1)
MCHC RBC-ENTMCNC: 29.6 PG — SIGNIFICANT CHANGE UP (ref 27–31)
MCHC RBC-ENTMCNC: 33.3 G/DL — SIGNIFICANT CHANGE UP (ref 32–37)
MCV RBC AUTO: 88.9 FL — SIGNIFICANT CHANGE UP (ref 81–99)
MONOCYTES # BLD AUTO: 0.58 K/UL — SIGNIFICANT CHANGE UP (ref 0.1–0.6)
MONOCYTES NFR BLD AUTO: 8.5 % — SIGNIFICANT CHANGE UP (ref 1.7–9.3)
NEUTROPHILS # BLD AUTO: 4.51 K/UL — SIGNIFICANT CHANGE UP (ref 1.4–6.5)
NEUTROPHILS NFR BLD AUTO: 66 % — SIGNIFICANT CHANGE UP (ref 42.2–75.2)
NITRITE UR-MCNC: NEGATIVE — SIGNIFICANT CHANGE UP
NRBC # BLD: 0 /100 WBCS — SIGNIFICANT CHANGE UP (ref 0–0)
PH UR: 6 — SIGNIFICANT CHANGE UP (ref 5–8)
PLATELET # BLD AUTO: 274 K/UL — SIGNIFICANT CHANGE UP (ref 130–400)
PMV BLD: 10.7 FL — HIGH (ref 7.4–10.4)
POTASSIUM SERPL-MCNC: 4.3 MMOL/L — SIGNIFICANT CHANGE UP (ref 3.5–5)
POTASSIUM SERPL-SCNC: 4.3 MMOL/L — SIGNIFICANT CHANGE UP (ref 3.5–5)
PROT SERPL-MCNC: 6.9 G/DL — SIGNIFICANT CHANGE UP (ref 6–8)
PROT UR-MCNC: NEGATIVE MG/DL — SIGNIFICANT CHANGE UP
RBC # BLD: 4.25 M/UL — SIGNIFICANT CHANGE UP (ref 4.2–5.4)
RBC # FLD: 12.6 % — SIGNIFICANT CHANGE UP (ref 11.5–14.5)
RBC CASTS # UR COMP ASSIST: 1 /HPF — SIGNIFICANT CHANGE UP (ref 0–4)
SODIUM SERPL-SCNC: 138 MMOL/L — SIGNIFICANT CHANGE UP (ref 135–146)
SP GR SPEC: 1.02 — SIGNIFICANT CHANGE UP (ref 1–1.03)
UROBILINOGEN FLD QL: 0.2 MG/DL — SIGNIFICANT CHANGE UP (ref 0.2–1)
WBC # BLD: 6.83 K/UL — SIGNIFICANT CHANGE UP (ref 4.8–10.8)
WBC # FLD AUTO: 6.83 K/UL — SIGNIFICANT CHANGE UP (ref 4.8–10.8)
WBC UR QL: 7 /HPF — HIGH (ref 0–5)

## 2024-09-01 PROCEDURE — 99285 EMERGENCY DEPT VISIT HI MDM: CPT | Mod: FS

## 2024-09-01 PROCEDURE — 81001 URINALYSIS AUTO W/SCOPE: CPT

## 2024-09-01 PROCEDURE — 96374 THER/PROPH/DIAG INJ IV PUSH: CPT | Mod: XU

## 2024-09-01 PROCEDURE — 74177 CT ABD & PELVIS W/CONTRAST: CPT | Mod: MC

## 2024-09-01 PROCEDURE — 80053 COMPREHEN METABOLIC PANEL: CPT

## 2024-09-01 PROCEDURE — 83605 ASSAY OF LACTIC ACID: CPT

## 2024-09-01 PROCEDURE — 71045 X-RAY EXAM CHEST 1 VIEW: CPT

## 2024-09-01 PROCEDURE — 96375 TX/PRO/DX INJ NEW DRUG ADDON: CPT

## 2024-09-01 PROCEDURE — 83690 ASSAY OF LIPASE: CPT

## 2024-09-01 PROCEDURE — 74177 CT ABD & PELVIS W/CONTRAST: CPT | Mod: 26,MC

## 2024-09-01 PROCEDURE — 36415 COLL VENOUS BLD VENIPUNCTURE: CPT

## 2024-09-01 PROCEDURE — 99285 EMERGENCY DEPT VISIT HI MDM: CPT | Mod: 25

## 2024-09-01 PROCEDURE — 71045 X-RAY EXAM CHEST 1 VIEW: CPT | Mod: 26

## 2024-09-01 PROCEDURE — 85025 COMPLETE CBC W/AUTO DIFF WBC: CPT

## 2024-09-01 PROCEDURE — 93005 ELECTROCARDIOGRAM TRACING: CPT

## 2024-09-01 PROCEDURE — 93010 ELECTROCARDIOGRAM REPORT: CPT

## 2024-09-01 RX ORDER — FAMOTIDINE 10 MG/ML
20 INJECTION INTRAVENOUS ONCE
Refills: 0 | Status: COMPLETED | OUTPATIENT
Start: 2024-09-01 | End: 2024-09-01

## 2024-09-01 RX ORDER — ONDANSETRON 2 MG/ML
4 INJECTION, SOLUTION INTRAMUSCULAR; INTRAVENOUS ONCE
Refills: 0 | Status: COMPLETED | OUTPATIENT
Start: 2024-09-01 | End: 2024-09-01

## 2024-09-01 RX ORDER — SILVER SULFADIAZINE 1 %
1 CREAM (GRAM) TOPICAL
Qty: 2 | Refills: 0
Start: 2024-09-01 | End: 2024-09-07

## 2024-09-01 RX ORDER — KETOROLAC TROMETHAMINE 30 MG/ML
15 INJECTION, SOLUTION INTRAMUSCULAR ONCE
Refills: 0 | Status: DISCONTINUED | OUTPATIENT
Start: 2024-09-01 | End: 2024-09-01

## 2024-09-01 RX ORDER — PANTOPRAZOLE SODIUM 40 MG
40 TABLET, DELAYED RELEASE (ENTERIC COATED) ORAL ONCE
Refills: 0 | Status: COMPLETED | OUTPATIENT
Start: 2024-09-01 | End: 2024-09-01

## 2024-09-01 RX ADMIN — FAMOTIDINE 20 MILLIGRAM(S): 10 INJECTION INTRAVENOUS at 21:10

## 2024-09-01 RX ADMIN — Medication 40 MILLIGRAM(S): at 22:42

## 2024-09-01 RX ADMIN — KETOROLAC TROMETHAMINE 15 MILLIGRAM(S): 30 INJECTION, SOLUTION INTRAMUSCULAR at 21:10

## 2024-09-01 RX ADMIN — ONDANSETRON 4 MILLIGRAM(S): 2 INJECTION, SOLUTION INTRAMUSCULAR; INTRAVENOUS at 21:10

## 2024-09-01 NOTE — ED PROVIDER NOTE - PROGRESS NOTE DETAILS
Patient sxs improved after medications, feeling better. All results d/w patient and family -copies of results provided.  Pt instructed to return if any worsening symptoms or concerns.  They verbalize understanding.  Patient to fu with her PCP. Belly soft, non tender,

## 2024-09-01 NOTE — ED PROVIDER NOTE - CLINICAL SUMMARY MEDICAL DECISION MAKING FREE TEXT BOX
64-year-old female, history of cholecystectomy, esophageal dilatation, gastritis, presents with left upper quadrant pain and vomiting for 1 day.  Tender left upper quadrant otherwise unremarkable exam.  Labs noted for WBC 6.8, hemoglobin 12.6, BUN 23, creatinine 0.8, lipase 43.  Urinalysis negative.  Chest x-ray no acute disease.  CT abdomen no acute pathology.  Given Pepcid, Toradol, morphine, Zofran, pantoprazole with improvement.  Will DC to followup with PCP/clinic.

## 2024-09-01 NOTE — ED PROVIDER NOTE - ATTENDING APP SHARED VISIT CONTRIBUTION OF CARE
64-year-old female, history of cholecystectomy, esophageal dilatation, gastritis, presents with left upper quadrant pain and vomiting for 1 day.  Exam shows alert patient in no distress, HEENT NCAT PERRL, neck supple, throat no exudates, lungs clear, RR S1S2, abdomen soft +LUQ tenderness +BS, no CCE.

## 2024-09-01 NOTE — ED ADULT TRIAGE NOTE - INTERNATIONAL TRAVEL
Received report from Javi LINCOLN  Was informed that pt's significant others will come within 40 mins from now   No

## 2024-09-01 NOTE — ED PROVIDER NOTE - NSFOLLOWUPCLINICS_GEN_ALL_ED_FT
[facility]  Medicine  [address], [place]   Phone: [phone]  Fax:   Follow Up Time: Routine

## 2024-09-01 NOTE — ED PROVIDER NOTE - OBJECTIVE STATEMENT
66-year-old female past medical history of hypertension, neuromuscular disease presents to the ED for evaluation of abdominal pain.  Patient with acute onset of left quadrant abdominal pain x 1 day, nonradiating, worse with movement.  Associated nausea with vomiting.  Denies any diarrhea, fevers, chills, dysuria, melena, hematochezia.

## 2024-09-01 NOTE — ED ADULT TRIAGE NOTE - CHIEF COMPLAINT QUOTE
Pt states shes having throbbing abdominal pain in her LLQ that started today with nausea and vomitting.

## 2024-09-01 NOTE — ED PROVIDER NOTE - NS ED MD DISPO DISCHARGE CCDA
PAST SURGICAL HISTORY:  History of Colonoscopy     S/P laparoscopic cholecystectomy 2011     Patient/Caregiver provided printed discharge information.

## 2024-09-01 NOTE — ED PROVIDER NOTE - PHYSICAL EXAMINATION
CONST: Well appearing in NAD  EYES: PERRL, EOMI, Sclera and conjunctiva clear.   ENT: Oropharynx normal appearing, no erythema or exudates. Uvula midline.  CARD: Normal S1 S2; Normal rate and rhythm  RESP: Equal BS B/L, No wheezes, rhonchi or rales. No distress  GI: Left upper quadrant tenderness to palpation, abdomen soft, no rebound or guarding.    MS: Normal ROM in all extremities. No midline spinal tenderness.  SKIN: Warm, dry, no acute rashes.   NEURO: A&Ox3, No focal deficits. Strength 5/5 with no sensory deficits. Steady gait

## 2024-09-01 NOTE — ED ADULT NURSE NOTE - AVIAN FLU WORK
No

## 2024-09-01 NOTE — ED ADULT NURSE NOTE - NSICDXPASTMEDICALHX_GEN_ALL_CORE_FT
PAST MEDICAL HISTORY:  GERD (gastroesophageal reflux disease)     High blood cholesterol     HTN (hypertension)     Hypothyroid     Leaky heart valve MITRAL REGURG    Myasthenia gravis     KAID (obstructive sleep apnea)     Pancreatitis     POTS (postural orthostatic tachycardia syndrome)

## 2024-09-01 NOTE — ED PROVIDER NOTE - DISPOSITION TYPE
Patient does not want family updated by us, states he has been updating them himself. Questions answered and concerns addressed. DISCHARGE

## 2024-09-01 NOTE — ED PROVIDER NOTE - PATIENT PORTAL LINK FT
You can access the FollowMyHealth Patient Portal offered by [facility] by registering at the following website: [email or web address]. By joining [employer]’s FollowMyHealth portal, you will also be able to view your health information using other applications (apps) compatible with our system.

## 2024-09-06 ENCOUNTER — OUTPATIENT (OUTPATIENT)
Dept: OUTPATIENT SERVICES | Facility: HOSPITAL | Age: 64
LOS: 1 days | End: 2024-09-06
Payer: MEDICARE

## 2024-09-06 ENCOUNTER — RESULT REVIEW (OUTPATIENT)
Age: 64
End: 2024-09-06

## 2024-09-06 DIAGNOSIS — Z98.890 OTHER SPECIFIED POSTPROCEDURAL STATES: Chronic | ICD-10-CM

## 2024-09-06 DIAGNOSIS — Z00.8 ENCOUNTER FOR OTHER GENERAL EXAMINATION: ICD-10-CM

## 2024-09-06 DIAGNOSIS — M60.9 MYOSITIS, UNSPECIFIED: ICD-10-CM

## 2024-09-06 PROCEDURE — 73723 MRI JOINT LWR EXTR W/O&W/DYE: CPT | Mod: 26,LT

## 2024-09-06 PROCEDURE — A9579: CPT

## 2024-09-06 PROCEDURE — 73723 MRI JOINT LWR EXTR W/O&W/DYE: CPT | Mod: LT

## 2024-09-07 DIAGNOSIS — M60.9 MYOSITIS, UNSPECIFIED: ICD-10-CM

## 2024-09-09 ENCOUNTER — TRANSCRIPTION ENCOUNTER (OUTPATIENT)
Age: 64
End: 2024-09-09

## 2024-09-10 ENCOUNTER — RESULT REVIEW (OUTPATIENT)
Age: 64
End: 2024-09-10

## 2024-09-10 ENCOUNTER — TRANSCRIPTION ENCOUNTER (OUTPATIENT)
Age: 64
End: 2024-09-10

## 2024-09-10 ENCOUNTER — OUTPATIENT (OUTPATIENT)
Dept: OUTPATIENT SERVICES | Facility: HOSPITAL | Age: 64
LOS: 1 days | End: 2024-09-10
Payer: MEDICARE

## 2024-09-10 DIAGNOSIS — Z98.890 OTHER SPECIFIED POSTPROCEDURAL STATES: Chronic | ICD-10-CM

## 2024-09-10 DIAGNOSIS — M60.9 MYOSITIS, UNSPECIFIED: ICD-10-CM

## 2024-09-10 DIAGNOSIS — Z00.8 ENCOUNTER FOR OTHER GENERAL EXAMINATION: ICD-10-CM

## 2024-09-10 PROCEDURE — 73219 MRI UPPER EXTREMITY W/DYE: CPT | Mod: 26,LT

## 2024-09-10 PROCEDURE — A9579: CPT

## 2024-09-10 PROCEDURE — 73219 MRI UPPER EXTREMITY W/DYE: CPT | Mod: LT

## 2024-09-11 DIAGNOSIS — M60.9 MYOSITIS, UNSPECIFIED: ICD-10-CM

## 2024-09-12 NOTE — ED ADULT NURSE NOTE - ED STAT RN HANDOFF TIME
Please advise that unfortunately I cannot render an opinion either way without seeing the EKG or her. Please also call Dr. Morocho's office and simply let them know that she is calling us out of concern of her EKG. I thought that he should be aware   18:00

## 2024-10-25 ENCOUNTER — EMERGENCY (EMERGENCY)
Facility: HOSPITAL | Age: 64
LOS: 0 days | Discharge: HOME | End: 2024-10-26
Attending: EMERGENCY MEDICINE
Payer: MEDICARE

## 2024-10-25 VITALS
HEART RATE: 84 BPM | HEIGHT: 64 IN | TEMPERATURE: 98 F | OXYGEN SATURATION: 97 % | WEIGHT: 182.1 LBS | SYSTOLIC BLOOD PRESSURE: 145 MMHG | RESPIRATION RATE: 20 BRPM | DIASTOLIC BLOOD PRESSURE: 83 MMHG

## 2024-10-25 DIAGNOSIS — Z98.890 OTHER SPECIFIED POSTPROCEDURAL STATES: Chronic | ICD-10-CM

## 2024-10-25 DIAGNOSIS — H92.01 OTALGIA, RIGHT EAR: ICD-10-CM

## 2024-10-25 DIAGNOSIS — E78.5 HYPERLIPIDEMIA, UNSPECIFIED: ICD-10-CM

## 2024-10-25 DIAGNOSIS — Z88.8 ALLERGY STATUS TO OTHER DRUGS, MEDICAMENTS AND BIOLOGICAL SUBSTANCES: ICD-10-CM

## 2024-10-25 DIAGNOSIS — I10 ESSENTIAL (PRIMARY) HYPERTENSION: ICD-10-CM

## 2024-10-25 DIAGNOSIS — Z88.2 ALLERGY STATUS TO SULFONAMIDES: ICD-10-CM

## 2024-10-25 PROCEDURE — 83605 ASSAY OF LACTIC ACID: CPT

## 2024-10-25 PROCEDURE — 96375 TX/PRO/DX INJ NEW DRUG ADDON: CPT

## 2024-10-25 PROCEDURE — 80053 COMPREHEN METABOLIC PANEL: CPT

## 2024-10-25 PROCEDURE — 36415 COLL VENOUS BLD VENIPUNCTURE: CPT

## 2024-10-25 PROCEDURE — 99285 EMERGENCY DEPT VISIT HI MDM: CPT | Mod: FS

## 2024-10-25 PROCEDURE — 85025 COMPLETE CBC W/AUTO DIFF WBC: CPT

## 2024-10-25 PROCEDURE — 99284 EMERGENCY DEPT VISIT MOD MDM: CPT | Mod: 25

## 2024-10-25 PROCEDURE — 70481 CT ORBIT/EAR/FOSSA W/DYE: CPT | Mod: MC

## 2024-10-25 PROCEDURE — 96374 THER/PROPH/DIAG INJ IV PUSH: CPT | Mod: XU

## 2024-10-25 RX ORDER — MORPHINE SULFATE 30 MG/1
4 TABLET, FILM COATED, EXTENDED RELEASE ORAL ONCE
Refills: 0 | Status: DISCONTINUED | OUTPATIENT
Start: 2024-10-25 | End: 2024-10-25

## 2024-10-25 RX ORDER — ONDANSETRON HCL/PF 4 MG/2 ML
4 VIAL (ML) INJECTION ONCE
Refills: 0 | Status: COMPLETED | OUTPATIENT
Start: 2024-10-25 | End: 2024-10-25

## 2024-10-25 RX ADMIN — MORPHINE SULFATE 4 MILLIGRAM(S): 30 TABLET, FILM COATED, EXTENDED RELEASE ORAL at 23:46

## 2024-10-25 RX ADMIN — Medication 4 MILLIGRAM(S): at 23:46

## 2024-10-25 NOTE — ED ADULT TRIAGE NOTE - CHIEF COMPLAINT QUOTE
Right side facial pain diagnosed by her PMD as mastoiditis.  Pt has been taking amoxicillin but pain is getting worse

## 2024-10-25 NOTE — ED ADULT NURSE NOTE - NSFALLUNIVINTERV_ED_ALL_ED
Bed/Stretcher in lowest position, wheels locked, appropriate side rails in place/Call bell, personal items and telephone in reach/Instruct patient to call for assistance before getting out of bed/chair/stretcher/Non-slip footwear applied when patient is off stretcher/[place] to call system/Physically safe environment - no spills, clutter or unnecessary equipment/Purposeful proactive rounding/Room/bathroom lighting operational, light cord in reach

## 2024-10-26 VITALS
SYSTOLIC BLOOD PRESSURE: 105 MMHG | RESPIRATION RATE: 18 BRPM | DIASTOLIC BLOOD PRESSURE: 61 MMHG | HEART RATE: 63 BPM | OXYGEN SATURATION: 100 %

## 2024-10-26 LAB
ALBUMIN SERPL ELPH-MCNC: 4.2 G/DL — SIGNIFICANT CHANGE UP (ref 3.5–5.2)
ALP SERPL-CCNC: 84 U/L — SIGNIFICANT CHANGE UP (ref 30–115)
ALT FLD-CCNC: 20 U/L — SIGNIFICANT CHANGE UP (ref 0–41)
ANION GAP SERPL CALC-SCNC: 11 MMOL/L — SIGNIFICANT CHANGE UP (ref 7–14)
AST SERPL-CCNC: 29 U/L — SIGNIFICANT CHANGE UP (ref 0–41)
BASOPHILS # BLD AUTO: 0.02 K/UL — SIGNIFICANT CHANGE UP (ref 0–0.2)
BASOPHILS NFR BLD AUTO: 0.5 % — SIGNIFICANT CHANGE UP (ref 0–1)
BILIRUB SERPL-MCNC: 0.3 MG/DL — SIGNIFICANT CHANGE UP (ref 0.2–1.2)
BUN SERPL-MCNC: 42 MG/DL — HIGH (ref 10–20)
CALCIUM SERPL-MCNC: 9.4 MG/DL — SIGNIFICANT CHANGE UP (ref 8.4–10.5)
CHLORIDE SERPL-SCNC: 101 MMOL/L — SIGNIFICANT CHANGE UP (ref 98–110)
CO2 SERPL-SCNC: 27 MMOL/L — SIGNIFICANT CHANGE UP (ref 17–32)
CREAT SERPL-MCNC: 1 MG/DL — SIGNIFICANT CHANGE UP (ref 0.7–1.5)
EGFR: 64 ML/MIN/1.73M2 — SIGNIFICANT CHANGE UP
EOSINOPHIL # BLD AUTO: 0.04 K/UL — SIGNIFICANT CHANGE UP (ref 0–0.7)
EOSINOPHIL NFR BLD AUTO: 1.1 % — SIGNIFICANT CHANGE UP (ref 0–8)
GLUCOSE SERPL-MCNC: 90 MG/DL — SIGNIFICANT CHANGE UP (ref 70–99)
HCT VFR BLD CALC: 34.7 % — LOW (ref 37–47)
HGB BLD-MCNC: 11.6 G/DL — LOW (ref 12–16)
IMM GRANULOCYTES NFR BLD AUTO: 0.3 % — SIGNIFICANT CHANGE UP (ref 0.1–0.3)
LACTATE SERPL-SCNC: 0.7 MMOL/L — SIGNIFICANT CHANGE UP (ref 0.7–2)
LYMPHOCYTES # BLD AUTO: 1.47 K/UL — SIGNIFICANT CHANGE UP (ref 1.2–3.4)
LYMPHOCYTES # BLD AUTO: 39.9 % — SIGNIFICANT CHANGE UP (ref 20.5–51.1)
MCHC RBC-ENTMCNC: 30.4 PG — SIGNIFICANT CHANGE UP (ref 27–31)
MCHC RBC-ENTMCNC: 33.4 G/DL — SIGNIFICANT CHANGE UP (ref 32–37)
MCV RBC AUTO: 90.8 FL — SIGNIFICANT CHANGE UP (ref 81–99)
MONOCYTES # BLD AUTO: 0.34 K/UL — SIGNIFICANT CHANGE UP (ref 0.1–0.6)
MONOCYTES NFR BLD AUTO: 9.2 % — SIGNIFICANT CHANGE UP (ref 1.7–9.3)
NEUTROPHILS # BLD AUTO: 1.8 K/UL — SIGNIFICANT CHANGE UP (ref 1.4–6.5)
NEUTROPHILS NFR BLD AUTO: 49 % — SIGNIFICANT CHANGE UP (ref 42.2–75.2)
NRBC # BLD: 0 /100 WBCS — SIGNIFICANT CHANGE UP (ref 0–0)
PLATELET # BLD AUTO: 379 K/UL — SIGNIFICANT CHANGE UP (ref 130–400)
PMV BLD: 10.1 FL — SIGNIFICANT CHANGE UP (ref 7.4–10.4)
POTASSIUM SERPL-MCNC: 4.5 MMOL/L — SIGNIFICANT CHANGE UP (ref 3.5–5)
POTASSIUM SERPL-SCNC: 4.5 MMOL/L — SIGNIFICANT CHANGE UP (ref 3.5–5)
PROT SERPL-MCNC: 8.4 G/DL — HIGH (ref 6–8)
RBC # BLD: 3.82 M/UL — LOW (ref 4.2–5.4)
RBC # FLD: 15 % — HIGH (ref 11.5–14.5)
SODIUM SERPL-SCNC: 139 MMOL/L — SIGNIFICANT CHANGE UP (ref 135–146)
WBC # BLD: 3.68 K/UL — LOW (ref 4.8–10.8)
WBC # FLD AUTO: 3.68 K/UL — LOW (ref 4.8–10.8)

## 2024-10-26 PROCEDURE — 70481 CT ORBIT/EAR/FOSSA W/DYE: CPT | Mod: 26,MC

## 2024-10-26 RX ORDER — KETOROLAC TROMETHAMINE 10 MG/1
15 TABLET, FILM COATED ORAL ONCE
Refills: 0 | Status: DISCONTINUED | OUTPATIENT
Start: 2024-10-26 | End: 2024-10-26

## 2024-10-26 RX ADMIN — KETOROLAC TROMETHAMINE 15 MILLIGRAM(S): 10 TABLET, FILM COATED ORAL at 01:00

## 2024-10-26 NOTE — ED PROVIDER NOTE - CARE PROVIDERS DIRECT ADDRESSES
,[email or web address] [email or web address]

## 2024-10-26 NOTE — ED PROVIDER NOTE - CLINICAL SUMMARY MEDICAL DECISION MAKING FREE TEXT BOX
64-year-old female, history of HTN, mastoiditis, presents with pain behind right ear for the past 4 days.  On Augmentin, took 2 doses so far.  Labs unremarkable.  CT shows no evidence of mastoiditis.  Will DC and refer to ENT.

## 2024-10-26 NOTE — ED PROVIDER NOTE - CARE PROVIDER_API CALL
Elliot Yi  Otolaryngology  [address] [place] [id number]  Phone: [phone]  Fax: [phone]  Follow Up Time: 1-3 Days

## 2024-10-26 NOTE — ED PROVIDER NOTE - PATIENT PORTAL LINK FT
You can access the FollowMyHealth Patient Portal offered by [facility] by registering at the following website: [email or web address]. By joining [employer]’s FollowMyHealth portal, you will also be able to view your health information using other applications (apps) compatible with our system.

## 2024-10-26 NOTE — ED PROVIDER NOTE - PHYSICAL EXAMINATION
GENERAL: Well-nourished, Well-developed. NAD.  HEAD: No visible or palpable bumps or hematomas. No ecchymosis behind ears B/L.  Eyes: PERRLA, EOMI.   ENMT: MMM. No pharyngeal erythema or exudates. Uvula midline. No oral lesions. TMs clear with good cone of light B/L. no mastoid ttp, no swelling or erythema  Neck: Supple. No LAD. FROM  CVS: Normal S1,S2. No murmurs appreciated on auscultation   RESP: No use of accessory muscles. Chest rise symmetrical with good expansion. Lungs clear to auscultation B/L. No wheezing, rales, or rhonchi auscultated.  Skin: Warm, Dry. No rashes or lesions. Good cap refill < 2 sec B/L.

## 2024-10-26 NOTE — ED PROVIDER NOTE - OBJECTIVE STATEMENT
64-year-old female PMH HTN, HLD presenting to ED for evaluation of right ear pain for 4 days.  Patient states she has pain behind her right ear was seen by her PMD who prescribed her Augmentin.  Denies any fever, chills, ear discharge, hearing change, neck pain, difficulty breathing.

## 2024-10-26 NOTE — ED PROVIDER NOTE - NSFOLLOWUPINSTRUCTIONS_ED_ALL_ED_FT
**continue taking augmentin, follow up with ent    Our Emergency Department Referral Coordinators will be reaching out to you in the next 24-48 hours from 9:00am to 5:00pm to schedule a follow up appointment. Please expect a phone call from the hospital in that time frame. If you do not receive a call or if you have any questions or concerns, you can reach them at   [phone].      Earache    WHAT YOU NEED TO KNOW:    An earache can be caused by a problem within your ear or from another body area. Common causes include earwax buildup, objects in your ear, injury, infections, or jaw or dental problems. Less often, earaches may be caused by arthritis in your upper spine.    DISCHARGE INSTRUCTIONS:    Return to the emergency department if:     You have a severe earache.      You have ear pain with itching, hearing loss, dizziness, a feeling of fullness in your ear, or ringing in your ears.    Contact your healthcare provider if:     Your ear pain worsens or does not go away with treatment.      You have drainage from your ear.      You have a fever.       Your outer ear becomes red, swollen, and warm.      You have questions or concerns about your condition or care.    Medicines: You may need any of the following:     NSAIDs, such as ibuprofen, help decrease swelling, pain, and fever. This medicine is available with or without a doctor's order. NSAIDs can cause stomach bleeding or kidney problems in certain people. If you take blood thinner medicine, always ask if NSAIDs are safe for you. Always read the medicine label and follow directions. Do not give these medicines to children under 6 months of age without direction from your child's healthcare provider.      Acetaminophen decreases pain and fever. It is available without a doctor's order. Ask how much to take and how often to take it. Follow directions. Acetaminophen can cause liver damage if not taken correctly.      Do not give aspirin to children under 18 years of age. Your child could develop Reye syndrome if he takes aspirin. Reye syndrome can cause life-threatening brain and liver damage. Check your child's medicine labels for aspirin, salicylates, or oil of wintergreen.       Take your medicine as directed. Call your healthcare provider if you think your medicine is not helping or if you have side effects. Tell him if you are allergic to any medicine. Keep a list of the medicines, vitamins, and herbs you take. Include the amounts, and when and why you take them. Bring the list or the pill bottles to follow-up visits. Carry your medicine list with you in case of an emergency.    Follow up with your healthcare provider as directed: Write down your questions so you remember to ask them during your visits.       © Copyright [employer] 2019 All illustrations and images included in CareNotes are the copyrighted property of [employer] or [employer].

## 2024-10-26 NOTE — ED PROVIDER NOTE - ATTENDING APP SHARED VISIT CONTRIBUTION OF CARE
64-year-old female, history of HTN, mastoiditis, presents with pain behind right ear for the past 4 days.  Was seen by PCP and given Augmentin, took 2 doses so far.  Exam shows alert patient in no distress, HEENT NCAT PERRL, neck supple, throat no exudates, TMs clear, mild tenderness right mastoid, lungs clear, RR S1S2, abdomen soft NT +BS, no CCE, skin no rash, neuro A&OX3 GCS 15 no deficits.

## 2024-10-28 ENCOUNTER — TRANSCRIPTION ENCOUNTER (OUTPATIENT)
Age: 64
End: 2024-10-28

## 2024-10-31 ENCOUNTER — TRANSCRIPTION ENCOUNTER (OUTPATIENT)
Age: 64
End: 2024-10-31

## 2024-11-06 ENCOUNTER — TRANSCRIPTION ENCOUNTER (OUTPATIENT)
Age: 64
End: 2024-11-06

## 2024-12-05 ENCOUNTER — APPOINTMENT (OUTPATIENT)
Dept: NEUROLOGY | Facility: CLINIC | Age: 64
End: 2024-12-05

## 2024-12-05 VITALS
BODY MASS INDEX: 31.41 KG/M2 | SYSTOLIC BLOOD PRESSURE: 129 MMHG | WEIGHT: 184 LBS | DIASTOLIC BLOOD PRESSURE: 85 MMHG | HEIGHT: 64 IN | HEART RATE: 74 BPM

## 2024-12-05 DIAGNOSIS — R25.2 CRAMP AND SPASM: ICD-10-CM

## 2024-12-05 DIAGNOSIS — M62.81 MUSCLE WEAKNESS (GENERALIZED): ICD-10-CM

## 2024-12-05 DIAGNOSIS — M60.9 MYOSITIS, UNSPECIFIED: ICD-10-CM

## 2024-12-05 PROCEDURE — 99215 OFFICE O/P EST HI 40 MIN: CPT

## 2024-12-05 PROCEDURE — G2211 COMPLEX E/M VISIT ADD ON: CPT

## 2024-12-19 ENCOUNTER — APPOINTMENT (OUTPATIENT)
Dept: OBGYN | Facility: CLINIC | Age: 64
End: 2024-12-19

## 2024-12-19 NOTE — ED PROVIDER NOTE - IV ALTEPLASE DOOR HIDDEN
Plan: Recommended Xtresse hair gummies. Detail Level: Detailed Render In Strict Bullet Format?: No show

## 2024-12-24 PROBLEM — F10.90 ALCOHOL USE: Status: INACTIVE | Noted: 2021-12-17

## 2025-02-12 ENCOUNTER — APPOINTMENT (OUTPATIENT)
Dept: NEUROSURGERY | Facility: CLINIC | Age: 65
End: 2025-02-12
Payer: MEDICARE

## 2025-02-12 ENCOUNTER — NON-APPOINTMENT (OUTPATIENT)
Age: 65
End: 2025-02-12

## 2025-02-12 VITALS — WEIGHT: 182 LBS | HEIGHT: 64 IN | BODY MASS INDEX: 31.07 KG/M2

## 2025-02-12 DIAGNOSIS — Z80.9 FAMILY HISTORY OF MALIGNANT NEOPLASM, UNSPECIFIED: ICD-10-CM

## 2025-02-12 DIAGNOSIS — Z86.79 PERSONAL HISTORY OF OTHER DISEASES OF THE CIRCULATORY SYSTEM: ICD-10-CM

## 2025-02-12 DIAGNOSIS — Z82.61 FAMILY HISTORY OF ARTHRITIS: ICD-10-CM

## 2025-02-12 DIAGNOSIS — Z82.49 FAMILY HISTORY OF ISCHEMIC HEART DISEASE AND OTHER DISEASES OF THE CIRCULATORY SYSTEM: ICD-10-CM

## 2025-02-12 DIAGNOSIS — Z83.3 FAMILY HISTORY OF DIABETES MELLITUS: ICD-10-CM

## 2025-02-12 DIAGNOSIS — Z86.69 PERSONAL HISTORY OF OTHER DISEASES OF THE NERVOUS SYSTEM AND SENSE ORGANS: ICD-10-CM

## 2025-02-12 DIAGNOSIS — Z86.39 PERSONAL HISTORY OF OTHER ENDOCRINE, NUTRITIONAL AND METABOLIC DISEASE: ICD-10-CM

## 2025-02-12 DIAGNOSIS — G70.00 MYASTHENIA GRAVIS W/OUT (ACUTE) EXACERBATION: ICD-10-CM

## 2025-02-12 PROCEDURE — 99203 OFFICE O/P NEW LOW 30 MIN: CPT

## 2025-03-05 ENCOUNTER — NON-APPOINTMENT (OUTPATIENT)
Age: 65
End: 2025-03-05

## 2025-03-10 ENCOUNTER — APPOINTMENT (OUTPATIENT)
Dept: NEUROSURGERY | Facility: HOSPITAL | Age: 65
End: 2025-03-10

## 2025-03-10 ENCOUNTER — RESULT REVIEW (OUTPATIENT)
Age: 65
End: 2025-03-10

## 2025-03-10 ENCOUNTER — OUTPATIENT (OUTPATIENT)
Dept: OUTPATIENT SERVICES | Facility: HOSPITAL | Age: 65
LOS: 1 days | Discharge: HOME | End: 2025-03-10
Payer: MEDICARE

## 2025-03-10 ENCOUNTER — TRANSCRIPTION ENCOUNTER (OUTPATIENT)
Age: 65
End: 2025-03-10

## 2025-03-10 VITALS
WEIGHT: 182.1 LBS | TEMPERATURE: 98 F | HEART RATE: 72 BPM | RESPIRATION RATE: 14 BRPM | SYSTOLIC BLOOD PRESSURE: 106 MMHG | HEIGHT: 64 IN | OXYGEN SATURATION: 99 % | DIASTOLIC BLOOD PRESSURE: 52 MMHG

## 2025-03-10 VITALS
DIASTOLIC BLOOD PRESSURE: 80 MMHG | SYSTOLIC BLOOD PRESSURE: 110 MMHG | OXYGEN SATURATION: 99 % | HEART RATE: 59 BPM | RESPIRATION RATE: 15 BRPM

## 2025-03-10 DIAGNOSIS — E78.00 PURE HYPERCHOLESTEROLEMIA, UNSPECIFIED: ICD-10-CM

## 2025-03-10 DIAGNOSIS — I10 ESSENTIAL (PRIMARY) HYPERTENSION: ICD-10-CM

## 2025-03-10 DIAGNOSIS — Z79.82 LONG TERM (CURRENT) USE OF ASPIRIN: ICD-10-CM

## 2025-03-10 DIAGNOSIS — E03.9 HYPOTHYROIDISM, UNSPECIFIED: ICD-10-CM

## 2025-03-10 DIAGNOSIS — Z79.890 HORMONE REPLACEMENT THERAPY: ICD-10-CM

## 2025-03-10 DIAGNOSIS — G47.33 OBSTRUCTIVE SLEEP APNEA (ADULT) (PEDIATRIC): ICD-10-CM

## 2025-03-10 DIAGNOSIS — M62.50 MUSCLE WASTING AND ATROPHY, NOT ELSEWHERE CLASSIFIED, UNSPECIFIED SITE: ICD-10-CM

## 2025-03-10 DIAGNOSIS — Z98.890 OTHER SPECIFIED POSTPROCEDURAL STATES: Chronic | ICD-10-CM

## 2025-03-10 DIAGNOSIS — M62.81 MUSCLE WEAKNESS (GENERALIZED): ICD-10-CM

## 2025-03-10 DIAGNOSIS — G90.A POSTURAL ORTHOSTATIC TACHYCARDIA SYNDROME [POTS]: ICD-10-CM

## 2025-03-10 DIAGNOSIS — Z79.1 LONG TERM (CURRENT) USE OF NON-STEROIDAL ANTI-INFLAMMATORIES (NSAID): ICD-10-CM

## 2025-03-10 DIAGNOSIS — G70.00 MYASTHENIA GRAVIS WITHOUT (ACUTE) EXACERBATION: ICD-10-CM

## 2025-03-10 DIAGNOSIS — Z79.52 LONG TERM (CURRENT) USE OF SYSTEMIC STEROIDS: ICD-10-CM

## 2025-03-10 DIAGNOSIS — Z79.624 LONG TERM (CURRENT) USE OF INHIBITORS OF NUCLEOTIDE SYNTHESIS: ICD-10-CM

## 2025-03-10 DIAGNOSIS — Z88.2 ALLERGY STATUS TO SULFONAMIDES: ICD-10-CM

## 2025-03-10 DIAGNOSIS — G72.9 MYOPATHY, UNSPECIFIED: ICD-10-CM

## 2025-03-10 PROCEDURE — 88313 SPECIAL STAINS GROUP 2: CPT

## 2025-03-10 PROCEDURE — 88319 ENZYME HISTOCHEMISTRY: CPT

## 2025-03-10 PROCEDURE — 88313 SPECIAL STAINS GROUP 2: CPT | Mod: 26

## 2025-03-10 PROCEDURE — 88305 TISSUE EXAM BY PATHOLOGIST: CPT

## 2025-03-10 PROCEDURE — 88319 ENZYME HISTOCHEMISTRY: CPT | Mod: 26

## 2025-03-10 PROCEDURE — ZZZZZ: CPT

## 2025-03-10 PROCEDURE — C1889: CPT

## 2025-03-10 PROCEDURE — 88314 HISTOCHEMICAL STAINS ADD-ON: CPT

## 2025-03-10 PROCEDURE — 88314 HISTOCHEMICAL STAINS ADD-ON: CPT | Mod: 26

## 2025-03-10 PROCEDURE — 88305 TISSUE EXAM BY PATHOLOGIST: CPT | Mod: 26

## 2025-03-10 PROCEDURE — 20205 DEEP MUSCLE BIOPSY: CPT

## 2025-03-10 RX ORDER — HYDROMORPHONE/SOD CHLOR,ISO/PF 2 MG/10 ML
0.5 SYRINGE (ML) INJECTION
Refills: 0 | Status: DISCONTINUED | OUTPATIENT
Start: 2025-03-10 | End: 2025-03-10

## 2025-03-10 RX ORDER — ONDANSETRON HCL/PF 4 MG/2 ML
4 VIAL (ML) INJECTION ONCE
Refills: 0 | Status: DISCONTINUED | OUTPATIENT
Start: 2025-03-10 | End: 2025-03-10

## 2025-03-10 RX ORDER — SODIUM CHLORIDE 9 G/1000ML
1000 INJECTION, SOLUTION INTRAVENOUS
Refills: 0 | Status: DISCONTINUED | OUTPATIENT
Start: 2025-03-10 | End: 2025-03-10

## 2025-03-10 NOTE — PRE-OP CHECKLIST - LAST DOSE WITHIN LAST 24HRS
Patient transferred from NICU to room 3016. Report given by Etta NAVARRETE. Safety information discussed.    Yes

## 2025-03-10 NOTE — ASU DISCHARGE PLAN (ADULT/PEDIATRIC) - FINANCIAL ASSISTANCE
[facility] provides services at a reduced cost to those who are determined to be eligible through [facility]’s financial assistance program. Information regarding [facility]’s financial assistance program can be found by going to [email or web address] or by calling [phone].

## 2025-03-10 NOTE — PRE-OP CHECKLIST - SURGICAL CONSENT
Gastroesophageal reflux disease without esophagitis     Diverticulosis of large intestine without hemorrhage     Obesity (BMI 30.0-34. 9)     Chronic pain disorder     Contusion of chest     Diverticulum, pharynx     Primary osteoarthritis     Pleural effusion     Pneumococcal pneumonia (HCC)     Prediabetes  , done

## 2025-03-10 NOTE — ASU DISCHARGE PLAN (ADULT/PEDIATRIC) - CARE PROVIDER_API CALL
Cornell Love  Neurosurgery  [address] [place] [address], [address]  [place] [address]  Phone: [phone]  Fax: [phone]  Follow Up Time: 2 weeks

## 2025-03-11 LAB — SURGICAL PATHOLOGY STUDY: SIGNIFICANT CHANGE UP

## 2025-03-25 ENCOUNTER — APPOINTMENT (OUTPATIENT)
Dept: NEUROSURGERY | Facility: CLINIC | Age: 65
End: 2025-03-25

## 2025-03-31 NOTE — ED ADULT NURSE NOTE - CHIEF COMPLAINT QUOTE
Writer called and scheduled follow up appointment with Hien Espinosa.   
left sided pain under ribs x 3 days , nausea

## 2025-04-01 ENCOUNTER — RX RENEWAL (OUTPATIENT)
Age: 65
End: 2025-04-01

## 2025-04-24 ENCOUNTER — APPOINTMENT (OUTPATIENT)
Dept: NEUROLOGY | Facility: CLINIC | Age: 65
End: 2025-04-24
Payer: MEDICARE

## 2025-04-24 ENCOUNTER — NON-APPOINTMENT (OUTPATIENT)
Age: 65
End: 2025-04-24

## 2025-04-24 VITALS
BODY MASS INDEX: 30.73 KG/M2 | WEIGHT: 180 LBS | DIASTOLIC BLOOD PRESSURE: 70 MMHG | SYSTOLIC BLOOD PRESSURE: 106 MMHG | HEIGHT: 64 IN | HEART RATE: 68 BPM

## 2025-04-24 PROCEDURE — G2211 COMPLEX E/M VISIT ADD ON: CPT

## 2025-04-24 PROCEDURE — 99215 OFFICE O/P EST HI 40 MIN: CPT

## 2025-05-01 ENCOUNTER — TRANSCRIPTION ENCOUNTER (OUTPATIENT)
Age: 65
End: 2025-05-01

## 2025-05-13 NOTE — ED ADULT NURSE NOTE - NSICDXPASTSURGICALHX_GEN_ALL_CORE_FT
[Initial Visit] : an initial visit for [FreeTextEntry2] : left knee pain  PAST SURGICAL HISTORY:  H/O colonoscopy 2021    H/O cone biopsy of cervix     H/O dilation and curettage     History of esophagogastroduodenoscopy (EGD)     History of foot surgery

## 2025-05-23 ENCOUNTER — EMERGENCY (EMERGENCY)
Facility: HOSPITAL | Age: 65
LOS: 0 days | Discharge: HOME | End: 2025-05-23
Attending: EMERGENCY MEDICINE
Payer: MEDICARE

## 2025-05-23 VITALS
HEART RATE: 76 BPM | TEMPERATURE: 97 F | DIASTOLIC BLOOD PRESSURE: 83 MMHG | WEIGHT: 179.9 LBS | OXYGEN SATURATION: 99 % | HEIGHT: 64 IN | RESPIRATION RATE: 18 BRPM | SYSTOLIC BLOOD PRESSURE: 136 MMHG
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
## 2025-05-23 DIAGNOSIS — Z98.890 OTHER SPECIFIED POSTPROCEDURAL STATES: Chronic | ICD-10-CM
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
## 2025-05-23 DIAGNOSIS — Z87.891 PERSONAL HISTORY OF NICOTINE DEPENDENCE: ICD-10-CM

## 2025-05-23 DIAGNOSIS — Z88.2 ALLERGY STATUS TO SULFONAMIDES: ICD-10-CM

## 2025-05-23 DIAGNOSIS — M62.838 OTHER MUSCLE SPASM: ICD-10-CM

## 2025-05-23 DIAGNOSIS — Z88.0 ALLERGY STATUS TO PENICILLIN: ICD-10-CM

## 2025-05-23 DIAGNOSIS — S29.012A STRAIN OF MUSCLE AND TENDON OF BACK WALL OF THORAX, INITIAL ENCOUNTER: ICD-10-CM

## 2025-05-23 PROCEDURE — 99283 EMERGENCY DEPT VISIT LOW MDM: CPT | Mod: 25

## 2025-05-23 PROCEDURE — 99284 EMERGENCY DEPT VISIT MOD MDM: CPT | Mod: FS

## 2025-05-23 PROCEDURE — 96372 THER/PROPH/DIAG INJ SC/IM: CPT

## 2025-05-23 RX ORDER — TIZANIDINE 4 MG/1
1 TABLET ORAL
Qty: 20 | Refills: 0
Start: 2025-05-23 | End: 2025-06-01

## 2025-05-23 RX ORDER — DIAZEPAM 2 MG/1
5 TABLET ORAL ONCE
Refills: 0 | Status: DISCONTINUED | OUTPATIENT
Start: 2025-05-23 | End: 2025-05-23

## 2025-05-23 RX ORDER — KETOROLAC TROMETHAMINE 30 MG/ML
30 INJECTION, SOLUTION INTRAMUSCULAR; INTRAVENOUS ONCE
Refills: 0 | Status: DISCONTINUED | OUTPATIENT
Start: 2025-05-23 | End: 2025-05-23

## 2025-05-23 RX ADMIN — DIAZEPAM 5 MILLIGRAM(S): 2 TABLET ORAL at 16:01

## 2025-05-23 RX ADMIN — KETOROLAC TROMETHAMINE 30 MILLIGRAM(S): 30 INJECTION, SOLUTION INTRAMUSCULAR; INTRAVENOUS at 16:01

## 2025-05-23 NOTE — ED PROVIDER NOTE - PATIENT PORTAL LINK FT
You can access the FollowMyHealth Patient Portal offered by [facility] by registering at the following website: [email or web address]. By joining [employer]’s FollowMyHealth portal, you will also be able to view your health information using other applications (apps) compatible with our system.

## 2025-05-23 NOTE — ED PROVIDER NOTE - ATTENDING APP SHARED VISIT CONTRIBUTION OF CARE
Pt is a 63yo female who notes thoracic back stiffness after bending opver to pick something up yesterday.  Worse today.  No direct trauma or fall.  No radiation of pain.    Exam: spine without stepoffs, b/l trapezius spasm, normal skin, NAD  Plan: NSAIDs, muscle relaxants

## 2025-05-23 NOTE — ED PROVIDER NOTE - OBJECTIVE STATEMENT
Patient c/o upper back pain, Started yesterday while bending over to grab something, NO SOB,  no abd pain, no chest pain,

## 2025-05-23 NOTE — ED POST DISCHARGE NOTE - RESULT SUMMARY
Pharmacy called. Tizanidine capsules not covered by pharmacy. Verbal to give tablets given over phone

## 2025-06-17 NOTE — STROKE CODE NOTE - ER ARRIVAL: DATE/TIME
A user error has taken place: encounter opened in error, closed for administrative reasons    .    
19-Sep-2019 13:00

## 2025-06-23 ENCOUNTER — APPOINTMENT (OUTPATIENT)
Age: 65
End: 2025-06-23

## 2025-07-02 ENCOUNTER — APPOINTMENT (OUTPATIENT)
Facility: CLINIC | Age: 65
End: 2025-07-02

## 2025-08-26 ENCOUNTER — OUTPATIENT (OUTPATIENT)
Dept: OUTPATIENT SERVICES | Facility: HOSPITAL | Age: 65
LOS: 1 days | End: 2025-08-26
Payer: MEDICARE

## 2025-08-26 ENCOUNTER — RESULT REVIEW (OUTPATIENT)
Age: 65
End: 2025-08-26

## 2025-08-26 DIAGNOSIS — Z98.890 OTHER SPECIFIED POSTPROCEDURAL STATES: Chronic | ICD-10-CM

## 2025-08-26 DIAGNOSIS — Z12.31 ENCOUNTER FOR SCREENING MAMMOGRAM FOR MALIGNANT NEOPLASM OF BREAST: ICD-10-CM

## 2025-08-26 PROCEDURE — 77063 BREAST TOMOSYNTHESIS BI: CPT | Mod: 26

## 2025-08-26 PROCEDURE — 77067 SCR MAMMO BI INCL CAD: CPT | Mod: 26

## 2025-08-26 PROCEDURE — 77063 BREAST TOMOSYNTHESIS BI: CPT

## 2025-08-26 PROCEDURE — 77067 SCR MAMMO BI INCL CAD: CPT

## 2025-08-27 ENCOUNTER — APPOINTMENT (OUTPATIENT)
Dept: ORTHOPEDIC SURGERY | Facility: CLINIC | Age: 65
End: 2025-08-27
Payer: MEDICARE

## 2025-08-27 DIAGNOSIS — G57.61 LESION OF PLANTAR NERVE, RIGHT LOWER LIMB: ICD-10-CM

## 2025-08-27 DIAGNOSIS — Z12.31 ENCOUNTER FOR SCREENING MAMMOGRAM FOR MALIGNANT NEOPLASM OF BREAST: ICD-10-CM

## 2025-08-27 PROCEDURE — 99204 OFFICE O/P NEW MOD 45 MIN: CPT

## 2025-08-28 ENCOUNTER — APPOINTMENT (OUTPATIENT)
Dept: NEUROLOGY | Facility: CLINIC | Age: 65
End: 2025-08-28
Payer: MEDICARE

## 2025-08-28 VITALS
SYSTOLIC BLOOD PRESSURE: 115 MMHG | HEIGHT: 64 IN | DIASTOLIC BLOOD PRESSURE: 74 MMHG | BODY MASS INDEX: 29.88 KG/M2 | HEART RATE: 64 BPM | WEIGHT: 175 LBS

## 2025-08-28 DIAGNOSIS — M54.2 CERVICALGIA: ICD-10-CM

## 2025-08-28 DIAGNOSIS — G70.00 MYASTHENIA GRAVIS W/OUT (ACUTE) EXACERBATION: ICD-10-CM

## 2025-08-28 PROCEDURE — G2211 COMPLEX E/M VISIT ADD ON: CPT

## 2025-08-28 PROCEDURE — 99215 OFFICE O/P EST HI 40 MIN: CPT
